# Patient Record
Sex: MALE | Race: WHITE | Employment: UNEMPLOYED | URBAN - METROPOLITAN AREA
[De-identification: names, ages, dates, MRNs, and addresses within clinical notes are randomized per-mention and may not be internally consistent; named-entity substitution may affect disease eponyms.]

---

## 2017-01-13 RX ORDER — DULOXETIN HYDROCHLORIDE 30 MG/1
CAPSULE, DELAYED RELEASE ORAL
Qty: 60 CAPSULE | Refills: 5 | Status: SHIPPED | OUTPATIENT
Start: 2017-01-13 | End: 2017-07-16 | Stop reason: SDUPTHER

## 2017-01-14 ENCOUNTER — TELEPHONE (OUTPATIENT)
Dept: FAMILY MEDICINE CLINIC | Age: 47
End: 2017-01-14

## 2017-01-15 ENCOUNTER — TELEPHONE (OUTPATIENT)
Dept: FAMILY MEDICINE CLINIC | Age: 47
End: 2017-01-15

## 2017-03-17 ENCOUNTER — HOSPITAL ENCOUNTER (OUTPATIENT)
Dept: CT IMAGING | Age: 47
Discharge: HOME | End: 2017-03-17
Attending: INTERNAL MEDICINE | Admitting: INTERNAL MEDICINE

## 2017-03-17 ENCOUNTER — OFFICE VISIT (OUTPATIENT)
Dept: FAMILY MEDICINE CLINIC | Age: 47
End: 2017-03-17

## 2017-03-17 VITALS
SYSTOLIC BLOOD PRESSURE: 110 MMHG | BODY MASS INDEX: 29.16 KG/M2 | HEART RATE: 131 BPM | OXYGEN SATURATION: 97 % | DIASTOLIC BLOOD PRESSURE: 80 MMHG | WEIGHT: 220 LBS | TEMPERATURE: 98.2 F

## 2017-03-17 DIAGNOSIS — R10.11 RUQ PAIN: Primary | ICD-10-CM

## 2017-03-17 DIAGNOSIS — F33.1 MODERATE EPISODE OF RECURRENT MAJOR DEPRESSIVE DISORDER (HCC): ICD-10-CM

## 2017-03-17 DIAGNOSIS — R10.11 RIGHT UPPER QUADRANT PAIN: ICD-10-CM

## 2017-03-17 DIAGNOSIS — R10.11 RUQ PAIN: ICD-10-CM

## 2017-03-17 LAB
A/G RATIO: 1.6 (ref 1.1–2.2)
ALBUMIN SERPL-MCNC: 4.5 G/DL (ref 3.4–5)
ALP BLD-CCNC: 114 U/L (ref 40–129)
ALT SERPL-CCNC: 58 U/L (ref 10–40)
ANION GAP SERPL CALCULATED.3IONS-SCNC: 15 MMOL/L (ref 3–16)
AST SERPL-CCNC: 30 U/L (ref 15–37)
BILIRUB SERPL-MCNC: 0.5 MG/DL (ref 0–1)
BUN BLDV-MCNC: 17 MG/DL (ref 7–20)
CALCIUM SERPL-MCNC: 9.4 MG/DL (ref 8.3–10.6)
CHLORIDE BLD-SCNC: 105 MMOL/L (ref 99–110)
CO2: 19 MMOL/L (ref 21–32)
CREAT SERPL-MCNC: 0.8 MG/DL (ref 0.9–1.3)
GFR AFRICAN AMERICAN: >60
GFR NON-AFRICAN AMERICAN: >60
GLOBULIN: 2.9 G/DL
GLUCOSE BLD-MCNC: 105 MG/DL (ref 70–99)
HCT VFR BLD CALC: 51.7 % (ref 40.5–52.5)
HEMOGLOBIN: 17.4 G/DL (ref 13.5–17.5)
MCH RBC QN AUTO: 31.8 PG (ref 26–34)
MCHC RBC AUTO-ENTMCNC: 33.6 G/DL (ref 31–36)
MCV RBC AUTO: 94.7 FL (ref 80–100)
PDW BLD-RTO: 13.7 % (ref 12.4–15.4)
PLATELET # BLD: 300 K/UL (ref 135–450)
PMV BLD AUTO: 8.7 FL (ref 5–10.5)
POTASSIUM SERPL-SCNC: 4.3 MMOL/L (ref 3.5–5.1)
RBC # BLD: 5.46 M/UL (ref 4.2–5.9)
SODIUM BLD-SCNC: 139 MMOL/L (ref 136–145)
TOTAL PROTEIN: 7.4 G/DL (ref 6.4–8.2)
WBC # BLD: 11.4 K/UL (ref 4–11)

## 2017-03-17 PROCEDURE — 99214 OFFICE O/P EST MOD 30 MIN: CPT | Performed by: INTERNAL MEDICINE

## 2017-03-17 PROCEDURE — 36415 COLL VENOUS BLD VENIPUNCTURE: CPT | Performed by: INTERNAL MEDICINE

## 2017-03-17 RX ORDER — DULOXETIN HYDROCHLORIDE 30 MG/1
CAPSULE, DELAYED RELEASE ORAL
Qty: 60 CAPSULE | Refills: 5 | Status: CANCELLED | OUTPATIENT
Start: 2017-03-17

## 2017-03-17 RX ORDER — DICYCLOMINE HYDROCHLORIDE 10 MG/1
10 CAPSULE ORAL 3 TIMES DAILY PRN
Qty: 60 CAPSULE | Refills: 5 | Status: SHIPPED | OUTPATIENT
Start: 2017-03-17 | End: 2017-11-02

## 2017-03-17 RX ORDER — CYCLOBENZAPRINE HCL 10 MG
TABLET ORAL
Qty: 90 TABLET | Refills: 5 | Status: SHIPPED | OUTPATIENT
Start: 2017-03-17 | End: 2017-11-15 | Stop reason: SDUPTHER

## 2017-03-17 RX ORDER — OMEPRAZOLE 40 MG/1
40 CAPSULE, DELAYED RELEASE ORAL DAILY
Qty: 30 CAPSULE | Refills: 5 | Status: SHIPPED | OUTPATIENT
Start: 2017-03-17 | End: 2017-03-20

## 2017-03-17 RX ORDER — TRAZODONE HYDROCHLORIDE 50 MG/1
100 TABLET ORAL NIGHTLY
Qty: 60 TABLET | Refills: 2 | Status: SHIPPED | OUTPATIENT
Start: 2017-03-17 | End: 2018-03-19 | Stop reason: SDUPTHER

## 2017-03-17 ASSESSMENT — ENCOUNTER SYMPTOMS
ABDOMINAL PAIN: 1
DIARRHEA: 0
CONSTIPATION: 0
NAUSEA: 0
BLOOD IN STOOL: 1
VOMITING: 0

## 2017-06-19 ENCOUNTER — OFFICE VISIT (OUTPATIENT)
Dept: FAMILY MEDICINE CLINIC | Age: 47
End: 2017-06-19

## 2017-06-19 VITALS
SYSTOLIC BLOOD PRESSURE: 110 MMHG | WEIGHT: 237 LBS | HEART RATE: 98 BPM | DIASTOLIC BLOOD PRESSURE: 76 MMHG | BODY MASS INDEX: 31.41 KG/M2 | OXYGEN SATURATION: 96 % | RESPIRATION RATE: 18 BRPM

## 2017-06-19 DIAGNOSIS — R60.9 SWELLING: Primary | ICD-10-CM

## 2017-06-19 DIAGNOSIS — R06.02 SHORTNESS OF BREATH: ICD-10-CM

## 2017-06-19 PROCEDURE — 99213 OFFICE O/P EST LOW 20 MIN: CPT | Performed by: NURSE PRACTITIONER

## 2017-06-19 ASSESSMENT — ENCOUNTER SYMPTOMS
DOUBLE VISION: 0
HEMOPTYSIS: 0
ORTHOPNEA: 0
SPUTUM PRODUCTION: 0
WHEEZING: 1
SHORTNESS OF BREATH: 1
BLURRED VISION: 1
COUGH: 1

## 2017-06-20 ENCOUNTER — OFFICE VISIT (OUTPATIENT)
Dept: FAMILY MEDICINE CLINIC | Age: 47
End: 2017-06-20

## 2017-06-20 VITALS
WEIGHT: 237 LBS | HEART RATE: 102 BPM | OXYGEN SATURATION: 96 % | HEIGHT: 73 IN | SYSTOLIC BLOOD PRESSURE: 120 MMHG | BODY MASS INDEX: 31.41 KG/M2 | DIASTOLIC BLOOD PRESSURE: 78 MMHG

## 2017-06-20 DIAGNOSIS — M79.89 LEG SWELLING: Primary | ICD-10-CM

## 2017-06-20 PROCEDURE — 99213 OFFICE O/P EST LOW 20 MIN: CPT | Performed by: FAMILY MEDICINE

## 2017-06-20 RX ORDER — FUROSEMIDE 20 MG/1
20 TABLET ORAL DAILY
Qty: 5 TABLET | Refills: 0 | Status: SHIPPED | OUTPATIENT
Start: 2017-06-20 | End: 2017-06-30 | Stop reason: SDUPTHER

## 2017-06-29 ENCOUNTER — TELEPHONE (OUTPATIENT)
Dept: FAMILY MEDICINE CLINIC | Age: 47
End: 2017-06-29

## 2017-06-29 DIAGNOSIS — M79.89 LEG SWELLING: ICD-10-CM

## 2017-06-30 RX ORDER — POTASSIUM CHLORIDE 750 MG/1
10 TABLET, EXTENDED RELEASE ORAL DAILY
Qty: 10 TABLET | Refills: 0 | Status: SHIPPED | OUTPATIENT
Start: 2017-06-30 | End: 2017-08-08 | Stop reason: ALTCHOICE

## 2017-06-30 RX ORDER — FUROSEMIDE 40 MG/1
40 TABLET ORAL DAILY
Qty: 5 TABLET | Refills: 0 | Status: SHIPPED | OUTPATIENT
Start: 2017-06-30 | End: 2017-07-05 | Stop reason: SDUPTHER

## 2017-07-05 ENCOUNTER — TELEPHONE (OUTPATIENT)
Dept: FAMILY MEDICINE CLINIC | Age: 47
End: 2017-07-05

## 2017-07-05 DIAGNOSIS — M79.89 LEG SWELLING: ICD-10-CM

## 2017-07-05 RX ORDER — FUROSEMIDE 40 MG/1
40 TABLET ORAL DAILY
Qty: 10 TABLET | Refills: 0 | Status: SHIPPED | OUTPATIENT
Start: 2017-07-05 | End: 2017-07-21 | Stop reason: SDUPTHER

## 2017-07-07 ENCOUNTER — HOSPITAL ENCOUNTER (OUTPATIENT)
Dept: GENERAL RADIOLOGY | Age: 47
Discharge: HOME | End: 2017-07-07

## 2017-07-07 ENCOUNTER — OFFICE VISIT (OUTPATIENT)
Dept: FAMILY MEDICINE CLINIC | Age: 47
End: 2017-07-07

## 2017-07-07 VITALS
OXYGEN SATURATION: 96 % | HEART RATE: 102 BPM | BODY MASS INDEX: 30.9 KG/M2 | DIASTOLIC BLOOD PRESSURE: 86 MMHG | WEIGHT: 234.2 LBS | SYSTOLIC BLOOD PRESSURE: 124 MMHG

## 2017-07-07 DIAGNOSIS — M25.571 ACUTE RIGHT ANKLE PAIN: ICD-10-CM

## 2017-07-07 DIAGNOSIS — M79.89 LEG SWELLING: ICD-10-CM

## 2017-07-07 DIAGNOSIS — M79.671 RIGHT FOOT PAIN: ICD-10-CM

## 2017-07-07 DIAGNOSIS — M25.571 ACUTE RIGHT ANKLE PAIN: Primary | ICD-10-CM

## 2017-07-07 DIAGNOSIS — M25.561 ACUTE PAIN OF RIGHT KNEE: ICD-10-CM

## 2017-07-07 DIAGNOSIS — M25.521 RIGHT ELBOW PAIN: ICD-10-CM

## 2017-07-07 LAB
ALBUMIN SERPL-MCNC: 4.3 G/DL (ref 3.4–5)
ANION GAP SERPL CALCULATED.3IONS-SCNC: 16 MMOL/L (ref 3–16)
BUN BLDV-MCNC: 24 MG/DL (ref 7–20)
CALCIUM SERPL-MCNC: 9.4 MG/DL (ref 8.3–10.6)
CHLORIDE BLD-SCNC: 101 MMOL/L (ref 99–110)
CO2: 26 MMOL/L (ref 21–32)
CREAT SERPL-MCNC: 0.8 MG/DL (ref 0.9–1.3)
GFR AFRICAN AMERICAN: >60
GFR NON-AFRICAN AMERICAN: >60
GLUCOSE BLD-MCNC: 115 MG/DL (ref 70–99)
PHOSPHORUS: 2.9 MG/DL (ref 2.5–4.9)
POTASSIUM SERPL-SCNC: 4.2 MMOL/L (ref 3.5–5.1)
SEDIMENTATION RATE, ERYTHROCYTE: 18 MM/HR (ref 0–15)
SODIUM BLD-SCNC: 143 MMOL/L (ref 136–145)
URIC ACID, SERUM: 8.2 MG/DL (ref 3.5–7.2)

## 2017-07-07 PROCEDURE — 99213 OFFICE O/P EST LOW 20 MIN: CPT | Performed by: INTERNAL MEDICINE

## 2017-07-07 RX ORDER — TRAMADOL HYDROCHLORIDE 50 MG/1
50 TABLET ORAL EVERY 8 HOURS PRN
Qty: 90 TABLET | Refills: 0 | Status: SHIPPED | OUTPATIENT
Start: 2017-07-07 | End: 2017-11-02

## 2017-07-07 RX ORDER — PROMETHAZINE HYDROCHLORIDE 25 MG/1
25 TABLET ORAL EVERY 8 HOURS PRN
Qty: 20 TABLET | Refills: 1 | Status: CANCELLED | OUTPATIENT
Start: 2017-07-07

## 2017-07-07 RX ORDER — NAPROXEN 500 MG/1
TABLET ORAL
Qty: 60 TABLET | Refills: 5 | Status: CANCELLED | OUTPATIENT
Start: 2017-07-07

## 2017-07-07 ASSESSMENT — PATIENT HEALTH QUESTIONNAIRE - PHQ9
2. FEELING DOWN, DEPRESSED OR HOPELESS: 1
SUM OF ALL RESPONSES TO PHQ QUESTIONS 1-9: 2
1. LITTLE INTEREST OR PLEASURE IN DOING THINGS: 1
SUM OF ALL RESPONSES TO PHQ9 QUESTIONS 1 & 2: 2

## 2017-07-10 ENCOUNTER — TELEPHONE (OUTPATIENT)
Dept: FAMILY MEDICINE CLINIC | Age: 47
End: 2017-07-10

## 2017-07-10 RX ORDER — PREDNISONE 20 MG/1
TABLET ORAL
Qty: 7 TABLET | Refills: 0 | Status: SHIPPED | OUTPATIENT
Start: 2017-07-10 | End: 2017-07-20

## 2017-07-20 ENCOUNTER — HOSPITAL ENCOUNTER (OUTPATIENT)
Dept: NON INVASIVE DIAGNOSTICS | Age: 47
Discharge: HOME | End: 2017-07-20
Attending: INTERNAL MEDICINE | Admitting: INTERNAL MEDICINE

## 2017-07-20 DIAGNOSIS — M79.89 OTHER SPECIFIED SOFT TISSUE DISORDERS: ICD-10-CM

## 2017-07-20 LAB
LV EF: 50 %
LVEF MODALITY: NORMAL

## 2017-07-20 RX ORDER — DULOXETIN HYDROCHLORIDE 60 MG/1
60 CAPSULE, DELAYED RELEASE ORAL DAILY
Qty: 30 CAPSULE | Refills: 5 | Status: SHIPPED | OUTPATIENT
Start: 2017-07-20 | End: 2017-07-21

## 2017-07-21 ENCOUNTER — OFFICE VISIT (OUTPATIENT)
Dept: FAMILY MEDICINE CLINIC | Age: 47
End: 2017-07-21

## 2017-07-21 VITALS
HEART RATE: 88 BPM | DIASTOLIC BLOOD PRESSURE: 80 MMHG | BODY MASS INDEX: 29.18 KG/M2 | OXYGEN SATURATION: 97 % | WEIGHT: 221.2 LBS | SYSTOLIC BLOOD PRESSURE: 124 MMHG

## 2017-07-21 DIAGNOSIS — M25.571 ACUTE RIGHT ANKLE PAIN: ICD-10-CM

## 2017-07-21 DIAGNOSIS — E78.00 PURE HYPERCHOLESTEROLEMIA: ICD-10-CM

## 2017-07-21 DIAGNOSIS — Z11.4 SCREENING FOR HIV (HUMAN IMMUNODEFICIENCY VIRUS): ICD-10-CM

## 2017-07-21 DIAGNOSIS — I51.89 CARDIAC DYSFUNCTION: Primary | ICD-10-CM

## 2017-07-21 DIAGNOSIS — M79.89 LEG SWELLING: ICD-10-CM

## 2017-07-21 LAB
CHOLESTEROL, TOTAL: 189 MG/DL (ref 0–199)
HDLC SERPL-MCNC: 31 MG/DL (ref 40–60)
LDL CHOLESTEROL CALCULATED: 120 MG/DL
TRIGL SERPL-MCNC: 192 MG/DL (ref 0–150)
VLDLC SERPL CALC-MCNC: 38 MG/DL

## 2017-07-21 PROCEDURE — 99214 OFFICE O/P EST MOD 30 MIN: CPT | Performed by: INTERNAL MEDICINE

## 2017-07-21 RX ORDER — DULOXETIN HYDROCHLORIDE 30 MG/1
30 CAPSULE, DELAYED RELEASE ORAL 2 TIMES DAILY
COMMUNITY
End: 2017-07-21 | Stop reason: ALTCHOICE

## 2017-07-21 RX ORDER — OMEPRAZOLE 40 MG/1
CAPSULE, DELAYED RELEASE ORAL
Qty: 30 CAPSULE | Refills: 5 | Status: SHIPPED | OUTPATIENT
Start: 2017-07-21 | End: 2018-03-19 | Stop reason: SDUPTHER

## 2017-07-21 RX ORDER — ONDANSETRON HYDROCHLORIDE 8 MG/1
8 TABLET, FILM COATED ORAL EVERY 8 HOURS PRN
Qty: 30 TABLET | Refills: 1 | Status: SHIPPED | OUTPATIENT
Start: 2017-07-21 | End: 2017-11-02

## 2017-07-21 RX ORDER — FUROSEMIDE 40 MG/1
40 TABLET ORAL DAILY PRN
Qty: 30 TABLET | Refills: 1 | Status: SHIPPED | OUTPATIENT
Start: 2017-07-21 | End: 2017-11-02

## 2017-07-21 RX ORDER — DULOXETIN HYDROCHLORIDE 30 MG/1
30 CAPSULE, DELAYED RELEASE ORAL 2 TIMES DAILY
Qty: 30 CAPSULE | Refills: 5 | Status: CANCELLED | OUTPATIENT
Start: 2017-07-21

## 2017-07-21 ASSESSMENT — ENCOUNTER SYMPTOMS
COUGH: 0
SHORTNESS OF BREATH: 0

## 2017-07-21 NOTE — TELEPHONE ENCOUNTER
Patient wants to know if he can have an early refill on tramadol. Last refill was 7/7/17, patient stated during OV that he was out of medication and was hoping to have pcp give him another prescription.  [address]

## 2017-07-24 LAB — HIV-1 AND HIV-2 ANTIBODIES: NORMAL

## 2017-07-25 RX ORDER — TRAMADOL HYDROCHLORIDE 50 MG/1
50 TABLET ORAL EVERY 8 HOURS PRN
Qty: 90 TABLET | Refills: 0 | OUTPATIENT
Start: 2017-07-25

## 2017-07-25 NOTE — TELEPHONE ENCOUNTER
90 tabs should have lasted one month, if this were taken as prescribed. No refill at this point. Schedule OV in 2 weeks to reassess and discuss pain medication.

## 2017-08-08 ENCOUNTER — OFFICE VISIT (OUTPATIENT)
Dept: CARDIOLOGY CLINIC | Age: 47
End: 2017-08-08

## 2017-08-08 VITALS
BODY MASS INDEX: 29.16 KG/M2 | HEART RATE: 89 BPM | SYSTOLIC BLOOD PRESSURE: 120 MMHG | HEIGHT: 73 IN | OXYGEN SATURATION: 96 % | WEIGHT: 220 LBS | DIASTOLIC BLOOD PRESSURE: 84 MMHG

## 2017-08-08 DIAGNOSIS — R06.02 SOB (SHORTNESS OF BREATH) ON EXERTION: ICD-10-CM

## 2017-08-08 DIAGNOSIS — Z76.89 ENCOUNTER TO ESTABLISH CARE: Primary | ICD-10-CM

## 2017-08-08 DIAGNOSIS — I10 ESSENTIAL HYPERTENSION: ICD-10-CM

## 2017-08-08 DIAGNOSIS — R07.2 PRECORDIAL PAIN: ICD-10-CM

## 2017-08-08 PROBLEM — R07.9 CHEST PAIN: Status: ACTIVE | Noted: 2017-08-08

## 2017-08-08 PROCEDURE — 93000 ELECTROCARDIOGRAM COMPLETE: CPT | Performed by: INTERNAL MEDICINE

## 2017-08-08 PROCEDURE — 99245 OFF/OP CONSLTJ NEW/EST HI 55: CPT | Performed by: INTERNAL MEDICINE

## 2017-08-08 RX ORDER — DULOXETIN HYDROCHLORIDE 60 MG/1
60 CAPSULE, DELAYED RELEASE ORAL DAILY
COMMUNITY
End: 2018-01-23 | Stop reason: SDUPTHER

## 2017-09-07 ENCOUNTER — OFFICE VISIT (OUTPATIENT)
Dept: CARDIOLOGY CLINIC | Age: 47
End: 2017-09-07

## 2017-09-07 VITALS
SYSTOLIC BLOOD PRESSURE: 120 MMHG | DIASTOLIC BLOOD PRESSURE: 98 MMHG | HEIGHT: 73 IN | BODY MASS INDEX: 30.62 KG/M2 | WEIGHT: 231 LBS | OXYGEN SATURATION: 95 % | HEART RATE: 106 BPM

## 2017-09-07 DIAGNOSIS — I10 HTN (HYPERTENSION), MALIGNANT: ICD-10-CM

## 2017-09-07 DIAGNOSIS — R07.2 PRECORDIAL PAIN: Primary | ICD-10-CM

## 2017-09-07 DIAGNOSIS — I51.9 LV DYSFUNCTION: ICD-10-CM

## 2017-09-07 PROCEDURE — 99214 OFFICE O/P EST MOD 30 MIN: CPT | Performed by: NURSE PRACTITIONER

## 2017-09-07 RX ORDER — VALSARTAN 160 MG/1
160 TABLET ORAL DAILY
Qty: 30 TABLET | Refills: 3 | Status: SHIPPED | OUTPATIENT
Start: 2017-09-07 | End: 2018-01-15 | Stop reason: SDUPTHER

## 2017-10-12 ENCOUNTER — TELEPHONE (OUTPATIENT)
Dept: CARDIOLOGY | Age: 47
End: 2017-10-12

## 2017-10-18 ENCOUNTER — OFFICE VISIT (OUTPATIENT)
Dept: CARDIOLOGY CLINIC | Age: 47
End: 2017-10-18

## 2017-10-18 VITALS
BODY MASS INDEX: 30.35 KG/M2 | HEIGHT: 73 IN | HEART RATE: 100 BPM | DIASTOLIC BLOOD PRESSURE: 84 MMHG | OXYGEN SATURATION: 95 % | WEIGHT: 229 LBS | SYSTOLIC BLOOD PRESSURE: 126 MMHG

## 2017-10-18 DIAGNOSIS — I51.9 LV DYSFUNCTION: ICD-10-CM

## 2017-10-18 DIAGNOSIS — I10 HTN (HYPERTENSION), MALIGNANT: Primary | ICD-10-CM

## 2017-10-18 DIAGNOSIS — I25.110 CORONARY ARTERY DISEASE INVOLVING NATIVE CORONARY ARTERY OF NATIVE HEART WITH UNSTABLE ANGINA PECTORIS (HCC): ICD-10-CM

## 2017-10-18 DIAGNOSIS — F41.9 ANXIETY: ICD-10-CM

## 2017-10-18 PROCEDURE — 99214 OFFICE O/P EST MOD 30 MIN: CPT | Performed by: NURSE PRACTITIONER

## 2017-10-18 NOTE — PROGRESS NOTES
[address]   Cardiac Evaluation    Primary Care Doctor:  Brennan Gaucher, MD    Chief Complaint   Patient presents with    1 Month Follow-Up    Hypertension    Discuss Labs     08/09/2017    Medication Adjustment    Shortness of Breath     can't breath w/ any activity    Fatigue    Altered Mental Status     confused    Headache     everyday        History of Present Illness:   I had the pleasure of seeing Karoline Jones in follow up for non-obstructive coronary artery disease, HTN and LV dysfunction. At last visit we stopped the metoprolol and started valsartan 160 mg daily for continue chest pain and LV dysfunction. He called with symptoms of shortness of breath and dizziness, was recommended evaluation in the ED but he declined. Today he is frustrated with continued symptoms. He has fatigue and dizziness with any activity or without activity. He complains of numbness in left hand from nerve damage and a severe headache (few days ago). He feels no better and maybe a bit worse since last visit and the valsartan. His main complaint is not able to do as much as before and feels a heavy burden to care for those around him. He admits to continued marijuana use and tobacco use. He took a dose of Xanax and had a really good day. Abimael Valentin describes symptoms including chest pain, dyspnea, fatigue but denies palpitations, orthopnea, PND, early saiety, edema, syncope. NYHA:   II  ACC/ AHA Stage:    C    Past Medical History:   has a past medical history of ADHD (attention deficit hyperactivity disorder); Chronic back pain; ED (erectile dysfunction); GERD (gastroesophageal reflux disease); Hypertension; and Pleurisy. Surgical History:   has a past surgical history that includes Lung surgery; Spine surgery (1995); Arm Surgery (Left, 12/10/2015); and Coronary angioplasty. Social History:   reports that he has been smoking Cigarettes. He has been smoking about 0.50 packs per day.  He has never used smokeless tobacco. He reports that he uses drugs, including Marijuana. He reports that he does not drink alcohol. Family History:   Family History   Problem Relation Age of Onset    Arthritis Mother     Asthma Father        Home Medications:  Prior to Admission medications    Medication Sig Start Date End Date Taking? Authorizing Provider   valsartan (DIOVAN) 160 MG tablet Take 1 tablet by mouth daily 9/7/17  Yes Damir Hernandes NP   aspirin 81 MG tablet Take 81 mg by mouth daily   Yes Historical Provider, MD   atorvastatin (LIPITOR) 40 MG tablet Take 1 tablet by mouth daily 8/9/17  Yes Skyla Escalona MD   DULoxetine (CYMBALTA) 60 MG extended release capsule Take 60 mg by mouth daily   Yes Historical Provider, MD   omeprazole (PRILOSEC) 40 MG delayed release capsule TAKE ONE CAPSULE BY MOUTH DAILY 7/21/17  Yes Destiny Estrada MD   ondansetron (ZOFRAN) 8 MG tablet Take 1 tablet by mouth every 8 hours as needed for Nausea or Vomiting 7/21/17  Yes Destiny Estrada MD   furosemide (LASIX) 40 MG tablet Take 1 tablet by mouth daily as needed (edema) 7/21/17  Yes Destiny Estrada MD   traMADol (ULTRAM) 50 MG tablet Take 1 tablet by mouth every 8 hours as needed for Pain 7/7/17  Yes Destiny Estrada MD   cyclobenzaprine (FLEXERIL) 10 MG tablet TAKE ONE TABLET BY MOUTH EVERY 8 HOURS AS NEEDED FOR MUSCLE SPASMS 3/17/17  Yes Destiny Estrada MD   dicyclomine (BENTYL) 10 MG capsule Take 1 capsule by mouth 3 times daily as needed (abd pain) 3/17/17  Yes Destiny Estrada MD   traZODone (DESYREL) 50 MG tablet Take 2 tablets by mouth nightly  Patient taking differently: Take 100 mg by mouth as needed  3/17/17  Yes Destiny Estrada MD        Allergies:  Lexapro [escitalopram oxalate]; Morphine; Prozac [fluoxetine hcl]; and Wellbutrin [bupropion]     Review of Systems:   · Constitutional: there has been no unanticipated weight loss. There's been no change in energy level, sleep pattern, or activity level. cardiomyopathy/myocarditis. Assessment:    1. HTN (hypertension), malignant    2. LV dysfunction    3. Coronary artery disease involving native coronary artery of native heart with unstable angina pectoris ([address]    4. Anxiety          Plan:   1. No change in heart medicines - same dose of valsartan 160 mg  2. Have blood work to check potassium and kidney's  3. Follow up with Dr. Boogie Nagy in 3 months  4. Look into the [place] [id number] for evaluation/ treatment of ADHD and anxiety disorder      I appreciate the opportunity of cooperating in the care of this individual.    Elisa Lane CNP, 10/18/2017, 1:33 PM    QUALITY MEASURES  1. Tobacco Cessation Counseling: Yes  2. Retake of BP if >140/90:   NA  3. Documentation to PCP/referring for new patient:  Sent to PCP at close of office visit  4. CAD patient on anti-platelet: Yes  5. CAD patient on STATIN therapy:  Yes  6.  Patient with CHF and aFib on anticoagulation:  NA

## 2017-10-18 NOTE — PATIENT INSTRUCTIONS
1. No change in heart medicines - same dose of valsartan 160 mg  2. Have blood work to check potassium and kidney's  3. Follow up with Dr. Al Rodriguez in 3 months  4.  [facility]/ treatment of ADHD and anxiety disorder

## 2017-11-03 ENCOUNTER — TELEPHONE (OUTPATIENT)
Dept: CASE MANAGEMENT | Age: 47
End: 2017-11-03

## 2017-11-03 DIAGNOSIS — R91.1 PULMONARY NODULE SEEN ON IMAGING STUDY: Primary | ICD-10-CM

## 2017-11-03 NOTE — TELEPHONE ENCOUNTER
Dr Vila Opitz, I received the imaging report from the ED MD due to incidental pulmonary nodule findings. I did not see a f/u visit scheduled with you yet and just wanted to see if you would like me to contact the pt regarding nodule finding and get him in with pulmonology based upon his risk factors. Thanks Montse Saldaña Lung Navigator    EXAMINATION:   CTA OF THE CHEST 11/2/2017 1:21 pm       TECHNIQUE:   CTA of the chest was performed after the administration of intravenous   contrast.  Multiplanar reformatted images are provided for review.  MIP   images are provided for review. Dose modulation, iterative reconstruction,   and/or weight based adjustment of the mA/kV was utilized to reduce the   radiation dose to as low as reasonably achievable.       COMPARISON:   Chest radiograph same date and CT abdomen and pelvis March 17, 2017.  No   prior dedicated chest CTs.       HISTORY:   ORDERING PHYSICIAN PROVIDED HISTORY: right rib pain tachy ho pneumo in past   TECHNOLOGIST PROVIDED HISTORY:   If patient is on cardiac monitor and/or pulse ox, they may be taken off   cardiac monitor and pulse ox, left on O2 if currently on. All monitors   reattached when patient returns to room. Technologist Provided Reason for Exam: RT CHEST CONTUSION FROM A FALL/ RIB   PAIN/ HX PNEUMOTHORAX   Acuity: Acute   Type of Encounter: Initial       FINDINGS:   Pulmonary Arteries: Evaluation for pulmonary embolism is nondiagnostic due to   poor bolus timing with the majority of the contrast being within the aorta. No obvious central pulmonary embolism identified.  Main pulmonary artery is   normal in caliber.       Mediastinum: There are few mildly enlarged mediastinal lymph nodes.  The   largest is in AP window lymph node measuring 1 cm in short axis dimension.    There is also mild right hilar lymphadenopathy.  The heart and pericardium   demonstrate no acute abnormality.  There is no acute abnormality of the   thoracic aorta.       Lungs/pleura: The central airways are patent.  Emphysematous changes are seen   within the lungs.  An area of scarring is seen within the right lung apex.  A   few areas of nodularity are seen along the superior left major fissure.    Largest area measures 7 mm on image number 106.  There are few other smaller   scattered indeterminate noncalcified nodules within the left lung.  There   mild linear dependent airspace opacities in both lungs with a linear band of   atelectasis versus scar in the left lower lobe.  No pneumothorax or pleural   effusion identified.       Upper Abdomen: Small hepatic cyst is again seen.  Visualized upper abdomen is   without acute process.       Soft Tissues/Bones: No acute bone or soft tissue abnormality.           Impression   Nondiagnostic evaluation for pulmonary embolism due to technical factors.  No   obvious central pulmonary embolism.       Mild bibasilar atelectasis.       A few small areas of nodularity are seen along the left major fissure as well   as a few small scattered indeterminate noncalcified lung nodules.  These all   measure 7 mm or less.  Follow-up recommended based on risk factors.       Fleischner Society guidelines for follow-up and management of incidentally   detected pulmonary nodules:

## 2017-11-03 NOTE — TELEPHONE ENCOUNTER
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  Multiplanar reformatted images are provided for review.  MIP   images are provided for review. Dose modulation, iterative reconstruction,   and/or weight based adjustment of the mA/kV was utilized to reduce the   radiation dose to as low as reasonably achievable.       COMPARISON:   Chest radiograph same date and CT abdomen and pelvis March 17, 2017.  No   prior dedicated chest CTs.       HISTORY:   ORDERING PHYSICIAN PROVIDED HISTORY: right rib pain tachy ho pneumo in past   TECHNOLOGIST PROVIDED HISTORY:   If patient is on cardiac monitor and/or pulse ox, they may be taken off   cardiac monitor and pulse ox, left on O2 if currently on. All monitors   reattached when patient returns to room. Technologist Provided Reason for Exam: RT CHEST CONTUSION FROM A FALL/ RIB   PAIN/ HX PNEUMOTHORAX   Acuity: Acute   Type of Encounter: Initial       FINDINGS:   Pulmonary Arteries: Evaluation for pulmonary embolism is nondiagnostic due to   poor bolus timing with the majority of the contrast being within the aorta. No obvious central pulmonary embolism identified.  Main pulmonary artery is   normal in caliber.       Mediastinum: There are few mildly enlarged mediastinal lymph nodes.  The   largest is in AP window lymph node measuring 1 cm in short axis dimension. There is also mild right hilar lymphadenopathy.  The heart and pericardium   demonstrate no acute abnormality.  There is no acute abnormality of the   thoracic aorta.       Lungs/pleura:  The central airways are patent.  Emphysematous changes are seen   within the lungs.  An area of scarring is seen within the right lung apex.  A   few areas of nodularity are seen along the superior left major fissure. Largest area measures 7 mm on image number 106.  There are few other smaller   scattered indeterminate noncalcified nodules within the left lung.  There   mild linear dependent airspace opacities in both lungs with a linear band of   atelectasis versus scar in the left lower lobe.  No pneumothorax or pleural   effusion identified.       Upper Abdomen: Small hepatic cyst is again seen.  Visualized upper abdomen is   without acute process.       Soft Tissues/Bones: No acute bone or soft tissue abnormality.           Impression   Nondiagnostic evaluation for pulmonary embolism due to technical factors.  No   obvious central pulmonary embolism.       Mild bibasilar atelectasis.       A few small areas of nodularity are seen along the left major fissure as well   as a few small scattered indeterminate noncalcified lung nodules.  These all   measure 7 mm or less.  Follow-up recommended based on risk factors.

## 2017-11-06 ENCOUNTER — OFFICE VISIT (OUTPATIENT)
Dept: PULMONOLOGY | Age: 47
End: 2017-11-06

## 2017-11-06 VITALS
RESPIRATION RATE: 16 BRPM | WEIGHT: 233.8 LBS | SYSTOLIC BLOOD PRESSURE: 122 MMHG | HEIGHT: 73 IN | DIASTOLIC BLOOD PRESSURE: 78 MMHG | TEMPERATURE: 98.2 F | OXYGEN SATURATION: 97 % | HEART RATE: 100 BPM | BODY MASS INDEX: 30.99 KG/M2

## 2017-11-06 DIAGNOSIS — R05.9 COUGH: ICD-10-CM

## 2017-11-06 DIAGNOSIS — J43.9 PULMONARY EMPHYSEMA, UNSPECIFIED EMPHYSEMA TYPE (HCC): ICD-10-CM

## 2017-11-06 DIAGNOSIS — R93.89 ABNORMAL CT OF THE CHEST: ICD-10-CM

## 2017-11-06 DIAGNOSIS — R91.8 PULMONARY NODULES: Primary | ICD-10-CM

## 2017-11-06 DIAGNOSIS — R06.02 SOB (SHORTNESS OF BREATH): ICD-10-CM

## 2017-11-06 DIAGNOSIS — F17.200 CURRENT SMOKER: ICD-10-CM

## 2017-11-06 PROCEDURE — G8484 FLU IMMUNIZE NO ADMIN: HCPCS | Performed by: INTERNAL MEDICINE

## 2017-11-06 PROCEDURE — 99244 OFF/OP CNSLTJ NEW/EST MOD 40: CPT | Performed by: INTERNAL MEDICINE

## 2017-11-06 PROCEDURE — G8417 CALC BMI ABV UP PARAM F/U: HCPCS | Performed by: INTERNAL MEDICINE

## 2017-11-06 PROCEDURE — G8598 ASA/ANTIPLAT THER USED: HCPCS | Performed by: INTERNAL MEDICINE

## 2017-11-06 PROCEDURE — G8427 DOCREV CUR MEDS BY ELIG CLIN: HCPCS | Performed by: INTERNAL MEDICINE

## 2017-11-06 PROCEDURE — 3023F SPIROM DOC REV: CPT | Performed by: INTERNAL MEDICINE

## 2017-11-06 PROCEDURE — G8926 SPIRO NO PERF OR DOC: HCPCS | Performed by: INTERNAL MEDICINE

## 2017-11-06 PROCEDURE — 4004F PT TOBACCO SCREEN RCVD TLK: CPT | Performed by: INTERNAL MEDICINE

## 2017-11-06 RX ORDER — ALBUTEROL SULFATE 90 UG/1
2 AEROSOL, METERED RESPIRATORY (INHALATION) EVERY 6 HOURS PRN
Qty: 1 INHALER | Refills: 6 | Status: SHIPPED | OUTPATIENT
Start: 2017-11-06 | End: 2019-07-30 | Stop reason: SDUPTHER

## 2017-11-06 RX ORDER — VARENICLINE TARTRATE 0.5 MG/1
TABLET, FILM COATED ORAL
Qty: 319 TABLET | Refills: 0 | Status: SHIPPED | OUTPATIENT
Start: 2017-11-06 | End: 2018-05-03

## 2017-11-06 NOTE — PROGRESS NOTES
[facility]                                                                    CHIEF COMPLAINT: Pulmonary nodules    Consulting provider: Alisha Coppola MD      HPI:   56years old with history of hypertension had a CT chest 11/2/2017 for chest pain evaluation. The chest reviewed by me and showed pulmonary atelectasis and nodules largest 7 mm. Patient smoker for 33 years- up to 2 ppd. Has cough with clear sputum. No hemoptysis. Occasional SOB, active and can walk as far as he wants. No weight loss. Uses Albuterol once a months- for SOB with help. Past Medical History:   Diagnosis Date    ADHD (attention deficit hyperactivity disorder)     Chronic back pain     ED (erectile dysfunction) 12/29/2011    GERD (gastroesophageal reflux disease)     Hypertension     Pleurisy        Past Surgical History:        Procedure Laterality Date    ARM SURGERY Left 12/10/2015    CORONARY ANGIOPLASTY      LUNG SURGERY      partial removal (right) agent orange    SPINE SURGERY  1995    lumbar laminectomy       Allergies:  is allergic to lexapro [escitalopram oxalate]; morphine; prozac [fluoxetine hcl]; and wellbutrin [bupropion]. Social History:    TOBACCO:   reports that he has been smoking Cigarettes. He has a 57.75 pack-year smoking history. He quit smokeless tobacco use about 2 years ago. ETOH:   reports that he does not drink alcohol.       Family History:       Problem Relation Age of Onset    Arthritis Mother     Other Mother      pneumothorax several times    Asthma Father        Current Medications:    Current Outpatient Prescriptions:     oxyCODONE-acetaminophen (PERCOCET) 5-325 MG per tablet, Take 1 tablet by mouth every 4 hours as needed for Pain ., Disp: 12 tablet, Rfl: 0    valsartan (DIOVAN) 160 MG tablet, Take 1 tablet by mouth daily, Disp: 30 tablet, Rfl: 3    aspirin 81 MG tablet, Take 81 mg by mouth daily, Disp: , Rfl:     atorvastatin (LIPITOR) 40 MG tablet, Take 1 tablet by mouth daily, Disp: 30 tablet, Rfl: 3    DULoxetine (CYMBALTA) 60 MG extended release capsule, Take 60 mg by mouth daily, Disp: , Rfl:     omeprazole (PRILOSEC) 40 MG delayed release capsule, TAKE ONE CAPSULE BY MOUTH DAILY, Disp: 30 capsule, Rfl: 5    cyclobenzaprine (FLEXERIL) 10 MG tablet, TAKE ONE TABLET BY MOUTH EVERY 8 HOURS AS NEEDED FOR MUSCLE SPASMS, Disp: 90 tablet, Rfl: 5    traZODone (DESYREL) 50 MG tablet, Take 2 tablets by mouth nightly (Patient taking differently: Take 100 mg by mouth as needed ), Disp: 60 tablet, Rfl: 2      REVIEW OF SYSTEMS:  Constitutional: Negative for fever  HENT: Negative for sore throat  Eyes: Negative for redness   Respiratory: +  cough  Cardiovascular: Negative for chest pain  Gastrointestinal: Negative for vomiting, diarrhea   Genitourinary: Negative for hematuria   Musculoskeletal: Negative for arthralgias   Skin: Negative for rash  Neurological: Negative for syncope  Hematological: Negative for adenopathy  Psychiatric/Behavorial: Negative for anxiety      Objective:   PHYSICAL EXAM:    Blood pressure 122/78, pulse 100, temperature 98.2 °F (36.8 °C), temperature source Oral, resp. rate 16, height 6' 1\" (1.854 m), weight 233 lb 12.8 oz (106.1 kg), SpO2 97 %.' on RA  Gen: No distress. Eyes: PERRL. No sclera icterus. No conjunctival injection. ENT: No discharge. Pharynx clear. Neck: Trachea midline. No obvious mass. Resp: No accessory muscle use. No crackles. No wheezes. No rhonchi. No dullness on percussion. Decreased air entry. CV: Regular rate. Regular rhythm. No murmur or rub. No edema. GI: Non-tender. Non-distended. No hernia. Skin: Warm and dry. No nodule on exposed extremities. Lymph: No cervical LAD. No supraclavicular LAD. M/S: No cyanosis. No joint deformity. No clubbing. Neuro: Awake. Alert. Moves all four extremities. Psych: Oriented x 3. No anxiety.            DATA reviewed by me:     CT chest 11/2/2017 images reviewed by me and showed:  Nondiagnostic evaluation for pulmonary embolism due to technical factors. No obvious central pulmonary embolism. Mild bibasilar atelectasis. A few small areas of nodularity are seen along the left major fissure as well as a few small scattered indeterminate noncalcified lung nodules. These all measure 7 mm or less. Follow-up recommended based on risk factors. Assessment:       · Pulmonary nodules on CT chest 11/2/2017, largest 7 mm. DDx granuloma versus intrapulmonary lymph node versus malignancy. We'll follow-up radiographically  · Abnormal CT chest with bibasilar atelectasis  · Pulmonary emphysema   · Recurrent PTX post VATS 1995   · Cough and SOB- favor emphysema related and deconditioning  · >33 pack year smoking       Plan:       Options of Bx, resection and watchful waiting were discussed with patient. I recommend radiographic follow up CT chest in 3 months  PFTs  Albuterol 2 puffs Q4-6 hrs PRN  Smoking cessation counseling. Patient was advised to visit smokefree. gov and [facility]. Will refer to [id number] smoking cessation program.   Willing to try Chantix. Risks, benefits and side effects were discussed with patient.

## 2017-12-20 RX ORDER — ATORVASTATIN CALCIUM 40 MG/1
TABLET, FILM COATED ORAL
Qty: 30 TABLET | Refills: 2 | Status: SHIPPED | OUTPATIENT
Start: 2017-12-20 | End: 2018-03-20 | Stop reason: SDUPTHER

## 2018-01-15 RX ORDER — VALSARTAN 160 MG/1
TABLET ORAL
Qty: 30 TABLET | Refills: 3 | Status: SHIPPED | OUTPATIENT
Start: 2018-01-15 | End: 2018-05-24 | Stop reason: SDUPTHER

## 2018-01-19 ENCOUNTER — TELEPHONE (OUTPATIENT)
Dept: FAMILY MEDICINE CLINIC | Age: 48
End: 2018-01-19

## 2018-01-23 ENCOUNTER — OFFICE VISIT (OUTPATIENT)
Dept: FAMILY MEDICINE CLINIC | Age: 48
End: 2018-01-23

## 2018-01-23 VITALS
WEIGHT: 231.6 LBS | SYSTOLIC BLOOD PRESSURE: 138 MMHG | DIASTOLIC BLOOD PRESSURE: 60 MMHG | BODY MASS INDEX: 30.56 KG/M2 | OXYGEN SATURATION: 97 % | HEART RATE: 125 BPM

## 2018-01-23 DIAGNOSIS — F33.1 MODERATE EPISODE OF RECURRENT MAJOR DEPRESSIVE DISORDER (HCC): Primary | ICD-10-CM

## 2018-01-23 DIAGNOSIS — L63.9 ALOPECIA AREATA: ICD-10-CM

## 2018-01-23 DIAGNOSIS — I10 ESSENTIAL HYPERTENSION: ICD-10-CM

## 2018-01-23 PROCEDURE — G8484 FLU IMMUNIZE NO ADMIN: HCPCS | Performed by: INTERNAL MEDICINE

## 2018-01-23 PROCEDURE — 90732 PPSV23 VACC 2 YRS+ SUBQ/IM: CPT | Performed by: INTERNAL MEDICINE

## 2018-01-23 PROCEDURE — 90472 IMMUNIZATION ADMIN EACH ADD: CPT | Performed by: INTERNAL MEDICINE

## 2018-01-23 PROCEDURE — G8427 DOCREV CUR MEDS BY ELIG CLIN: HCPCS | Performed by: INTERNAL MEDICINE

## 2018-01-23 PROCEDURE — 99214 OFFICE O/P EST MOD 30 MIN: CPT | Performed by: INTERNAL MEDICINE

## 2018-01-23 PROCEDURE — 4004F PT TOBACCO SCREEN RCVD TLK: CPT | Performed by: INTERNAL MEDICINE

## 2018-01-23 PROCEDURE — 90471 IMMUNIZATION ADMIN: CPT | Performed by: INTERNAL MEDICINE

## 2018-01-23 PROCEDURE — G8417 CALC BMI ABV UP PARAM F/U: HCPCS | Performed by: INTERNAL MEDICINE

## 2018-01-23 PROCEDURE — G8598 ASA/ANTIPLAT THER USED: HCPCS | Performed by: INTERNAL MEDICINE

## 2018-01-23 PROCEDURE — 90686 IIV4 VACC NO PRSV 0.5 ML IM: CPT | Performed by: INTERNAL MEDICINE

## 2018-01-23 RX ORDER — BUSPIRONE HYDROCHLORIDE 10 MG/1
10 TABLET ORAL 2 TIMES DAILY
Qty: 60 TABLET | Refills: 1 | Status: SHIPPED | OUTPATIENT
Start: 2018-01-23 | End: 2018-03-19 | Stop reason: SDUPTHER

## 2018-01-28 NOTE — PROGRESS NOTES
likely stress induced. Declines referral to derm for steroid injections. Plan:      As above and per orders.

## 2018-02-08 ENCOUNTER — OFFICE VISIT (OUTPATIENT)
Dept: PULMONOLOGY | Age: 48
End: 2018-02-08

## 2018-02-08 ENCOUNTER — HOSPITAL ENCOUNTER (OUTPATIENT)
Dept: CT IMAGING | Age: 48
Discharge: HOME | End: 2018-02-08
Attending: INTERNAL MEDICINE | Admitting: INTERNAL MEDICINE

## 2018-02-08 VITALS
RESPIRATION RATE: 18 BRPM | DIASTOLIC BLOOD PRESSURE: 76 MMHG | WEIGHT: 236 LBS | BODY MASS INDEX: 31.28 KG/M2 | HEIGHT: 73 IN | TEMPERATURE: 98.1 F | SYSTOLIC BLOOD PRESSURE: 120 MMHG | HEART RATE: 121 BPM | OXYGEN SATURATION: 97 %

## 2018-02-08 DIAGNOSIS — J43.9 PULMONARY EMPHYSEMA, UNSPECIFIED EMPHYSEMA TYPE (HCC): ICD-10-CM

## 2018-02-08 DIAGNOSIS — R91.1 PULMONARY NODULE: Primary | ICD-10-CM

## 2018-02-08 DIAGNOSIS — R05.9 COUGH: ICD-10-CM

## 2018-02-08 DIAGNOSIS — R91.1 PULMONARY NODULE: ICD-10-CM

## 2018-02-08 PROCEDURE — 99214 OFFICE O/P EST MOD 30 MIN: CPT | Performed by: INTERNAL MEDICINE

## 2018-02-08 PROCEDURE — G8926 SPIRO NO PERF OR DOC: HCPCS | Performed by: INTERNAL MEDICINE

## 2018-02-08 PROCEDURE — G8598 ASA/ANTIPLAT THER USED: HCPCS | Performed by: INTERNAL MEDICINE

## 2018-02-08 PROCEDURE — G8417 CALC BMI ABV UP PARAM F/U: HCPCS | Performed by: INTERNAL MEDICINE

## 2018-02-08 PROCEDURE — G8427 DOCREV CUR MEDS BY ELIG CLIN: HCPCS | Performed by: INTERNAL MEDICINE

## 2018-02-08 PROCEDURE — G8484 FLU IMMUNIZE NO ADMIN: HCPCS | Performed by: INTERNAL MEDICINE

## 2018-02-08 PROCEDURE — 4004F PT TOBACCO SCREEN RCVD TLK: CPT | Performed by: INTERNAL MEDICINE

## 2018-02-08 PROCEDURE — 3023F SPIROM DOC REV: CPT | Performed by: INTERNAL MEDICINE

## 2018-02-08 NOTE — PROGRESS NOTES
extremities. Psych: Oriented x 3. No anxiety. DATA reviewed by me:     CT chest 11/2/2017 images reviewed by me and showed:  Nondiagnostic evaluation for pulmonary embolism due to technical factors. No obvious central pulmonary embolism. Mild bibasilar atelectasis. A few small areas of nodularity are seen along the left major fissure as well as a few small scattered indeterminate noncalcified lung nodules. These all measure 7 mm or less. Follow-up recommended based on risk factors. CT chest 2/8/2018  images reviewed by me and showed:   1. No acute intrapulmonary findings. 2. Interval resolution of a few scattered pulmonary nodules as seen within the lungs the prior stud 11/02/2017, which were likely infectious inflammatory in etiology at that time. 3. There are 2 stable subcentimeter subpleural nodules within the left lung apex, the largest measuring 6 x 2 mm in size    Assessment:       · Pulmonary nodules on CT chest 11/2/2017, largest 6.6 mm. DDx granuloma versus intrapulmonary lymph node versus malignancy. Stable/imprtoving on repeat CT chest 2/8/2018   · Abnormal CT chest with bibasilar atelectasis  · Pulmonary emphysema   · Recurrent PTX post VATS 1995   · Cough and SOB- favor emphysema related and deconditioning  · >33 pack year smoking       Plan:       CT chest 12 months   PFTs  Albuterol 2 puffs Q4-6 hrs PRN  Smoking cessation counseling  Advised to start Chantix. Risks, benefits and side effects were discussed with patient.

## 2018-02-08 NOTE — PATIENT INSTRUCTIONS
Please keep all of your future appointments scheduled by [facility] [address], [facility] office. PFT PREP  Nothing my mouth 1 hour prior to test (water only is OK). No smoking or inhalers 4 hours prior to test unless directed differently by the physician. Please PRE-REGISTER at [phone] option 2, option 2,prior to your test date. Also, please remember to arrive 20 to 30 minutes prior to testing unless otherwise instructed. Tips to Help You Stop Smoking (taken from Up-To-Date)      Cigarette smoking is a preventable cause of death in the [place]. Quitting smoking now can decrease your risk of getting smoking-related illnesses like heart disease, stroke, cancer & COPD. S = Set a quit date. T = Tell family, friends, and the people around you that you plan to quit. A = Anticipate or plan ahead for the tough times you'll face while quitting. R = Remove cigarettes and other tobacco products from your home, car, and work. T = Talk to your doctor about getting help to quit. Your doctor may give you medicines to reduce your craving for cigarettes & the unpleasant symptoms that happen when you stop smoking (called withdrawal symptoms). What are the symptoms of withdrawal?  The symptoms include:   ?Trouble sleeping   ? Being irritable, anxious or restless   ? Getting frustrated or angry   ? Having trouble thinking clearly  Nicotine replacement therapy eases withdrawal and reduces your bodys craving for nicotine, the main drug found in cigarettes. Non-prescription forms of nicotine replacement include skin patches, lozenges, and gum. Two prescription medications are available that have been proven to help people stop smoking:  ? Bupropion is a prescription medicine that reduces your desire to smoke. This medicine is sold under the brand names Zyban and Wellbutrin & as a generic formulation. ? Varenicline (brand name: Chantix) is a prescription medicine that reduces

## 2018-02-14 NOTE — TELEPHONE ENCOUNTER
Last Seen: 1/23/2018  Next Appointment: Visit date not found    Requested Prescriptions     Pending Prescriptions Disp Refills    cyclobenzaprine (FLEXERIL) 10 MG tablet [Pharmacy Med Name: CYCLOBENZAPRINE 10 MG TABLET] 90 tablet 1     Sig: TAKE ONE TABLET BY MOUTH EVERY 8 HOURS AS NEEDED FOR MUSCLE SPASMS    DULoxetine (CYMBALTA) 60 MG extended release capsule [Pharmacy Med Name: DULoxetine HCL DR 60 MG CAPSULE] 30 capsule 0     Sig: TAKE ONE CAPSULE BY MOUTH DAILY

## 2018-02-16 RX ORDER — DULOXETIN HYDROCHLORIDE 60 MG/1
CAPSULE, DELAYED RELEASE ORAL
Qty: 30 CAPSULE | Refills: 5 | Status: SHIPPED | OUTPATIENT
Start: 2018-02-16 | End: 2018-05-02

## 2018-02-16 RX ORDER — CYCLOBENZAPRINE HCL 10 MG
TABLET ORAL
Qty: 90 TABLET | Refills: 1 | Status: SHIPPED | OUTPATIENT
Start: 2018-02-16 | End: 2018-04-16 | Stop reason: SDUPTHER

## 2018-03-20 RX ORDER — ATORVASTATIN CALCIUM 40 MG/1
TABLET, FILM COATED ORAL
Qty: 30 TABLET | Refills: 5 | Status: SHIPPED | OUTPATIENT
Start: 2018-03-20 | End: 2018-06-12 | Stop reason: SDUPTHER

## 2018-03-21 RX ORDER — OMEPRAZOLE 40 MG/1
CAPSULE, DELAYED RELEASE ORAL
Qty: 30 CAPSULE | Refills: 4 | Status: SHIPPED | OUTPATIENT
Start: 2018-03-21 | End: 2018-06-11

## 2018-04-13 ENCOUNTER — TELEPHONE (OUTPATIENT)
Dept: CASE MANAGEMENT | Age: 48
End: 2018-04-13

## 2018-04-17 RX ORDER — CYCLOBENZAPRINE HCL 10 MG
TABLET ORAL
Qty: 90 TABLET | Refills: 0 | Status: SHIPPED | OUTPATIENT
Start: 2018-04-17 | End: 2018-04-19 | Stop reason: SDUPTHER

## 2018-04-19 ENCOUNTER — OFFICE VISIT (OUTPATIENT)
Dept: FAMILY MEDICINE CLINIC | Age: 48
End: 2018-04-19

## 2018-04-19 VITALS
TEMPERATURE: 98.5 F | OXYGEN SATURATION: 98 % | WEIGHT: 226 LBS | BODY MASS INDEX: 29.82 KG/M2 | DIASTOLIC BLOOD PRESSURE: 84 MMHG | SYSTOLIC BLOOD PRESSURE: 138 MMHG | HEART RATE: 121 BPM

## 2018-04-19 DIAGNOSIS — N52.9 ERECTILE DYSFUNCTION, UNSPECIFIED ERECTILE DYSFUNCTION TYPE: ICD-10-CM

## 2018-04-19 DIAGNOSIS — K92.0 HEMATEMESIS, PRESENCE OF NAUSEA NOT SPECIFIED: ICD-10-CM

## 2018-04-19 DIAGNOSIS — I10 ESSENTIAL HYPERTENSION: ICD-10-CM

## 2018-04-19 DIAGNOSIS — R11.2 NAUSEA AND VOMITING, INTRACTABILITY OF VOMITING NOT SPECIFIED, UNSPECIFIED VOMITING TYPE: Primary | ICD-10-CM

## 2018-04-19 DIAGNOSIS — M54.50 CHRONIC LOW BACK PAIN WITHOUT SCIATICA, UNSPECIFIED BACK PAIN LATERALITY: ICD-10-CM

## 2018-04-19 DIAGNOSIS — G89.29 CHRONIC LOW BACK PAIN WITHOUT SCIATICA, UNSPECIFIED BACK PAIN LATERALITY: ICD-10-CM

## 2018-04-19 PROCEDURE — G8417 CALC BMI ABV UP PARAM F/U: HCPCS | Performed by: PHYSICIAN ASSISTANT

## 2018-04-19 PROCEDURE — G8427 DOCREV CUR MEDS BY ELIG CLIN: HCPCS | Performed by: PHYSICIAN ASSISTANT

## 2018-04-19 PROCEDURE — 99213 OFFICE O/P EST LOW 20 MIN: CPT | Performed by: PHYSICIAN ASSISTANT

## 2018-04-19 PROCEDURE — G8598 ASA/ANTIPLAT THER USED: HCPCS | Performed by: PHYSICIAN ASSISTANT

## 2018-04-19 PROCEDURE — 4004F PT TOBACCO SCREEN RCVD TLK: CPT | Performed by: PHYSICIAN ASSISTANT

## 2018-04-19 RX ORDER — ONDANSETRON 4 MG/1
4 TABLET, FILM COATED ORAL EVERY 8 HOURS PRN
Qty: 24 TABLET | Refills: 0 | Status: SHIPPED | OUTPATIENT
Start: 2018-04-19 | End: 2018-09-06 | Stop reason: ALTCHOICE

## 2018-04-19 RX ORDER — CYCLOBENZAPRINE HCL 10 MG
TABLET ORAL
Qty: 90 TABLET | Refills: 0 | Status: SHIPPED | OUTPATIENT
Start: 2018-04-19 | End: 2018-06-12 | Stop reason: SDUPTHER

## 2018-04-19 ASSESSMENT — ENCOUNTER SYMPTOMS
RHINORRHEA: 0
CONSTIPATION: 0
DIARRHEA: 0
SHORTNESS OF BREATH: 0
COUGH: 0
ABDOMINAL PAIN: 0
VOMITING: 0
SORE THROAT: 0
NAUSEA: 0

## 2018-04-20 ENCOUNTER — TELEPHONE (OUTPATIENT)
Dept: FAMILY MEDICINE CLINIC | Age: 48
End: 2018-04-20

## 2018-04-20 LAB
ANION GAP SERPL CALCULATED.3IONS-SCNC: 14 MMOL/L (ref 3–16)
BUN BLDV-MCNC: 16 MG/DL (ref 7–20)
CALCIUM SERPL-MCNC: 9.3 MG/DL (ref 8.3–10.6)
CHLORIDE BLD-SCNC: 104 MMOL/L (ref 99–110)
CO2: 24 MMOL/L (ref 21–32)
CREAT SERPL-MCNC: 0.7 MG/DL (ref 0.9–1.3)
GFR AFRICAN AMERICAN: >60
GFR NON-AFRICAN AMERICAN: >60
GLUCOSE BLD-MCNC: 93 MG/DL (ref 70–99)
HCT VFR BLD CALC: 47.3 % (ref 40.5–52.5)
HEMOGLOBIN: 15.6 G/DL (ref 13.5–17.5)
MCH RBC QN AUTO: 31 PG (ref 26–34)
MCHC RBC AUTO-ENTMCNC: 33.1 G/DL (ref 31–36)
MCV RBC AUTO: 93.8 FL (ref 80–100)
PDW BLD-RTO: 13.8 % (ref 12.4–15.4)
PLATELET # BLD: 288 K/UL (ref 135–450)
PMV BLD AUTO: 9.2 FL (ref 5–10.5)
POTASSIUM SERPL-SCNC: 3.7 MMOL/L (ref 3.5–5.1)
PROLACTIN: 13 NG/ML
RBC # BLD: 5.04 M/UL (ref 4.2–5.9)
SODIUM BLD-SCNC: 142 MMOL/L (ref 136–145)
WBC # BLD: 11.4 K/UL (ref 4–11)

## 2018-04-21 LAB
SEX HORMONE BINDING GLOBULIN: 73 NMOL/L (ref 11–80)
TESTOSTERONE FREE-NONMALE: 71 PG/ML (ref 47–244)
TESTOSTERONE TOTAL: 582 NG/DL (ref 220–1000)

## 2018-04-26 ENCOUNTER — INITIAL CONSULT (OUTPATIENT)
Dept: GASTROENTEROLOGY | Age: 48
End: 2018-04-26

## 2018-04-26 VITALS
DIASTOLIC BLOOD PRESSURE: 80 MMHG | HEIGHT: 73 IN | WEIGHT: 222 LBS | SYSTOLIC BLOOD PRESSURE: 134 MMHG | BODY MASS INDEX: 29.42 KG/M2

## 2018-04-26 DIAGNOSIS — G89.29 CHRONIC RLQ PAIN: ICD-10-CM

## 2018-04-26 DIAGNOSIS — K44.9 HIATAL HERNIA: ICD-10-CM

## 2018-04-26 DIAGNOSIS — R10.11 RUQ PAIN: ICD-10-CM

## 2018-04-26 DIAGNOSIS — R11.2 NAUSEA AND VOMITING, INTRACTABILITY OF VOMITING NOT SPECIFIED, UNSPECIFIED VOMITING TYPE: ICD-10-CM

## 2018-04-26 DIAGNOSIS — Q43.3 MALROTATION, CONGENITAL: ICD-10-CM

## 2018-04-26 DIAGNOSIS — R19.7 DIARRHEA, UNSPECIFIED TYPE: Primary | ICD-10-CM

## 2018-04-26 DIAGNOSIS — R10.31 CHRONIC RLQ PAIN: ICD-10-CM

## 2018-04-26 PROCEDURE — 99204 OFFICE O/P NEW MOD 45 MIN: CPT | Performed by: INTERNAL MEDICINE

## 2018-04-26 RX ORDER — LANSOPRAZOLE 30 MG/1
30 CAPSULE, DELAYED RELEASE ORAL 2 TIMES DAILY
Qty: 60 CAPSULE | Refills: 1 | Status: SHIPPED | OUTPATIENT
Start: 2018-04-26 | End: 2018-06-11

## 2018-04-26 RX ORDER — POLYETHYLENE GLYCOL 3350 17 G/17G
255 POWDER ORAL DAILY
Qty: 255 G | Refills: 0 | Status: SHIPPED | OUTPATIENT
Start: 2018-04-26 | End: 2018-06-11

## 2018-04-27 ENCOUNTER — TELEPHONE (OUTPATIENT)
Dept: GASTROENTEROLOGY | Age: 48
End: 2018-04-27

## 2018-04-27 RX ORDER — PANTOPRAZOLE SODIUM 40 MG/1
40 TABLET, DELAYED RELEASE ORAL 2 TIMES DAILY
Qty: 60 TABLET | Refills: 1 | Status: SHIPPED | OUTPATIENT
Start: 2018-04-27 | End: 2018-06-11 | Stop reason: SDUPTHER

## 2018-04-30 ENCOUNTER — TELEPHONE (OUTPATIENT)
Dept: GASTROENTEROLOGY | Age: 48
End: 2018-04-30

## 2018-05-02 ENCOUNTER — TELEPHONE (OUTPATIENT)
Dept: GASTROENTEROLOGY | Age: 48
End: 2018-05-02

## 2018-05-03 ENCOUNTER — HOSPITAL ENCOUNTER (OUTPATIENT)
Dept: SURGERY | Age: 48
Discharge: HOME | End: 2018-05-03
Attending: INTERNAL MEDICINE | Admitting: INTERNAL MEDICINE

## 2018-05-03 VITALS
DIASTOLIC BLOOD PRESSURE: 82 MMHG | OXYGEN SATURATION: 96 % | SYSTOLIC BLOOD PRESSURE: 117 MMHG | RESPIRATION RATE: 24 BRPM | TEMPERATURE: 98 F | HEART RATE: 89 BPM

## 2018-05-03 PROCEDURE — 45385 COLONOSCOPY W/LESION REMOVAL: CPT | Performed by: INTERNAL MEDICINE

## 2018-05-03 PROCEDURE — 43239 EGD BIOPSY SINGLE/MULTIPLE: CPT | Performed by: INTERNAL MEDICINE

## 2018-05-03 RX ORDER — CHLORDIAZEPOXIDE HYDROCHLORIDE AND CLIDINIUM BROMIDE 5; 2.5 MG/1; MG/1
1 CAPSULE ORAL
Qty: 90 CAPSULE | Refills: 1 | Status: SHIPPED | OUTPATIENT
Start: 2018-05-03 | End: 2018-05-11 | Stop reason: SDUPTHER

## 2018-05-03 RX ORDER — DIPHENHYDRAMINE HCL 25 MG
25 TABLET ORAL EVERY 6 HOURS PRN
COMMUNITY
End: 2021-05-27 | Stop reason: SDUPTHER

## 2018-05-03 RX ORDER — SODIUM CHLORIDE, SODIUM LACTATE, POTASSIUM CHLORIDE, CALCIUM CHLORIDE 600; 310; 30; 20 MG/100ML; MG/100ML; MG/100ML; MG/100ML
INJECTION, SOLUTION INTRAVENOUS ONCE
Status: COMPLETED | OUTPATIENT
Start: 2018-05-03 | End: 2018-05-03

## 2018-05-03 RX ADMIN — SODIUM CHLORIDE, SODIUM LACTATE, POTASSIUM CHLORIDE, CALCIUM CHLORIDE: 600; 310; 30; 20 INJECTION, SOLUTION INTRAVENOUS at 14:30

## 2018-05-03 ASSESSMENT — LIFESTYLE VARIABLES: SMOKING_STATUS: 1

## 2018-05-03 ASSESSMENT — ENCOUNTER SYMPTOMS: SHORTNESS OF BREATH: 1

## 2018-05-03 ASSESSMENT — PAIN - FUNCTIONAL ASSESSMENT: PAIN_FUNCTIONAL_ASSESSMENT: 0-10

## 2018-05-11 DIAGNOSIS — K58.0 IRRITABLE BOWEL SYNDROME WITH DIARRHEA: Primary | ICD-10-CM

## 2018-05-11 RX ORDER — CHLORDIAZEPOXIDE HYDROCHLORIDE AND CLIDINIUM BROMIDE 5; 2.5 MG/1; MG/1
1 CAPSULE ORAL
Qty: 90 CAPSULE | Refills: 1 | OUTPATIENT
Start: 2018-05-11 | End: 2018-06-11

## 2018-05-24 RX ORDER — VALSARTAN 160 MG/1
TABLET ORAL
Qty: 30 TABLET | Refills: 0 | Status: SHIPPED | OUTPATIENT
Start: 2018-05-24 | End: 2018-06-12 | Stop reason: SDUPTHER

## 2018-06-01 ENCOUNTER — TELEPHONE (OUTPATIENT)
Dept: GASTROENTEROLOGY | Age: 48
End: 2018-06-01

## 2018-06-11 ENCOUNTER — OFFICE VISIT (OUTPATIENT)
Dept: GASTROENTEROLOGY | Age: 48
End: 2018-06-11

## 2018-06-11 VITALS
WEIGHT: 220 LBS | HEIGHT: 73 IN | DIASTOLIC BLOOD PRESSURE: 86 MMHG | SYSTOLIC BLOOD PRESSURE: 138 MMHG | BODY MASS INDEX: 29.16 KG/M2

## 2018-06-11 DIAGNOSIS — K58.0 IRRITABLE BOWEL SYNDROME WITH DIARRHEA: ICD-10-CM

## 2018-06-11 DIAGNOSIS — K21.9 GASTROESOPHAGEAL REFLUX DISEASE WITHOUT ESOPHAGITIS: Primary | ICD-10-CM

## 2018-06-11 DIAGNOSIS — K76.0 FATTY LIVER: ICD-10-CM

## 2018-06-11 DIAGNOSIS — K22.70 BARRETT'S ESOPHAGUS DETERMINED BY ENDOSCOPY: ICD-10-CM

## 2018-06-11 PROCEDURE — 99213 OFFICE O/P EST LOW 20 MIN: CPT | Performed by: INTERNAL MEDICINE

## 2018-06-11 RX ORDER — PANTOPRAZOLE SODIUM 40 MG/1
40 TABLET, DELAYED RELEASE ORAL 2 TIMES DAILY
Qty: 60 TABLET | Refills: 11 | Status: SHIPPED | OUTPATIENT
Start: 2018-06-11 | End: 2018-06-12 | Stop reason: SDUPTHER

## 2018-06-12 ENCOUNTER — OFFICE VISIT (OUTPATIENT)
Dept: FAMILY MEDICINE CLINIC | Age: 48
End: 2018-06-12

## 2018-06-12 VITALS
HEART RATE: 117 BPM | OXYGEN SATURATION: 97 % | WEIGHT: 220 LBS | BODY MASS INDEX: 29.03 KG/M2 | DIASTOLIC BLOOD PRESSURE: 94 MMHG | SYSTOLIC BLOOD PRESSURE: 140 MMHG

## 2018-06-12 DIAGNOSIS — E78.00 PURE HYPERCHOLESTEROLEMIA: ICD-10-CM

## 2018-06-12 DIAGNOSIS — K21.9 GASTROESOPHAGEAL REFLUX DISEASE WITHOUT ESOPHAGITIS: ICD-10-CM

## 2018-06-12 DIAGNOSIS — F33.1 MODERATE EPISODE OF RECURRENT MAJOR DEPRESSIVE DISORDER (HCC): ICD-10-CM

## 2018-06-12 DIAGNOSIS — I10 ESSENTIAL HYPERTENSION: Primary | ICD-10-CM

## 2018-06-12 DIAGNOSIS — M54.50 CHRONIC LOW BACK PAIN WITHOUT SCIATICA, UNSPECIFIED BACK PAIN LATERALITY: ICD-10-CM

## 2018-06-12 DIAGNOSIS — I25.110 CORONARY ARTERY DISEASE INVOLVING NATIVE CORONARY ARTERY OF NATIVE HEART WITH UNSTABLE ANGINA PECTORIS (HCC): ICD-10-CM

## 2018-06-12 DIAGNOSIS — G89.29 CHRONIC LOW BACK PAIN WITHOUT SCIATICA, UNSPECIFIED BACK PAIN LATERALITY: ICD-10-CM

## 2018-06-12 DIAGNOSIS — K22.70 BARRETT'S ESOPHAGUS DETERMINED BY ENDOSCOPY: ICD-10-CM

## 2018-06-12 PROCEDURE — G8427 DOCREV CUR MEDS BY ELIG CLIN: HCPCS | Performed by: INTERNAL MEDICINE

## 2018-06-12 PROCEDURE — 99214 OFFICE O/P EST MOD 30 MIN: CPT | Performed by: INTERNAL MEDICINE

## 2018-06-12 PROCEDURE — G8598 ASA/ANTIPLAT THER USED: HCPCS | Performed by: INTERNAL MEDICINE

## 2018-06-12 PROCEDURE — G8417 CALC BMI ABV UP PARAM F/U: HCPCS | Performed by: INTERNAL MEDICINE

## 2018-06-12 PROCEDURE — 4004F PT TOBACCO SCREEN RCVD TLK: CPT | Performed by: INTERNAL MEDICINE

## 2018-06-12 RX ORDER — PANTOPRAZOLE SODIUM 40 MG/1
40 TABLET, DELAYED RELEASE ORAL 2 TIMES DAILY
Qty: 180 TABLET | Refills: 1 | Status: SHIPPED | OUTPATIENT
Start: 2018-06-12 | End: 2018-12-21 | Stop reason: SDUPTHER

## 2018-06-12 RX ORDER — TRAZODONE HYDROCHLORIDE 50 MG/1
TABLET ORAL
Qty: 180 TABLET | Refills: 1 | Status: SHIPPED | OUTPATIENT
Start: 2018-06-12 | End: 2018-12-21 | Stop reason: SDUPTHER

## 2018-06-12 RX ORDER — VALSARTAN 160 MG/1
TABLET ORAL
Qty: 90 TABLET | Refills: 1 | Status: SHIPPED | OUTPATIENT
Start: 2018-06-12 | End: 2018-07-30 | Stop reason: SINTOL

## 2018-06-12 RX ORDER — BUSPIRONE HYDROCHLORIDE 10 MG/1
TABLET ORAL
Qty: 180 TABLET | Refills: 1 | Status: SHIPPED | OUTPATIENT
Start: 2018-06-12 | End: 2019-06-26

## 2018-06-12 RX ORDER — CYCLOBENZAPRINE HCL 10 MG
TABLET ORAL
Qty: 90 TABLET | Refills: 5 | Status: SHIPPED | OUTPATIENT
Start: 2018-06-12 | End: 2018-12-21 | Stop reason: SDUPTHER

## 2018-06-12 RX ORDER — ATORVASTATIN CALCIUM 40 MG/1
TABLET, FILM COATED ORAL
Qty: 90 TABLET | Refills: 1 | Status: SHIPPED | OUTPATIENT
Start: 2018-06-12 | End: 2018-12-21 | Stop reason: SDUPTHER

## 2018-06-12 ASSESSMENT — ENCOUNTER SYMPTOMS
SHORTNESS OF BREATH: 0
COUGH: 0

## 2018-06-13 ENCOUNTER — TELEPHONE (OUTPATIENT)
Dept: FAMILY MEDICINE CLINIC | Age: 48
End: 2018-06-13

## 2018-06-13 LAB
CHOLESTEROL, TOTAL: 154 MG/DL (ref 0–199)
HDLC SERPL-MCNC: 47 MG/DL (ref 40–60)
LDL CHOLESTEROL CALCULATED: 87 MG/DL
TRIGL SERPL-MCNC: 99 MG/DL (ref 0–150)
VLDLC SERPL CALC-MCNC: 20 MG/DL

## 2018-06-13 RX ORDER — TADALAFIL 10 MG/1
10 TABLET ORAL PRN
Qty: 9 TABLET | Refills: 1 | Status: SHIPPED | OUTPATIENT
Start: 2018-06-13 | End: 2018-09-06 | Stop reason: ALTCHOICE

## 2018-07-30 RX ORDER — LOSARTAN POTASSIUM 50 MG/1
50 TABLET ORAL DAILY
Qty: 90 TABLET | Refills: 0 | Status: SHIPPED | OUTPATIENT
Start: 2018-07-30 | End: 2018-12-10 | Stop reason: SDUPTHER

## 2018-09-06 ENCOUNTER — TELEPHONE (OUTPATIENT)
Dept: FAMILY MEDICINE CLINIC | Age: 48
End: 2018-09-06

## 2018-09-06 ENCOUNTER — HOSPITAL ENCOUNTER (EMERGENCY)
Age: 48
Discharge: HOME | End: 2018-09-06
Attending: EMERGENCY MEDICINE
Payer: COMMERCIAL

## 2018-09-06 ENCOUNTER — APPOINTMENT (OUTPATIENT)
Dept: CT IMAGING | Age: 48
End: 2018-09-06
Payer: COMMERCIAL

## 2018-09-06 VITALS
SYSTOLIC BLOOD PRESSURE: 144 MMHG | HEART RATE: 88 BPM | RESPIRATION RATE: 16 BRPM | OXYGEN SATURATION: 95 % | HEIGHT: 73 IN | WEIGHT: 210 LBS | TEMPERATURE: 97.2 F | DIASTOLIC BLOOD PRESSURE: 100 MMHG | BODY MASS INDEX: 27.83 KG/M2

## 2018-09-06 DIAGNOSIS — R10.31 RIGHT LOWER QUADRANT ABDOMINAL PAIN: Primary | ICD-10-CM

## 2018-09-06 LAB
A/G RATIO: 1.4 (ref 1.1–2.2)
ALBUMIN SERPL-MCNC: 4.4 G/DL (ref 3.4–5)
ALP BLD-CCNC: 135 U/L (ref 40–129)
ALT SERPL-CCNC: 35 U/L (ref 10–40)
ANION GAP SERPL CALCULATED.3IONS-SCNC: 9 MMOL/L (ref 3–16)
AST SERPL-CCNC: 30 U/L (ref 15–37)
BASOPHILS ABSOLUTE: 0.1 K/UL (ref 0–0.2)
BASOPHILS RELATIVE PERCENT: 1 %
BILIRUB SERPL-MCNC: 0.4 MG/DL (ref 0–1)
BILIRUBIN URINE: NEGATIVE
BLOOD, URINE: NEGATIVE
BUN BLDV-MCNC: 16 MG/DL (ref 7–20)
CALCIUM SERPL-MCNC: 9.6 MG/DL (ref 8.3–10.6)
CHLORIDE BLD-SCNC: 108 MMOL/L (ref 99–110)
CLARITY: CLEAR
CO2: 26 MMOL/L (ref 21–32)
COLOR: YELLOW
CREAT SERPL-MCNC: 0.7 MG/DL (ref 0.9–1.3)
EOSINOPHILS ABSOLUTE: 0.2 K/UL (ref 0–0.6)
EOSINOPHILS RELATIVE PERCENT: 1.9 %
GFR AFRICAN AMERICAN: >60
GFR NON-AFRICAN AMERICAN: >60
GLOBULIN: 3.1 G/DL
GLUCOSE BLD-MCNC: 160 MG/DL (ref 70–99)
GLUCOSE URINE: NEGATIVE MG/DL
HCT VFR BLD CALC: 47.7 % (ref 40.5–52.5)
HEMOGLOBIN: 16.1 G/DL (ref 13.5–17.5)
KETONES, URINE: NEGATIVE MG/DL
LEUKOCYTE ESTERASE, URINE: NEGATIVE
LIPASE: 15 U/L (ref 13–60)
LYMPHOCYTES ABSOLUTE: 2.6 K/UL (ref 1–5.1)
LYMPHOCYTES RELATIVE PERCENT: 21.4 %
MCH RBC QN AUTO: 30.8 PG (ref 26–34)
MCHC RBC AUTO-ENTMCNC: 33.8 G/DL (ref 31–36)
MCV RBC AUTO: 91 FL (ref 80–100)
MICROSCOPIC EXAMINATION: NORMAL
MONOCYTES ABSOLUTE: 0.7 K/UL (ref 0–1.3)
MONOCYTES RELATIVE PERCENT: 6 %
NEUTROPHILS ABSOLUTE: 8.3 K/UL (ref 1.7–7.7)
NEUTROPHILS RELATIVE PERCENT: 69.7 %
NITRITE, URINE: NEGATIVE
PDW BLD-RTO: 14 % (ref 12.4–15.4)
PH UA: 6.5
PLATELET # BLD: 279 K/UL (ref 135–450)
PMV BLD AUTO: 7.9 FL (ref 5–10.5)
POTASSIUM SERPL-SCNC: 4.5 MMOL/L (ref 3.5–5.1)
PROTEIN UA: NEGATIVE MG/DL
RBC # BLD: 5.25 M/UL (ref 4.2–5.9)
SODIUM BLD-SCNC: 143 MMOL/L (ref 136–145)
SPECIFIC GRAVITY UA: 1.02
TOTAL PROTEIN: 7.5 G/DL (ref 6.4–8.2)
URINE TYPE: NORMAL
UROBILINOGEN, URINE: 0.2 E.U./DL
WBC # BLD: 11.9 K/UL (ref 4–11)

## 2018-09-06 PROCEDURE — 81003 URINALYSIS AUTO W/O SCOPE: CPT

## 2018-09-06 PROCEDURE — 96375 TX/PRO/DX INJ NEW DRUG ADDON: CPT

## 2018-09-06 PROCEDURE — 6360000004 HC RX CONTRAST MEDICATION: Performed by: NURSE PRACTITIONER

## 2018-09-06 PROCEDURE — 99284 EMERGENCY DEPT VISIT MOD MDM: CPT

## 2018-09-06 PROCEDURE — 96374 THER/PROPH/DIAG INJ IV PUSH: CPT

## 2018-09-06 PROCEDURE — 6360000002 HC RX W HCPCS: Performed by: NURSE PRACTITIONER

## 2018-09-06 PROCEDURE — 85025 COMPLETE CBC W/AUTO DIFF WBC: CPT

## 2018-09-06 PROCEDURE — 83690 ASSAY OF LIPASE: CPT

## 2018-09-06 PROCEDURE — 2580000003 HC RX 258: Performed by: NURSE PRACTITIONER

## 2018-09-06 PROCEDURE — 96361 HYDRATE IV INFUSION ADD-ON: CPT

## 2018-09-06 PROCEDURE — 80053 COMPREHEN METABOLIC PANEL: CPT

## 2018-09-06 PROCEDURE — 74177 CT ABD & PELVIS W/CONTRAST: CPT

## 2018-09-06 RX ORDER — 0.9 % SODIUM CHLORIDE 0.9 %
1000 INTRAVENOUS SOLUTION INTRAVENOUS ONCE
Status: COMPLETED | OUTPATIENT
Start: 2018-09-06 | End: 2018-09-06

## 2018-09-06 RX ORDER — KETOROLAC TROMETHAMINE 30 MG/ML
30 INJECTION, SOLUTION INTRAMUSCULAR; INTRAVENOUS ONCE
Status: COMPLETED | OUTPATIENT
Start: 2018-09-06 | End: 2018-09-06

## 2018-09-06 RX ORDER — DICYCLOMINE HYDROCHLORIDE 10 MG/1
10 CAPSULE ORAL EVERY 6 HOURS PRN
Qty: 20 CAPSULE | Refills: 0 | Status: SHIPPED | OUTPATIENT
Start: 2018-09-06 | End: 2018-11-26 | Stop reason: SDUPTHER

## 2018-09-06 RX ORDER — ONDANSETRON 2 MG/ML
4 INJECTION INTRAMUSCULAR; INTRAVENOUS ONCE
Status: COMPLETED | OUTPATIENT
Start: 2018-09-06 | End: 2018-09-06

## 2018-09-06 RX ADMIN — SODIUM CHLORIDE 1000 ML: 9 INJECTION, SOLUTION INTRAVENOUS at 10:00

## 2018-09-06 RX ADMIN — ONDANSETRON HYDROCHLORIDE 4 MG: 2 INJECTION, SOLUTION INTRAMUSCULAR; INTRAVENOUS at 10:00

## 2018-09-06 RX ADMIN — KETOROLAC TROMETHAMINE 30 MG: 30 INJECTION, SOLUTION INTRAMUSCULAR at 09:59

## 2018-09-06 RX ADMIN — IOPAMIDOL 75 ML: 755 INJECTION, SOLUTION INTRAVENOUS at 10:40

## 2018-09-06 ASSESSMENT — ENCOUNTER SYMPTOMS
SHORTNESS OF BREATH: 0
BLOOD IN STOOL: 0
ABDOMINAL PAIN: 1
BACK PAIN: 1
VOMITING: 0
DIARRHEA: 0
NAUSEA: 0
COUGH: 0

## 2018-09-06 ASSESSMENT — PAIN SCALES - GENERAL
PAINLEVEL_OUTOF10: 4
PAINLEVEL_OUTOF10: 2
PAINLEVEL_OUTOF10: 2

## 2018-09-06 ASSESSMENT — PAIN DESCRIPTION - LOCATION: LOCATION: ABDOMEN

## 2018-09-06 ASSESSMENT — PAIN DESCRIPTION - PAIN TYPE
TYPE: ACUTE PAIN
TYPE: ACUTE PAIN

## 2018-09-06 ASSESSMENT — PAIN SCALES - WONG BAKER: WONGBAKER_NUMERICALRESPONSE: 0

## 2018-09-06 NOTE — ED PROVIDER NOTES
Attending Supervisory Note/Shared Visit   I have personally performed a face to face diagnostic evaluation on this patient. I have reviewed the mid-levels findings and agree. History and Exam by me shows:    A shunt with right lower quadrant abdominal pain for 2 weeks. Pain waxes and wanes. No nausea vomiting diarrhea. No fever. Occasional pain in the right groin. No testicular pain. No back pain. ED Triage Vitals [09/06/18 0938]   BP Temp Temp Source Pulse Resp SpO2 Height Weight   (!) 148/106 97.2 °F (36.2 °C) Oral 96 16 97 % 6' 1\" (1.854 m) 210 lb (95.3 kg)     Constitutional: Well appearing and hydrated. Awake & alert. No acute distress. Cardiovascular: Regular rate. Pulmonary/Chest: Clear to auscultation bilaterally . No distress  GI: Soft and non-distended. There is no discomfort with palpation. No rebound, guarding, or rigidity. : No inguinal tenderness or swelling. No testicular tenderness or swelling  Neurological: Alert, awake. Moving all extremities  Skin: Skin is warm and dry. Psychiatric: Cooperative. MDM:     Abdominal Pain: This patient presents with abdominal pain of unclear etiology. A CT scan was performed to evaluate the abdomen. The history, clinical exam, labs, and imaging have not identified an emergent cause for the abdominal pain. Given the benign exam, the laboratory studies, and unremarkable CT, I have a very low suspicion for any life threatening disease. Patient's symptoms improved after ED medication. No signs of SBO, ileus, colitis, diverticulitis, appendicitis, significant systemic disease or sepsis. They will drink fluids and advance diet as tolerated. Will take oral pain medications.  I have discussed with the patient the level of uncertainty with undifferentiated abdominal pain and clearly explained the need to follow-up as noted on the discharge instructions, or return to the Emergency Department immediately if the pain worsens, develops fever, persistent and uncontrollable vomiting, or for any new symptoms or concerns. Clinical Impression:  1.  Right lower quadrant abdominal pain          Nuha Taveras MD  Attending Emergency Physician        Nuha Taveras MD  09/06/18 [id number]

## 2018-09-06 NOTE — ED TRIAGE NOTES
Chief Complaint   Patient presents with    Abdominal Pain     Pt presents with c/o RLQ abdominal pain x 2 weeks. Denies n/v/f. Denies dysuria. Also notes Right flank pain.

## 2018-09-06 NOTE — ED PROVIDER NOTES
[address] ED  eMERGENCY dEPARTMENT eNCOUnter        Pt Name: Angela Lee  MRN: [id number]  [place] 1970  Date of evaluation: 9/6/2018  Provider: RAH Manning - CNP  PCP: Esperanza Jarquin MD  ED Attending: No att. providers found    [address] [facility]       Chief Complaint   Patient presents with    Abdominal Pain     Pt presents with c/o RLQ abdominal pain x 2 weeks. Denies n/v/f. Denies dysuria. Also notes Right flank pain. HISTORY OF PRESENT ILLNESS   (Location/Symptom, Timing/Onset, Context/Setting, Quality, Duration, Modifying Factors, Severity)  Note limiting factors. Angela Lee is a 53 y.o. male  Who presents to the ED with complaints of RLQ abdominal pain. He reports that he has had abdominal pain consistently for the last 2 weeks. Pain has been wandering through his abdomen, but today does seem to be localized over his RLQ. He has not had any abdominal surgeries. He does reports some right flank pain as well. No dysuria or hematuria. No nausea, vomiting or diarrhea. He reports a fever a few days ago, but nothing since. He has been taking norco for pain without relief. Nursing Notes were all reviewed and agreed with or any disagreements were addressed  in the HPI. REVIEW OF SYSTEMS    (2-9 systems for level 4, 10 or more for level 5)     Review of Systems   Constitutional: Positive for fever. Negative for appetite change and chills. HENT: Negative. Respiratory: Negative for cough and shortness of breath. Cardiovascular: Negative for chest pain. Gastrointestinal: Positive for abdominal pain. Negative for blood in stool, diarrhea, nausea and vomiting. Genitourinary: Positive for flank pain. Negative for dysuria and hematuria. Musculoskeletal: Positive for back pain. Skin: Negative. Neurological: Negative. Psychiatric/Behavioral: Negative. Positives and Pertinent negatives as per HPI.   Except as noted above in the Packs/day: 1.00     Years: 33.00     Types: Cigarettes    Smokeless tobacco: Former User     Quit date: 11/1/2015      Comment: down to 1/2 PPD 11/6/17    Alcohol use Yes      Comment: socially     Drug use: Yes     Types: Marijuana      Comment: also taking vicodin and xanax that are not prescribed    Sexual activity: Yes     Other Topics Concern    None     Social History Narrative    None       SCREENINGS             PHYSICAL EXAM    (up to 7 for level 4, 8 or more for level 5)     ED Triage Vitals [09/06/18 0938]   BP Temp Temp Source Pulse Resp SpO2 Height Weight   (!) 148/106 97.2 °F (36.2 °C) Oral 96 16 97 % 6' 1\" (1.854 m) 210 lb (95.3 kg)       Physical Exam   Constitutional: He appears well-developed and well-nourished. HENT:   Head: Normocephalic and atraumatic. Right Ear: External ear normal.   Left Ear: External ear normal.   Nose: Nose normal.   Eyes: Conjunctivae are normal.   Neck: Normal range of motion. Cardiovascular: Normal rate, regular rhythm and normal heart sounds. Pulmonary/Chest: Effort normal and breath sounds normal.   Abdominal: Soft. Bowel sounds are normal. He exhibits no distension. There is tenderness. There is CVA tenderness. Musculoskeletal: Normal range of motion. Neurological: He is alert. Skin: Skin is warm and dry. Psychiatric: He has a normal mood and affect. His behavior is normal.   Nursing note and vitals reviewed.       DIAGNOSTIC RESULTS   LABS:    Labs Reviewed   CBC WITH AUTO DIFFERENTIAL - Abnormal; Notable for the following:        Result Value    WBC 11.9 (*)     Neutrophils # 8.3 (*)     All other components within normal limits    Narrative:     Performed at:  [facility] [place] [address],  [place], [facility]   Phone [phone]   COMPREHENSIVE METABOLIC PANEL - Abnormal; Notable for the following:     Glucose 160 (*)     CREATININE 0.7 (*)     Alkaline Phosphatase 135 (*)     All other components injection 75 mL (75 mLs Intravenous Given 9/6/18 1040)       Patient presented to the emergency department with complaints of right lower quadrant abdominal pain for the last couple weeks. Physical exam did reveal right lower quadrant abdominal tenderness as well as some right CVA tenderness. UA was negative for any infection. CBC with mild leukocytosis of 11.9. CMP and lipase were unremarkable. CT scan of abdomen and pelvis was obtained and revealed no acute abnormality. 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 Patient is medicated here with Toradol and Zofran. He did have improvement in his symptoms. He is to follow-up with primary care in the next 3 days. He was given a prescription for Bentyl. He is to return to the emergency department with any worsening symptoms. I discussed treatment plan with patient, patient is agreeable and denies questions at this time. The patient tolerated their visit well. They were seen and evaluated by the attending physician, No att. providers found who agreed with the assessment and plan. The patient and / or the family were informed of the results of any tests, a time was given to answer questions, a plan was proposed and they agreed with plan. FINAL IMPRESSION      1.  Right lower quadrant abdominal pain          DISPOSITION/PLAN   DISPOSITION        PATIENT REFERRED TO:  Esepranza Jarquin MD  [id number]    Schedule an appointment as soon as possible for a visit in 3 days  For follow up care    [facility] [place] [facility] [place] [facility]  [address] [facility]  [phone]  Go to   As needed, If symptoms worsen      DISCHARGE MEDICATIONS:  New Prescriptions    DICYCLOMINE (BENTYL) 10 MG CAPSULE    Take 1 capsule by mouth every 6 hours as needed (cramps)       DISCONTINUED MEDICATIONS:  Discontinued Medications    ONDANSETRON (ZOFRAN) 4 MG TABLET    Take 1 tablet by mouth every 8 hours as needed for

## 2018-09-10 ENCOUNTER — OFFICE VISIT (OUTPATIENT)
Dept: GASTROENTEROLOGY | Age: 48
End: 2018-09-10

## 2018-09-10 VITALS
HEIGHT: 73 IN | BODY MASS INDEX: 30.19 KG/M2 | SYSTOLIC BLOOD PRESSURE: 120 MMHG | DIASTOLIC BLOOD PRESSURE: 88 MMHG | WEIGHT: 227.8 LBS

## 2018-09-10 DIAGNOSIS — R10.31 CHRONIC RLQ PAIN: Primary | ICD-10-CM

## 2018-09-10 DIAGNOSIS — K58.9 IRRITABLE BOWEL SYNDROME, UNSPECIFIED TYPE: ICD-10-CM

## 2018-09-10 DIAGNOSIS — G89.29 CHRONIC RLQ PAIN: Primary | ICD-10-CM

## 2018-09-10 PROCEDURE — G8427 DOCREV CUR MEDS BY ELIG CLIN: HCPCS | Performed by: INTERNAL MEDICINE

## 2018-09-10 PROCEDURE — G8417 CALC BMI ABV UP PARAM F/U: HCPCS | Performed by: INTERNAL MEDICINE

## 2018-09-10 PROCEDURE — G8598 ASA/ANTIPLAT THER USED: HCPCS | Performed by: INTERNAL MEDICINE

## 2018-09-10 PROCEDURE — 99214 OFFICE O/P EST MOD 30 MIN: CPT | Performed by: INTERNAL MEDICINE

## 2018-09-10 PROCEDURE — 4004F PT TOBACCO SCREEN RCVD TLK: CPT | Performed by: INTERNAL MEDICINE

## 2018-09-10 RX ORDER — CHLORDIAZEPOXIDE HYDROCHLORIDE AND CLIDINIUM BROMIDE 5; 2.5 MG/1; MG/1
1 CAPSULE ORAL
Qty: 120 CAPSULE | Refills: 1 | Status: SHIPPED | OUTPATIENT
Start: 2018-09-10 | End: 2019-07-30 | Stop reason: SDUPTHER

## 2018-09-10 NOTE — PATIENT INSTRUCTIONS
Patient Education          chlordiazepoxide  Pronunciation:  KLOR dye AZ e POX bashir  Brand:  Librium  What is the most important information I should know about chlordiazepoxide? Chlordiazepoxide may be habit-forming. Misuse of habit-forming medicine can cause addiction, overdose, or death. Selling or giving away this medicine is against the law. What is chlordiazepoxide? Chlordiazepoxide is a benzodiazepine (efraín-stephanie-dye-AZE-eh-peen). Chlordiazepoxide affects chemicals in the brain that may be unbalanced in people with anxiety. Chlordiazepoxide is used to treat anxiety disorders. Chlordiazepoxide may be used short-term to treat anxiety you may have before a surgery. Chlordiazepoxide is also used to treat anxiety or withdrawal symptoms of alcoholism. Chlordiazepoxide may also be used for purposes not listed in this medication guide. What should I discuss with my healthcare provider before taking chlordiazepoxide? You should not use chlordiazepoxide if you are allergic to it. To make sure chlordiazepoxide is safe for you, tell your doctor if:  · you have porphyria (a genetic enzyme disorder that causes symptoms affecting the skin or nervous system);  · you take a blood thinner such as warfarin (Coumadin, Jantoven); or  · you take a narcotic (opioid) medication. The sedative effects of chlordiazepoxide may last longer in older adults. Accidental falls are common in elderly patients who take benzodiazepines. Use caution to avoid falling or accidental injury while you are taking chlordiazepoxide. Do not use chlordiazepoxide if you are pregnant. This medicine can cause birth defects. Your baby could also become dependent on the drug. This can cause life-threatening withdrawal symptoms in the baby after it is born. Babies born dependent on habit-forming medicine may need medical treatment for several weeks. Tell your doctor if you are pregnant or plan to become pregnant.  Use effective birth control to prevent pregnancy while you are taking chlordiazepoxide. It is not known whether chlordiazepoxide passes into breast milk or if it could harm a nursing baby. Tell your doctor if you are breast-feeding a baby. Chlordiazepoxide is not approved for use by anyone younger than 10years old. How should I take chlordiazepoxide? Follow all directions on your prescription label. Never use chlordiazepoxide in larger amounts, or for longer than prescribed. Tell your doctor if the medicine seems to stop working as well in treating your symptoms. Your doctor may occasionally change your dose to make sure you get the best results. Chlordiazepoxide may be habit-forming. Never share this medicine with another person, especially someone with a history of drug abuse or addiction. Keep the medication in a place where others cannot get to it. Misuse of habit-forming medicine can cause addiction, overdose, or death. Selling or giving away this medicine is against the law. Do not stop using chlordiazepoxide suddenly, or you could have unpleasant withdrawal symptoms. Ask your doctor how to safely stop using chlordiazepoxide. Store at room temperature away from moisture and heat. Keep track of the amount of medicine used from each new bottle. Chlordiazepoxide is a drug of abuse and you should be aware if anyone is using your medicine improperly or without a prescription. What happens if I miss a dose? Take the missed dose as soon as you remember. Skip the missed dose if it is almost time for your next scheduled dose. Do not take extra medicine to make up the missed dose. What happens if I overdose? Seek emergency medical attention or call the Poison Help line at 1-689.319.6733. An overdose of chlordiazepoxide can be fatal.  Overdose symptoms may include extreme drowsiness, confusion, muscle weakness, or loss of consciousness. What should I avoid while taking chlordiazepoxide? Avoid drinking alcohol.  Dangerous side effects could more information about chlordiazepoxide. Remember, keep this and all other medicines out of the reach of children, never share your medicines with others, and use this medication only for the indication prescribed. Every effort has been made to ensure that the information provided by [address] is accurate, up-to-date, and complete, but no guarantee is made to that effect. Drug information contained herein may be time sensitive. [facility] information has been compiled for use by healthcare practitioners and consumers in the [place] and therefore [facility] does not warrant that uses outside of the [place] are appropriate, unless specifically indicated otherwise. [facility]'s drug information does not endorse drugs, diagnose patients or recommend therapy. [facility]'s drug information is an informational resource designed to assist licensed healthcare practitioners in caring for their patients and/or to serve consumers viewing this service as a supplement to, and not a substitute for, the expertise, skill, knowledge and judgment of healthcare practitioners. The absence of a warning for a given drug or drug combination in no way should be construed to indicate that the drug or drug combination is safe, effective or appropriate for any given patient. [facility] does not assume any responsibility for any aspect of healthcare administered with the aid of information [facility] provides. The information contained herein is not intended to cover all possible uses, directions, precautions, warnings, drug interactions, allergic reactions, or adverse effects. If you have questions about the drugs you are taking, check with your doctor, nurse or pharmacist.  Copyright 8987-9326 1 [employer]. Version: 7.03. Revision date: 9/28/2016. Care instructions adapted under license by [facility] ([facility]).  If you have questions about a medical condition or this instruction, always ask your healthcare professional. [employer] disclaims any warranty or liability for your use of this information.

## 2018-09-10 NOTE — PROGRESS NOTES
[address] ,  [address], [facility]  Phone: [phone] [address]     Chief Complaint   Patient presents with    Abdominal Pain     X 2 weeks. Having a lot of pain in lower abdomen, worse prior to bowel movement       HPI     Thank you Aiyana Rubio MD for asking me to see Dewamarge [facility]va in consultation. He is a  [4] White [1] 53 y.o. . male seen independently who presents with the following GI complaints:  . Refugio Valentin  Has recurrent abdominal pain mostly on the right but it hurts all over. CT in the ER a few days ago with stable gut malrotation with cecum in the llq. Bowels seem to be moving normally independent of pain. Denies bloating, n/v.  He states he felt well after his colonoscopy. GERD controlled with medication. Again states librax helped in the past and he is willing to try this. No help from Bentyl. HPI elements: location, severity, timing, modifying factors, quality, duration, context and associated signs/symptoms. Last Encounter Reviewed: 6/11/2018  Chuck Valentin  Is here for follow up of ibs and Ha's. States his gerd has been doing well on protonix 40 bid although he admits to taking it occasional 3 times a day. He never got the librax and is not desiring it at this time.   He has known fatty liver with mild lft elevation.     Last Encounter Reviewed: 4/26/18  Addison Valentin  Complains of vomiting in the morning.  He has heartburn if he does not take prevacid. Rona Kebede is taking it at night and has not tried bid.  Nexium helped better. Jayda Tucker is no dysphagia.   He had a medium sized hiatal hernia on previous egd 2 years ago. Rona Kebede complains of 2 weeks of rlq pain along with muscle spasms in his ruq radiating to his right flank.  He describes it as stabbing when up but burning when he lies down. Rona Kebede is having a BM every 2-3 days without bloating or feelings of constipation.  He does have watery loose HISTORY     Past Surgical History:   Procedure Laterality Date    ARM SURGERY Left 12/10/2015    LUNG SURGERY      partial removal (right) agent orange    SPINE SURGERY  1995    lumbar laminectomy     CURRENT MEDICATIONS   (This list may include medications prescribed during this encounter as epic can not insert only the list prior to this encounter.)  Current Outpatient Rx   Medication Sig Dispense Refill    chlordiazePOXIDE-clidinium (LIBRAX) 5-2.5 MG per capsule Take 1 capsule by mouth 4 times daily (before meals and nightly). . 120 capsule 1    dicyclomine (BENTYL) 10 MG capsule Take 1 capsule by mouth every 6 hours as needed (cramps) 20 capsule 0    losartan (COZAAR) 50 MG tablet Take 1 tablet by mouth daily 90 tablet 0    cyclobenzaprine (FLEXERIL) 10 MG tablet TAKE ONE TABLET BY MOUTH EVERY 8 HOURS AS NEEDED FOR MUSCLE SPASMS 90 tablet 5    atorvastatin (LIPITOR) 40 MG tablet TAKE ONE TABLET BY MOUTH DAILY 90 tablet 1    busPIRone (BUSPAR) 10 MG tablet TAKE ONE TABLET BY MOUTH TWICE A DAY (Patient taking differently: Take 20 mg by mouth nightly TAKE ONE TABLET BY MOUTH TWICE A DAY) 180 tablet 1    traZODone (DESYREL) 50 MG tablet TAKE TWO TABLETS BY MOUTH ONCE NIGHTLY (Patient taking differently: 50 mg as needed TAKE TWO TABLETS BY MOUTH ONCE NIGHTLY) 180 tablet 1    diphenhydrAMINE (BENADRYL) 25 MG tablet Take 25 mg by mouth every 6 hours as needed for Itching      albuterol sulfate HFA (VENTOLIN HFA) 108 (90 Base) MCG/ACT inhaler Inhale 2 puffs into the lungs every 6 hours as needed for Wheezing or Shortness of Breath 1 Inhaler 6    aspirin 81 MG tablet Take 81 mg by mouth daily      pantoprazole (PROTONIX) 40 MG tablet Take 1 tablet by mouth 2 times daily 180 tablet 1     ALLERGIES     Allergies   Allergen Reactions    Lexapro [Escitalopram Oxalate] Other (See Comments)     Crazy dream hallucinations      Morphine      Makes him angry    Prozac [Fluoxetine Hcl]      Irritable        Wellbutrin [Bupropion] Other (See Comments)     Crazy dream halluciantions     IMMUNIZATIONS     Immunization History   Administered Date(s) Administered    Influenza Virus Vaccine 09/19/2014, 01/11/2016    Influenza, [place], 3 yrs and older, IM, PF (Fluzone 3 yrs and older or Afluria 5 yrs and older) 10/03/2016, 01/23/2018    Pneumococcal Polysaccharide ([id number]) 01/23/2018    Tdap (Boostrix, Adacel) 06/16/2011     REVIEW OF SYSTEMS   See HPI for further details and pertinent postiives. Negative for the following:  Constitutional: Negative for weight change. Negative for appetite change and fatigue. HENT: Negative for nosebleeds, sore throat, mouth sores, and voice change. Respiratory: Negative for cough, choking and chest tightness. Cardiovascular: Negative for chest pain   Gastrointestinal: See HPI  Musculoskeletal: Negative for arthralgias. Skin: Negative for pallor. Neurological: Negative for weakness and light-headedness. Hematological: Negative for adenopathy. Does not bruise/bleed easily. Psychiatric/Behavioral: Negative for suicidal ideas. PHYSICAL EXAM   VITAL SIGNS: /88   Ht 6' 0.99\" (1.854 m)   Wt 227 lb 12.8 oz (103.3 kg)   BMI 30.06 kg/m²   Wt Readings from Last 3 Encounters:   09/10/18 227 lb 12.8 oz (103.3 kg)   09/06/18 210 lb (95.3 kg)   06/12/18 220 lb (99.8 kg)     Constitutional: Well developed, Well nourished, No acute distress, Non-toxic appearance. HENT: Normocephalic, Atraumatic, Bilateral external ears normal, Oropharynx moist, No oral exudates, Nose normal.   Eyes: Conjunctiva normal, No discharge. Neck: Normal range of motion, No tenderness, Supple, No stridor. Lymphatic: No cervical, subclavian, or axillary lymphadenopathy. Cardiovascular: Normal heart rate, Normal rhythm, No murmurs, No rubs, No gallops. Thorax & Lungs: Normal breath sounds, No respiratory distress, No wheezing, No chest tenderness. No gynecomastia.   Abdomen: scars consistent with stated surgeries, no hernias, no HSM, soft NTND   Rectal:  Deferred. Skin: Warm, Dry, No erythema, No rash. No bruising. No spider hemangiomas. Back: No tenderness, No CVA tenderness. Lower Extremities: Intact distal pulses, No edema, No tenderness, No cyanosis, No clubbing. Neurologic: Alert & oriented x 3, Normal motor function, Normal sensory function, No focal deficits noted. No asterixis. RADIOLOGY/PROCEDURES       FINAL IMPRESSION     Orders Placed This Encounter   Procedures    FL UGI W SMALL BOWEL     Standing Status:   Future     Standing Expiration Date:   9/10/2019     Teresa Melgoza was seen today for abdominal pain. Diagnoses and all orders for this visit:    Chronic RLQ pain  -     chlordiazePOXIDE-clidinium (LIBRAX) 5-2.5 MG per capsule; Take 1 capsule by mouth 4 times daily (before meals and nightly). .  -     FL UGI W SMALL BOWEL; Future    Irritable bowel syndrome, unspecified type  -     chlordiazePOXIDE-clidinium (LIBRAX) 5-2.5 MG per capsule; Take 1 capsule by mouth 4 times daily (before meals and nightly). .      ORDERED FUTURE/PENDING TESTS     Lab Frequency Next Occurrence   CT Chest WO Contrast Once 02/08/2018   COLONOSCOPY W/ OR W/O BIOPSY Once 05/10/2018   [facility] nursing order (specify) Once 06/11/2018       FOLLOWUP   Return for after ordered tests.           [address] 9/10/18 2:46 PM    CC:  Cindy Garcia MD

## 2018-09-12 ENCOUNTER — TELEPHONE (OUTPATIENT)
Dept: GASTROENTEROLOGY | Age: 48
End: 2018-09-12

## 2018-10-02 ENCOUNTER — TELEPHONE (OUTPATIENT)
Dept: FAMILY MEDICINE CLINIC | Age: 48
End: 2018-10-02

## 2018-10-04 RX ORDER — SILDENAFIL CITRATE 20 MG/1
60 TABLET ORAL DAILY PRN
Qty: 30 TABLET | Refills: 5 | Status: SHIPPED | OUTPATIENT
Start: 2018-10-04 | End: 2021-05-27 | Stop reason: SDUPTHER

## 2018-11-27 RX ORDER — DICYCLOMINE HYDROCHLORIDE 10 MG/1
10 CAPSULE ORAL EVERY 6 HOURS PRN
Qty: 60 CAPSULE | Refills: 5 | Status: SHIPPED | OUTPATIENT
Start: 2018-11-27 | End: 2019-07-30 | Stop reason: SDUPTHER

## 2018-12-10 RX ORDER — LOSARTAN POTASSIUM 50 MG/1
TABLET ORAL
Qty: 30 TABLET | Refills: 0 | Status: SHIPPED | OUTPATIENT
Start: 2018-12-10 | End: 2018-12-21 | Stop reason: SDUPTHER

## 2018-12-21 ENCOUNTER — OFFICE VISIT (OUTPATIENT)
Dept: FAMILY MEDICINE CLINIC | Age: 48
End: 2018-12-21
Payer: COMMERCIAL

## 2018-12-21 VITALS
HEIGHT: 72 IN | SYSTOLIC BLOOD PRESSURE: 102 MMHG | WEIGHT: 227 LBS | BODY MASS INDEX: 30.75 KG/M2 | DIASTOLIC BLOOD PRESSURE: 72 MMHG | HEART RATE: 108 BPM | OXYGEN SATURATION: 98 %

## 2018-12-21 DIAGNOSIS — M96.1 POSTLAMINECTOMY SYNDROME, LUMBAR REGION: ICD-10-CM

## 2018-12-21 DIAGNOSIS — F33.1 MODERATE EPISODE OF RECURRENT MAJOR DEPRESSIVE DISORDER (HCC): ICD-10-CM

## 2018-12-21 DIAGNOSIS — K21.9 GASTROESOPHAGEAL REFLUX DISEASE WITHOUT ESOPHAGITIS: ICD-10-CM

## 2018-12-21 DIAGNOSIS — K58.9 IRRITABLE BOWEL SYNDROME, UNSPECIFIED TYPE: ICD-10-CM

## 2018-12-21 DIAGNOSIS — Z23 NEED FOR INFLUENZA VACCINATION: ICD-10-CM

## 2018-12-21 DIAGNOSIS — K22.70 BARRETT'S ESOPHAGUS DETERMINED BY ENDOSCOPY: ICD-10-CM

## 2018-12-21 DIAGNOSIS — F41.9 ANXIETY: ICD-10-CM

## 2018-12-21 DIAGNOSIS — I10 BENIGN ESSENTIAL HYPERTENSION: Primary | ICD-10-CM

## 2018-12-21 PROCEDURE — G8482 FLU IMMUNIZE ORDER/ADMIN: HCPCS | Performed by: INTERNAL MEDICINE

## 2018-12-21 PROCEDURE — G8598 ASA/ANTIPLAT THER USED: HCPCS | Performed by: INTERNAL MEDICINE

## 2018-12-21 PROCEDURE — 99214 OFFICE O/P EST MOD 30 MIN: CPT | Performed by: INTERNAL MEDICINE

## 2018-12-21 PROCEDURE — G8428 CUR MEDS NOT DOCUMENT: HCPCS | Performed by: INTERNAL MEDICINE

## 2018-12-21 PROCEDURE — 90686 IIV4 VACC NO PRSV 0.5 ML IM: CPT | Performed by: INTERNAL MEDICINE

## 2018-12-21 PROCEDURE — 90471 IMMUNIZATION ADMIN: CPT | Performed by: INTERNAL MEDICINE

## 2018-12-21 PROCEDURE — G8417 CALC BMI ABV UP PARAM F/U: HCPCS | Performed by: INTERNAL MEDICINE

## 2018-12-21 PROCEDURE — 4004F PT TOBACCO SCREEN RCVD TLK: CPT | Performed by: INTERNAL MEDICINE

## 2018-12-21 RX ORDER — ATORVASTATIN CALCIUM 40 MG/1
TABLET, FILM COATED ORAL
Qty: 90 TABLET | Refills: 1 | Status: SHIPPED | OUTPATIENT
Start: 2018-12-21 | End: 2019-07-30 | Stop reason: SDUPTHER

## 2018-12-21 RX ORDER — CYCLOBENZAPRINE HCL 10 MG
TABLET ORAL
Qty: 90 TABLET | Refills: 5 | Status: SHIPPED | OUTPATIENT
Start: 2018-12-21 | End: 2019-07-30 | Stop reason: SDUPTHER

## 2018-12-21 RX ORDER — BUSPIRONE HYDROCHLORIDE 10 MG/1
TABLET ORAL
Qty: 180 TABLET | Refills: 1 | Status: CANCELLED | OUTPATIENT
Start: 2018-12-21

## 2018-12-21 RX ORDER — CHLORDIAZEPOXIDE HYDROCHLORIDE AND CLIDINIUM BROMIDE 5; 2.5 MG/1; MG/1
1 CAPSULE ORAL
Qty: 120 CAPSULE | Refills: 1 | Status: CANCELLED | OUTPATIENT
Start: 2018-12-21

## 2018-12-21 RX ORDER — DICYCLOMINE HYDROCHLORIDE 10 MG/1
10 CAPSULE ORAL EVERY 6 HOURS PRN
Qty: 60 CAPSULE | Refills: 5 | Status: CANCELLED | OUTPATIENT
Start: 2018-12-21

## 2018-12-21 RX ORDER — BUSPIRONE HYDROCHLORIDE 10 MG/1
20 TABLET ORAL 2 TIMES DAILY
Qty: 360 TABLET | Refills: 1 | Status: SHIPPED | OUTPATIENT
Start: 2018-12-21 | End: 2019-06-26 | Stop reason: SDUPTHER

## 2018-12-21 RX ORDER — PANTOPRAZOLE SODIUM 40 MG/1
40 TABLET, DELAYED RELEASE ORAL 2 TIMES DAILY
Qty: 180 TABLET | Refills: 1 | Status: SHIPPED | OUTPATIENT
Start: 2018-12-21 | End: 2019-07-30 | Stop reason: SDUPTHER

## 2018-12-21 RX ORDER — LOSARTAN POTASSIUM 50 MG/1
TABLET ORAL
Qty: 90 TABLET | Refills: 1 | Status: SHIPPED | OUTPATIENT
Start: 2018-12-21 | End: 2019-04-03 | Stop reason: ALTCHOICE

## 2018-12-21 RX ORDER — TRAZODONE HYDROCHLORIDE 50 MG/1
100 TABLET ORAL NIGHTLY
Qty: 180 TABLET | Refills: 1 | Status: SHIPPED | OUTPATIENT
Start: 2018-12-21 | End: 2019-12-23

## 2018-12-21 ASSESSMENT — ENCOUNTER SYMPTOMS
SHORTNESS OF BREATH: 0
COUGH: 0

## 2018-12-21 NOTE — PROGRESS NOTES
Vaccine Information Sheet, \"Influenza - Inactivated\"  given to Olya kAhtar, or parent/legal guardian of  Olya Akhtar and verbalized understanding. Patient responses:    Have you ever had a reaction to a flu vaccine? No  Are you able to eat eggs without adverse effects? Yes  Do you have any current illness? No  Have you ever had [place] Syndrome? No    Flu vaccine given per order. Please see immunization tab.

## 2019-01-15 ENCOUNTER — TELEPHONE (OUTPATIENT)
Dept: GASTROENTEROLOGY | Age: 49
End: 2019-01-15

## 2019-02-04 ENCOUNTER — OFFICE VISIT (OUTPATIENT)
Dept: FAMILY MEDICINE CLINIC | Age: 49
End: 2019-02-04
Payer: COMMERCIAL

## 2019-02-04 VITALS
OXYGEN SATURATION: 97 % | WEIGHT: 240.2 LBS | BODY MASS INDEX: 31.83 KG/M2 | HEIGHT: 73 IN | DIASTOLIC BLOOD PRESSURE: 96 MMHG | HEART RATE: 118 BPM | SYSTOLIC BLOOD PRESSURE: 132 MMHG

## 2019-02-04 DIAGNOSIS — M79.89 SWELLING OF BOTH LOWER EXTREMITIES: Primary | ICD-10-CM

## 2019-02-04 PROCEDURE — G8598 ASA/ANTIPLAT THER USED: HCPCS | Performed by: PHYSICIAN ASSISTANT

## 2019-02-04 PROCEDURE — G8417 CALC BMI ABV UP PARAM F/U: HCPCS | Performed by: PHYSICIAN ASSISTANT

## 2019-02-04 PROCEDURE — G8427 DOCREV CUR MEDS BY ELIG CLIN: HCPCS | Performed by: PHYSICIAN ASSISTANT

## 2019-02-04 PROCEDURE — G8482 FLU IMMUNIZE ORDER/ADMIN: HCPCS | Performed by: PHYSICIAN ASSISTANT

## 2019-02-04 PROCEDURE — 4004F PT TOBACCO SCREEN RCVD TLK: CPT | Performed by: PHYSICIAN ASSISTANT

## 2019-02-04 PROCEDURE — 99213 OFFICE O/P EST LOW 20 MIN: CPT | Performed by: PHYSICIAN ASSISTANT

## 2019-02-04 RX ORDER — FUROSEMIDE 20 MG/1
20 TABLET ORAL EVERY OTHER DAY
Qty: 5 TABLET | Refills: 0 | Status: SHIPPED | OUTPATIENT
Start: 2019-02-04 | End: 2019-03-11 | Stop reason: SDUPTHER

## 2019-02-07 ASSESSMENT — ENCOUNTER SYMPTOMS
SORE THROAT: 0
VOMITING: 0
COUGH: 0
CONSTIPATION: 0
SHORTNESS OF BREATH: 0
DIARRHEA: 0
NAUSEA: 0
ABDOMINAL PAIN: 0
RHINORRHEA: 0

## 2019-02-11 ENCOUNTER — TELEPHONE (OUTPATIENT)
Dept: PULMONOLOGY | Age: 49
End: 2019-02-11

## 2019-03-11 ENCOUNTER — TELEPHONE (OUTPATIENT)
Dept: FAMILY MEDICINE CLINIC | Age: 49
End: 2019-03-11

## 2019-03-11 DIAGNOSIS — M79.89 SWELLING OF BOTH LOWER EXTREMITIES: ICD-10-CM

## 2019-03-11 RX ORDER — FUROSEMIDE 20 MG/1
20 TABLET ORAL EVERY OTHER DAY
Qty: 5 TABLET | Refills: 0 | Status: SHIPPED | OUTPATIENT
Start: 2019-03-11 | End: 2019-08-08

## 2019-04-03 ENCOUNTER — TELEPHONE (OUTPATIENT)
Dept: FAMILY MEDICINE CLINIC | Age: 49
End: 2019-04-03

## 2019-04-03 RX ORDER — VALSARTAN 80 MG/1
80 TABLET ORAL DAILY
Qty: 30 TABLET | Refills: 2 | Status: SHIPPED | OUTPATIENT
Start: 2019-04-03 | End: 2019-07-17 | Stop reason: SDUPTHER

## 2019-04-03 NOTE — TELEPHONE ENCOUNTER
Pt states that he has gotten letters and a call from the pharmacy that the losartan that he takes is recalled. Please advise.

## 2019-04-03 NOTE — TELEPHONE ENCOUNTER
Patient needs to call his local pharmacy to see if his lot number has been affected, they may have some that has not been affected by the recall.

## 2019-06-20 ENCOUNTER — APPOINTMENT (OUTPATIENT)
Dept: GENERAL RADIOLOGY | Age: 49
End: 2019-06-20
Payer: COMMERCIAL

## 2019-06-20 ENCOUNTER — HOSPITAL ENCOUNTER (OUTPATIENT)
Age: 49
Setting detail: OBSERVATION
Discharge: HOME | End: 2019-06-21
Attending: EMERGENCY MEDICINE | Admitting: EMERGENCY MEDICINE
Payer: COMMERCIAL

## 2019-06-20 ENCOUNTER — APPOINTMENT (OUTPATIENT)
Dept: CT IMAGING | Age: 49
End: 2019-06-20
Payer: COMMERCIAL

## 2019-06-20 ENCOUNTER — NURSE TRIAGE (OUTPATIENT)
Dept: OTHER | Facility: CLINIC | Age: 49
End: 2019-06-20

## 2019-06-20 DIAGNOSIS — R07.9 CHEST PAIN, UNSPECIFIED TYPE: Primary | ICD-10-CM

## 2019-06-20 DIAGNOSIS — R60.0 LEG EDEMA, RIGHT: ICD-10-CM

## 2019-06-20 PROBLEM — M79.89 PAIN AND SWELLING OF RIGHT LOWER LEG: Status: ACTIVE | Noted: 2019-06-20

## 2019-06-20 PROBLEM — R07.89 ATYPICAL CHEST PAIN: Status: ACTIVE | Noted: 2019-06-20

## 2019-06-20 PROBLEM — M79.661 PAIN AND SWELLING OF RIGHT LOWER LEG: Status: ACTIVE | Noted: 2019-06-20

## 2019-06-20 LAB
A/G RATIO: 1.2 (ref 1.1–2.2)
ALBUMIN SERPL-MCNC: 4.1 G/DL (ref 3.4–5)
ALP BLD-CCNC: 122 U/L (ref 40–129)
ALT SERPL-CCNC: 33 U/L (ref 10–40)
ANION GAP SERPL CALCULATED.3IONS-SCNC: 10 MMOL/L (ref 3–16)
AST SERPL-CCNC: 26 U/L (ref 15–37)
BASOPHILS ABSOLUTE: 0.1 K/UL (ref 0–0.2)
BASOPHILS RELATIVE PERCENT: 1 %
BILIRUB SERPL-MCNC: 0.3 MG/DL (ref 0–1)
BUN BLDV-MCNC: 21 MG/DL (ref 7–20)
CALCIUM SERPL-MCNC: 9.1 MG/DL (ref 8.3–10.6)
CHLORIDE BLD-SCNC: 102 MMOL/L (ref 99–110)
CO2: 27 MMOL/L (ref 21–32)
CREAT SERPL-MCNC: 0.8 MG/DL (ref 0.9–1.3)
EKG ATRIAL RATE: 126 BPM
EKG DIAGNOSIS: NORMAL
EKG P AXIS: 47 DEGREES
EKG P-R INTERVAL: 144 MS
EKG Q-T INTERVAL: 316 MS
EKG QRS DURATION: 88 MS
EKG QTC CALCULATION (BAZETT): 457 MS
EKG R AXIS: -3 DEGREES
EKG T AXIS: 63 DEGREES
EKG VENTRICULAR RATE: 126 BPM
EOSINOPHILS ABSOLUTE: 0.2 K/UL (ref 0–0.6)
EOSINOPHILS RELATIVE PERCENT: 2.9 %
GFR AFRICAN AMERICAN: >60
GFR NON-AFRICAN AMERICAN: >60
GLOBULIN: 3.3 G/DL
GLUCOSE BLD-MCNC: 129 MG/DL (ref 70–99)
HCT VFR BLD CALC: 43.6 % (ref 40.5–52.5)
HEMOGLOBIN: 14.6 G/DL (ref 13.5–17.5)
INR BLD: 1 (ref 0.86–1.14)
LYMPHOCYTES ABSOLUTE: 3.1 K/UL (ref 1–5.1)
LYMPHOCYTES RELATIVE PERCENT: 42.5 %
MAGNESIUM: 2.1 MG/DL (ref 1.8–2.4)
MCH RBC QN AUTO: 30.9 PG (ref 26–34)
MCHC RBC AUTO-ENTMCNC: 33.4 G/DL (ref 31–36)
MCV RBC AUTO: 92.3 FL (ref 80–100)
MONOCYTES ABSOLUTE: 0.8 K/UL (ref 0–1.3)
MONOCYTES RELATIVE PERCENT: 10.9 %
NEUTROPHILS ABSOLUTE: 3.1 K/UL (ref 1.7–7.7)
NEUTROPHILS RELATIVE PERCENT: 42.7 %
PDW BLD-RTO: 13.4 % (ref 12.4–15.4)
PLATELET # BLD: 252 K/UL (ref 135–450)
PMV BLD AUTO: 7.8 FL (ref 5–10.5)
POTASSIUM SERPL-SCNC: 3.7 MMOL/L (ref 3.5–5.1)
PRO-BNP: <5 PG/ML (ref 0–124)
PROTHROMBIN TIME: 11.4 SEC (ref 9.8–13)
RBC # BLD: 4.72 M/UL (ref 4.2–5.9)
SODIUM BLD-SCNC: 139 MMOL/L (ref 136–145)
TOTAL PROTEIN: 7.4 G/DL (ref 6.4–8.2)
TROPONIN: <0.01 NG/ML
WBC # BLD: 7.2 K/UL (ref 4–11)

## 2019-06-20 PROCEDURE — 6360000002 HC RX W HCPCS: Performed by: EMERGENCY MEDICINE

## 2019-06-20 PROCEDURE — 93005 ELECTROCARDIOGRAM TRACING: CPT | Performed by: EMERGENCY MEDICINE

## 2019-06-20 PROCEDURE — 99285 EMERGENCY DEPT VISIT HI MDM: CPT

## 2019-06-20 PROCEDURE — 96374 THER/PROPH/DIAG INJ IV PUSH: CPT

## 2019-06-20 PROCEDURE — 93010 ELECTROCARDIOGRAM REPORT: CPT | Performed by: INTERNAL MEDICINE

## 2019-06-20 PROCEDURE — 83880 ASSAY OF NATRIURETIC PEPTIDE: CPT

## 2019-06-20 PROCEDURE — 85025 COMPLETE CBC W/AUTO DIFF WBC: CPT

## 2019-06-20 PROCEDURE — 80053 COMPREHEN METABOLIC PANEL: CPT

## 2019-06-20 PROCEDURE — 71260 CT THORAX DX C+: CPT

## 2019-06-20 PROCEDURE — 6370000000 HC RX 637 (ALT 250 FOR IP): Performed by: EMERGENCY MEDICINE

## 2019-06-20 PROCEDURE — 71046 X-RAY EXAM CHEST 2 VIEWS: CPT

## 2019-06-20 PROCEDURE — 84484 ASSAY OF TROPONIN QUANT: CPT

## 2019-06-20 PROCEDURE — 6360000004 HC RX CONTRAST MEDICATION: Performed by: EMERGENCY MEDICINE

## 2019-06-20 PROCEDURE — 85610 PROTHROMBIN TIME: CPT

## 2019-06-20 PROCEDURE — 2580000003 HC RX 258: Performed by: EMERGENCY MEDICINE

## 2019-06-20 PROCEDURE — 6370000000 HC RX 637 (ALT 250 FOR IP): Performed by: INTERNAL MEDICINE

## 2019-06-20 PROCEDURE — G0378 HOSPITAL OBSERVATION PER HR: HCPCS

## 2019-06-20 PROCEDURE — 96372 THER/PROPH/DIAG INJ SC/IM: CPT

## 2019-06-20 PROCEDURE — 96376 TX/PRO/DX INJ SAME DRUG ADON: CPT

## 2019-06-20 PROCEDURE — 2580000003 HC RX 258: Performed by: INTERNAL MEDICINE

## 2019-06-20 PROCEDURE — 96361 HYDRATE IV INFUSION ADD-ON: CPT

## 2019-06-20 PROCEDURE — 83735 ASSAY OF MAGNESIUM: CPT

## 2019-06-20 PROCEDURE — 94150 VITAL CAPACITY TEST: CPT

## 2019-06-20 RX ORDER — DIPHENHYDRAMINE HCL 25 MG
50 TABLET ORAL ONCE
Status: COMPLETED | OUTPATIENT
Start: 2019-06-20 | End: 2019-06-20

## 2019-06-20 RX ORDER — VALSARTAN 80 MG/1
80 TABLET ORAL DAILY
Status: DISCONTINUED | OUTPATIENT
Start: 2019-06-21 | End: 2019-06-21 | Stop reason: HOSPADM

## 2019-06-20 RX ORDER — ATORVASTATIN CALCIUM 40 MG/1
40 TABLET, FILM COATED ORAL DAILY
Status: DISCONTINUED | OUTPATIENT
Start: 2019-06-21 | End: 2019-06-21 | Stop reason: HOSPADM

## 2019-06-20 RX ORDER — SODIUM CHLORIDE 0.9 % (FLUSH) 0.9 %
10 SYRINGE (ML) INJECTION PRN
Status: DISCONTINUED | OUTPATIENT
Start: 2019-06-20 | End: 2019-06-21 | Stop reason: HOSPADM

## 2019-06-20 RX ORDER — CYCLOBENZAPRINE HCL 10 MG
10 TABLET ORAL 3 TIMES DAILY PRN
Status: DISCONTINUED | OUTPATIENT
Start: 2019-06-20 | End: 2019-06-21 | Stop reason: HOSPADM

## 2019-06-20 RX ORDER — BUSPIRONE HYDROCHLORIDE 5 MG/1
20 TABLET ORAL 2 TIMES DAILY
Status: DISCONTINUED | OUTPATIENT
Start: 2019-06-20 | End: 2019-06-21 | Stop reason: HOSPADM

## 2019-06-20 RX ORDER — ALBUTEROL SULFATE 90 UG/1
2 AEROSOL, METERED RESPIRATORY (INHALATION) EVERY 6 HOURS PRN
Status: DISCONTINUED | OUTPATIENT
Start: 2019-06-20 | End: 2019-06-21 | Stop reason: HOSPADM

## 2019-06-20 RX ORDER — 0.9 % SODIUM CHLORIDE 0.9 %
1000 INTRAVENOUS SOLUTION INTRAVENOUS ONCE
Status: COMPLETED | OUTPATIENT
Start: 2019-06-20 | End: 2019-06-20

## 2019-06-20 RX ORDER — FUROSEMIDE 20 MG/1
20 TABLET ORAL EVERY OTHER DAY
Status: DISCONTINUED | OUTPATIENT
Start: 2019-06-21 | End: 2019-06-21 | Stop reason: HOSPADM

## 2019-06-20 RX ORDER — ALPRAZOLAM 0.25 MG/1
0.5 TABLET ORAL ONCE
Status: COMPLETED | OUTPATIENT
Start: 2019-06-20 | End: 2019-06-20

## 2019-06-20 RX ORDER — NITROGLYCERIN 0.4 MG/1
0.4 TABLET SUBLINGUAL EVERY 5 MIN PRN
Status: DISCONTINUED | OUTPATIENT
Start: 2019-06-20 | End: 2019-06-21 | Stop reason: HOSPADM

## 2019-06-20 RX ORDER — TRAZODONE HYDROCHLORIDE 50 MG/1
100 TABLET ORAL NIGHTLY
Status: DISCONTINUED | OUTPATIENT
Start: 2019-06-20 | End: 2019-06-21 | Stop reason: HOSPADM

## 2019-06-20 RX ORDER — SODIUM CHLORIDE 0.9 % (FLUSH) 0.9 %
10 SYRINGE (ML) INJECTION EVERY 12 HOURS SCHEDULED
Status: DISCONTINUED | OUTPATIENT
Start: 2019-06-20 | End: 2019-06-21 | Stop reason: HOSPADM

## 2019-06-20 RX ORDER — CHLORDIAZEPOXIDE HYDROCHLORIDE AND CLIDINIUM BROMIDE 5; 2.5 MG/1; MG/1
1 CAPSULE ORAL
Status: DISCONTINUED | OUTPATIENT
Start: 2019-06-20 | End: 2019-06-21 | Stop reason: HOSPADM

## 2019-06-20 RX ORDER — PANTOPRAZOLE SODIUM 40 MG/1
40 TABLET, DELAYED RELEASE ORAL 2 TIMES DAILY
Status: DISCONTINUED | OUTPATIENT
Start: 2019-06-20 | End: 2019-06-21 | Stop reason: HOSPADM

## 2019-06-20 RX ORDER — ONDANSETRON 2 MG/ML
4 INJECTION INTRAMUSCULAR; INTRAVENOUS EVERY 6 HOURS PRN
Status: DISCONTINUED | OUTPATIENT
Start: 2019-06-20 | End: 2019-06-21 | Stop reason: HOSPADM

## 2019-06-20 RX ORDER — ASPIRIN 81 MG/1
81 TABLET, CHEWABLE ORAL DAILY
Status: DISCONTINUED | OUTPATIENT
Start: 2019-06-21 | End: 2019-06-21 | Stop reason: HOSPADM

## 2019-06-20 RX ORDER — ASPIRIN 325 MG
325 TABLET ORAL ONCE
Status: COMPLETED | OUTPATIENT
Start: 2019-06-20 | End: 2019-06-20

## 2019-06-20 RX ADMIN — ENOXAPARIN SODIUM 105 MG: 120 INJECTION SUBCUTANEOUS at 23:35

## 2019-06-20 RX ADMIN — SODIUM CHLORIDE 1000 ML: 9 INJECTION, SOLUTION INTRAVENOUS at 19:18

## 2019-06-20 RX ADMIN — Medication 10 ML: at 23:36

## 2019-06-20 RX ADMIN — ASPIRIN 325 MG: 325 TABLET, COATED ORAL at 19:19

## 2019-06-20 RX ADMIN — CYCLOBENZAPRINE HYDROCHLORIDE 10 MG: 10 TABLET, FILM COATED ORAL at 23:15

## 2019-06-20 RX ADMIN — IOPAMIDOL 75 ML: 755 INJECTION, SOLUTION INTRAVENOUS at 20:10

## 2019-06-20 RX ADMIN — BUSPIRONE HYDROCHLORIDE 20 MG: 5 TABLET ORAL at 23:15

## 2019-06-20 RX ADMIN — HYDROMORPHONE HYDROCHLORIDE 0.5 MG: 1 INJECTION, SOLUTION INTRAMUSCULAR; INTRAVENOUS; SUBCUTANEOUS at 21:09

## 2019-06-20 RX ADMIN — PANTOPRAZOLE SODIUM 40 MG: 40 TABLET, DELAYED RELEASE ORAL at 23:34

## 2019-06-20 RX ADMIN — DIPHENHYDRAMINE HCL 50 MG: 25 TABLET ORAL at 23:34

## 2019-06-20 RX ADMIN — TRAZODONE HYDROCHLORIDE 100 MG: 50 TABLET ORAL at 23:15

## 2019-06-20 RX ADMIN — NITROGLYCERIN 1 INCH: 20 OINTMENT TOPICAL at 19:18

## 2019-06-20 RX ADMIN — HYDROMORPHONE HYDROCHLORIDE 0.5 MG: 1 INJECTION, SOLUTION INTRAMUSCULAR; INTRAVENOUS; SUBCUTANEOUS at 19:18

## 2019-06-20 RX ADMIN — ALPRAZOLAM 0.5 MG: 0.25 TABLET ORAL at 23:34

## 2019-06-20 ASSESSMENT — PAIN DESCRIPTION - ORIENTATION
ORIENTATION: RIGHT
ORIENTATION: LEFT;RIGHT
ORIENTATION: RIGHT

## 2019-06-20 ASSESSMENT — PAIN DESCRIPTION - PAIN TYPE
TYPE: ACUTE PAIN

## 2019-06-20 ASSESSMENT — PAIN DESCRIPTION - DESCRIPTORS: DESCRIPTORS: ACHING;THROBBING

## 2019-06-20 ASSESSMENT — PAIN DESCRIPTION - LOCATION
LOCATION: KNEE
LOCATION: CHEST;KNEE
LOCATION: CHEST

## 2019-06-20 ASSESSMENT — PAIN DESCRIPTION - ONSET: ONSET: ON-GOING

## 2019-06-20 ASSESSMENT — PAIN DESCRIPTION - FREQUENCY: FREQUENCY: CONTINUOUS

## 2019-06-20 ASSESSMENT — PAIN SCALES - GENERAL
PAINLEVEL_OUTOF10: 8
PAINLEVEL_OUTOF10: 5
PAINLEVEL_OUTOF10: 7
PAINLEVEL_OUTOF10: 7
PAINLEVEL_OUTOF10: 4

## 2019-06-20 ASSESSMENT — HEART SCORE: ECG: 1

## 2019-06-20 ASSESSMENT — PAIN DESCRIPTION - PROGRESSION: CLINICAL_PROGRESSION: GRADUALLY WORSENING

## 2019-06-20 NOTE — ED PROVIDER NOTES
[address] [facility] Emergency Department    CHIEF COMPLAINT  Chest Pain (states \" thinks it is anxiety\", states really here because his left ankle and iknee \" is swelled up\", states chest pain near right shoulder)      HISTORY OF PRESENT ILLNESS  Belem Nunez is a 51 y.o. male  who presents to the ED with chest pain and right leg swelling. Patient denies any trauma. Chest pain gets worse with exertion and he does have some mild shortness of breath. He denies any nausea. He has noticed his right leg swelling over the past few days. He does feel like he is under more stress recently taking care of his sick father. No other complaints, modifying factors or associated symptoms. I have reviewed the following from the nursing documentation.     Past Medical History:   Diagnosis Date    ADHD (attention deficit hyperactivity disorder)     Ha esophagus 6/11/2018    Chronic back pain     ED (erectile dysfunction) 12/29/2011    GERD (gastroesophageal reflux disease)     Hypertension     Pleurisy      Past Surgical History:   Procedure Laterality Date    ARM SURGERY Left 12/10/2015    LUNG SURGERY      partial removal (right) agent orange    SPINE SURGERY  1995    lumbar laminectomy     Family History   Problem Relation Age of Onset    Arthritis Mother     Other Mother         pneumothorax several times    Asthma Father      Social History     Socioeconomic History    Marital status:      Spouse name: Not on file    Number of children: Not on file    Years of education: Not on file    Highest education level: Not on file   Occupational History    Not on file   Social Needs    Financial resource strain: Not on file    Food insecurity:     Worry: Not on file     Inability: Not on file    Transportation needs:     Medical: Not on file     Non-medical: Not on file   Tobacco Use    Smoking status: Current Every Day Smoker     Packs/day: 1.00     Years: 33.00     Pack years: Natriuretic Peptide   Result Value Ref Range    Pro-BNP <5 0 - 124 pg/mL   Protime-INR   Result Value Ref Range    Protime 11.4 9.8 - 13.0 sec    INR 1.00 0.86 - 1.14   Magnesium   Result Value Ref Range    Magnesium 2.10 1.80 - 2.40 mg/dL   EKG 12 Lead   Result Value Ref Range    Ventricular Rate 126 BPM    Atrial Rate 126 BPM    P-R Interval 144 ms    QRS Duration 88 ms    Q-T Interval 316 ms    QTc Calculation (Bazett) 457 ms    P Axis 47 degrees    R Axis -3 degrees    T Axis 63 degrees    Diagnosis       Sinus tachycardiaOtherwise normal ECGWhen compared with ECG of 02-NOV-2017 11:41,No significant change was foundConfirmed by [facility] YORDY ONTIVEROS, Sapna Latif ([id number]) on 6/20/2019 10:14:26 PM       The Ekg interpreted by me shows  sinus tachycardia, bdga=112 with a rate of 126  Axis is   Normal  QTc is  normal  Intervals and Durations are unremarkable. ST Segments: nonspecific changes        RADIOLOGY    Xr Chest Standard (2 Vw)    Result Date: 6/20/2019  EXAMINATION: TWO XRAY VIEWS OF THE CHEST 6/20/2019 4:55 pm COMPARISON: 02/08/2018 HISTORY: ORDERING SYSTEM PROVIDED HISTORY: chest pain TECHNOLOGIST PROVIDED HISTORY: Reason for exam:->chest pain Ordering Physician Provided Reason for Exam: swelling Acuity: Acute Type of Exam: Initial FINDINGS: Frontal and lateral views of the chest are submitted for review. The cardiac silhouette is normal in size. Lung parenchyma is clear without focal airspace consolidation, sizeable pleural effusion, or pneumothorax. Trachea is midline. Osseous structures and soft tissues are grossly intact. No evidence for acute cardiopulmonary pathology. Ct Chest Pulmonary Embolism W Contrast    Result Date: 6/20/2019  EXAMINATION: CTA OF THE CHEST 6/20/2019 7:53 pm TECHNIQUE: CTA of the chest was performed after the administration of intravenous contrast.  Multiplanar reformatted images are provided for review. MIP images are provided for review.  Dose modulation, iterative leg to rule out DVT. Also, patient has significant cardiac risk factors and is admitted to the hospital.        [address] [address]  1. Chest pain, unspecified type    2. Leg edema, right        Blood pressure (!) 139/94, pulse 115, temperature 97.8 °F (36.6 °C), temperature source Oral, resp. rate 16, height 6' 1\" (1.854 m), weight 240 lb (108.9 kg), SpO2 95 %. DISPOSITION  Chang Valentin was admitted in fair condition.          Melba So DO  06/20/19 2300
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
## 2019-06-21 VITALS
RESPIRATION RATE: 17 BRPM | DIASTOLIC BLOOD PRESSURE: 65 MMHG | OXYGEN SATURATION: 94 % | HEART RATE: 113 BPM | WEIGHT: 237.7 LBS | TEMPERATURE: 97.8 F | SYSTOLIC BLOOD PRESSURE: 129 MMHG | HEIGHT: 73 IN | BODY MASS INDEX: 31.5 KG/M2

## 2019-06-21 LAB
TROPONIN: <0.01 NG/ML
TROPONIN: <0.01 NG/ML

## 2019-06-21 PROCEDURE — 36415 COLL VENOUS BLD VENIPUNCTURE: CPT

## 2019-06-21 PROCEDURE — 6370000000 HC RX 637 (ALT 250 FOR IP): Performed by: INTERNAL MEDICINE

## 2019-06-21 PROCEDURE — 2580000003 HC RX 258: Performed by: INTERNAL MEDICINE

## 2019-06-21 PROCEDURE — 6360000002 HC RX W HCPCS: Performed by: INTERNAL MEDICINE

## 2019-06-21 PROCEDURE — 84484 ASSAY OF TROPONIN QUANT: CPT

## 2019-06-21 PROCEDURE — 96372 THER/PROPH/DIAG INJ SC/IM: CPT

## 2019-06-21 PROCEDURE — G0378 HOSPITAL OBSERVATION PER HR: HCPCS

## 2019-06-21 RX ORDER — NAPROXEN 250 MG/1
250 TABLET ORAL 2 TIMES DAILY WITH MEALS
Status: DISCONTINUED | OUTPATIENT
Start: 2019-06-21 | End: 2019-06-21 | Stop reason: HOSPADM

## 2019-06-21 RX ADMIN — Medication 10 ML: at 09:38

## 2019-06-21 RX ADMIN — PANTOPRAZOLE SODIUM 40 MG: 40 TABLET, DELAYED RELEASE ORAL at 09:38

## 2019-06-21 RX ADMIN — ASPIRIN 81 MG 81 MG: 81 TABLET ORAL at 09:38

## 2019-06-21 RX ADMIN — VALSARTAN 80 MG: 80 TABLET, FILM COATED ORAL at 09:38

## 2019-06-21 RX ADMIN — FUROSEMIDE 20 MG: 20 TABLET ORAL at 09:38

## 2019-06-21 RX ADMIN — BUSPIRONE HYDROCHLORIDE 20 MG: 5 TABLET ORAL at 09:38

## 2019-06-21 RX ADMIN — ATORVASTATIN CALCIUM 40 MG: 40 TABLET, FILM COATED ORAL at 09:38

## 2019-06-21 RX ADMIN — ENOXAPARIN SODIUM 40 MG: 40 INJECTION SUBCUTANEOUS at 09:38

## 2019-06-21 RX ADMIN — CYCLOBENZAPRINE HYDROCHLORIDE 10 MG: 10 TABLET, FILM COATED ORAL at 09:38

## 2019-06-21 ASSESSMENT — PAIN DESCRIPTION - PROGRESSION
CLINICAL_PROGRESSION: GRADUALLY WORSENING
CLINICAL_PROGRESSION: GRADUALLY WORSENING

## 2019-06-21 ASSESSMENT — PAIN DESCRIPTION - DESCRIPTORS: DESCRIPTORS: DISCOMFORT;ACHING

## 2019-06-21 ASSESSMENT — PAIN DESCRIPTION - LOCATION: LOCATION: KNEE

## 2019-06-21 ASSESSMENT — PAIN SCALES - GENERAL
PAINLEVEL_OUTOF10: 0
PAINLEVEL_OUTOF10: 6
PAINLEVEL_OUTOF10: 4

## 2019-06-21 ASSESSMENT — PAIN DESCRIPTION - PAIN TYPE: TYPE: ACUTE PAIN

## 2019-06-21 ASSESSMENT — PAIN DESCRIPTION - ORIENTATION: ORIENTATION: LEFT;RIGHT
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
## 2019-06-21 NOTE — PROGRESS NOTES
Procedure Laterality Date    ARM SURGERY Left 12/10/2015    LUNG SURGERY      partial removal (right) agent orange    SPINE SURGERY  1995    lumbar laminectomy       Level of Consciousness: Alert, Oriented, and Cooperative = 0    Level of Activity: Walking with assistance = 1    Respiratory Pattern: Regular Pattern; RR 8-20 = 0    Breath Sounds: Diminshed bilaterally and/or crackles = 2    Sputum   ,  ,    Cough: Strong, spontaneous, non-productive = 0    Vital Signs   BP (!) 144/91   Pulse 111   Temp 97.4 °F (36.3 °C)   Resp 14   Ht 6' 1\" (1.854 m)   Wt 240 lb (108.9 kg)   SpO2 95%   BMI 31.66 kg/m²   SPO2 (COPD values may differ): Greater than or equal to 92% on room air = 0    Peak Flow (asthma only): not applicable = 0    RSI: 5-6 = Q4hr PRN (every four hours as needed) for dyspnea        Plan       Goals: volume expansion    Patient/caregiver was educated on the proper method of use for Respiratory Care Devices:  Yes      Level of patient/caregiver understanding able to:   ? Verbalize understanding   ? Demonstrate understanding       ? Teach back        ? Needs reinforcement       ? No available caregiver               ? Other:     Response to education:  Very Good     Is patient being placed on Home Treatment Regimen? Yes     Does the patient have everything they need prior to discharge? NA     Comments: Patient assessed    Plan of Care: Keep current therapy    Electronically signed by Elliot Lees RCP on 6/20/2019 at 11:44 PM    Respiratory Protocol Guidelines     1. Assessment and treatment by Respiratory Therapy will be initiated for medication and therapeutic interventions upon initiation of aerosolized medication. 2. Physician will be contacted for respiratory rate (RR) greater than 35 breaths per minute. Therapy will be held for heart rate (HR) greater than 140 beats per minute, pending direction from physician.   3. Bronchodilators will be administered via Metered Dose Inhaler (MDI) with spacer when the following criteria are met:  a. Alert and cooperative     b. HR < 140 bpm  c. RR < 30 bpm                d. Can demonstrate a 2-3 second inspiratory hold  4. Bronchodilators will be administered via Hand Held Nebulizer [facility]) to patients when ANY of the following criteria are met  a. Incognizant or uncooperative          b. Patients treated with HHN at Home        c. Unable to demonstrate proper use of MDI with spacer     d. RR > 30 bpm   5. Bronchodilators will be delivered via Metered Dose Inhaler (MDI), HHN, Aerogen to intubated patients on mechanical ventilation. 6. Inhalation medication orders will be delivered and/or substituted as outlined below. Aerosolized Medications Ordering and Administration Guidelines:    1. All Medications will be ordered by a physician, and their frequency and/or modality will be adjusted as defined by the patients Respiratory Severity Index (RSI) score. 2. If the patient does not have documented COPD, consider discontinuing anticholinergics when RSI is less than 9.  3. If the bronchospasm worsens (increased RSI), then the bronchodilator frequency can be increased to a maximum of every 4 hours. If greater than every 4 hours is required, the physician will be contacted. 4. If the bronchospasm improves, the frequency of the bronchodilator can be decreased, based on the patient's RSI, but not less than home treatment regimen frequency. 5. Bronchodilator(s) will be discontinued if patient has a RSI less than 9 and has received no scheduled or as needed treatment for 72  Hrs. Patients Ordered on a Mucolytic Agent:    1. Must always be administered with a bronchodilator. 2. Discontinue if patient experiences worsened bronchospasm, or secretions have lessened to the point that the patient is able to clear them with a cough. Anti-inflammatory and Combination Medications:    1.  If the patient lacks prior history of lung disease, is not using inhaled

## 2019-06-21 NOTE — PROGRESS NOTES
End of shift report given to CYN Hagan at bedside. Patient alert and oriented. Bed in lowest position with wheels locked. Call light within reach. No further needs at this time.

## 2019-06-22 ENCOUNTER — HOSPITAL ENCOUNTER (EMERGENCY)
Age: 49
Discharge: HOME | End: 2019-06-22
Attending: EMERGENCY MEDICINE
Payer: COMMERCIAL

## 2019-06-22 ENCOUNTER — APPOINTMENT (OUTPATIENT)
Dept: GENERAL RADIOLOGY | Age: 49
End: 2019-06-22
Payer: COMMERCIAL

## 2019-06-22 VITALS
HEART RATE: 123 BPM | BODY MASS INDEX: 31.14 KG/M2 | OXYGEN SATURATION: 92 % | WEIGHT: 235 LBS | TEMPERATURE: 98.6 F | RESPIRATION RATE: 22 BRPM | HEIGHT: 73 IN | DIASTOLIC BLOOD PRESSURE: 96 MMHG | SYSTOLIC BLOOD PRESSURE: 144 MMHG

## 2019-06-22 DIAGNOSIS — R50.9 FEVER, UNKNOWN ORIGIN: Primary | ICD-10-CM

## 2019-06-22 DIAGNOSIS — R00.0 SINUS TACHYCARDIA: ICD-10-CM

## 2019-06-22 LAB
A/G RATIO: 1.3 (ref 1.1–2.2)
ALBUMIN SERPL-MCNC: 3.9 G/DL (ref 3.4–5)
ALP BLD-CCNC: 103 U/L (ref 40–129)
ALT SERPL-CCNC: 31 U/L (ref 10–40)
ANION GAP SERPL CALCULATED.3IONS-SCNC: 10 MMOL/L (ref 3–16)
AST SERPL-CCNC: 29 U/L (ref 15–37)
BASOPHILS ABSOLUTE: 0.1 K/UL (ref 0–0.2)
BASOPHILS RELATIVE PERCENT: 0.8 %
BILIRUB SERPL-MCNC: 0.6 MG/DL (ref 0–1)
BILIRUBIN URINE: NEGATIVE
BLOOD, URINE: NEGATIVE
BUN BLDV-MCNC: 21 MG/DL (ref 7–20)
CALCIUM SERPL-MCNC: 8.5 MG/DL (ref 8.3–10.6)
CHLORIDE BLD-SCNC: 106 MMOL/L (ref 99–110)
CLARITY: CLEAR
CO2: 25 MMOL/L (ref 21–32)
COLOR: YELLOW
CREAT SERPL-MCNC: 0.8 MG/DL (ref 0.9–1.3)
EOSINOPHILS ABSOLUTE: 0.2 K/UL (ref 0–0.6)
EOSINOPHILS RELATIVE PERCENT: 1.4 %
ETHANOL: NORMAL MG/DL (ref 0–0.08)
GFR AFRICAN AMERICAN: >60
GFR NON-AFRICAN AMERICAN: >60
GLOBULIN: 2.9 G/DL
GLUCOSE BLD-MCNC: 105 MG/DL (ref 70–99)
GLUCOSE URINE: NEGATIVE MG/DL
HCT VFR BLD CALC: 40.2 % (ref 40.5–52.5)
HEMOGLOBIN: 13.3 G/DL (ref 13.5–17.5)
KETONES, URINE: NEGATIVE MG/DL
LACTIC ACID: 0.9 MMOL/L (ref 0.4–2)
LEUKOCYTE ESTERASE, URINE: NEGATIVE
LYMPHOCYTES ABSOLUTE: 1.7 K/UL (ref 1–5.1)
LYMPHOCYTES RELATIVE PERCENT: 14.6 %
MCH RBC QN AUTO: 30.7 PG (ref 26–34)
MCHC RBC AUTO-ENTMCNC: 33.2 G/DL (ref 31–36)
MCV RBC AUTO: 92.7 FL (ref 80–100)
MICROSCOPIC EXAMINATION: NORMAL
MONOCYTES ABSOLUTE: 0.8 K/UL (ref 0–1.3)
MONOCYTES RELATIVE PERCENT: 6.8 %
NEUTROPHILS ABSOLUTE: 9 K/UL (ref 1.7–7.7)
NEUTROPHILS RELATIVE PERCENT: 76.4 %
NITRITE, URINE: NEGATIVE
PDW BLD-RTO: 13.1 % (ref 12.4–15.4)
PH UA: 5.5 (ref 5–8)
PLATELET # BLD: 239 K/UL (ref 135–450)
PMV BLD AUTO: 8 FL (ref 5–10.5)
POTASSIUM REFLEX MAGNESIUM: 3.7 MMOL/L (ref 3.5–5.1)
PRO-BNP: 8 PG/ML (ref 0–124)
PROTEIN UA: NEGATIVE MG/DL
RBC # BLD: 4.34 M/UL (ref 4.2–5.9)
SODIUM BLD-SCNC: 141 MMOL/L (ref 136–145)
SPECIFIC GRAVITY UA: 1.02 (ref 1–1.03)
TOTAL PROTEIN: 6.8 G/DL (ref 6.4–8.2)
TROPONIN: <0.01 NG/ML
URINE REFLEX TO CULTURE: NORMAL
URINE TYPE: NORMAL
UROBILINOGEN, URINE: 0.2 E.U./DL
WBC # BLD: 11.7 K/UL (ref 4–11)

## 2019-06-22 PROCEDURE — 80053 COMPREHEN METABOLIC PANEL: CPT

## 2019-06-22 PROCEDURE — 99285 EMERGENCY DEPT VISIT HI MDM: CPT

## 2019-06-22 PROCEDURE — 85025 COMPLETE CBC W/AUTO DIFF WBC: CPT

## 2019-06-22 PROCEDURE — 2580000003 HC RX 258: Performed by: EMERGENCY MEDICINE

## 2019-06-22 PROCEDURE — 93005 ELECTROCARDIOGRAM TRACING: CPT

## 2019-06-22 PROCEDURE — 71045 X-RAY EXAM CHEST 1 VIEW: CPT

## 2019-06-22 PROCEDURE — 81003 URINALYSIS AUTO W/O SCOPE: CPT

## 2019-06-22 PROCEDURE — G0480 DRUG TEST DEF 1-7 CLASSES: HCPCS

## 2019-06-22 PROCEDURE — 84484 ASSAY OF TROPONIN QUANT: CPT

## 2019-06-22 PROCEDURE — 83880 ASSAY OF NATRIURETIC PEPTIDE: CPT

## 2019-06-22 PROCEDURE — 83605 ASSAY OF LACTIC ACID: CPT

## 2019-06-22 RX ORDER — 0.9 % SODIUM CHLORIDE 0.9 %
2000 INTRAVENOUS SOLUTION INTRAVENOUS ONCE
Status: COMPLETED | OUTPATIENT
Start: 2019-06-22 | End: 2019-06-22

## 2019-06-22 RX ADMIN — SODIUM CHLORIDE 2000 ML: 9 INJECTION, SOLUTION INTRAVENOUS at 22:38

## 2019-06-23 LAB
EKG ATRIAL RATE: 136 BPM
EKG DIAGNOSIS: NORMAL
EKG P AXIS: 39 DEGREES
EKG P-R INTERVAL: 140 MS
EKG Q-T INTERVAL: 292 MS
EKG QRS DURATION: 90 MS
EKG QTC CALCULATION (BAZETT): 439 MS
EKG R AXIS: -25 DEGREES
EKG T AXIS: 66 DEGREES
EKG VENTRICULAR RATE: 136 BPM

## 2019-06-23 NOTE — ED NOTES
Patient provided with ice pack per request. Pt. Requesting to go home. MD Pillo Stewart notified.       Tere Miles RN  06/22/19 2940

## 2019-06-23 NOTE — ED PROVIDER NOTES
Laterality Date    ARM SURGERY Left 12/10/2015    LUNG SURGERY      partial removal (right) agent orange    SPINE SURGERY  1995    lumbar laminectomy         CURRENT MEDICATIONS       Previous Medications    ALBUTEROL SULFATE HFA (VENTOLIN HFA) 108 (90 BASE) MCG/ACT INHALER    Inhale 2 puffs into the lungs every 6 hours as needed for Wheezing or Shortness of Breath    ASPIRIN 81 MG TABLET    Take 81 mg by mouth daily    ATORVASTATIN (LIPITOR) 40 MG TABLET    TAKE ONE TABLET BY MOUTH DAILY    BUSPIRONE (BUSPAR) 10 MG TABLET    TAKE ONE TABLET BY MOUTH TWICE A DAY    BUSPIRONE (BUSPAR) 10 MG TABLET    Take 2 tablets by mouth 2 times daily    CHLORDIAZEPOXIDE-CLIDINIUM (LIBRAX) 5-2.5 MG PER CAPSULE    Take 1 capsule by mouth 4 times daily (before meals and nightly). .    CYCLOBENZAPRINE (FLEXERIL) 10 MG TABLET    TAKE ONE TABLET BY MOUTH EVERY 8 HOURS AS NEEDED FOR MUSCLE SPASMS    DICYCLOMINE (BENTYL) 10 MG CAPSULE    Take 1 capsule by mouth every 6 hours as needed (cramps)    DIPHENHYDRAMINE (BENADRYL) 25 MG TABLET    Take 25 mg by mouth every 6 hours as needed for Itching    FUROSEMIDE (LASIX) 20 MG TABLET    Take 1 tablet by mouth every other day    PANTOPRAZOLE (PROTONIX) 40 MG TABLET    Take 1 tablet by mouth 2 times daily    SILDENAFIL (REVATIO) 20 MG TABLET    Take 3 tablets by mouth daily as needed (ED)    TRAZODONE (DESYREL) 50 MG TABLET    Take 2 tablets by mouth nightly TAKE TWO TABLETS BY MOUTH ONCE NIGHTLY    VALSARTAN (DIOVAN) 80 MG TABLET    Take 1 tablet by mouth daily       ALLERGIES     Lexapro [escitalopram oxalate];  Morphine; Prozac [fluoxetine hcl]; and Wellbutrin [bupropion]    FAMILY HISTORY       Family History   Problem Relation Age of Onset    Arthritis Mother     Other Mother         pneumothorax several times    Asthma Father           SOCIAL HISTORY       Social History     Socioeconomic History    Marital status:      Spouse name: None    Number of children: None    Years PROCEDURES:  Unless otherwise noted below, none     Procedures    FINAL IMPRESSION      1. Fever, unknown origin    2.  Sinus tachycardia          DISPOSITION/PLAN   DISPOSITION Decision To Discharge 06/22/2019 10:48:22 PM      PATIENT REFERRED TO:  MD Archie Mendoza [address] [facility] [id number]  [phone]    Schedule an appointment as soon as possible for a visit         DISCHARGE MEDICATIONS:  New Prescriptions    No medications on file          (Please note that portions of this note were completed with a voicerecognition program.  Efforts were made to edit the dictations but occasionally words are mis-transcribed.)    Min Aviles MD (electronically signed)  Attending Emergency Physician           Min Aviles MD  06/22/19 8218

## 2019-06-24 ENCOUNTER — TELEPHONE (OUTPATIENT)
Dept: FAMILY MEDICINE CLINIC | Age: 49
End: 2019-06-24

## 2019-06-24 NOTE — TELEPHONE ENCOUNTER
Pt has to be seen for HFU Pt  is scheduled 6-28 with phu but would like to know if pt can be seen sooner by any other provider.  Please call back  [phone]

## 2019-06-26 ENCOUNTER — OFFICE VISIT (OUTPATIENT)
Dept: FAMILY MEDICINE CLINIC | Age: 49
End: 2019-06-26
Payer: COMMERCIAL

## 2019-06-26 VITALS
DIASTOLIC BLOOD PRESSURE: 106 MMHG | WEIGHT: 237 LBS | OXYGEN SATURATION: 97 % | SYSTOLIC BLOOD PRESSURE: 138 MMHG | TEMPERATURE: 98.8 F | BODY MASS INDEX: 31.27 KG/M2 | HEART RATE: 121 BPM

## 2019-06-26 DIAGNOSIS — R00.0 TACHYCARDIA: ICD-10-CM

## 2019-06-26 DIAGNOSIS — I10 ESSENTIAL HYPERTENSION: Primary | ICD-10-CM

## 2019-06-26 DIAGNOSIS — Z13.1 SCREENING FOR DIABETES MELLITUS (DM): ICD-10-CM

## 2019-06-26 DIAGNOSIS — R53.83 FATIGUE, UNSPECIFIED TYPE: ICD-10-CM

## 2019-06-26 LAB — HBA1C MFR BLD: 5.6 %

## 2019-06-26 PROCEDURE — 99215 OFFICE O/P EST HI 40 MIN: CPT | Performed by: PHYSICIAN ASSISTANT

## 2019-06-26 PROCEDURE — 83036 HEMOGLOBIN GLYCOSYLATED A1C: CPT | Performed by: PHYSICIAN ASSISTANT

## 2019-06-26 PROCEDURE — 93000 ELECTROCARDIOGRAM COMPLETE: CPT | Performed by: PHYSICIAN ASSISTANT

## 2019-06-26 PROCEDURE — 1111F DSCHRG MED/CURRENT MED MERGE: CPT | Performed by: PHYSICIAN ASSISTANT

## 2019-06-26 RX ORDER — HYDROCHLOROTHIAZIDE 25 MG/1
25 TABLET ORAL EVERY MORNING
Qty: 30 TABLET | Refills: 3 | Status: SHIPPED | OUTPATIENT
Start: 2019-06-26 | End: 2019-07-30 | Stop reason: ALTCHOICE

## 2019-06-26 NOTE — PROGRESS NOTES
hydrochloro Post-Discharge Transitional Care Management Services or Hospital Follow Up      Hebert Lopez   YOB: 1970    Date of Office Visit:  6/26/2019  Date of Hospital Admission: 6/22/19  Date of Hospital Discharge: 6/22/19  Risk of hospital readmission (high >=14%.  Medium >=10%) :Readmission Risk Score: 0      Care management risk score Rising risk (score 2-5) and Complex Care (Scores >=6): 5     Non face to face  following discharge, date last encounter closed (first attempt may have been earlier): 6/25/2019  6:30 PM    Call initiated 2 business days of discharge: Yes    Patient Active Problem List   Diagnosis    Allergic rhinitis    Hypertension    ED (erectile dysfunction)    Backache    Insomnia    MDD (major depressive disorder)    GERD (gastroesophageal reflux disease)    Anxiety    Chronic abdominal pain    Malrotation, congenital    History of pancreatitis    Constipation    SOB (shortness of breath) on exertion    Coronary artery disease involving native coronary artery of native heart with unstable angina pectoris ([facility] [address]    Family history of Ha's esophagus    Esophagitis    Polyp of colon    Ha esophagus    Acute abdominal pain    Attention deficit hyperactivity disorder (ADHD)    Attention-deficit hyperactivity disorder, predominantly hyperactive type    Benign essential hypertension    Duodenitis    Low back pain with right-sided sciatica    Thoracic or lumbosacral neuritis or radiculitis, unspecified    Myalgia and myositis    Postlaminectomy syndrome, lumbar region    Tobacco use disorder    Pain and swelling of right lower leg    Atypical chest pain    Chest pain       Allergies   Allergen Reactions    Lexapro [Escitalopram Oxalate] Other (See Comments)     Crazy dream hallucinations      Morphine      Makes him angry    Prozac [Fluoxetine Hcl]      Irritable        Wellbutrin [Bupropion] Other (See Comments)     Crazy dream halluciantions       Medications listed as ordered at the time of discharge from hospital   [place], [facility] Medication Instructions GISSEL:    Printed on:06/26/19 8613   Medication Information                      albuterol sulfate HFA (VENTOLIN HFA) 108 (90 Base) MCG/ACT inhaler  Inhale 2 puffs into the lungs every 6 hours as needed for Wheezing or Shortness of Breath             aspirin 81 MG tablet  Take 81 mg by mouth daily             atorvastatin (LIPITOR) 40 MG tablet  TAKE ONE TABLET BY MOUTH DAILY             chlordiazePOXIDE-clidinium (LIBRAX) 5-2.5 MG per capsule  Take 1 capsule by mouth 4 times daily (before meals and nightly). .             cyclobenzaprine (FLEXERIL) 10 MG tablet  TAKE ONE TABLET BY MOUTH EVERY 8 HOURS AS NEEDED FOR MUSCLE SPASMS             dicyclomine (BENTYL) 10 MG capsule  Take 1 capsule by mouth every 6 hours as needed (cramps)             diphenhydrAMINE (BENADRYL) 25 MG tablet  Take 25 mg by mouth every 6 hours as needed for Itching             furosemide (LASIX) 20 MG tablet  Take 1 tablet by mouth every other day             hydrochlorothiazide (HYDRODIURIL) 25 MG tablet  Take 1 tablet by mouth every morning             pantoprazole (PROTONIX) 40 MG tablet  Take 1 tablet by mouth 2 times daily             sildenafil (REVATIO) 20 MG tablet  Take 3 tablets by mouth daily as needed (ED)             traZODone (DESYREL) 50 MG tablet  Take 2 tablets by mouth nightly TAKE TWO TABLETS BY MOUTH ONCE NIGHTLY             valsartan (DIOVAN) 80 MG tablet  Take 1 tablet by mouth daily                   Medications marked \"taking\" at this time  Outpatient Medications Marked as Taking for the 6/26/19 encounter (Office Visit) with AVE [facility]   Medication Sig Dispense Refill    hydrochlorothiazide (HYDRODIURIL) 25 MG tablet Take 1 tablet by mouth every morning 30 tablet 3    valsartan (DIOVAN) 80 MG tablet Take 1 tablet by mouth daily 30 tablet 2    furosemide (LASIX) 20 MG tablet Take 1 tablet by mouth every other day 5 tablet 0    cyclobenzaprine (FLEXERIL) 10 MG tablet TAKE ONE TABLET BY MOUTH EVERY 8 HOURS AS NEEDED FOR MUSCLE SPASMS 90 tablet 5    atorvastatin (LIPITOR) 40 MG tablet TAKE ONE TABLET BY MOUTH DAILY 90 tablet 1    traZODone (DESYREL) 50 MG tablet Take 2 tablets by mouth nightly TAKE TWO TABLETS BY MOUTH ONCE NIGHTLY 180 tablet 1    dicyclomine (BENTYL) 10 MG capsule Take 1 capsule by mouth every 6 hours as needed (cramps) 60 capsule 5    sildenafil (REVATIO) 20 MG tablet Take 3 tablets by mouth daily as needed (ED) 30 tablet 5    chlordiazePOXIDE-clidinium (LIBRAX) 5-2.5 MG per capsule Take 1 capsule by mouth 4 times daily (before meals and nightly). . 120 capsule 1    [DISCONTINUED] busPIRone (BUSPAR) 10 MG tablet TAKE ONE TABLET BY MOUTH TWICE A DAY (Patient taking differently: Take 20 mg by mouth nightly TAKE ONE TABLET BY MOUTH TWICE A DAY) 180 tablet 1    diphenhydrAMINE (BENADRYL) 25 MG tablet Take 25 mg by mouth every 6 hours as needed for Itching      albuterol sulfate HFA (VENTOLIN HFA) 108 (90 Base) MCG/ACT inhaler Inhale 2 puffs into the lungs every 6 hours as needed for Wheezing or Shortness of Breath 1 Inhaler 6    aspirin 81 MG tablet Take 81 mg by mouth daily          Medications patient taking as of now reconciled against medications ordered at time of hospital discharge: Yes    Chief Complaint   Patient presents with    Follow-Up from [facility] 6/20/19 - [address] [place] [address] 6/22/19 Right leg swelling        History of Present illness - Follow up of Hospital diagnosis(es): Anxiety, MDD, Essential Hypertension, tachycardia    Inpatient course: Discharge summary reviewed- see chart. Interval history/Current status: Pt reports that he feels about the same as he did during hospitalization. He has been laying in bed for the last several days due to extreme fatigue.   Current symptoms include fatigue, shortness of breath, Pain under the knee cap. Pt reports that he is not taking his Lasix because he was told several years ago that it would cause dehydration? Pt reports high anxiety and depressive symptoms due to taking care of his father that has dementia. I reviewed lab work from 06/22. BNP normal, troponin was normal, EKG showed tachycardia which is normal for this pt. CBC just slightly elevated. He had a CTA which was negative for PE on 06/22. Chest x-ray did not show any atelectasis, pneumothorax, or infection. A comprehensive review of systems was negative except for: Constitutional: positive for fatigue  Respiratory: positive for shortness of breath  Cardiovascular: positive for lower leg edema and feeling short of breath  Behavioral/Psych: positive for anxiety and depression    Vitals:    06/26/19 1443 06/26/19 1452   BP: (!) 138/110 (!) 138/106   Site: Left Upper Arm Left Upper Arm   Position: Sitting Sitting   Cuff Size: Large Adult Large Adult   Pulse: 121    Temp: 98.8 °F (37.1 °C)    TempSrc: Oral    SpO2: 97%    Weight: 237 lb (107.5 kg)      Body mass index is 31.27 kg/m².    Wt Readings from Last 3 Encounters:   06/26/19 237 lb (107.5 kg)   06/22/19 235 lb (106.6 kg)   06/21/19 237 lb 11.2 oz (107.8 kg)     BP Readings from Last 3 Encounters:   06/26/19 (!) 138/106   06/22/19 (!) 144/96   06/21/19 129/65        Physical Exam:  General Appearance: alert and oriented to person, place and time, well-developed and well-nourished, in no acute distress  Skin: no rash or erythema  Head: normocephalic and atraumatic  Eyes: pupils equal, round, and reactive to light, extraocular eye movements intact, conjunctivae normal  Pulmonary/Chest: clear to auscultation bilaterally- no wheezes, rales or rhonchi, normal air movement, no respiratory distress  Cardiovascular: normal S1 and S2, no gallops, intact distal pulses, no carotid bruits and tachycardia  Abdomen: soft, non-tender, non-distended, normal bowel sounds, no masses or organomegaly      Assessment/Plan:  1. Essential hypertension  -  Call with blood pressure readings in two weeks. Follow up with PCP in 4 weeks. - hydrochlorothiazide (HYDRODIURIL) 25 MG tablet; Take 1 tablet by mouth every morning  Dispense: 30 tablet; Refill: 3    2. Tachycardia  -  Pt's pulse rate typically elevated  - TSH with Reflex; Future  - EKG 12 lead: no changes from recent EKG, tachy    3. Fatigue, unspecified type  - CBC; Future  - MT DISCHARGE MEDS RECONCILED W/ CURRENT OUTPATIENT MED LIST    4. Screening for diabetes mellitus (DM)  - POCT glycosylated hemoglobin (Hb A1C): 5.6      5. Depression/Anxiety  -  Offered appointment with Dr. Quique Flynn but pt declined. He is not interested in any medication at this time for improvement of anxiety or mood.      Medical Decision Making: moderate complexity

## 2019-06-27 LAB
HCT VFR BLD CALC: 47.1 % (ref 40.5–52.5)
HEMOGLOBIN: 15.7 G/DL (ref 13.5–17.5)
MCH RBC QN AUTO: 31.1 PG (ref 26–34)
MCHC RBC AUTO-ENTMCNC: 33.3 G/DL (ref 31–36)
MCV RBC AUTO: 93.4 FL (ref 80–100)
PDW BLD-RTO: 13.7 % (ref 12.4–15.4)
PLATELET # BLD: 333 K/UL (ref 135–450)
PMV BLD AUTO: 8 FL (ref 5–10.5)
RBC # BLD: 5.04 M/UL (ref 4.2–5.9)
TSH REFLEX: 3.71 UIU/ML (ref 0.27–4.2)
WBC # BLD: 9.6 K/UL (ref 4–11)

## 2019-07-17 RX ORDER — VALSARTAN 80 MG/1
TABLET ORAL
Qty: 30 TABLET | Refills: 1 | Status: SHIPPED | OUTPATIENT
Start: 2019-07-17 | End: 2019-07-30 | Stop reason: SDUPTHER

## 2019-07-30 ENCOUNTER — HOSPITAL ENCOUNTER (OUTPATIENT)
Age: 49
Discharge: HOME | End: 2019-07-30
Payer: COMMERCIAL

## 2019-07-30 ENCOUNTER — OFFICE VISIT (OUTPATIENT)
Dept: FAMILY MEDICINE CLINIC | Age: 49
End: 2019-07-30
Payer: COMMERCIAL

## 2019-07-30 ENCOUNTER — HOSPITAL ENCOUNTER (OUTPATIENT)
Dept: GENERAL RADIOLOGY | Age: 49
Discharge: HOME | End: 2019-07-30
Payer: COMMERCIAL

## 2019-07-30 VITALS
WEIGHT: 237 LBS | DIASTOLIC BLOOD PRESSURE: 90 MMHG | BODY MASS INDEX: 31.27 KG/M2 | TEMPERATURE: 98.8 F | OXYGEN SATURATION: 98 % | SYSTOLIC BLOOD PRESSURE: 138 MMHG | HEART RATE: 101 BPM

## 2019-07-30 DIAGNOSIS — R07.89 ATYPICAL CHEST PAIN: ICD-10-CM

## 2019-07-30 DIAGNOSIS — R07.81 PLEURITIC CHEST PAIN: ICD-10-CM

## 2019-07-30 DIAGNOSIS — F33.1 MODERATE EPISODE OF RECURRENT MAJOR DEPRESSIVE DISORDER (HCC): ICD-10-CM

## 2019-07-30 DIAGNOSIS — R07.2 PRECORDIAL PAIN: ICD-10-CM

## 2019-07-30 DIAGNOSIS — R10.31 CHRONIC RLQ PAIN: ICD-10-CM

## 2019-07-30 DIAGNOSIS — K58.9 IRRITABLE BOWEL SYNDROME, UNSPECIFIED TYPE: ICD-10-CM

## 2019-07-30 DIAGNOSIS — G89.29 CHRONIC RLQ PAIN: ICD-10-CM

## 2019-07-30 DIAGNOSIS — K22.70 BARRETT'S ESOPHAGUS DETERMINED BY ENDOSCOPY: ICD-10-CM

## 2019-07-30 DIAGNOSIS — K21.9 GASTROESOPHAGEAL REFLUX DISEASE WITHOUT ESOPHAGITIS: ICD-10-CM

## 2019-07-30 DIAGNOSIS — I10 BENIGN ESSENTIAL HYPERTENSION: Primary | ICD-10-CM

## 2019-07-30 DIAGNOSIS — Q43.3 MALROTATION, CONGENITAL: ICD-10-CM

## 2019-07-30 LAB — D DIMER: <200 NG/ML DDU (ref 0–229)

## 2019-07-30 PROCEDURE — 4004F PT TOBACCO SCREEN RCVD TLK: CPT | Performed by: INTERNAL MEDICINE

## 2019-07-30 PROCEDURE — 93000 ELECTROCARDIOGRAM COMPLETE: CPT | Performed by: INTERNAL MEDICINE

## 2019-07-30 PROCEDURE — G8417 CALC BMI ABV UP PARAM F/U: HCPCS | Performed by: INTERNAL MEDICINE

## 2019-07-30 PROCEDURE — 71046 X-RAY EXAM CHEST 2 VIEWS: CPT

## 2019-07-30 PROCEDURE — 99214 OFFICE O/P EST MOD 30 MIN: CPT | Performed by: INTERNAL MEDICINE

## 2019-07-30 PROCEDURE — G8427 DOCREV CUR MEDS BY ELIG CLIN: HCPCS | Performed by: INTERNAL MEDICINE

## 2019-07-30 PROCEDURE — G8598 ASA/ANTIPLAT THER USED: HCPCS | Performed by: INTERNAL MEDICINE

## 2019-07-30 RX ORDER — DICYCLOMINE HYDROCHLORIDE 10 MG/1
10 CAPSULE ORAL EVERY 6 HOURS PRN
Qty: 180 CAPSULE | Refills: 1 | Status: SHIPPED | OUTPATIENT
Start: 2019-07-30 | End: 2020-10-17 | Stop reason: ALTCHOICE

## 2019-07-30 RX ORDER — PANTOPRAZOLE SODIUM 40 MG/1
40 TABLET, DELAYED RELEASE ORAL 2 TIMES DAILY
Qty: 180 TABLET | Refills: 1 | Status: SHIPPED | OUTPATIENT
Start: 2019-07-30 | End: 2020-03-11

## 2019-07-30 RX ORDER — CHLORDIAZEPOXIDE HYDROCHLORIDE AND CLIDINIUM BROMIDE 5; 2.5 MG/1; MG/1
1 CAPSULE ORAL 2 TIMES DAILY PRN
Qty: 60 CAPSULE | Refills: 2 | Status: SHIPPED | OUTPATIENT
Start: 2019-07-30 | End: 2020-08-01

## 2019-07-30 RX ORDER — CYCLOBENZAPRINE HCL 10 MG
TABLET ORAL
Qty: 90 TABLET | Refills: 5 | Status: SHIPPED | OUTPATIENT
Start: 2019-07-30 | End: 2020-03-11

## 2019-07-30 RX ORDER — METOPROLOL SUCCINATE 25 MG/1
25 TABLET, EXTENDED RELEASE ORAL DAILY
Qty: 90 TABLET | Refills: 1 | Status: SHIPPED | OUTPATIENT
Start: 2019-07-30 | End: 2020-01-20

## 2019-07-30 RX ORDER — TRAZODONE HYDROCHLORIDE 50 MG/1
100 TABLET ORAL NIGHTLY
Qty: 180 TABLET | Refills: 1 | Status: CANCELLED | OUTPATIENT
Start: 2019-07-30

## 2019-07-30 RX ORDER — ATORVASTATIN CALCIUM 40 MG/1
TABLET, FILM COATED ORAL
Qty: 90 TABLET | Refills: 1 | Status: SHIPPED | OUTPATIENT
Start: 2019-07-30 | End: 2020-01-20

## 2019-07-30 RX ORDER — VALSARTAN 80 MG/1
TABLET ORAL
Qty: 90 TABLET | Refills: 1 | Status: SHIPPED | OUTPATIENT
Start: 2019-07-30 | End: 2020-03-10

## 2019-07-30 RX ORDER — DULOXETIN HYDROCHLORIDE 30 MG/1
CAPSULE, DELAYED RELEASE ORAL
Qty: 30 CAPSULE | Refills: 0 | Status: SHIPPED | OUTPATIENT
Start: 2019-07-30 | End: 2019-08-08

## 2019-07-30 RX ORDER — ALBUTEROL SULFATE 90 UG/1
2 AEROSOL, METERED RESPIRATORY (INHALATION) EVERY 6 HOURS PRN
Qty: 1 INHALER | Refills: 6 | Status: SHIPPED | OUTPATIENT
Start: 2019-07-30 | End: 2021-05-27 | Stop reason: SDUPTHER

## 2019-07-30 RX ORDER — SILDENAFIL CITRATE 20 MG/1
60 TABLET ORAL DAILY PRN
Qty: 30 TABLET | Refills: 5 | Status: CANCELLED | OUTPATIENT
Start: 2019-07-30

## 2019-07-30 RX ORDER — DULOXETIN HYDROCHLORIDE 60 MG/1
60 CAPSULE, DELAYED RELEASE ORAL DAILY
Qty: 90 CAPSULE | Refills: 1 | Status: SHIPPED | OUTPATIENT
Start: 2019-07-30 | End: 2019-09-05 | Stop reason: SDUPTHER

## 2019-07-30 ASSESSMENT — ENCOUNTER SYMPTOMS
SHORTNESS OF BREATH: 0
COUGH: 0

## 2019-07-30 NOTE — PROGRESS NOTES
2019     Jessica Valentin (:  1970) is a 51 y.o. male, here for evaluation of the following medical concerns:    Hypertension   Pertinent negatives include no chest pain, headaches or shortness of breath. Routine follow up IBS, hypertension, chronic back pain and anxiety. Quit work to take care of dad. Bilateral feet swelling in February, keeps recurring and does respond to lasix. Thinks he had an event last , right sided and left sided chest pain after, got up to go to the restroom and couldn't pee, diarrhea, lost energy, checked BP was low, 90/50, o2 mid 70s, HR 20 on sat meter. Kept falling back asleep for 3 days, check pain for 2 days, not sure if it was constant. Day by day getting somewhat better. Patient's medications, allergies, past medical, surgical, social and family histories were reviewed and updated as appropriate. Review of Systems   Constitutional: Negative for fatigue. Respiratory: Negative for cough and shortness of breath. Cardiovascular: Negative for chest pain and leg swelling. Neurological: Negative for dizziness and headaches. Prior to Visit Medications    Medication Sig Taking?  Authorizing Provider   valsartan (DIOVAN) 80 MG tablet TAKE ONE TABLET BY MOUTH DAILY Yes Viviane Barroso MD   hydrochlorothiazide (HYDRODIURIL) 25 MG tablet Take 1 tablet by mouth every morning Yes [facility]   furosemide (LASIX) 20 MG tablet Take 1 tablet by mouth every other day Yes A[facility]   cyclobenzaprine (FLEXERIL) 10 MG tablet TAKE ONE TABLET BY MOUTH EVERY 8 HOURS AS NEEDED FOR MUSCLE SPASMS Yes Viviane Barroso MD   atorvastatin (LIPITOR) 40 MG tablet TAKE ONE TABLET BY MOUTH DAILY Yes Viviane Barroso MD   traZODone (DESYREL) 50 MG tablet Take 2 tablets by mouth nightly TAKE TWO TABLETS BY MOUTH ONCE NIGHTLY Yes Viviane Barroso MD   dicyclomine (BENTYL) 10 MG capsule Take 1 capsule by mouth every 6 hours as needed (cramps) Yes Brittany Edward, heard.  Pulmonary/Chest: Effort normal and breath sounds normal. He has no wheezes. He has no rales. Musculoskeletal: He exhibits no edema or tenderness. Neurological: He is alert and oriented to person, place, and time. Skin: No rash noted. Vitals reviewed. ASSESSMENT/PLAN:  Sarah Galeas was seen today for hypertension. Diagnoses and all orders for this visit:    Benign essential hypertension    Malrotation, congenital    Atypical chest pain  -     EKG 12 Lead  -     XR CHEST STANDARD (2 VW); Future    Moderate episode of recurrent major depressive disorder (HCC)    Gastroesophageal reflux disease without esophagitis  -     pantoprazole (PROTONIX) 40 MG tablet; Take 1 tablet by mouth 2 times daily    Ha's esophagus determined by endoscopy  -     pantoprazole (PROTONIX) 40 MG tablet; Take 1 tablet by mouth 2 times daily    Chronic RLQ pain  -     chlordiazePOXIDE-clidinium (LIBRAX) 5-2.5 MG per capsule; Take 1 capsule by mouth 2 times daily as needed (stomach cramps). Irritable bowel syndrome, unspecified type  -     chlordiazePOXIDE-clidinium (LIBRAX) 5-2.5 MG per capsule; Take 1 capsule by mouth 2 times daily as needed (stomach cramps). Pleuritic chest pain  -     D-DIMER, QUANTITATIVE; Future    Precordial pain  -     NM MYOCARDIAL SPECT REST EXERCISE OR RX; Future    Other orders  -     valsartan (DIOVAN) 80 MG tablet; TAKE ONE TABLET BY MOUTH DAILY  -     albuterol sulfate HFA (VENTOLIN HFA) 108 (90 Base) MCG/ACT inhaler; Inhale 2 puffs into the lungs every 6 hours as needed for Wheezing or Shortness of Breath  -     dicyclomine (BENTYL) 10 MG capsule; Take 1 capsule by mouth every 6 hours as needed (cramps)  -     atorvastatin (LIPITOR) 40 MG tablet; TAKE ONE TABLET BY MOUTH DAILY  -     cyclobenzaprine (FLEXERIL) 10 MG tablet; TAKE ONE TABLET BY MOUTH EVERY 8 HOURS AS NEEDED FOR MUSCLE SPASMS  -     DULoxetine (CYMBALTA) 30 MG extended release capsule;  Take 1 tab daily for one week then 2 tabs daily.  -     DULoxetine (CYMBALTA) 60 MG extended release capsule; Take 1 capsule by mouth daily  -     metoprolol succinate (TOPROL XL) 25 MG extended release tablet; Take 1 tablet by mouth daily    As above and per orders. Adding back cymbalta, eval for atypical chest pain, trial of metoprolol. Return in about 4 weeks (around 8/27/2019) for hypertension, depression. An electronic signature was used to authenticate this note.     --Shavonne Hou MD on 7/30/2019 at 1:11 PM

## 2019-07-31 ENCOUNTER — TELEPHONE (OUTPATIENT)
Dept: FAMILY MEDICINE CLINIC | Age: 49
End: 2019-07-31

## 2019-08-02 NOTE — TELEPHONE ENCOUNTER
Per Wilbert, chlordiazePOXIDE-Clidinium 5-2.5MG capsules do NOT require PA. NOTE: The pharmacy is using a non-covered NDC. Please use any other generic NDC. Letter attached. Please advise patient and pharmacy. Thank you.

## 2019-08-07 ENCOUNTER — HOSPITAL ENCOUNTER (INPATIENT)
Age: 49
LOS: 1 days | Discharge: HOME | DRG: 720 | End: 2019-08-09
Attending: EMERGENCY MEDICINE | Admitting: INTERNAL MEDICINE
Payer: COMMERCIAL

## 2019-08-07 ENCOUNTER — APPOINTMENT (OUTPATIENT)
Dept: CT IMAGING | Age: 49
DRG: 720 | End: 2019-08-07
Payer: COMMERCIAL

## 2019-08-07 ENCOUNTER — APPOINTMENT (OUTPATIENT)
Dept: GENERAL RADIOLOGY | Age: 49
DRG: 720 | End: 2019-08-07
Payer: COMMERCIAL

## 2019-08-07 DIAGNOSIS — R09.02 HYPOXEMIA REQUIRING SUPPLEMENTAL OXYGEN: ICD-10-CM

## 2019-08-07 DIAGNOSIS — A41.9 SEPSIS, DUE TO UNSPECIFIED ORGANISM: Primary | ICD-10-CM

## 2019-08-07 DIAGNOSIS — J18.9 PNEUMONIA DUE TO ORGANISM: ICD-10-CM

## 2019-08-07 DIAGNOSIS — Z99.81 HYPOXEMIA REQUIRING SUPPLEMENTAL OXYGEN: ICD-10-CM

## 2019-08-07 LAB
A/G RATIO: 1.2 (ref 1.1–2.2)
ALBUMIN SERPL-MCNC: 3.8 G/DL (ref 3.4–5)
ALP BLD-CCNC: 95 U/L (ref 40–129)
ALT SERPL-CCNC: 26 U/L (ref 10–40)
ANION GAP SERPL CALCULATED.3IONS-SCNC: 12 MMOL/L (ref 3–16)
AST SERPL-CCNC: 26 U/L (ref 15–37)
BASOPHILS ABSOLUTE: 0.1 K/UL (ref 0–0.2)
BASOPHILS RELATIVE PERCENT: 0.7 %
BILIRUB SERPL-MCNC: 0.7 MG/DL (ref 0–1)
BILIRUBIN URINE: NEGATIVE
BLOOD, URINE: NEGATIVE
BUN BLDV-MCNC: 16 MG/DL (ref 7–20)
CALCIUM SERPL-MCNC: 8.9 MG/DL (ref 8.3–10.6)
CHLORIDE BLD-SCNC: 99 MMOL/L (ref 99–110)
CLARITY: CLEAR
CO2: 27 MMOL/L (ref 21–32)
COLOR: YELLOW
CREAT SERPL-MCNC: 0.9 MG/DL (ref 0.9–1.3)
EOSINOPHILS ABSOLUTE: 0 K/UL (ref 0–0.6)
EOSINOPHILS RELATIVE PERCENT: 0 %
GFR AFRICAN AMERICAN: >60
GFR NON-AFRICAN AMERICAN: >60
GLOBULIN: 3.2 G/DL
GLUCOSE BLD-MCNC: 107 MG/DL (ref 70–99)
GLUCOSE URINE: NEGATIVE MG/DL
HCT VFR BLD CALC: 41.6 % (ref 40.5–52.5)
HEMOGLOBIN: 13.9 G/DL (ref 13.5–17.5)
KETONES, URINE: NEGATIVE MG/DL
LACTIC ACID, SEPSIS: 1.5 MMOL/L (ref 0.4–1.9)
LEUKOCYTE ESTERASE, URINE: NEGATIVE
LYMPHOCYTES ABSOLUTE: 2.2 K/UL (ref 1–5.1)
LYMPHOCYTES RELATIVE PERCENT: 13.7 %
MCH RBC QN AUTO: 30.5 PG (ref 26–34)
MCHC RBC AUTO-ENTMCNC: 33.4 G/DL (ref 31–36)
MCV RBC AUTO: 91.4 FL (ref 80–100)
MICROSCOPIC EXAMINATION: NORMAL
MONOCYTES ABSOLUTE: 1.2 K/UL (ref 0–1.3)
MONOCYTES RELATIVE PERCENT: 7.2 %
NEUTROPHILS ABSOLUTE: 12.9 K/UL (ref 1.7–7.7)
NEUTROPHILS RELATIVE PERCENT: 78.4 %
NITRITE, URINE: NEGATIVE
PDW BLD-RTO: 14 % (ref 12.4–15.4)
PH UA: 6.5 (ref 5–8)
PLATELET # BLD: 256 K/UL (ref 135–450)
PMV BLD AUTO: 7.9 FL (ref 5–10.5)
POTASSIUM SERPL-SCNC: 3.7 MMOL/L (ref 3.5–5.1)
PROCALCITONIN: 0.11 NG/ML (ref 0–0.15)
PROTEIN UA: NEGATIVE MG/DL
RBC # BLD: 4.55 M/UL (ref 4.2–5.9)
SODIUM BLD-SCNC: 138 MMOL/L (ref 136–145)
SPECIFIC GRAVITY UA: 1.01 (ref 1–1.03)
TOTAL PROTEIN: 7 G/DL (ref 6.4–8.2)
TROPONIN: <0.01 NG/ML
URINE TYPE: NORMAL
UROBILINOGEN, URINE: 0.2 E.U./DL
WBC # BLD: 16.4 K/UL (ref 4–11)

## 2019-08-07 PROCEDURE — 6360000002 HC RX W HCPCS: Performed by: EMERGENCY MEDICINE

## 2019-08-07 PROCEDURE — 87086 URINE CULTURE/COLONY COUNT: CPT

## 2019-08-07 PROCEDURE — 96361 HYDRATE IV INFUSION ADD-ON: CPT

## 2019-08-07 PROCEDURE — 85025 COMPLETE CBC W/AUTO DIFF WBC: CPT

## 2019-08-07 PROCEDURE — 6360000004 HC RX CONTRAST MEDICATION: Performed by: EMERGENCY MEDICINE

## 2019-08-07 PROCEDURE — 80053 COMPREHEN METABOLIC PANEL: CPT

## 2019-08-07 PROCEDURE — 2580000003 HC RX 258: Performed by: EMERGENCY MEDICINE

## 2019-08-07 PROCEDURE — 71045 X-RAY EXAM CHEST 1 VIEW: CPT

## 2019-08-07 PROCEDURE — 87040 BLOOD CULTURE FOR BACTERIA: CPT

## 2019-08-07 PROCEDURE — 96365 THER/PROPH/DIAG IV INF INIT: CPT

## 2019-08-07 PROCEDURE — 87449 NOS EACH ORGANISM AG IA: CPT

## 2019-08-07 PROCEDURE — 71260 CT THORAX DX C+: CPT

## 2019-08-07 PROCEDURE — 96367 TX/PROPH/DG ADDL SEQ IV INF: CPT

## 2019-08-07 PROCEDURE — 81003 URINALYSIS AUTO W/O SCOPE: CPT

## 2019-08-07 PROCEDURE — 83605 ASSAY OF LACTIC ACID: CPT

## 2019-08-07 PROCEDURE — 84145 PROCALCITONIN (PCT): CPT

## 2019-08-07 PROCEDURE — 93005 ELECTROCARDIOGRAM TRACING: CPT | Performed by: EMERGENCY MEDICINE

## 2019-08-07 PROCEDURE — 84484 ASSAY OF TROPONIN QUANT: CPT

## 2019-08-07 PROCEDURE — 99285 EMERGENCY DEPT VISIT HI MDM: CPT

## 2019-08-07 PROCEDURE — 6370000000 HC RX 637 (ALT 250 FOR IP): Performed by: EMERGENCY MEDICINE

## 2019-08-07 PROCEDURE — 74177 CT ABD & PELVIS W/CONTRAST: CPT

## 2019-08-07 RX ORDER — IPRATROPIUM BROMIDE AND ALBUTEROL SULFATE 2.5; .5 MG/3ML; MG/3ML
1 SOLUTION RESPIRATORY (INHALATION) ONCE
Status: COMPLETED | OUTPATIENT
Start: 2019-08-07 | End: 2019-08-07

## 2019-08-07 RX ORDER — SODIUM CHLORIDE 0.9 % (FLUSH) 0.9 %
10 SYRINGE (ML) INJECTION PRN
Status: DISCONTINUED | OUTPATIENT
Start: 2019-08-07 | End: 2019-08-08

## 2019-08-07 RX ORDER — LEVOFLOXACIN 5 MG/ML
750 INJECTION, SOLUTION INTRAVENOUS ONCE
Status: COMPLETED | OUTPATIENT
Start: 2019-08-07 | End: 2019-08-08

## 2019-08-07 RX ORDER — SODIUM CHLORIDE 0.9 % (FLUSH) 0.9 %
10 SYRINGE (ML) INJECTION EVERY 12 HOURS SCHEDULED
Status: DISCONTINUED | OUTPATIENT
Start: 2019-08-07 | End: 2019-08-08

## 2019-08-07 RX ORDER — 0.9 % SODIUM CHLORIDE 0.9 %
30 INTRAVENOUS SOLUTION INTRAVENOUS ONCE
Status: COMPLETED | OUTPATIENT
Start: 2019-08-07 | End: 2019-08-08

## 2019-08-07 RX ORDER — IBUPROFEN 800 MG/1
800 TABLET ORAL ONCE
Status: COMPLETED | OUTPATIENT
Start: 2019-08-07 | End: 2019-08-07

## 2019-08-07 RX ORDER — ACETAMINOPHEN 325 MG/1
650 TABLET ORAL ONCE
Status: COMPLETED | OUTPATIENT
Start: 2019-08-07 | End: 2019-08-07

## 2019-08-07 RX ADMIN — SODIUM CHLORIDE 3129 ML: 9 INJECTION, SOLUTION INTRAVENOUS at 21:47

## 2019-08-07 RX ADMIN — IOPAMIDOL 85 ML: 755 INJECTION, SOLUTION INTRAVENOUS at 23:28

## 2019-08-07 RX ADMIN — IBUPROFEN 800 MG: 800 TABLET, FILM COATED ORAL at 21:47

## 2019-08-07 RX ADMIN — LEVOFLOXACIN 750 MG: 5 INJECTION, SOLUTION INTRAVENOUS at 23:34

## 2019-08-07 RX ADMIN — ACETAMINOPHEN 650 MG: 325 TABLET ORAL at 21:47

## 2019-08-07 RX ADMIN — CEFTRIAXONE SODIUM 1 G: 1 INJECTION, POWDER, FOR SOLUTION INTRAMUSCULAR; INTRAVENOUS at 22:21

## 2019-08-07 RX ADMIN — IPRATROPIUM BROMIDE AND ALBUTEROL SULFATE 1 AMPULE: .5; 3 SOLUTION RESPIRATORY (INHALATION) at 21:47

## 2019-08-07 ASSESSMENT — PAIN SCALES - GENERAL
PAINLEVEL_OUTOF10: 4
PAINLEVEL_OUTOF10: 5

## 2019-08-07 ASSESSMENT — PAIN DESCRIPTION - LOCATION: LOCATION: BACK

## 2019-08-08 PROBLEM — R65.20 SEVERE SEPSIS (HCC): Status: ACTIVE | Noted: 2019-08-08

## 2019-08-08 PROBLEM — A41.9 SEVERE SEPSIS (HCC): Status: ACTIVE | Noted: 2019-08-08

## 2019-08-08 LAB
A/G RATIO: 1.1 (ref 1.1–2.2)
ALBUMIN SERPL-MCNC: 3 G/DL (ref 3.4–5)
ALP BLD-CCNC: 74 U/L (ref 40–129)
ALT SERPL-CCNC: 20 U/L (ref 10–40)
ANION GAP SERPL CALCULATED.3IONS-SCNC: 8 MMOL/L (ref 3–16)
AST SERPL-CCNC: 18 U/L (ref 15–37)
BASOPHILS ABSOLUTE: 0.1 K/UL (ref 0–0.2)
BASOPHILS RELATIVE PERCENT: 0.5 %
BILIRUB SERPL-MCNC: 0.6 MG/DL (ref 0–1)
BUN BLDV-MCNC: 12 MG/DL (ref 7–20)
CALCIUM SERPL-MCNC: 7.8 MG/DL (ref 8.3–10.6)
CHLORIDE BLD-SCNC: 104 MMOL/L (ref 99–110)
CO2: 26 MMOL/L (ref 21–32)
CREAT SERPL-MCNC: 0.7 MG/DL (ref 0.9–1.3)
EKG ATRIAL RATE: 137 BPM
EKG DIAGNOSIS: NORMAL
EKG P AXIS: 32 DEGREES
EKG P-R INTERVAL: 138 MS
EKG Q-T INTERVAL: 358 MS
EKG QRS DURATION: 90 MS
EKG QTC CALCULATION (BAZETT): 540 MS
EKG R AXIS: -19 DEGREES
EKG T AXIS: 56 DEGREES
EKG VENTRICULAR RATE: 137 BPM
EOSINOPHILS ABSOLUTE: 0.1 K/UL (ref 0–0.6)
EOSINOPHILS RELATIVE PERCENT: 0.8 %
GFR AFRICAN AMERICAN: >60
GFR NON-AFRICAN AMERICAN: >60
GLOBULIN: 2.8 G/DL
GLUCOSE BLD-MCNC: 105 MG/DL (ref 70–99)
HCT VFR BLD CALC: 36.5 % (ref 40.5–52.5)
HEMOGLOBIN: 12 G/DL (ref 13.5–17.5)
LYMPHOCYTES ABSOLUTE: 2.4 K/UL (ref 1–5.1)
LYMPHOCYTES RELATIVE PERCENT: 18.1 %
MCH RBC QN AUTO: 30.6 PG (ref 26–34)
MCHC RBC AUTO-ENTMCNC: 33 G/DL (ref 31–36)
MCV RBC AUTO: 92.7 FL (ref 80–100)
MONOCYTES ABSOLUTE: 1.1 K/UL (ref 0–1.3)
MONOCYTES RELATIVE PERCENT: 8.5 %
NEUTROPHILS ABSOLUTE: 9.5 K/UL (ref 1.7–7.7)
NEUTROPHILS RELATIVE PERCENT: 72.1 %
PDW BLD-RTO: 13.9 % (ref 12.4–15.4)
PLATELET # BLD: 195 K/UL (ref 135–450)
PMV BLD AUTO: 7.7 FL (ref 5–10.5)
POTASSIUM REFLEX MAGNESIUM: 3.6 MMOL/L (ref 3.5–5.1)
PROCALCITONIN: 0.1 NG/ML (ref 0–0.15)
RBC # BLD: 3.94 M/UL (ref 4.2–5.9)
REPORT: NORMAL
RESPIRATORY PANEL PCR: NORMAL
SODIUM BLD-SCNC: 138 MMOL/L (ref 136–145)
TOTAL PROTEIN: 5.8 G/DL (ref 6.4–8.2)
WBC # BLD: 13.2 K/UL (ref 4–11)

## 2019-08-08 PROCEDURE — 94640 AIRWAY INHALATION TREATMENT: CPT

## 2019-08-08 PROCEDURE — 87205 SMEAR GRAM STAIN: CPT

## 2019-08-08 PROCEDURE — 87633 RESP VIRUS 12-25 TARGETS: CPT

## 2019-08-08 PROCEDURE — 2700000000 HC OXYGEN THERAPY PER DAY

## 2019-08-08 PROCEDURE — 87486 CHLMYD PNEUM DNA AMP PROBE: CPT

## 2019-08-08 PROCEDURE — 99223 1ST HOSP IP/OBS HIGH 75: CPT | Performed by: INTERNAL MEDICINE

## 2019-08-08 PROCEDURE — 6370000000 HC RX 637 (ALT 250 FOR IP): Performed by: INTERNAL MEDICINE

## 2019-08-08 PROCEDURE — 87581 M.PNEUMON DNA AMP PROBE: CPT

## 2019-08-08 PROCEDURE — 85025 COMPLETE CBC W/AUTO DIFF WBC: CPT

## 2019-08-08 PROCEDURE — 2580000003 HC RX 258: Performed by: INTERNAL MEDICINE

## 2019-08-08 PROCEDURE — 80053 COMPREHEN METABOLIC PANEL: CPT

## 2019-08-08 PROCEDURE — 36415 COLL VENOUS BLD VENIPUNCTURE: CPT

## 2019-08-08 PROCEDURE — 84145 PROCALCITONIN (PCT): CPT

## 2019-08-08 PROCEDURE — 6360000002 HC RX W HCPCS: Performed by: INTERNAL MEDICINE

## 2019-08-08 PROCEDURE — 94761 N-INVAS EAR/PLS OXIMETRY MLT: CPT

## 2019-08-08 PROCEDURE — 94150 VITAL CAPACITY TEST: CPT

## 2019-08-08 PROCEDURE — 99254 IP/OBS CNSLTJ NEW/EST MOD 60: CPT | Performed by: INTERNAL MEDICINE

## 2019-08-08 PROCEDURE — 87070 CULTURE OTHR SPECIMN AEROBIC: CPT

## 2019-08-08 PROCEDURE — 87798 DETECT AGENT NOS DNA AMP: CPT

## 2019-08-08 PROCEDURE — 93010 ELECTROCARDIOGRAM REPORT: CPT | Performed by: INTERNAL MEDICINE

## 2019-08-08 PROCEDURE — 2580000003 HC RX 258: Performed by: PHYSICIAN ASSISTANT

## 2019-08-08 PROCEDURE — 2060000000 HC ICU INTERMEDIATE R&B

## 2019-08-08 RX ORDER — CYCLOBENZAPRINE HCL 10 MG
5 TABLET ORAL 3 TIMES DAILY PRN
Status: DISCONTINUED | OUTPATIENT
Start: 2019-08-08 | End: 2019-08-09 | Stop reason: HOSPADM

## 2019-08-08 RX ORDER — METOPROLOL SUCCINATE 25 MG/1
25 TABLET, EXTENDED RELEASE ORAL DAILY
Status: DISCONTINUED | OUTPATIENT
Start: 2019-08-08 | End: 2019-08-09 | Stop reason: HOSPADM

## 2019-08-08 RX ORDER — ATORVASTATIN CALCIUM 40 MG/1
40 TABLET, FILM COATED ORAL DAILY
Status: DISCONTINUED | OUTPATIENT
Start: 2019-08-08 | End: 2019-08-09 | Stop reason: HOSPADM

## 2019-08-08 RX ORDER — CHLORDIAZEPOXIDE HYDROCHLORIDE AND CLIDINIUM BROMIDE 5; 2.5 MG/1; MG/1
1 CAPSULE ORAL 2 TIMES DAILY PRN
Status: DISCONTINUED | OUTPATIENT
Start: 2019-08-08 | End: 2019-08-09 | Stop reason: HOSPADM

## 2019-08-08 RX ORDER — SODIUM CHLORIDE 0.9 % (FLUSH) 0.9 %
10 SYRINGE (ML) INJECTION EVERY 12 HOURS SCHEDULED
Status: DISCONTINUED | OUTPATIENT
Start: 2019-08-08 | End: 2019-08-09 | Stop reason: HOSPADM

## 2019-08-08 RX ORDER — IPRATROPIUM BROMIDE AND ALBUTEROL SULFATE 2.5; .5 MG/3ML; MG/3ML
1 SOLUTION RESPIRATORY (INHALATION)
Status: DISCONTINUED | OUTPATIENT
Start: 2019-08-08 | End: 2019-08-09 | Stop reason: HOSPADM

## 2019-08-08 RX ORDER — PANTOPRAZOLE SODIUM 40 MG/1
40 TABLET, DELAYED RELEASE ORAL
Status: DISCONTINUED | OUTPATIENT
Start: 2019-08-08 | End: 2019-08-09 | Stop reason: HOSPADM

## 2019-08-08 RX ORDER — ALBUTEROL SULFATE 2.5 MG/3ML
2.5 SOLUTION RESPIRATORY (INHALATION)
Status: DISCONTINUED | OUTPATIENT
Start: 2019-08-08 | End: 2019-08-09 | Stop reason: HOSPADM

## 2019-08-08 RX ORDER — PROCHLORPERAZINE EDISYLATE 5 MG/ML
10 INJECTION INTRAMUSCULAR; INTRAVENOUS EVERY 6 HOURS PRN
Status: DISCONTINUED | OUTPATIENT
Start: 2019-08-08 | End: 2019-08-09 | Stop reason: HOSPADM

## 2019-08-08 RX ORDER — SODIUM CHLORIDE 0.9 % (FLUSH) 0.9 %
10 SYRINGE (ML) INJECTION PRN
Status: DISCONTINUED | OUTPATIENT
Start: 2019-08-08 | End: 2019-08-09 | Stop reason: HOSPADM

## 2019-08-08 RX ORDER — NICOTINE 21 MG/24HR
1 PATCH, TRANSDERMAL 24 HOURS TRANSDERMAL DAILY
Status: DISCONTINUED | OUTPATIENT
Start: 2019-08-08 | End: 2019-08-09 | Stop reason: HOSPADM

## 2019-08-08 RX ORDER — VALSARTAN 80 MG/1
80 TABLET ORAL DAILY
Status: DISCONTINUED | OUTPATIENT
Start: 2019-08-08 | End: 2019-08-08

## 2019-08-08 RX ORDER — DIPHENHYDRAMINE HCL 25 MG
25 TABLET ORAL EVERY 6 HOURS PRN
Status: DISCONTINUED | OUTPATIENT
Start: 2019-08-08 | End: 2019-08-09 | Stop reason: HOSPADM

## 2019-08-08 RX ORDER — SODIUM CHLORIDE 9 MG/ML
INJECTION, SOLUTION INTRAVENOUS CONTINUOUS
Status: DISCONTINUED | OUTPATIENT
Start: 2019-08-08 | End: 2019-08-09 | Stop reason: HOSPADM

## 2019-08-08 RX ORDER — ASPIRIN 81 MG/1
81 TABLET, CHEWABLE ORAL DAILY
Status: DISCONTINUED | OUTPATIENT
Start: 2019-08-08 | End: 2019-08-09 | Stop reason: HOSPADM

## 2019-08-08 RX ORDER — DICYCLOMINE HYDROCHLORIDE 10 MG/1
10 CAPSULE ORAL EVERY 6 HOURS PRN
Status: DISCONTINUED | OUTPATIENT
Start: 2019-08-08 | End: 2019-08-09 | Stop reason: HOSPADM

## 2019-08-08 RX ORDER — IPRATROPIUM BROMIDE AND ALBUTEROL SULFATE 2.5; .5 MG/3ML; MG/3ML
1 SOLUTION RESPIRATORY (INHALATION) EVERY 4 HOURS PRN
Status: DISCONTINUED | OUTPATIENT
Start: 2019-08-08 | End: 2019-08-09 | Stop reason: HOSPADM

## 2019-08-08 RX ORDER — TRAZODONE HYDROCHLORIDE 100 MG/1
100 TABLET ORAL NIGHTLY
Status: DISCONTINUED | OUTPATIENT
Start: 2019-08-08 | End: 2019-08-09 | Stop reason: HOSPADM

## 2019-08-08 RX ORDER — DULOXETIN HYDROCHLORIDE 60 MG/1
60 CAPSULE, DELAYED RELEASE ORAL DAILY
Status: DISCONTINUED | OUTPATIENT
Start: 2019-08-08 | End: 2019-08-09 | Stop reason: HOSPADM

## 2019-08-08 RX ORDER — OXYCODONE HYDROCHLORIDE AND ACETAMINOPHEN 5; 325 MG/1; MG/1
1 TABLET ORAL EVERY 4 HOURS PRN
Status: DISCONTINUED | OUTPATIENT
Start: 2019-08-08 | End: 2019-08-09 | Stop reason: HOSPADM

## 2019-08-08 RX ORDER — ACETAMINOPHEN 325 MG/1
650 TABLET ORAL EVERY 4 HOURS PRN
Status: DISCONTINUED | OUTPATIENT
Start: 2019-08-08 | End: 2019-08-09 | Stop reason: HOSPADM

## 2019-08-08 RX ORDER — LEVOFLOXACIN 5 MG/ML
750 INJECTION, SOLUTION INTRAVENOUS EVERY 24 HOURS
Status: DISCONTINUED | OUTPATIENT
Start: 2019-08-08 | End: 2019-08-09 | Stop reason: HOSPADM

## 2019-08-08 RX ADMIN — CYCLOBENZAPRINE HYDROCHLORIDE 5 MG: 10 TABLET, FILM COATED ORAL at 21:49

## 2019-08-08 RX ADMIN — IPRATROPIUM BROMIDE AND ALBUTEROL SULFATE 1 AMPULE: .5; 3 SOLUTION RESPIRATORY (INHALATION) at 14:59

## 2019-08-08 RX ADMIN — Medication 10 ML: at 02:43

## 2019-08-08 RX ADMIN — Medication 10 ML: at 21:35

## 2019-08-08 RX ADMIN — ENOXAPARIN SODIUM 40 MG: 40 INJECTION SUBCUTANEOUS at 10:06

## 2019-08-08 RX ADMIN — IPRATROPIUM BROMIDE AND ALBUTEROL SULFATE 1 AMPULE: .5; 3 SOLUTION RESPIRATORY (INHALATION) at 10:48

## 2019-08-08 RX ADMIN — ASPIRIN 81 MG 81 MG: 81 TABLET ORAL at 09:58

## 2019-08-08 RX ADMIN — IPRATROPIUM BROMIDE AND ALBUTEROL SULFATE 1 AMPULE: .5; 3 SOLUTION RESPIRATORY (INHALATION) at 19:48

## 2019-08-08 RX ADMIN — SODIUM CHLORIDE: 9 INJECTION, SOLUTION INTRAVENOUS at 14:57

## 2019-08-08 RX ADMIN — Medication 1.5 G: at 02:33

## 2019-08-08 RX ADMIN — IPRATROPIUM BROMIDE AND ALBUTEROL SULFATE 1 AMPULE: .5; 3 SOLUTION RESPIRATORY (INHALATION) at 07:13

## 2019-08-08 RX ADMIN — TRAZODONE HYDROCHLORIDE 100 MG: 100 TABLET ORAL at 21:35

## 2019-08-08 RX ADMIN — DULOXETINE HYDROCHLORIDE 60 MG: 60 CAPSULE, DELAYED RELEASE ORAL at 09:58

## 2019-08-08 RX ADMIN — ATORVASTATIN CALCIUM 40 MG: 40 TABLET, FILM COATED ORAL at 09:58

## 2019-08-08 RX ADMIN — IPRATROPIUM BROMIDE AND ALBUTEROL SULFATE 1 AMPULE: .5; 3 SOLUTION RESPIRATORY (INHALATION) at 23:15

## 2019-08-08 RX ADMIN — METOPROLOL SUCCINATE 25 MG: 25 TABLET, FILM COATED, EXTENDED RELEASE ORAL at 09:58

## 2019-08-08 RX ADMIN — SODIUM CHLORIDE: 9 INJECTION, SOLUTION INTRAVENOUS at 02:33

## 2019-08-08 RX ADMIN — CEFEPIME 2 G: 2 INJECTION, POWDER, FOR SOLUTION INTRAVENOUS at 10:06

## 2019-08-08 RX ADMIN — TRAZODONE HYDROCHLORIDE 100 MG: 100 TABLET ORAL at 02:33

## 2019-08-08 RX ADMIN — VALSARTAN 80 MG: 80 TABLET, FILM COATED ORAL at 09:58

## 2019-08-08 RX ADMIN — DIPHENHYDRAMINE HCL 25 MG: 25 TABLET ORAL at 21:49

## 2019-08-08 RX ADMIN — Medication 10 ML: at 10:07

## 2019-08-08 RX ADMIN — IPRATROPIUM BROMIDE AND ALBUTEROL SULFATE 1 AMPULE: .5; 3 SOLUTION RESPIRATORY (INHALATION) at 01:44

## 2019-08-08 RX ADMIN — LEVOFLOXACIN 750 MG: 5 INJECTION, SOLUTION INTRAVENOUS at 21:35

## 2019-08-08 RX ADMIN — PANTOPRAZOLE SODIUM 40 MG: 40 TABLET, DELAYED RELEASE ORAL at 15:34

## 2019-08-08 RX ADMIN — PANTOPRAZOLE SODIUM 40 MG: 40 TABLET, DELAYED RELEASE ORAL at 06:20

## 2019-08-08 ASSESSMENT — PAIN SCALES - GENERAL
PAINLEVEL_OUTOF10: 3
PAINLEVEL_OUTOF10: 0

## 2019-08-08 ASSESSMENT — PAIN DESCRIPTION - FREQUENCY: FREQUENCY: CONTINUOUS

## 2019-08-08 ASSESSMENT — PAIN DESCRIPTION - PAIN TYPE: TYPE: CHRONIC PAIN

## 2019-08-08 ASSESSMENT — PAIN DESCRIPTION - DIRECTION: RADIATING_TOWARDS: BILAT LEGS

## 2019-08-08 ASSESSMENT — PAIN DESCRIPTION - ONSET: ONSET: ON-GOING

## 2019-08-08 ASSESSMENT — PAIN DESCRIPTION - DESCRIPTORS: DESCRIPTORS: CONSTANT;DULL;NAGGING

## 2019-08-08 ASSESSMENT — PAIN - FUNCTIONAL ASSESSMENT: PAIN_FUNCTIONAL_ASSESSMENT: PREVENTS OR INTERFERES SOME ACTIVE ACTIVITIES AND ADLS

## 2019-08-08 ASSESSMENT — PAIN DESCRIPTION - PROGRESSION: CLINICAL_PROGRESSION: NOT CHANGED

## 2019-08-08 ASSESSMENT — PAIN DESCRIPTION - LOCATION: LOCATION: BACK

## 2019-08-08 ASSESSMENT — PAIN DESCRIPTION - ORIENTATION: ORIENTATION: LOWER;MID

## 2019-08-08 NOTE — FLOWSHEET NOTE
08/08/19 0914   Vital Signs   Temp 98.4 °F (36.9 °C)   Temp Source Oral   Pulse 132   Heart Rate Source Monitor   Resp 16   /74   BP Location Left Arm   BP Upper/Lower Upper   MAP (mmHg) 88   Oxygen Therapy   SpO2 95 %   O2 Device Nasal cannula   O2 Flow Rate (L/min) 2 L/min   Patient resting quietly in bed. No s/s of distress noted. Shift assessment complete, see flow sheet. Denies needs at this time. Call light within reach. Will continue to monitor.

## 2019-08-08 NOTE — ED PROVIDER NOTES
Base) MCG/ACT inhaler Inhale 2 puffs into the lungs every 6 hours as needed for Wheezing or Shortness of Breath 7/30/19  Yes Nazia Katz MD   chlordiazePOXIDE-clidinium (LIBRAX) 5-2.5 MG per capsule Take 1 capsule by mouth 2 times daily as needed (stomach cramps). 7/30/19  Yes Nazia Katz MD   dicyclomine (BENTYL) 10 MG capsule Take 1 capsule by mouth every 6 hours as needed (cramps) 7/30/19  Yes Nazia Katz MD   atorvastatin (LIPITOR) 40 MG tablet TAKE ONE TABLET BY MOUTH DAILY 7/30/19  Yes Nazia Katz MD   cyclobenzaprine (FLEXERIL) 10 MG tablet TAKE ONE TABLET BY MOUTH EVERY 8 HOURS AS NEEDED FOR MUSCLE SPASMS 7/30/19  Yes Nazia Katz MD   metoprolol succinate (TOPROL XL) 25 MG extended release tablet Take 1 tablet by mouth daily 7/30/19  Yes Nazia Katz MD   traZODone (DESYREL) 50 MG tablet Take 2 tablets by mouth nightly TAKE TWO TABLETS BY MOUTH ONCE NIGHTLY 12/21/18  Yes Nazia Katz MD   diphenhydrAMINE (BENADRYL) 25 MG tablet Take 25 mg by mouth every 6 hours as needed for Itching   Yes Historical Provider, MD   aspirin 81 MG tablet Take 81 mg by mouth daily   Yes Historical Provider, MD   DULoxetine (CYMBALTA) 30 MG extended release capsule Take 1 tab daily for one week then 2 tabs daily.  7/30/19   Nazia Katz MD   DULoxetine (CYMBALTA) 60 MG extended release capsule Take 1 capsule by mouth daily 7/30/19   Nazia Katz MD   furosemide (LASIX) 20 MG tablet Take 1 tablet by mouth every other day 3/11/19   [facility]   sildenafil (REVATIO) 20 MG tablet Take 3 tablets by mouth daily as needed (ED) 10/4/18   Nazia Katz MD       Allergies as of 08/07/2019 - Review Complete 08/07/2019   Allergen Reaction Noted    Lexapro [escitalopram oxalate] Other (See Comments) 11/09/2016    Morphine  09/07/2010    Prozac [fluoxetine hcl]  09/07/2010    Wellbutrin [bupropion] Other (See Comments) 11/09/2016       Past Medical History:   Diagnosis Date    ADHD Culture    Culture blood #1    Culture blood #2    XR CHEST PORTABLE    CBC auto differential    Comprehensive metabolic panel    Troponin    Procalcitonin    Urinalysis    Lactate, Sepsis    Initiate Oxygen Therapy Protocol    EKG 12 Lead       Medications ordered for this visit  Orders Placed This Encounter   Medications    0.9 % sodium chloride IV bolus 3,129 mL    sodium chloride flush 0.9 % injection 10 mL    sodium chloride flush 0.9 % injection 10 mL    acetaminophen (TYLENOL) tablet 650 mg    ibuprofen (ADVIL;MOTRIN) tablet 800 mg    ipratropium-albuterol (DUONEB) nebulizer solution 1 ampule     LABS  Results for orders placed or performed during the hospital encounter of 08/07/19   CBC auto differential   Result Value Ref Range    WBC 16.4 (H) 4.0 - 11.0 K/uL    RBC 4.55 4.20 - 5.90 M/uL    Hemoglobin 13.9 13.5 - 17.5 g/dL    Hematocrit 41.6 40.5 - 52.5 %    MCV 91.4 80.0 - 100.0 fL    MCH 30.5 26.0 - 34.0 pg    MCHC 33.4 31.0 - 36.0 g/dL    RDW 14.0 12.4 - 15.4 %    Platelets 753 530 - 782 K/uL    MPV 7.9 5.0 - 10.5 fL    Neutrophils % 78.4 %    Lymphocytes % 13.7 %    Monocytes % 7.2 %    Eosinophils % 0.0 %    Basophils % 0.7 %    Neutrophils # 12.9 (H) 1.7 - 7.7 K/uL    Lymphocytes # 2.2 1.0 - 5.1 K/uL    Monocytes # 1.2 0.0 - 1.3 K/uL    Eosinophils # 0.0 0.0 - 0.6 K/uL    Basophils # 0.1 0.0 - 0.2 K/uL   Comprehensive metabolic panel   Result Value Ref Range    Sodium 138 136 - 145 mmol/L    Potassium 3.7 3.5 - 5.1 mmol/L    Chloride 99 99 - 110 mmol/L    CO2 27 21 - 32 mmol/L    Anion Gap 12 3 - 16    Glucose 107 (H) 70 - 99 mg/dL    BUN 16 7 - 20 mg/dL    CREATININE 0.9 0.9 - 1.3 mg/dL    GFR Non-African American >60 >60    GFR African American >60 >60    Calcium 8.9 8.3 - 10.6 mg/dL    Total Protein 7.0 6.4 - 8.2 g/dL    Alb 3.8 3.4 - 5.0 g/dL    Albumin/Globulin Ratio 1.2 1.1 - 2.2    Total Bilirubin 0.7 0.0 - 1.0 mg/dL    Alkaline Phosphatase 95 40 - 129 U/L    ALT 26 10 - 40 U/L

## 2019-08-08 NOTE — H&P
Provider   valsartan (DIOVAN) 80 MG tablet TAKE ONE TABLET BY MOUTH DAILY 7/30/19  Yes Tori Huizar MD   pantoprazole (PROTONIX) 40 MG tablet Take 1 tablet by mouth 2 times daily 7/30/19 1/29/20 Yes Tori Huizar MD   albuterol sulfate HFA (VENTOLIN HFA) 108 (90 Base) MCG/ACT inhaler Inhale 2 puffs into the lungs every 6 hours as needed for Wheezing or Shortness of Breath 7/30/19  Yes Tori Huizar MD   chlordiazePOXIDE-clidinium (LIBRAX) 5-2.5 MG per capsule Take 1 capsule by mouth 2 times daily as needed (stomach cramps). 7/30/19  Yes Tori Huizar MD   dicyclomine (BENTYL) 10 MG capsule Take 1 capsule by mouth every 6 hours as needed (cramps) 7/30/19  Yes Tori Huizar MD   atorvastatin (LIPITOR) 40 MG tablet TAKE ONE TABLET BY MOUTH DAILY 7/30/19  Yes Tori Huizar MD   cyclobenzaprine (FLEXERIL) 10 MG tablet TAKE ONE TABLET BY MOUTH EVERY 8 HOURS AS NEEDED FOR MUSCLE SPASMS 7/30/19  Yes Tori Huizar MD   metoprolol succinate (TOPROL XL) 25 MG extended release tablet Take 1 tablet by mouth daily 7/30/19  Yes Tori Huizar MD   traZODone (DESYREL) 50 MG tablet Take 2 tablets by mouth nightly TAKE TWO TABLETS BY MOUTH ONCE NIGHTLY 12/21/18  Yes Tori Huizar MD   sildenafil (REVATIO) 20 MG tablet Take 3 tablets by mouth daily as needed (ED) 10/4/18  Yes Tori Huizar MD   diphenhydrAMINE (BENADRYL) 25 MG tablet Take 25 mg by mouth every 6 hours as needed for Itching   Yes Historical Provider, MD   aspirin 81 MG tablet Take 81 mg by mouth daily   Yes Historical Provider, MD   DULoxetine (CYMBALTA) 60 MG extended release capsule Take 1 capsule by mouth daily 7/30/19   Tori Huizar MD       Allergies:  Lexapro [escitalopram oxalate]; Morphine; Prozac [fluoxetine hcl]; and Wellbutrin [bupropion]    Social History:  The patient currently lives at home. He cares for his father who has dementia and requires 24/7 care     TOBACCO:   reports that he has been smoking cigarettes.  He has a after discussion with my PA formulated the plan. Agree with note with the following edits. HPI:     I reviewed the patient's Past Medical History, Past Surgical History, Medications, and Allergies. 51 y.o. male with tobacco abuse, HTN, chronic pain, depression and anxiety who presented to the ED with complaint of fatigue, fever, feeling unwell. Reports in his usual state of health until 2 nights ago. He reports falling asleep and being woken up in the morning by the aide whom he hired to help take care of his father with dementia. Patient states he never sleeps through the night as he is his father's primary caretaker, but when he woke up yesterday he felt extremely fatigued and generally weak. He felt like he could not stand up. He admits to a chronic cough        General:  Middle aged male Awake, alert and oriented. Appears to be not in any distress  Mucous Membranes:  Pink , anicteric  Neck: No JVD, no carotid bruit, no thyromegaly  Chest: diminished in left base with crackles  Cardiovascular:  RRR S1S2 heard, no murmurs or gallops  Abdomen:  Soft, undistended, non tender, no organomegaly, BS present  Extremities: No edema or cyanosis. Distal pulses well felt  Neurological : grossly normal              CBC:   Recent Labs     08/07/19 2131 08/08/19  0505   WBC 16.4* 13.2*   HGB 13.9 12.0*   HCT 41.6 36.5*   MCV 91.4 92.7    195     BMP:   Recent Labs     08/07/19 2131 08/08/19  0505    138   K 3.7 3.6   CL 99 104   CO2 27 26   BUN 16 12   CREATININE 0.9 0.7*     LIVER PROFILE:   Recent Labs     08/07/19 2131 08/08/19  0505   AST 26 18   ALT 26 20   BILITOT 0.7 0.6   ALKPHOS 95 74     PT/INR: No results for input(s): PROTIME, INR in the last 72 hours. APTT: No results for input(s): APTT in the last 72 hours.   UA:  Recent Labs     08/07/19  2315   COLORU Yellow   PHUR 6.5   CLARITYU Clear   SPECGRAV 1.010   LEUKOCYTESUR Negative   UROBILINOGEN 0.2   BILIRUBINUR Negative   BLOODU

## 2019-08-09 ENCOUNTER — TELEPHONE (OUTPATIENT)
Dept: PULMONOLOGY | Age: 49
End: 2019-08-09

## 2019-08-09 VITALS
HEIGHT: 73 IN | RESPIRATION RATE: 22 BRPM | TEMPERATURE: 98.7 F | DIASTOLIC BLOOD PRESSURE: 68 MMHG | OXYGEN SATURATION: 95 % | WEIGHT: 255.5 LBS | HEART RATE: 112 BPM | BODY MASS INDEX: 33.86 KG/M2 | SYSTOLIC BLOOD PRESSURE: 118 MMHG

## 2019-08-09 DIAGNOSIS — R09.02 HYPOXEMIA: ICD-10-CM

## 2019-08-09 DIAGNOSIS — J18.9 COMMUNITY ACQUIRED PNEUMONIA, UNSPECIFIED LATERALITY: Primary | ICD-10-CM

## 2019-08-09 PROBLEM — A41.9 SEPSIS (HCC): Status: RESOLVED | Noted: 2019-08-08 | Resolved: 2019-08-09

## 2019-08-09 LAB
ANION GAP SERPL CALCULATED.3IONS-SCNC: 9 MMOL/L (ref 3–16)
BASOPHILS ABSOLUTE: 0 K/UL (ref 0–0.2)
BASOPHILS RELATIVE PERCENT: 0.4 %
BUN BLDV-MCNC: 7 MG/DL (ref 7–20)
CALCIUM SERPL-MCNC: 8.3 MG/DL (ref 8.3–10.6)
CHLORIDE BLD-SCNC: 107 MMOL/L (ref 99–110)
CO2: 23 MMOL/L (ref 21–32)
CREAT SERPL-MCNC: 0.7 MG/DL (ref 0.9–1.3)
EOSINOPHILS ABSOLUTE: 0.2 K/UL (ref 0–0.6)
EOSINOPHILS RELATIVE PERCENT: 1.8 %
GFR AFRICAN AMERICAN: >60
GFR NON-AFRICAN AMERICAN: >60
GLUCOSE BLD-MCNC: 108 MG/DL (ref 70–99)
HCT VFR BLD CALC: 34.8 % (ref 40.5–52.5)
HEMOGLOBIN: 11.6 G/DL (ref 13.5–17.5)
L. PNEUMOPHILA SEROGP 1 UR AG: NORMAL
LYMPHOCYTES ABSOLUTE: 1.9 K/UL (ref 1–5.1)
LYMPHOCYTES RELATIVE PERCENT: 19.1 %
MAGNESIUM: 2 MG/DL (ref 1.8–2.4)
MCH RBC QN AUTO: 30.5 PG (ref 26–34)
MCHC RBC AUTO-ENTMCNC: 33.3 G/DL (ref 31–36)
MCV RBC AUTO: 91.6 FL (ref 80–100)
MONOCYTES ABSOLUTE: 1 K/UL (ref 0–1.3)
MONOCYTES RELATIVE PERCENT: 9.6 %
NEUTROPHILS ABSOLUTE: 7 K/UL (ref 1.7–7.7)
NEUTROPHILS RELATIVE PERCENT: 69.1 %
PDW BLD-RTO: 13.6 % (ref 12.4–15.4)
PLATELET # BLD: 194 K/UL (ref 135–450)
PMV BLD AUTO: 7.7 FL (ref 5–10.5)
POTASSIUM REFLEX MAGNESIUM: 3.5 MMOL/L (ref 3.5–5.1)
PROCALCITONIN: 0.03 NG/ML (ref 0–0.15)
RBC # BLD: 3.8 M/UL (ref 4.2–5.9)
SODIUM BLD-SCNC: 139 MMOL/L (ref 136–145)
STREP PNEUMONIAE ANTIGEN, URINE: NORMAL
URINE CULTURE, ROUTINE: NORMAL
WBC # BLD: 10.1 K/UL (ref 4–11)

## 2019-08-09 PROCEDURE — 2580000003 HC RX 258: Performed by: INTERNAL MEDICINE

## 2019-08-09 PROCEDURE — 99238 HOSP IP/OBS DSCHRG MGMT 30/<: CPT | Performed by: INTERNAL MEDICINE

## 2019-08-09 PROCEDURE — 85025 COMPLETE CBC W/AUTO DIFF WBC: CPT

## 2019-08-09 PROCEDURE — 6360000002 HC RX W HCPCS: Performed by: INTERNAL MEDICINE

## 2019-08-09 PROCEDURE — 84145 PROCALCITONIN (PCT): CPT

## 2019-08-09 PROCEDURE — 94640 AIRWAY INHALATION TREATMENT: CPT

## 2019-08-09 PROCEDURE — 94761 N-INVAS EAR/PLS OXIMETRY MLT: CPT

## 2019-08-09 PROCEDURE — 2580000003 HC RX 258: Performed by: PHYSICIAN ASSISTANT

## 2019-08-09 PROCEDURE — 83735 ASSAY OF MAGNESIUM: CPT

## 2019-08-09 PROCEDURE — 80048 BASIC METABOLIC PNL TOTAL CA: CPT

## 2019-08-09 PROCEDURE — 6370000000 HC RX 637 (ALT 250 FOR IP): Performed by: INTERNAL MEDICINE

## 2019-08-09 PROCEDURE — 99232 SBSQ HOSP IP/OBS MODERATE 35: CPT | Performed by: INTERNAL MEDICINE

## 2019-08-09 PROCEDURE — 36415 COLL VENOUS BLD VENIPUNCTURE: CPT

## 2019-08-09 PROCEDURE — 2700000000 HC OXYGEN THERAPY PER DAY

## 2019-08-09 RX ORDER — LEVOFLOXACIN 750 MG/1
750 TABLET ORAL DAILY
Qty: 6 TABLET | Refills: 0 | Status: SHIPPED | OUTPATIENT
Start: 2019-08-09 | End: 2019-08-15

## 2019-08-09 RX ADMIN — ATORVASTATIN CALCIUM 40 MG: 40 TABLET, FILM COATED ORAL at 08:35

## 2019-08-09 RX ADMIN — ACETAMINOPHEN 650 MG: 325 TABLET ORAL at 02:56

## 2019-08-09 RX ADMIN — Medication 10 ML: at 08:36

## 2019-08-09 RX ADMIN — METOPROLOL SUCCINATE 25 MG: 25 TABLET, FILM COATED, EXTENDED RELEASE ORAL at 08:36

## 2019-08-09 RX ADMIN — IPRATROPIUM BROMIDE AND ALBUTEROL SULFATE 1 AMPULE: .5; 3 SOLUTION RESPIRATORY (INHALATION) at 11:23

## 2019-08-09 RX ADMIN — DULOXETINE HYDROCHLORIDE 60 MG: 60 CAPSULE, DELAYED RELEASE ORAL at 08:35

## 2019-08-09 RX ADMIN — OXYCODONE HYDROCHLORIDE AND ACETAMINOPHEN 1 TABLET: 5; 325 TABLET ORAL at 06:17

## 2019-08-09 RX ADMIN — IPRATROPIUM BROMIDE AND ALBUTEROL SULFATE 1 AMPULE: .5; 3 SOLUTION RESPIRATORY (INHALATION) at 07:38

## 2019-08-09 RX ADMIN — PANTOPRAZOLE SODIUM 40 MG: 40 TABLET, DELAYED RELEASE ORAL at 06:13

## 2019-08-09 RX ADMIN — CYCLOBENZAPRINE HYDROCHLORIDE 5 MG: 10 TABLET, FILM COATED ORAL at 08:36

## 2019-08-09 RX ADMIN — SODIUM CHLORIDE: 9 INJECTION, SOLUTION INTRAVENOUS at 06:13

## 2019-08-09 RX ADMIN — ASPIRIN 81 MG 81 MG: 81 TABLET ORAL at 08:36

## 2019-08-09 RX ADMIN — OXYCODONE HYDROCHLORIDE AND ACETAMINOPHEN 1 TABLET: 5; 325 TABLET ORAL at 10:48

## 2019-08-09 RX ADMIN — ENOXAPARIN SODIUM 40 MG: 40 INJECTION SUBCUTANEOUS at 08:35

## 2019-08-09 ASSESSMENT — PAIN DESCRIPTION - FREQUENCY
FREQUENCY: CONTINUOUS

## 2019-08-09 ASSESSMENT — PAIN - FUNCTIONAL ASSESSMENT
PAIN_FUNCTIONAL_ASSESSMENT: PREVENTS OR INTERFERES SOME ACTIVE ACTIVITIES AND ADLS
PAIN_FUNCTIONAL_ASSESSMENT: PREVENTS OR INTERFERES SOME ACTIVE ACTIVITIES AND ADLS

## 2019-08-09 ASSESSMENT — PAIN DESCRIPTION - ORIENTATION
ORIENTATION: LOWER
ORIENTATION: LOWER;RIGHT
ORIENTATION: LOWER

## 2019-08-09 ASSESSMENT — PAIN DESCRIPTION - DESCRIPTORS
DESCRIPTORS: CONSTANT;SHARP
DESCRIPTORS: CONSTANT;DULL
DESCRIPTORS: DULL;DISCOMFORT

## 2019-08-09 ASSESSMENT — PAIN DESCRIPTION - PAIN TYPE
TYPE: CHRONIC PAIN

## 2019-08-09 ASSESSMENT — PAIN SCALES - GENERAL
PAINLEVEL_OUTOF10: 5
PAINLEVEL_OUTOF10: 6
PAINLEVEL_OUTOF10: 6
PAINLEVEL_OUTOF10: 8
PAINLEVEL_OUTOF10: 2

## 2019-08-09 ASSESSMENT — PAIN DESCRIPTION - ONSET
ONSET: ON-GOING

## 2019-08-09 ASSESSMENT — PAIN DESCRIPTION - PROGRESSION
CLINICAL_PROGRESSION: NOT CHANGED
CLINICAL_PROGRESSION: GRADUALLY WORSENING

## 2019-08-09 ASSESSMENT — PAIN DESCRIPTION - LOCATION
LOCATION: BACK
LOCATION: BACK
LOCATION: BACK;ARM

## 2019-08-09 NOTE — PROGRESS NOTES
Dishcharge instructions given to the pt. He verbalized understanding. Spoke with the pt's PCP and set up a follow up appointment on 8/15/19 at 1120. Pt verbalized understanding. Pt able to identify adverse effects of antibiotic use. New medication Levaquin sent to the pt's pharmacy [facility] in [place]. He verbalized he is able to afford the medication. IV discontinued in the pt's right arm. IV is intact. Pressure to site held and a bandage is applied. Telemetry discontinued. Pt dressed in his own cloths and verbalized he has all of his possessions. Transport notified for wheelchair to take the pt to his rides car.  India Pierce
held for heart rate (HR) greater than 140 beats per minute, pending direction from physician. 3. Bronchodilators will be administered via Metered Dose Inhaler (MDI) with spacer when the following criteria are met:  a. Alert and cooperative     b. HR < 140 bpm  c. RR < 30 bpm                d. Can demonstrate a 2-3 second inspiratory hold  4. Bronchodilators will be administered via Hand Held Nebulizer JO[facility]) to patients when ANY of the following criteria are met  a. Incognizant or uncooperative          b. Patients treated with HHN at Home        c. Unable to demonstrate proper use of MDI with spacer     d. RR > 30 bpm   5. Bronchodilators will be delivered via Metered Dose Inhaler (MDI), HHN, Aerogen to intubated patients on mechanical ventilation. 6. Inhalation medication orders will be delivered and/or substituted as outlined below. Aerosolized Medications Ordering and Administration Guidelines:    1. All Medications will be ordered by a physician, and their frequency and/or modality will be adjusted as defined by the patients Respiratory Severity Index (RSI) score. 2. If the patient does not have documented COPD, consider discontinuing anticholinergics when RSI is less than 9.  3. If the bronchospasm worsens (increased RSI), then the bronchodilator frequency can be increased to a maximum of every 4 hours. If greater than every 4 hours is required, the physician will be contacted. 4. If the bronchospasm improves, the frequency of the bronchodilator can be decreased, based on the patient's RSI, but not less than home treatment regimen frequency. 5. Bronchodilator(s) will be discontinued if patient has a RSI less than 9 and has received no scheduled or as needed treatment for 72  Hrs. Patients Ordered on a Mucolytic Agent:    1. Must always be administered with a bronchodilator.     2. Discontinue if patient experiences worsened bronchospasm, or secretions have lessened to the point that the patient is

## 2019-08-10 LAB
CULTURE, RESPIRATORY: NORMAL
GRAM STAIN RESULT: NORMAL

## 2019-08-12 ENCOUNTER — APPOINTMENT (OUTPATIENT)
Dept: GENERAL RADIOLOGY | Age: 49
End: 2019-08-12
Payer: COMMERCIAL

## 2019-08-12 ENCOUNTER — TELEPHONE (OUTPATIENT)
Dept: OTHER | Facility: CLINIC | Age: 49
End: 2019-08-12

## 2019-08-12 ENCOUNTER — HOSPITAL ENCOUNTER (EMERGENCY)
Age: 49
Discharge: HOME | End: 2019-08-12
Attending: EMERGENCY MEDICINE
Payer: COMMERCIAL

## 2019-08-12 VITALS
TEMPERATURE: 97.1 F | OXYGEN SATURATION: 93 % | RESPIRATION RATE: 12 BRPM | DIASTOLIC BLOOD PRESSURE: 99 MMHG | WEIGHT: 250 LBS | HEART RATE: 93 BPM | HEIGHT: 73 IN | SYSTOLIC BLOOD PRESSURE: 128 MMHG | BODY MASS INDEX: 33.13 KG/M2

## 2019-08-12 DIAGNOSIS — J18.9 PNEUMONIA DUE TO ORGANISM: Primary | ICD-10-CM

## 2019-08-12 DIAGNOSIS — J44.9 CHRONIC OBSTRUCTIVE PULMONARY DISEASE, UNSPECIFIED COPD TYPE (HCC): ICD-10-CM

## 2019-08-12 DIAGNOSIS — F17.200 SMOKER: ICD-10-CM

## 2019-08-12 LAB
A/G RATIO: 1.1 (ref 1.1–2.2)
ALBUMIN SERPL-MCNC: 3.7 G/DL (ref 3.4–5)
ALP BLD-CCNC: 107 U/L (ref 40–129)
ALT SERPL-CCNC: 29 U/L (ref 10–40)
ANION GAP SERPL CALCULATED.3IONS-SCNC: 10 MMOL/L (ref 3–16)
APTT: 35.1 SEC (ref 26–36)
AST SERPL-CCNC: 25 U/L (ref 15–37)
BASE EXCESS ARTERIAL: 1.6 MMOL/L (ref -3–3)
BASOPHILS ABSOLUTE: 0.1 K/UL (ref 0–0.2)
BASOPHILS RELATIVE PERCENT: 1.1 %
BILIRUB SERPL-MCNC: <0.2 MG/DL (ref 0–1)
BLOOD CULTURE, ROUTINE: NORMAL
BUN BLDV-MCNC: 11 MG/DL (ref 7–20)
CALCIUM SERPL-MCNC: 9.2 MG/DL (ref 8.3–10.6)
CARBOXYHEMOGLOBIN ARTERIAL: 2.1 % (ref 0–1.5)
CHLORIDE BLD-SCNC: 102 MMOL/L (ref 99–110)
CO2: 27 MMOL/L (ref 21–32)
CREAT SERPL-MCNC: 0.8 MG/DL (ref 0.9–1.3)
CULTURE, BLOOD 2: NORMAL
D DIMER: <200 NG/ML DDU (ref 0–229)
EKG ATRIAL RATE: 105 BPM
EKG DIAGNOSIS: NORMAL
EKG P AXIS: 46 DEGREES
EKG P-R INTERVAL: 152 MS
EKG Q-T INTERVAL: 350 MS
EKG QRS DURATION: 100 MS
EKG QTC CALCULATION (BAZETT): 462 MS
EKG R AXIS: 3 DEGREES
EKG T AXIS: 67 DEGREES
EKG VENTRICULAR RATE: 105 BPM
EOSINOPHILS ABSOLUTE: 0.3 K/UL (ref 0–0.6)
EOSINOPHILS RELATIVE PERCENT: 3.2 %
GFR AFRICAN AMERICAN: >60
GFR NON-AFRICAN AMERICAN: >60
GLOBULIN: 3.5 G/DL
GLUCOSE BLD-MCNC: 130 MG/DL (ref 70–99)
HCO3 ARTERIAL: 26.8 MMOL/L (ref 21–29)
HCT VFR BLD CALC: 41.6 % (ref 40.5–52.5)
HEMOGLOBIN, ART, EXTENDED: 14.4 G/DL (ref 13.5–17.5)
HEMOGLOBIN: 14.1 G/DL (ref 13.5–17.5)
INR BLD: 1.06 (ref 0.86–1.14)
LACTIC ACID: 1.1 MMOL/L (ref 0.4–2)
LYMPHOCYTES ABSOLUTE: 2.4 K/UL (ref 1–5.1)
LYMPHOCYTES RELATIVE PERCENT: 27.1 %
MAGNESIUM: 2 MG/DL (ref 1.8–2.4)
MCH RBC QN AUTO: 30.7 PG (ref 26–34)
MCHC RBC AUTO-ENTMCNC: 33.9 G/DL (ref 31–36)
MCV RBC AUTO: 90.8 FL (ref 80–100)
METHEMOGLOBIN ARTERIAL: 0.2 %
MONOCYTES ABSOLUTE: 0.7 K/UL (ref 0–1.3)
MONOCYTES RELATIVE PERCENT: 7.6 %
NEUTROPHILS ABSOLUTE: 5.4 K/UL (ref 1.7–7.7)
NEUTROPHILS RELATIVE PERCENT: 61 %
O2 CONTENT ARTERIAL: 19 ML/DL
O2 SAT, ARTERIAL: 95 %
O2 THERAPY: ABNORMAL
PCO2 ARTERIAL: 44.5 MMHG (ref 35–45)
PDW BLD-RTO: 13.5 % (ref 12.4–15.4)
PH ARTERIAL: 7.4 (ref 7.35–7.45)
PLATELET # BLD: 305 K/UL (ref 135–450)
PMV BLD AUTO: 7.5 FL (ref 5–10.5)
PO2 ARTERIAL: 74.8 MMHG (ref 75–108)
POTASSIUM SERPL-SCNC: 4.2 MMOL/L (ref 3.5–5.1)
PRO-BNP: 14 PG/ML (ref 0–124)
PROTHROMBIN TIME: 12.1 SEC (ref 9.8–13)
RBC # BLD: 4.58 M/UL (ref 4.2–5.9)
SODIUM BLD-SCNC: 139 MMOL/L (ref 136–145)
TCO2 ARTERIAL: 28.2 MMOL/L
TOTAL PROTEIN: 7.2 G/DL (ref 6.4–8.2)
TROPONIN: <0.01 NG/ML
WBC # BLD: 8.8 K/UL (ref 4–11)

## 2019-08-12 PROCEDURE — 96360 HYDRATION IV INFUSION INIT: CPT

## 2019-08-12 PROCEDURE — 6370000000 HC RX 637 (ALT 250 FOR IP): Performed by: PHYSICIAN ASSISTANT

## 2019-08-12 PROCEDURE — 2580000003 HC RX 258: Performed by: PHYSICIAN ASSISTANT

## 2019-08-12 PROCEDURE — 99285 EMERGENCY DEPT VISIT HI MDM: CPT

## 2019-08-12 PROCEDURE — 87040 BLOOD CULTURE FOR BACTERIA: CPT

## 2019-08-12 PROCEDURE — 85379 FIBRIN DEGRADATION QUANT: CPT

## 2019-08-12 PROCEDURE — 80053 COMPREHEN METABOLIC PANEL: CPT

## 2019-08-12 PROCEDURE — 71046 X-RAY EXAM CHEST 2 VIEWS: CPT

## 2019-08-12 PROCEDURE — 83735 ASSAY OF MAGNESIUM: CPT

## 2019-08-12 PROCEDURE — 84484 ASSAY OF TROPONIN QUANT: CPT

## 2019-08-12 PROCEDURE — 82803 BLOOD GASES ANY COMBINATION: CPT

## 2019-08-12 PROCEDURE — 85025 COMPLETE CBC W/AUTO DIFF WBC: CPT

## 2019-08-12 PROCEDURE — 85610 PROTHROMBIN TIME: CPT

## 2019-08-12 PROCEDURE — 85730 THROMBOPLASTIN TIME PARTIAL: CPT

## 2019-08-12 PROCEDURE — 83605 ASSAY OF LACTIC ACID: CPT

## 2019-08-12 PROCEDURE — 83880 ASSAY OF NATRIURETIC PEPTIDE: CPT

## 2019-08-12 PROCEDURE — 93010 ELECTROCARDIOGRAM REPORT: CPT | Performed by: INTERNAL MEDICINE

## 2019-08-12 PROCEDURE — 93005 ELECTROCARDIOGRAM TRACING: CPT | Performed by: PHYSICIAN ASSISTANT

## 2019-08-12 PROCEDURE — 87801 DETECT AGNT MULT DNA AMPLI: CPT

## 2019-08-12 RX ORDER — PREDNISONE 10 MG/1
TABLET ORAL
Qty: 18 TABLET | Refills: 0 | Status: SHIPPED | OUTPATIENT
Start: 2019-08-12 | End: 2019-08-22

## 2019-08-12 RX ORDER — DOXYCYCLINE HYCLATE 100 MG
100 TABLET ORAL 2 TIMES DAILY
Qty: 20 TABLET | Refills: 0 | Status: SHIPPED | OUTPATIENT
Start: 2019-08-12 | End: 2019-08-22

## 2019-08-12 RX ORDER — IPRATROPIUM BROMIDE AND ALBUTEROL SULFATE 2.5; .5 MG/3ML; MG/3ML
2 SOLUTION RESPIRATORY (INHALATION) ONCE
Status: COMPLETED | OUTPATIENT
Start: 2019-08-12 | End: 2019-08-12

## 2019-08-12 RX ORDER — 0.9 % SODIUM CHLORIDE 0.9 %
1000 INTRAVENOUS SOLUTION INTRAVENOUS ONCE
Status: COMPLETED | OUTPATIENT
Start: 2019-08-12 | End: 2019-08-12

## 2019-08-12 RX ORDER — IPRATROPIUM BROMIDE AND ALBUTEROL SULFATE 2.5; .5 MG/3ML; MG/3ML
1 SOLUTION RESPIRATORY (INHALATION) EVERY 6 HOURS PRN
Qty: 75 ML | Refills: 0 | Status: SHIPPED | OUTPATIENT
Start: 2019-08-12 | End: 2019-09-24 | Stop reason: ALTCHOICE

## 2019-08-12 RX ADMIN — SODIUM CHLORIDE 1000 ML: 9 INJECTION, SOLUTION INTRAVENOUS at 17:45

## 2019-08-12 RX ADMIN — IPRATROPIUM BROMIDE AND ALBUTEROL SULFATE 2 AMPULE: .5; 3 SOLUTION RESPIRATORY (INHALATION) at 19:08

## 2019-08-12 NOTE — TELEPHONE ENCOUNTER
Writer contacted  ED provider,  to inform of 30 day readmission risk. Probable change in antibiotics and discharge.

## 2019-08-12 NOTE — ED PROVIDER NOTES
dysfunction) 12/29/2011    GERD (gastroesophageal reflux disease)     Hypertension     Pleurisy          SURGICAL HISTORY     Past Surgical History:   Procedure Laterality Date    ARM SURGERY Left 12/10/2015    BACK SURGERY      COLONOSCOPY      LUNG SURGERY      partial removal (right) agent orange    SPINE SURGERY  1995    lumbar laminectomy         CURRENTMEDICATIONS       Discharge Medication List as of 8/12/2019  8:04 PM      CONTINUE these medications which have NOT CHANGED    Details   levofloxacin (LEVAQUIN) 750 MG tablet Take 1 tablet by mouth daily for 6 days, Disp-6 tablet, R-0Normal      valsartan (DIOVAN) 80 MG tablet TAKE ONE TABLET BY MOUTH DAILY, Disp-90 tablet, R-1Normal      pantoprazole (PROTONIX) 40 MG tablet Take 1 tablet by mouth 2 times daily, Disp-180 tablet, R-1Normal      albuterol sulfate HFA (VENTOLIN HFA) 108 (90 Base) MCG/ACT inhaler Inhale 2 puffs into the lungs every 6 hours as needed for Wheezing or Shortness of Breath, Disp-1 Inhaler, R-6Normal      chlordiazePOXIDE-clidinium (LIBRAX) 5-2.5 MG per capsule Take 1 capsule by mouth 2 times daily as needed (stomach cramps). , Disp-60 capsule, R-2Normal      dicyclomine (BENTYL) 10 MG capsule Take 1 capsule by mouth every 6 hours as needed (cramps), Disp-180 capsule, R-1Normal      atorvastatin (LIPITOR) 40 MG tablet TAKE ONE TABLET BY MOUTH DAILY, Disp-90 tablet, R-1Normal      cyclobenzaprine (FLEXERIL) 10 MG tablet TAKE ONE TABLET BY MOUTH EVERY 8 HOURS AS NEEDED FOR MUSCLE SPASMS, Disp-90 tablet, R-5Normal      DULoxetine (CYMBALTA) 60 MG extended release capsule Take 1 capsule by mouth daily, Disp-90 capsule, R-1Normal      metoprolol succinate (TOPROL XL) 25 MG extended release tablet Take 1 tablet by mouth daily, Disp-90 tablet, R-1Normal      traZODone (DESYREL) 50 MG tablet Take 2 tablets by mouth nightly TAKE TWO TABLETS BY MOUTH ONCE NIGHTLY, Disp-180 tablet, R-1Normal      sildenafil (REVATIO) 20 MG tablet Take 3 CULTURE BLOOD #2   CBC WITH AUTO DIFFERENTIAL    Narrative:     Performed at:  [facility] [address],  [place]   Phone [phone]   TROPONIN    Narrative:     Performed at:  [facility] [address],  [place] [facility]   Phone [phone]   APTT    Narrative:     Performed at:  [facility] [address],  [place] [facility]   Phone [phone]   PROTIME-INR    Narrative:     Performed at:  [facility] [address],  [place] [facility]   Phone [phone]   D-DIMER, QUANTITATIVE    Narrative:     Performed at:  [facility] [address],  [place] [facility]   Phone [phone]   BRAIN NATRIURETIC PEPTIDE    Narrative:     Performed at:  [facility] [address],  [place], [facility]   Phone [phone]   LACTIC ACID, PLASMA    Narrative:     Performed at:  [facility] [address],  [place] [facility]   Phone [phone]   MAGNESIUM    Narrative:     Performed at:  [facility]) [facility] [address],  [place]   Phone [phone]       All other labs were within normal range or not returned as of this dictation. EKG: All EKG's are interpreted by the Emergency Department Physician in the absence of a cardiologist.  Please see their note for interpretation of EKG.       RADIOLOGY:   Non-plain film images such as CT, Ultrasound and MRI are read by the radiologist. Plain radiographic images are visualized andpreliminarily interpreted by the  ED Provider with the below findings:        Interpretation perthe Radiologist below, if available at the time of this note:    XR CHEST STANDARD (2 VW)   Final Result   Interstitial opacity which is increased when compared to the previous exam.   Correlate with clinical evidence of interstitial edema. Since the heart size   is normal, consider both cardiogenic and noncardiogenic etiologies of   interstitial edema. Xr Chest Standard (2 Vw)    Result Date: 8/12/2019  EXAMINATION: TWO XRAY VIEWS OF THE CHEST 8/12/2019 3:10 pm COMPARISON: 08/07/2019 HISTORY: ORDERING SYSTEM PROVIDED HISTORY: Pneumonia, shortness of breath, progressive TECHNOLOGIST PROVIDED HISTORY: Reason for exam:->Pneumonia, shortness of breath, progressive Reason for Exam: Respiratory Distress (Pt. states he was short of breath when he would lay down. Pt. was at [facility] on Tuesday of last week and was diagnosed with pneumonia.) FINDINGS: There is interstitial opacity, increased when compared to the previous exam, with a small amount of fluid adjacent to the minor fissure. The cardial pericardial silhouette is normal in size, however. No focal infiltrate is identified. No pneumothorax is seen. No free air. No acute bony abnormalities are detected. Interstitial opacity which is increased when compared to the previous exam. Correlate with clinical evidence of interstitial edema. Since the heart size is normal, consider both cardiogenic and noncardiogenic etiologies of interstitial edema. Ct Abdomen Pelvis W Iv Contrast Additional Contrast? None    Result Date: 8/8/2019  EXAMINATION: CTA OF THE CHEST; CT OF THE ABDOMEN AND PELVIS WITH CONTRAST 8/7/2019 11:21 pm TECHNIQUE: CTA of the chest was performed after the administration of intravenous contrast.  Multiplanar reformatted images are provided for review. MIP images are provided for review. Dose modulation, iterative reconstruction, and/or weight based adjustment of the mA/kV was utilized to reduce the radiation dose to as low as reasonably achievable.; CT of the abdomen and pelvis was performed with the administration of intravenous contrast. Multiplanar reformatted images are provided for review. FINDINGS: Pulmonary Arteries: Less than optimal timing contrast bolus. No convincing evidence of central or lobar pulmonary artery emboli. .  Main pulmonary artery is normal in caliber. Mediastinum: No evidence of mediastinal lymphadenopathy. The heart and pericardium demonstrate no acute abnormality. There is no acute abnormality of the thoracic aorta. Lungs/pleura: Patchy ground-glass nodules identified involving the left lower lobe. Large is ground-glass nodule measures up to 9 mm in size. This is associated with pleuroparenchymal bands left lower lobe. Findings suggestive of infectious or inflammatory infiltrate/pneumonia. Soft Tissues/Bones: No acute bone or soft tissue abnormality. Mild-to-moderate degenerative changes notably involving lower thoracic spine. CT abdomen pelvis: Lung bases are clear. Organs: Diffuse hypoattenuation liver suggesting fatty infiltration. Scattered hepatic hypodensities identified up to 1.5 cm consistent with cysts. .  These are stable. Spleen, adrenal glands, kidneys, and pancreas unremarkable. GI/bowel: Mild retained stool throughout the colon. Cecum noted within the left mid abdomen. Appendix unremarkable. No pericecal inflammatory changes. Peritoneum/retroperitoneum: No free air or free fluid. Aorta remarkable caliber. Pelvis: Bladder unremarkable. Prostate unremarkable. Bones/soft tissues: Severe degenerate changes L5-S1. Moderate severe facet arthropathy mid and lower lumbar spine. Vertebral heights are maintained. No evidence of pulmonary embolism Ground-glass opacity involving the left lower lobe with multifocal nodules likely due to infectious or inflammatory pneumonia/infiltrate. Follow-up is recommended to assure resolution following medical treatment course. No acute inflammatory process or evidence of bowel obstruction involving the abdomen pelvis. Fatty infiltration liver.            PROCEDURES   Unless otherwise noted below, none

## 2019-08-13 ENCOUNTER — TELEPHONE (OUTPATIENT)
Dept: FAMILY MEDICINE CLINIC | Age: 49
End: 2019-08-13

## 2019-08-13 NOTE — ED PROVIDER NOTES
Emergency Department Provider Note     Location: [place] [address] ED  8/12/2019    I independently performed a history and physical on Nataliia Smith. All diagnostic, treatment, and disposition decisions were made by myself in conjunction with the mid-level provider. Briefly, this is a 51 y.o. male here for shortness of breath, concern that his pneumonia is worsening    ED Triage Vitals [08/12/19 1707]   BP Temp Temp Source Pulse Resp SpO2 Height Weight   (!) 144/99 97.1 °F (36.2 °C) Oral 105 18 90 % 6' 1\" (1.854 m) 250 lb (113.4 kg)      Exam:  no acute distress, A&Ox4, heart RRR, no M/G/R, lungs with coarse breath sounds, few scattered end expiratory wheezes, a few atelectatic crackles in the bases, no significant rhonchi on my exam, resp. Nonlabored, no abd tenderness, no peritoneal signs/no rebound/no guarding      Patient seen and examined. Xr Chest Standard (2 Vw)    Result Date: 8/12/2019  EXAMINATION: TWO XRAY VIEWS OF THE CHEST 8/12/2019 3:10 pm COMPARISON: 08/07/2019 HISTORY: ORDERING SYSTEM PROVIDED HISTORY: Pneumonia, shortness of breath, progressive TECHNOLOGIST PROVIDED HISTORY: Reason for exam:->Pneumonia, shortness of breath, progressive Reason for Exam: Respiratory Distress (Pt. states he was short of breath when he would lay down. Pt. was at [facility] on Tuesday of last week and was diagnosed with pneumonia.) FINDINGS: There is interstitial opacity, increased when compared to the previous exam, with a small amount of fluid adjacent to the minor fissure. The cardial pericardial silhouette is normal in size, however. No focal infiltrate is identified. No pneumothorax is seen. No free air. No acute bony abnormalities are detected. Interstitial opacity which is increased when compared to the previous exam. Correlate with clinical evidence of interstitial edema. Since the heart size is normal, consider both cardiogenic and noncardiogenic etiologies of interstitial edema.

## 2019-08-14 ENCOUNTER — TELEPHONE (OUTPATIENT)
Dept: FAMILY MEDICINE CLINIC | Age: 49
End: 2019-08-14

## 2019-08-14 DIAGNOSIS — J18.9 PNEUMONIA DUE TO INFECTIOUS ORGANISM, UNSPECIFIED LATERALITY, UNSPECIFIED PART OF LUNG: Primary | ICD-10-CM

## 2019-08-15 ENCOUNTER — OFFICE VISIT (OUTPATIENT)
Dept: FAMILY MEDICINE CLINIC | Age: 49
End: 2019-08-15
Payer: COMMERCIAL

## 2019-08-15 VITALS
HEART RATE: 97 BPM | BODY MASS INDEX: 32.19 KG/M2 | DIASTOLIC BLOOD PRESSURE: 86 MMHG | OXYGEN SATURATION: 97 % | SYSTOLIC BLOOD PRESSURE: 110 MMHG | WEIGHT: 244 LBS

## 2019-08-15 DIAGNOSIS — F17.200 TOBACCO DEPENDENCE: ICD-10-CM

## 2019-08-15 DIAGNOSIS — A41.9 SEPSIS, DUE TO UNSPECIFIED ORGANISM: ICD-10-CM

## 2019-08-15 DIAGNOSIS — J81.0 ACUTE PULMONARY EDEMA (HCC): ICD-10-CM

## 2019-08-15 DIAGNOSIS — J18.9 PNEUMONIA OF LEFT LOWER LOBE DUE TO INFECTIOUS ORGANISM: Primary | ICD-10-CM

## 2019-08-15 LAB — REPORT: NORMAL

## 2019-08-15 PROCEDURE — 99214 OFFICE O/P EST MOD 30 MIN: CPT | Performed by: PHYSICIAN ASSISTANT

## 2019-08-15 PROCEDURE — 4004F PT TOBACCO SCREEN RCVD TLK: CPT | Performed by: PHYSICIAN ASSISTANT

## 2019-08-15 PROCEDURE — G8427 DOCREV CUR MEDS BY ELIG CLIN: HCPCS | Performed by: PHYSICIAN ASSISTANT

## 2019-08-15 PROCEDURE — 1111F DSCHRG MED/CURRENT MED MERGE: CPT | Performed by: PHYSICIAN ASSISTANT

## 2019-08-15 PROCEDURE — G8598 ASA/ANTIPLAT THER USED: HCPCS | Performed by: PHYSICIAN ASSISTANT

## 2019-08-15 PROCEDURE — G8417 CALC BMI ABV UP PARAM F/U: HCPCS | Performed by: PHYSICIAN ASSISTANT

## 2019-08-15 RX ORDER — VARENICLINE TARTRATE 25 MG
KIT ORAL
Qty: 1 BOX | Refills: 0 | Status: SHIPPED | OUTPATIENT
Start: 2019-08-15 | End: 2019-09-30

## 2019-08-15 RX ORDER — HYDROCHLOROTHIAZIDE 25 MG/1
25 TABLET ORAL DAILY
COMMUNITY
End: 2019-12-18 | Stop reason: SDUPTHER

## 2019-08-15 ASSESSMENT — ENCOUNTER SYMPTOMS
VOMITING: 0
CONSTIPATION: 0
ABDOMINAL PAIN: 0
NAUSEA: 0
SORE THROAT: 0
RHINORRHEA: 0
DIARRHEA: 0

## 2019-08-16 ASSESSMENT — ENCOUNTER SYMPTOMS
SHORTNESS OF BREATH: 1
COUGH: 1

## 2019-08-18 LAB
BLOOD CULTURE, ROUTINE: ABNORMAL
BLOOD CULTURE, ROUTINE: ABNORMAL
ORGANISM: ABNORMAL

## 2019-08-27 ENCOUNTER — HOSPITAL ENCOUNTER (OUTPATIENT)
Age: 49
Discharge: HOME | End: 2019-08-27
Payer: COMMERCIAL

## 2019-08-27 ENCOUNTER — OFFICE VISIT (OUTPATIENT)
Dept: PULMONOLOGY | Age: 49
End: 2019-08-27
Payer: COMMERCIAL

## 2019-08-27 VITALS
BODY MASS INDEX: 31.41 KG/M2 | RESPIRATION RATE: 16 BRPM | WEIGHT: 237 LBS | HEIGHT: 73 IN | DIASTOLIC BLOOD PRESSURE: 78 MMHG | SYSTOLIC BLOOD PRESSURE: 114 MMHG | HEART RATE: 120 BPM | TEMPERATURE: 97.4 F | OXYGEN SATURATION: 98 %

## 2019-08-27 DIAGNOSIS — J40 TRACHEOBRONCHITIS: ICD-10-CM

## 2019-08-27 DIAGNOSIS — R91.8 PULMONARY NODULES: ICD-10-CM

## 2019-08-27 DIAGNOSIS — J43.9 PULMONARY EMPHYSEMA, UNSPECIFIED EMPHYSEMA TYPE (HCC): ICD-10-CM

## 2019-08-27 DIAGNOSIS — J40 TRACHEOBRONCHITIS: Primary | ICD-10-CM

## 2019-08-27 DIAGNOSIS — Z87.891 PERSONAL HISTORY OF TOBACCO USE: ICD-10-CM

## 2019-08-27 DIAGNOSIS — R93.89 ABNORMAL CT OF THE CHEST: ICD-10-CM

## 2019-08-27 LAB
REASON FOR REJECTION: NORMAL
REJECTED TEST: NORMAL

## 2019-08-27 PROCEDURE — G8427 DOCREV CUR MEDS BY ELIG CLIN: HCPCS | Performed by: INTERNAL MEDICINE

## 2019-08-27 PROCEDURE — 4004F PT TOBACCO SCREEN RCVD TLK: CPT | Performed by: INTERNAL MEDICINE

## 2019-08-27 PROCEDURE — G8598 ASA/ANTIPLAT THER USED: HCPCS | Performed by: INTERNAL MEDICINE

## 2019-08-27 PROCEDURE — G8926 SPIRO NO PERF OR DOC: HCPCS | Performed by: INTERNAL MEDICINE

## 2019-08-27 PROCEDURE — G8417 CALC BMI ABV UP PARAM F/U: HCPCS | Performed by: INTERNAL MEDICINE

## 2019-08-27 PROCEDURE — 99214 OFFICE O/P EST MOD 30 MIN: CPT | Performed by: INTERNAL MEDICINE

## 2019-08-27 PROCEDURE — 1111F DSCHRG MED/CURRENT MED MERGE: CPT | Performed by: INTERNAL MEDICINE

## 2019-08-27 PROCEDURE — 3023F SPIROM DOC REV: CPT | Performed by: INTERNAL MEDICINE

## 2019-08-27 PROCEDURE — 87205 SMEAR GRAM STAIN: CPT

## 2019-08-27 RX ORDER — LEVOFLOXACIN 500 MG/1
500 TABLET, FILM COATED ORAL DAILY
Qty: 7 TABLET | Refills: 0 | Status: SHIPPED | OUTPATIENT
Start: 2019-08-27 | End: 2019-09-03

## 2019-08-30 ENCOUNTER — OFFICE VISIT (OUTPATIENT)
Dept: FAMILY MEDICINE CLINIC | Age: 49
End: 2019-08-30
Payer: COMMERCIAL

## 2019-08-30 VITALS
BODY MASS INDEX: 31 KG/M2 | DIASTOLIC BLOOD PRESSURE: 78 MMHG | SYSTOLIC BLOOD PRESSURE: 116 MMHG | WEIGHT: 235 LBS | HEART RATE: 82 BPM | OXYGEN SATURATION: 98 %

## 2019-08-30 DIAGNOSIS — R07.9 CHEST PAIN, UNSPECIFIED TYPE: ICD-10-CM

## 2019-08-30 DIAGNOSIS — R19.7 DIARRHEA OF PRESUMED INFECTIOUS ORIGIN: ICD-10-CM

## 2019-08-30 DIAGNOSIS — J18.9 PNEUMONIA DUE TO ORGANISM: Primary | ICD-10-CM

## 2019-08-30 DIAGNOSIS — F33.1 MODERATE EPISODE OF RECURRENT MAJOR DEPRESSIVE DISORDER (HCC): ICD-10-CM

## 2019-08-30 PROCEDURE — 99214 OFFICE O/P EST MOD 30 MIN: CPT | Performed by: INTERNAL MEDICINE

## 2019-08-30 PROCEDURE — G8427 DOCREV CUR MEDS BY ELIG CLIN: HCPCS | Performed by: INTERNAL MEDICINE

## 2019-08-30 PROCEDURE — 4004F PT TOBACCO SCREEN RCVD TLK: CPT | Performed by: INTERNAL MEDICINE

## 2019-08-30 PROCEDURE — G8598 ASA/ANTIPLAT THER USED: HCPCS | Performed by: INTERNAL MEDICINE

## 2019-08-30 PROCEDURE — G8417 CALC BMI ABV UP PARAM F/U: HCPCS | Performed by: INTERNAL MEDICINE

## 2019-08-30 PROCEDURE — 1111F DSCHRG MED/CURRENT MED MERGE: CPT | Performed by: INTERNAL MEDICINE

## 2019-08-30 RX ORDER — PROMETHAZINE HYDROCHLORIDE 25 MG/1
25 TABLET ORAL EVERY 8 HOURS PRN
Qty: 20 TABLET | Refills: 0 | Status: SHIPPED | OUTPATIENT
Start: 2019-08-30 | End: 2019-09-06

## 2019-09-03 ENCOUNTER — HOSPITAL ENCOUNTER (OUTPATIENT)
Age: 49
Discharge: HOME | End: 2019-09-03
Payer: COMMERCIAL

## 2019-09-03 ENCOUNTER — HOSPITAL ENCOUNTER (OUTPATIENT)
Dept: PULMONOLOGY | Age: 49
Discharge: HOME | End: 2019-09-03
Payer: COMMERCIAL

## 2019-09-03 DIAGNOSIS — J43.9 PULMONARY EMPHYSEMA, UNSPECIFIED EMPHYSEMA TYPE (HCC): ICD-10-CM

## 2019-09-03 DIAGNOSIS — R19.7 DIARRHEA OF PRESUMED INFECTIOUS ORIGIN: ICD-10-CM

## 2019-09-03 LAB — C DIFF TOXIN/ANTIGEN: NORMAL

## 2019-09-03 PROCEDURE — 87324 CLOSTRIDIUM AG IA: CPT

## 2019-09-03 PROCEDURE — 94618 PULMONARY STRESS TESTING: CPT

## 2019-09-03 PROCEDURE — 94726 PLETHYSMOGRAPHY LUNG VOLUMES: CPT

## 2019-09-03 PROCEDURE — 6360000002 HC RX W HCPCS: Performed by: INTERNAL MEDICINE

## 2019-09-03 PROCEDURE — 94060 EVALUATION OF WHEEZING: CPT

## 2019-09-03 PROCEDURE — 87505 NFCT AGENT DETECTION GI: CPT

## 2019-09-03 PROCEDURE — 94729 DIFFUSING CAPACITY: CPT

## 2019-09-03 PROCEDURE — 87449 NOS EACH ORGANISM AG IA: CPT

## 2019-09-03 PROCEDURE — 87046 STOOL CULTR AEROBIC BACT EA: CPT

## 2019-09-03 PROCEDURE — 94640 AIRWAY INHALATION TREATMENT: CPT

## 2019-09-03 RX ORDER — ALBUTEROL SULFATE 2.5 MG/3ML
2.5 SOLUTION RESPIRATORY (INHALATION) ONCE
Status: COMPLETED | OUTPATIENT
Start: 2019-09-03 | End: 2019-09-03

## 2019-09-03 RX ADMIN — ALBUTEROL SULFATE 2.5 MG: 2.5 SOLUTION RESPIRATORY (INHALATION) at 14:14

## 2019-09-04 LAB — GI BACTERIAL PATHOGENS BY PCR: NORMAL

## 2019-09-05 ENCOUNTER — TELEPHONE (OUTPATIENT)
Dept: FAMILY MEDICINE CLINIC | Age: 49
End: 2019-09-05

## 2019-09-05 RX ORDER — DULOXETIN HYDROCHLORIDE 60 MG/1
60 CAPSULE, DELAYED RELEASE ORAL DAILY
Qty: 90 CAPSULE | Refills: 1 | Status: SHIPPED | OUTPATIENT
Start: 2019-09-05 | End: 2019-11-10

## 2019-09-09 RX ORDER — DULOXETIN HYDROCHLORIDE 30 MG/1
CAPSULE, DELAYED RELEASE ORAL
Qty: 30 CAPSULE | Refills: 0 | OUTPATIENT
Start: 2019-09-09

## 2019-09-09 NOTE — TELEPHONE ENCOUNTER
Patient states he is taking this medication per his and dr Jhon Heath treatment plan, he doesn't know

## 2019-09-17 ENCOUNTER — TELEPHONE (OUTPATIENT)
Dept: PULMONOLOGY | Age: 49
End: 2019-09-17

## 2019-09-17 NOTE — TELEPHONE ENCOUNTER
Pt called stating that he took the abx given at last OV, and he is still having symptoms. Pt does not know what to do. Pt has had these symptoms for close to 1 month, without improvement. Please advise. Do you have the following symptoms? Shortness of Breath  yes  Wheezing  yes  Cough  yes                  Cough Characteristics:                           Productive    Yes, somtimes                           Sputum Color    brown                           Hemoptysis   no                           Consistency of sputum   thick     Fever:    no  Temp:n/a  Chills/Sweats: sweats  What other symptoms are you having?:  Fatigue, very sleepy, cannot eat - no desire to eat    How long have you had these symptoms? approx 1 month     [facility]: [facility]          Review medications and allergies: Allergies? Lexapro, Morphine, Prozac, Wellbutrin                   Currently on Antibiotics? (Drug/Dose/Frequency and how long on?) none                   Systemic Steroids? (Drug/Dose/Frequency and how long on?) none      Last sick call taken on n/a. Meds prescribed were n/a    OV 8/27/19:    Assessment:       · Tracheobronchitis/pneumonia  · Abnormal CT chest/chest x-ray- admitted and treated for PNA. Completed 2 course of antibiotics  · Pulmonary nodules on CT chest 11/2/2017, largest 6.6 mm. DDx granuloma versus intrapulmonary lymph node versus malignancy.   Stable/imprtoving on repeat CT chest 2/8/2018   · Abnormal CT chest with bibasilar atelectasis  · Pulmonary emphysema   · Recurrent PTX post VATS 1995   · >33 pack year smoking       Plan:       · Levaquin 500 mg po daily for 7 days   · Sputum Cx   · ER if no improvement  · PFTs   · Follow-up in 4 weeks with CXR    · Will need CT chest and 3 months   · Albuterol 2 puffs Q4-6 hrs PRN  · Smoking cessation counseling

## 2019-09-24 ENCOUNTER — APPOINTMENT (OUTPATIENT)
Dept: GENERAL RADIOLOGY | Age: 49
End: 2019-09-24
Payer: COMMERCIAL

## 2019-09-24 ENCOUNTER — OFFICE VISIT (OUTPATIENT)
Dept: PULMONOLOGY | Age: 49
End: 2019-09-24
Payer: COMMERCIAL

## 2019-09-24 ENCOUNTER — APPOINTMENT (OUTPATIENT)
Dept: CT IMAGING | Age: 49
End: 2019-09-24
Payer: COMMERCIAL

## 2019-09-24 ENCOUNTER — TELEPHONE (OUTPATIENT)
Dept: PULMONOLOGY | Age: 49
End: 2019-09-24

## 2019-09-24 ENCOUNTER — HOSPITAL ENCOUNTER (EMERGENCY)
Age: 49
Discharge: HOME | End: 2019-09-24
Attending: EMERGENCY MEDICINE
Payer: COMMERCIAL

## 2019-09-24 VITALS
HEART RATE: 86 BPM | BODY MASS INDEX: 29.16 KG/M2 | DIASTOLIC BLOOD PRESSURE: 89 MMHG | HEIGHT: 73 IN | OXYGEN SATURATION: 93 % | TEMPERATURE: 99.1 F | WEIGHT: 220 LBS | SYSTOLIC BLOOD PRESSURE: 113 MMHG | RESPIRATION RATE: 5 BRPM

## 2019-09-24 VITALS
WEIGHT: 242.6 LBS | HEIGHT: 70 IN | DIASTOLIC BLOOD PRESSURE: 64 MMHG | RESPIRATION RATE: 18 BRPM | TEMPERATURE: 97.6 F | OXYGEN SATURATION: 96 % | SYSTOLIC BLOOD PRESSURE: 128 MMHG | BODY MASS INDEX: 34.73 KG/M2 | HEART RATE: 88 BPM

## 2019-09-24 DIAGNOSIS — R93.89 ABNORMAL CT OF THE CHEST: ICD-10-CM

## 2019-09-24 DIAGNOSIS — R06.89 DYSPNEA AND RESPIRATORY ABNORMALITIES: Primary | ICD-10-CM

## 2019-09-24 DIAGNOSIS — R06.00 DYSPNEA AND RESPIRATORY ABNORMALITIES: Primary | ICD-10-CM

## 2019-09-24 DIAGNOSIS — J43.9 ACUTE EXACERBATION OF EMPHYSEMA (HCC): Primary | ICD-10-CM

## 2019-09-24 DIAGNOSIS — Z72.0 TOBACCO ABUSE: ICD-10-CM

## 2019-09-24 LAB
A/G RATIO: 1.2 (ref 1.1–2.2)
ALBUMIN SERPL-MCNC: 4.1 G/DL (ref 3.4–5)
ALP BLD-CCNC: 143 U/L (ref 40–129)
ALT SERPL-CCNC: 39 U/L (ref 10–40)
AMORPHOUS: ABNORMAL /HPF
AMPHETAMINE SCREEN, URINE: ABNORMAL
ANION GAP SERPL CALCULATED.3IONS-SCNC: 10 MMOL/L (ref 3–16)
AST SERPL-CCNC: 26 U/L (ref 15–37)
BACTERIA: ABNORMAL /HPF
BARBITURATE SCREEN URINE: ABNORMAL
BASOPHILS ABSOLUTE: 0.2 K/UL (ref 0–0.2)
BASOPHILS RELATIVE PERCENT: 2.6 %
BENZODIAZEPINE SCREEN, URINE: POSITIVE
BILIRUB SERPL-MCNC: <0.2 MG/DL (ref 0–1)
BILIRUBIN URINE: NEGATIVE
BLOOD, URINE: NEGATIVE
BUN BLDV-MCNC: 15 MG/DL (ref 7–20)
CALCIUM SERPL-MCNC: 9.3 MG/DL (ref 8.3–10.6)
CANNABINOID SCREEN URINE: POSITIVE
CHLORIDE BLD-SCNC: 103 MMOL/L (ref 99–110)
CLARITY: CLEAR
CO2: 26 MMOL/L (ref 21–32)
COCAINE METABOLITE SCREEN URINE: ABNORMAL
COLOR: YELLOW
CREAT SERPL-MCNC: 0.8 MG/DL (ref 0.9–1.3)
CRYSTALS, UA: ABNORMAL /HPF
EOSINOPHILS ABSOLUTE: 0.2 K/UL (ref 0–0.6)
EOSINOPHILS RELATIVE PERCENT: 2.5 %
EPITHELIAL CELLS, UA: ABNORMAL /HPF
GFR AFRICAN AMERICAN: >60
GFR NON-AFRICAN AMERICAN: >60
GLOBULIN: 3.4 G/DL
GLUCOSE BLD-MCNC: 118 MG/DL (ref 70–99)
GLUCOSE URINE: NEGATIVE MG/DL
HCT VFR BLD CALC: 43.9 % (ref 40.5–52.5)
HEMOGLOBIN: 15 G/DL (ref 13.5–17.5)
KETONES, URINE: ABNORMAL MG/DL
LACTIC ACID: 1.1 MMOL/L (ref 0.4–2)
LEUKOCYTE ESTERASE, URINE: NEGATIVE
LYMPHOCYTES ABSOLUTE: 2.2 K/UL (ref 1–5.1)
LYMPHOCYTES RELATIVE PERCENT: 24.9 %
Lab: ABNORMAL
MCH RBC QN AUTO: 30.5 PG (ref 26–34)
MCHC RBC AUTO-ENTMCNC: 34.1 G/DL (ref 31–36)
MCV RBC AUTO: 89.4 FL (ref 80–100)
METHADONE SCREEN, URINE: ABNORMAL
MICROSCOPIC EXAMINATION: YES
MONOCYTES ABSOLUTE: 0.7 K/UL (ref 0–1.3)
MONOCYTES RELATIVE PERCENT: 8.5 %
MUCUS: ABNORMAL /LPF
NEUTROPHILS ABSOLUTE: 5.4 K/UL (ref 1.7–7.7)
NEUTROPHILS RELATIVE PERCENT: 61.5 %
NITRITE, URINE: NEGATIVE
OPIATE SCREEN URINE: ABNORMAL
OXYCODONE URINE: ABNORMAL
PDW BLD-RTO: 13.6 % (ref 12.4–15.4)
PH UA: 5.5
PH UA: 5.5 (ref 5–8)
PHENCYCLIDINE SCREEN URINE: ABNORMAL
PLATELET # BLD: 277 K/UL (ref 135–450)
PMV BLD AUTO: 7.9 FL (ref 5–10.5)
POTASSIUM REFLEX MAGNESIUM: 4.3 MMOL/L (ref 3.5–5.1)
PRO-BNP: 6 PG/ML (ref 0–124)
PROPOXYPHENE SCREEN: ABNORMAL
PROTEIN UA: ABNORMAL MG/DL
RBC # BLD: 4.91 M/UL (ref 4.2–5.9)
RBC UA: ABNORMAL /HPF (ref 0–2)
SODIUM BLD-SCNC: 139 MMOL/L (ref 136–145)
SPECIFIC GRAVITY UA: >=1.03 (ref 1–1.03)
TOTAL PROTEIN: 7.5 G/DL (ref 6.4–8.2)
TROPONIN: <0.01 NG/ML
URINE REFLEX TO CULTURE: YES
URINE TYPE: ABNORMAL
UROBILINOGEN, URINE: 0.2 E.U./DL
WBC # BLD: 8.8 K/UL (ref 4–11)
WBC UA: ABNORMAL /HPF (ref 0–5)

## 2019-09-24 PROCEDURE — 6360000004 HC RX CONTRAST MEDICATION: Performed by: EMERGENCY MEDICINE

## 2019-09-24 PROCEDURE — G8417 CALC BMI ABV UP PARAM F/U: HCPCS | Performed by: INTERNAL MEDICINE

## 2019-09-24 PROCEDURE — 99284 EMERGENCY DEPT VISIT MOD MDM: CPT

## 2019-09-24 PROCEDURE — 81001 URINALYSIS AUTO W/SCOPE: CPT

## 2019-09-24 PROCEDURE — 83880 ASSAY OF NATRIURETIC PEPTIDE: CPT

## 2019-09-24 PROCEDURE — 87086 URINE CULTURE/COLONY COUNT: CPT

## 2019-09-24 PROCEDURE — 2580000003 HC RX 258: Performed by: EMERGENCY MEDICINE

## 2019-09-24 PROCEDURE — 80307 DRUG TEST PRSMV CHEM ANLYZR: CPT

## 2019-09-24 PROCEDURE — 80053 COMPREHEN METABOLIC PANEL: CPT

## 2019-09-24 PROCEDURE — 85025 COMPLETE CBC W/AUTO DIFF WBC: CPT

## 2019-09-24 PROCEDURE — 71046 X-RAY EXAM CHEST 2 VIEWS: CPT

## 2019-09-24 PROCEDURE — 84484 ASSAY OF TROPONIN QUANT: CPT

## 2019-09-24 PROCEDURE — 96361 HYDRATE IV INFUSION ADD-ON: CPT

## 2019-09-24 PROCEDURE — 93005 ELECTROCARDIOGRAM TRACING: CPT

## 2019-09-24 PROCEDURE — 96360 HYDRATION IV INFUSION INIT: CPT

## 2019-09-24 PROCEDURE — 83605 ASSAY OF LACTIC ACID: CPT

## 2019-09-24 PROCEDURE — G8427 DOCREV CUR MEDS BY ELIG CLIN: HCPCS | Performed by: INTERNAL MEDICINE

## 2019-09-24 PROCEDURE — 3023F SPIROM DOC REV: CPT | Performed by: INTERNAL MEDICINE

## 2019-09-24 PROCEDURE — G8926 SPIRO NO PERF OR DOC: HCPCS | Performed by: INTERNAL MEDICINE

## 2019-09-24 PROCEDURE — 71260 CT THORAX DX C+: CPT

## 2019-09-24 PROCEDURE — G8598 ASA/ANTIPLAT THER USED: HCPCS | Performed by: INTERNAL MEDICINE

## 2019-09-24 PROCEDURE — 4004F PT TOBACCO SCREEN RCVD TLK: CPT | Performed by: INTERNAL MEDICINE

## 2019-09-24 PROCEDURE — 99214 OFFICE O/P EST MOD 30 MIN: CPT | Performed by: INTERNAL MEDICINE

## 2019-09-24 RX ORDER — AZITHROMYCIN 250 MG/1
250 TABLET, FILM COATED ORAL SEE ADMIN INSTRUCTIONS
Qty: 6 TABLET | Refills: 0 | Status: SHIPPED | OUTPATIENT
Start: 2019-09-24 | End: 2019-09-29

## 2019-09-24 RX ORDER — PREDNISONE 10 MG/1
TABLET ORAL
Qty: 30 TABLET | Refills: 0 | Status: SHIPPED | OUTPATIENT
Start: 2019-09-24 | End: 2019-10-04

## 2019-09-24 RX ORDER — SODIUM CHLORIDE 9 MG/ML
1000 INJECTION, SOLUTION INTRAVENOUS CONTINUOUS
Status: DISCONTINUED | OUTPATIENT
Start: 2019-09-24 | End: 2019-09-24 | Stop reason: HOSPADM

## 2019-09-24 RX ADMIN — SODIUM CHLORIDE 1000 ML: 9 INJECTION, SOLUTION INTRAVENOUS at 12:52

## 2019-09-24 RX ADMIN — IOPAMIDOL 85 ML: 755 INJECTION, SOLUTION INTRAVENOUS at 13:40

## 2019-09-24 NOTE — ED PROVIDER NOTES
[address] ED  eMERGENCY dEPARTMENT eNCOUnter        Pt Name: Tanisha Lee  MRN: [id number]  [place] 1970  Date of evaluation: 9/24/2019  Provider: Sri Trejo DO  PCP: Dilia Harden MD      CHIEF COMPLAINT       Chief Complaint   Patient presents with    Dizziness     dizzy hard to breathe x a couple weeks now being treated for pna. Pt to have f/u apt. with Dr. Franco Homans at 1500 today       HISTORY OFPRESENT ILLNESS   (Location/Symptom, Timing/Onset, Context/Setting, Quality, Duration, Modifying Factors,Severity)  Note limiting factors. Tanisha Lee is a 51 y.o. male history of CAD, recurrent pneumonia presents to ED with episode of dyspnea. Patient states that he was supposed to see his pulmonary doctor today for an outpatient appointment however decided that since he felt \"so sick\" that he should come to the ED. Patient states that he has not been able to breathe. Patient finished 2 rounds of antibiotics at home. Denies fevers chills chest pain nausea vomiting leg swelling other pain complaints    Nursing Notes were all reviewed and agreed with or any disagreements were addressed  in the HPI. REVIEW OF SYSTEMS    (2-9 systems for level 4, 10 or more for level 5)     Review of Systems    Positives and Pertinent negatives as per HPI. Except as noted above in the ROS, all other systems were reviewed andnegative.        PASTMEDICAL HISTORY     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    Chronic back pain     ED (erectile dysfunction) 12/29/2011    GERD (gastroesophageal reflux disease)     Hypertension     Pleurisy          SURGICAL HISTORY       Past Surgical History:   Procedure Laterality Date    ARM SURGERY Left 12/10/2015    BACK SURGERY      COLONOSCOPY      LUNG SURGERY      partial removal (right) agent orange    SPINE SURGERY  1995    lumbar laminectomy

## 2019-09-25 ENCOUNTER — TELEPHONE (OUTPATIENT)
Dept: FAMILY MEDICINE CLINIC | Age: 49
End: 2019-09-25

## 2019-09-25 LAB
EKG ATRIAL RATE: 117 BPM
EKG DIAGNOSIS: NORMAL
EKG P AXIS: 30 DEGREES
EKG P-R INTERVAL: 156 MS
EKG Q-T INTERVAL: 322 MS
EKG QRS DURATION: 102 MS
EKG QTC CALCULATION (BAZETT): 449 MS
EKG R AXIS: -20 DEGREES
EKG T AXIS: 58 DEGREES
EKG VENTRICULAR RATE: 117 BPM
URINE CULTURE, ROUTINE: NORMAL

## 2019-09-25 RX ORDER — DULOXETIN HYDROCHLORIDE 60 MG/1
60 CAPSULE, DELAYED RELEASE ORAL DAILY
Qty: 90 CAPSULE | Refills: 1 | Status: SHIPPED | OUTPATIENT
Start: 2019-09-25 | End: 2019-09-30 | Stop reason: SDUPTHER

## 2019-09-30 ENCOUNTER — TELEPHONE (OUTPATIENT)
Dept: FAMILY MEDICINE CLINIC | Age: 49
End: 2019-09-30

## 2019-09-30 RX ORDER — DULOXETIN HYDROCHLORIDE 60 MG/1
60 CAPSULE, DELAYED RELEASE ORAL 2 TIMES DAILY
Qty: 180 CAPSULE | Refills: 1 | Status: SHIPPED | OUTPATIENT
Start: 2019-09-30 | End: 2020-04-09

## 2019-09-30 RX ORDER — VARENICLINE TARTRATE 1 MG/1
TABLET, FILM COATED ORAL
Qty: 56 TABLET | Refills: 2 | Status: SHIPPED | OUTPATIENT
Start: 2019-09-30 | End: 2020-02-14

## 2019-09-30 RX ORDER — VARENICLINE TARTRATE 1 MG/1
1 TABLET, FILM COATED ORAL 2 TIMES DAILY
Qty: 60 TABLET | Refills: 0 | Status: SHIPPED | OUTPATIENT
Start: 2019-09-30 | End: 2019-09-30

## 2019-09-30 ASSESSMENT — ENCOUNTER SYMPTOMS
SHORTNESS OF BREATH: 0
COUGH: 1

## 2019-10-28 RX ORDER — BUSPIRONE HYDROCHLORIDE 10 MG/1
TABLET ORAL
Qty: 120 TABLET | Refills: 5 | Status: SHIPPED | OUTPATIENT
Start: 2019-10-28 | End: 2020-05-11

## 2019-10-30 ENCOUNTER — OFFICE VISIT (OUTPATIENT)
Dept: PSYCHOLOGY | Age: 49
End: 2019-10-30
Payer: COMMERCIAL

## 2019-10-30 DIAGNOSIS — F41.9 ANXIETY: ICD-10-CM

## 2019-10-30 DIAGNOSIS — F32.A DEPRESSION, UNSPECIFIED DEPRESSION TYPE: Primary | ICD-10-CM

## 2019-10-30 PROCEDURE — 90791 PSYCH DIAGNOSTIC EVALUATION: CPT | Performed by: PSYCHOLOGIST

## 2019-10-30 ASSESSMENT — PATIENT HEALTH QUESTIONNAIRE - PHQ9
7. TROUBLE CONCENTRATING ON THINGS, SUCH AS READING THE NEWSPAPER OR WATCHING TELEVISION: 3
4. FEELING TIRED OR HAVING LITTLE ENERGY: 3
SUM OF ALL RESPONSES TO PHQ QUESTIONS 1-9: 21
1. LITTLE INTEREST OR PLEASURE IN DOING THINGS: 2
SUM OF ALL RESPONSES TO PHQ9 QUESTIONS 1 & 2: 5
6. FEELING BAD ABOUT YOURSELF - OR THAT YOU ARE A FAILURE OR HAVE LET YOURSELF OR YOUR FAMILY DOWN: 2
9. THOUGHTS THAT YOU WOULD BE BETTER OFF DEAD, OR OF HURTING YOURSELF: 1
SUM OF ALL RESPONSES TO PHQ QUESTIONS 1-9: 21
2. FEELING DOWN, DEPRESSED OR HOPELESS: 3
8. MOVING OR SPEAKING SO SLOWLY THAT OTHER PEOPLE COULD HAVE NOTICED. OR THE OPPOSITE, BEING SO FIGETY OR RESTLESS THAT YOU HAVE BEEN MOVING AROUND A LOT MORE THAN USUAL: 1
3. TROUBLE FALLING OR STAYING ASLEEP: 3
5. POOR APPETITE OR OVEREATING: 3

## 2019-10-30 ASSESSMENT — ANXIETY QUESTIONNAIRES
1. FEELING NERVOUS, ANXIOUS, OR ON EDGE: 3-NEARLY EVERY DAY
6. BECOMING EASILY ANNOYED OR IRRITABLE: 3-NEARLY EVERY DAY
5. BEING SO RESTLESS THAT IT IS HARD TO SIT STILL: 3-NEARLY EVERY DAY
7. FEELING AFRAID AS IF SOMETHING AWFUL MIGHT HAPPEN: 3-NEARLY EVERY DAY
2. NOT BEING ABLE TO STOP OR CONTROL WORRYING: 3-NEARLY EVERY DAY
4. TROUBLE RELAXING: 3-NEARLY EVERY DAY
GAD7 TOTAL SCORE: 21
3. WORRYING TOO MUCH ABOUT DIFFERENT THINGS: 3-NEARLY EVERY DAY

## 2019-10-30 ASSESSMENT — COLUMBIA-SUICIDE SEVERITY RATING SCALE - C-SSRS
6. HAVE YOU EVER DONE ANYTHING, STARTED TO DO ANYTHING, OR PREPARED TO DO ANYTHING TO END YOUR LIFE?: NO
2. HAVE YOU ACTUALLY HAD ANY THOUGHTS OF KILLING YOURSELF?: NO
1. WITHIN THE PAST MONTH, HAVE YOU WISHED YOU WERE DEAD OR WISHED YOU COULD GO TO SLEEP AND NOT WAKE UP?: NO

## 2019-11-10 ENCOUNTER — HOSPITAL ENCOUNTER (EMERGENCY)
Age: 49
Discharge: HOME | End: 2019-11-10
Attending: EMERGENCY MEDICINE
Payer: COMMERCIAL

## 2019-11-10 ENCOUNTER — APPOINTMENT (OUTPATIENT)
Dept: GENERAL RADIOLOGY | Age: 49
End: 2019-11-10
Payer: COMMERCIAL

## 2019-11-10 VITALS
OXYGEN SATURATION: 96 % | SYSTOLIC BLOOD PRESSURE: 150 MMHG | HEART RATE: 108 BPM | DIASTOLIC BLOOD PRESSURE: 107 MMHG | HEIGHT: 70 IN | RESPIRATION RATE: 15 BRPM | TEMPERATURE: 98.6 F | WEIGHT: 220 LBS | BODY MASS INDEX: 31.5 KG/M2

## 2019-11-10 DIAGNOSIS — J18.9 ATYPICAL PNEUMONIA: Primary | ICD-10-CM

## 2019-11-10 DIAGNOSIS — F32.A DEPRESSION, UNSPECIFIED DEPRESSION TYPE: ICD-10-CM

## 2019-11-10 DIAGNOSIS — D72.829 LEUKOCYTOSIS, UNSPECIFIED TYPE: ICD-10-CM

## 2019-11-10 DIAGNOSIS — R06.02 SHORTNESS OF BREATH: ICD-10-CM

## 2019-11-10 DIAGNOSIS — R68.83 CHILLS: ICD-10-CM

## 2019-11-10 LAB
A/G RATIO: 1.2 (ref 1.1–2.2)
ALBUMIN SERPL-MCNC: 4.2 G/DL (ref 3.4–5)
ALP BLD-CCNC: 115 U/L (ref 40–129)
ALT SERPL-CCNC: 37 U/L (ref 10–40)
AMPHETAMINE SCREEN, URINE: ABNORMAL
ANION GAP SERPL CALCULATED.3IONS-SCNC: 12 MMOL/L (ref 3–16)
AST SERPL-CCNC: 27 U/L (ref 15–37)
BARBITURATE SCREEN URINE: ABNORMAL
BASOPHILS ABSOLUTE: 0.1 K/UL (ref 0–0.2)
BASOPHILS RELATIVE PERCENT: 0.6 %
BENZODIAZEPINE SCREEN, URINE: ABNORMAL
BILIRUB SERPL-MCNC: <0.2 MG/DL (ref 0–1)
BILIRUBIN URINE: NEGATIVE
BLOOD, URINE: NEGATIVE
BUN BLDV-MCNC: 18 MG/DL (ref 7–20)
CALCIUM SERPL-MCNC: 9.5 MG/DL (ref 8.3–10.6)
CANNABINOID SCREEN URINE: POSITIVE
CHLORIDE BLD-SCNC: 100 MMOL/L (ref 99–110)
CLARITY: CLEAR
CO2: 25 MMOL/L (ref 21–32)
COCAINE METABOLITE SCREEN URINE: ABNORMAL
COLOR: YELLOW
CREAT SERPL-MCNC: 0.9 MG/DL (ref 0.9–1.3)
D DIMER: <200 NG/ML DDU (ref 0–229)
EOSINOPHILS ABSOLUTE: 0.1 K/UL (ref 0–0.6)
EOSINOPHILS RELATIVE PERCENT: 0.8 %
ETHANOL: NORMAL MG/DL (ref 0–0.08)
GFR AFRICAN AMERICAN: >60
GFR NON-AFRICAN AMERICAN: >60
GLOBULIN: 3.5 G/DL
GLUCOSE BLD-MCNC: 127 MG/DL (ref 70–99)
GLUCOSE URINE: NEGATIVE MG/DL
HCT VFR BLD CALC: 42.1 % (ref 40.5–52.5)
HEMOGLOBIN: 14.4 G/DL (ref 13.5–17.5)
KETONES, URINE: NEGATIVE MG/DL
LACTIC ACID: 1.8 MMOL/L (ref 0.4–2)
LEUKOCYTE ESTERASE, URINE: NEGATIVE
LIPASE: 7 U/L (ref 13–60)
LYMPHOCYTES ABSOLUTE: 1.8 K/UL (ref 1–5.1)
LYMPHOCYTES RELATIVE PERCENT: 15.4 %
Lab: ABNORMAL
MAGNESIUM: 2.1 MG/DL (ref 1.8–2.4)
MCH RBC QN AUTO: 30.7 PG (ref 26–34)
MCHC RBC AUTO-ENTMCNC: 34.3 G/DL (ref 31–36)
MCV RBC AUTO: 89.5 FL (ref 80–100)
METHADONE SCREEN, URINE: ABNORMAL
MICROSCOPIC EXAMINATION: NORMAL
MONOCYTES ABSOLUTE: 0.7 K/UL (ref 0–1.3)
MONOCYTES RELATIVE PERCENT: 6.1 %
NEUTROPHILS ABSOLUTE: 9.3 K/UL (ref 1.7–7.7)
NEUTROPHILS RELATIVE PERCENT: 77.1 %
NITRITE, URINE: NEGATIVE
OPIATE SCREEN URINE: ABNORMAL
OXYCODONE URINE: ABNORMAL
PDW BLD-RTO: 14.7 % (ref 12.4–15.4)
PH UA: 6
PH UA: 6 (ref 5–8)
PHENCYCLIDINE SCREEN URINE: ABNORMAL
PLATELET # BLD: 276 K/UL (ref 135–450)
PMV BLD AUTO: 7.6 FL (ref 5–10.5)
POTASSIUM REFLEX MAGNESIUM: 4.3 MMOL/L (ref 3.5–5.1)
PROPOXYPHENE SCREEN: ABNORMAL
PROTEIN UA: NEGATIVE MG/DL
RBC # BLD: 4.71 M/UL (ref 4.2–5.9)
SODIUM BLD-SCNC: 137 MMOL/L (ref 136–145)
SPECIFIC GRAVITY UA: 1.01 (ref 1–1.03)
TOTAL PROTEIN: 7.7 G/DL (ref 6.4–8.2)
TROPONIN: <0.01 NG/ML
URINE TYPE: NORMAL
UROBILINOGEN, URINE: 0.2 E.U./DL
WBC # BLD: 12 K/UL (ref 4–11)

## 2019-11-10 PROCEDURE — 6360000002 HC RX W HCPCS: Performed by: EMERGENCY MEDICINE

## 2019-11-10 PROCEDURE — 80053 COMPREHEN METABOLIC PANEL: CPT

## 2019-11-10 PROCEDURE — 84484 ASSAY OF TROPONIN QUANT: CPT

## 2019-11-10 PROCEDURE — 96375 TX/PRO/DX INJ NEW DRUG ADDON: CPT

## 2019-11-10 PROCEDURE — 81003 URINALYSIS AUTO W/O SCOPE: CPT

## 2019-11-10 PROCEDURE — 85025 COMPLETE CBC W/AUTO DIFF WBC: CPT

## 2019-11-10 PROCEDURE — 71046 X-RAY EXAM CHEST 2 VIEWS: CPT

## 2019-11-10 PROCEDURE — 99285 EMERGENCY DEPT VISIT HI MDM: CPT

## 2019-11-10 PROCEDURE — 6370000000 HC RX 637 (ALT 250 FOR IP): Performed by: EMERGENCY MEDICINE

## 2019-11-10 PROCEDURE — 83690 ASSAY OF LIPASE: CPT

## 2019-11-10 PROCEDURE — G0480 DRUG TEST DEF 1-7 CLASSES: HCPCS

## 2019-11-10 PROCEDURE — 80307 DRUG TEST PRSMV CHEM ANLYZR: CPT

## 2019-11-10 PROCEDURE — 83605 ASSAY OF LACTIC ACID: CPT

## 2019-11-10 PROCEDURE — 93005 ELECTROCARDIOGRAM TRACING: CPT | Performed by: EMERGENCY MEDICINE

## 2019-11-10 PROCEDURE — 83735 ASSAY OF MAGNESIUM: CPT

## 2019-11-10 PROCEDURE — 2580000003 HC RX 258: Performed by: EMERGENCY MEDICINE

## 2019-11-10 PROCEDURE — 96365 THER/PROPH/DIAG IV INF INIT: CPT

## 2019-11-10 PROCEDURE — 96366 THER/PROPH/DIAG IV INF ADDON: CPT

## 2019-11-10 PROCEDURE — 85379 FIBRIN DEGRADATION QUANT: CPT

## 2019-11-10 RX ORDER — SODIUM CHLORIDE, SODIUM LACTATE, POTASSIUM CHLORIDE, AND CALCIUM CHLORIDE .6; .31; .03; .02 G/100ML; G/100ML; G/100ML; G/100ML
1000 INJECTION, SOLUTION INTRAVENOUS ONCE
Status: COMPLETED | OUTPATIENT
Start: 2019-11-10 | End: 2019-11-10

## 2019-11-10 RX ORDER — LEVOFLOXACIN 750 MG/1
750 TABLET ORAL ONCE
Status: COMPLETED | OUTPATIENT
Start: 2019-11-10 | End: 2019-11-10

## 2019-11-10 RX ORDER — ONDANSETRON 2 MG/ML
4 INJECTION INTRAMUSCULAR; INTRAVENOUS ONCE
Status: COMPLETED | OUTPATIENT
Start: 2019-11-10 | End: 2019-11-10

## 2019-11-10 RX ORDER — IPRATROPIUM BROMIDE AND ALBUTEROL SULFATE 2.5; .5 MG/3ML; MG/3ML
3 SOLUTION RESPIRATORY (INHALATION) ONCE
Status: COMPLETED | OUTPATIENT
Start: 2019-11-10 | End: 2019-11-10

## 2019-11-10 RX ORDER — DEXAMETHASONE SODIUM PHOSPHATE 10 MG/ML
8 INJECTION INTRAMUSCULAR; INTRAVENOUS ONCE
Status: COMPLETED | OUTPATIENT
Start: 2019-11-10 | End: 2019-11-10

## 2019-11-10 RX ORDER — LEVOFLOXACIN 750 MG/1
750 TABLET ORAL DAILY
Qty: 5 TABLET | Refills: 0 | Status: SHIPPED | OUTPATIENT
Start: 2019-11-10 | End: 2019-11-15

## 2019-11-10 RX ADMIN — LEVOFLOXACIN 750 MG: 750 TABLET, FILM COATED ORAL at 14:34

## 2019-11-10 RX ADMIN — SODIUM CHLORIDE, POTASSIUM CHLORIDE, SODIUM LACTATE AND CALCIUM CHLORIDE 1000 ML: 600; 310; 30; 20 INJECTION, SOLUTION INTRAVENOUS at 11:51

## 2019-11-10 RX ADMIN — DEXAMETHASONE SODIUM PHOSPHATE 8 MG: 10 INJECTION, SOLUTION INTRAMUSCULAR; INTRAVENOUS at 11:51

## 2019-11-10 RX ADMIN — IPRATROPIUM BROMIDE AND ALBUTEROL SULFATE 3 AMPULE: .5; 3 SOLUTION RESPIRATORY (INHALATION) at 11:51

## 2019-11-10 RX ADMIN — ONDANSETRON HYDROCHLORIDE 4 MG: 2 INJECTION, SOLUTION INTRAMUSCULAR; INTRAVENOUS at 11:51

## 2019-11-10 ASSESSMENT — ENCOUNTER SYMPTOMS
ABDOMINAL DISTENTION: 1
ABDOMINAL PAIN: 1
COLOR CHANGE: 0
VOMITING: 1
BACK PAIN: 1
NAUSEA: 1
SHORTNESS OF BREATH: 1
DIARRHEA: 0

## 2019-11-10 ASSESSMENT — PAIN DESCRIPTION - LOCATION: LOCATION: BACK

## 2019-11-10 ASSESSMENT — PAIN DESCRIPTION - DESCRIPTORS: DESCRIPTORS: ACHING

## 2019-11-10 ASSESSMENT — PAIN SCALES - GENERAL: PAINLEVEL_OUTOF10: 3

## 2019-11-10 ASSESSMENT — PAIN DESCRIPTION - PAIN TYPE: TYPE: CHRONIC PAIN

## 2019-11-10 ASSESSMENT — PAIN DESCRIPTION - FREQUENCY: FREQUENCY: CONTINUOUS

## 2019-11-11 ENCOUNTER — TELEPHONE (OUTPATIENT)
Dept: PULMONOLOGY | Age: 49
End: 2019-11-11

## 2019-11-11 ENCOUNTER — OFFICE VISIT (OUTPATIENT)
Dept: FAMILY MEDICINE CLINIC | Age: 49
End: 2019-11-11
Payer: COMMERCIAL

## 2019-11-11 VITALS
TEMPERATURE: 98.3 F | SYSTOLIC BLOOD PRESSURE: 118 MMHG | OXYGEN SATURATION: 96 % | DIASTOLIC BLOOD PRESSURE: 88 MMHG | HEART RATE: 120 BPM | BODY MASS INDEX: 35.58 KG/M2 | WEIGHT: 248 LBS

## 2019-11-11 DIAGNOSIS — F33.1 MODERATE EPISODE OF RECURRENT MAJOR DEPRESSIVE DISORDER (HCC): ICD-10-CM

## 2019-11-11 DIAGNOSIS — R11.0 NAUSEA: ICD-10-CM

## 2019-11-11 DIAGNOSIS — R73.9 HYPERGLYCEMIA: ICD-10-CM

## 2019-11-11 DIAGNOSIS — K58.2 IRRITABLE BOWEL SYNDROME WITH BOTH CONSTIPATION AND DIARRHEA: Primary | ICD-10-CM

## 2019-11-11 DIAGNOSIS — I10 ESSENTIAL HYPERTENSION: ICD-10-CM

## 2019-11-11 LAB
EKG ATRIAL RATE: 114 BPM
EKG DIAGNOSIS: NORMAL
EKG P AXIS: 37 DEGREES
EKG P-R INTERVAL: 152 MS
EKG Q-T INTERVAL: 334 MS
EKG QRS DURATION: 100 MS
EKG QTC CALCULATION (BAZETT): 460 MS
EKG R AXIS: -25 DEGREES
EKG T AXIS: 58 DEGREES
EKG VENTRICULAR RATE: 114 BPM
HBA1C MFR BLD: 5.7 %

## 2019-11-11 PROCEDURE — G8428 CUR MEDS NOT DOCUMENT: HCPCS | Performed by: INTERNAL MEDICINE

## 2019-11-11 PROCEDURE — 93010 ELECTROCARDIOGRAM REPORT: CPT | Performed by: INTERNAL MEDICINE

## 2019-11-11 PROCEDURE — 4004F PT TOBACCO SCREEN RCVD TLK: CPT | Performed by: INTERNAL MEDICINE

## 2019-11-11 PROCEDURE — 99213 OFFICE O/P EST LOW 20 MIN: CPT | Performed by: INTERNAL MEDICINE

## 2019-11-11 PROCEDURE — G8417 CALC BMI ABV UP PARAM F/U: HCPCS | Performed by: INTERNAL MEDICINE

## 2019-11-11 PROCEDURE — G8598 ASA/ANTIPLAT THER USED: HCPCS | Performed by: INTERNAL MEDICINE

## 2019-11-11 PROCEDURE — 83036 HEMOGLOBIN GLYCOSYLATED A1C: CPT | Performed by: INTERNAL MEDICINE

## 2019-11-11 PROCEDURE — G8482 FLU IMMUNIZE ORDER/ADMIN: HCPCS | Performed by: INTERNAL MEDICINE

## 2019-11-11 RX ORDER — ONDANSETRON 4 MG/1
4 TABLET, FILM COATED ORAL EVERY 8 HOURS PRN
Qty: 40 TABLET | Refills: 2 | Status: SHIPPED | OUTPATIENT
Start: 2019-11-11 | End: 2020-09-21 | Stop reason: SDUPTHER

## 2019-11-26 ENCOUNTER — TELEPHONE (OUTPATIENT)
Dept: FAMILY MEDICINE CLINIC | Age: 49
End: 2019-11-26

## 2019-11-26 NOTE — TELEPHONE ENCOUNTER
Pt was on Vi's schedule for tomorrow in a Same Day Visit, but,  it was scheduled as a DM / BP check. Nehemiah Murillo, asked me to contact the patient to see if he was truly coming in for a check up or if he was ill. Per pt he stated he would rather not come in at all since he just saw PCP 11/11/19. Pt stated he wanted to let Dr. Matthew Gutierrez know that in the last few nights at rest his O2 levels have been dropping to 89. Wanted to know what Dr. Vance Jump?

## 2019-11-27 ENCOUNTER — TELEPHONE (OUTPATIENT)
Dept: PULMONOLOGY | Age: 49
End: 2019-11-27

## 2019-11-27 DIAGNOSIS — J43.9 PULMONARY EMPHYSEMA, UNSPECIFIED EMPHYSEMA TYPE (HCC): Primary | ICD-10-CM

## 2019-11-27 RX ORDER — PREDNISONE 20 MG/1
20 TABLET ORAL DAILY
Qty: 5 TABLET | Refills: 0 | Status: SHIPPED | OUTPATIENT
Start: 2019-11-27 | End: 2019-12-02

## 2019-11-27 ASSESSMENT — ENCOUNTER SYMPTOMS
BLOOD IN STOOL: 0
DIARRHEA: 1
WHEEZING: 1
NAUSEA: 1
SHORTNESS OF BREATH: 1
RECTAL PAIN: 0
CONSTIPATION: 0
ABDOMINAL PAIN: 1
ABDOMINAL DISTENTION: 0

## 2019-11-27 NOTE — TELEPHONE ENCOUNTER
I spoke to Vasiliy, discussed using his albuterol Q 4-6 hours during the day and before bed, 2 puffs each use. Discussed proper usage. Will begin steroid pack. I gave her Dr. David Hamilton and Dr. Sanjay Chin contact information as he forgot this #. He is most concerned about his O2 dropping low HS- asking about a CPAP. Advised Dr. Yarelis Duncan for this consult. He will get in to see them ASAP and call us Monday with an update. Pt continuing Chantix- strongly advised him to stop smoking.      UMESH Erwin:   [place] [phone]

## 2019-11-27 NOTE — TELEPHONE ENCOUNTER
If he hasn't stopped smoking, he needs to now. Use albuterol inhaler several times daily and at bedtime. I'll send in another steroid pack for him. If he is going to need oxygen, we have to see and document that in the office, or he can follow up with his pulmonary specialist to do this. Needs to see pulmonary specialist next week if he isn't doing any better.

## 2020-01-09 NOTE — DISCHARGE INSTR - DIET
Daily Note /Discharge    Today's date: 2020  Patient name: Terry Randall  : 1971  MRN: [id number]  Referring provider: Terrace Dakins, MD  Dx:   Encounter Diagnosis     ICD-10-CM    1  Radial nerve compression, right G56 31                   Subjective: "It feels really good "      Objective: See treatment diary below      Assessment: Tolerated treatment well  Patient and therapist  Patient tolerated additional UE strengthening exercises to improve functional use and UE strength  Patient has Consistent attended OP OT services since start of care  Patient has attended 5 visits since start of care  Patient last missed visit/visit was on 2020  Patient and therapist discussed discontinued and have agreed on Plan of Care  Patient achieved all goals in plan of care  Patient to be discharged to an independent Home Exercise Program  Thank you for the referral  Please refer to patient's last assessment for most recent objective measurements  Patient given additional silicone gel patch to be worn to decrease scar adhesions         Plan: D/C OT services        Precautions Radial Nerve Release Protocol       Manual  2019   Scar Massage  10 Min                                          Exercise Diary  2019   Radial Nerve Glide  X 10 X 30 X 30 X 30   Wrist Flexor Stretch  30 sec x 3 5 sec x 30 30 sec x 3 30 sec x 3   Wrist Extensor Stretch  30 sec x 3 5 sec x 30 30 sec x 3 30 sec x 3   Wrist Iso  5 sec x 5 5 sec x 15     Supination Iso  5 sec x 5 5 sec x 15     Wrist Flex/Ext   2 x 20 2# 2 x 20 each 3# 2 x 20 each   Sup/pronation   2 x 20 2# 2 x 20 2# 2 x 20   Elbow Flex/Ext Iso   5 sec x 5 5 sec x 10 2# 2 x 20 2# 2 x 20   Theraputty Grasps  Yellow x 20 Yellow x 20 Red x 20 Red x 20   Theraputty Pinches  Yellow x 20 Yellow x 20 Red x 30 Red x 30   Theraputty Finger Abduction  Yellow x 20 Yellow x 20 Red x 10 Red x 10  Good nutrition is important when healing from an illness, injury, or surgery. Follow any nutrition recommendations given to you during your hospital stay.  If you were given an oral nutrition supplement while in the hospital, continue to take this supplement at home. You can take it with meals, in-between meals, and/or before bedtime. These supplements can be purchased at most local grocery stores, pharmacies, and chain super-stores.  If you have any questions about your diet or nutrition, call the hospital and ask for the dietitian. Digi-Flex  Blue 2 x 10 Blue 2 x 20 Black 2 x 20 Black 2 x 20   X-Trainer    Green 2 x 10 Blue 2 x 10   FlexBar pro/sup/flex/ext    Green x 20 each way Green x 20 each way   Gripper Pegs    All in/out hardest gripper level All in/out hardest gripper level   UBE Machine    5 min 5 Min   Wrist Maze    X 2    Tension Pins    Bl,G,B on/off Bl, G, B on/off   Triceps Extension     Green 2 x 10   Power Web Twists     Green 2 x 10       Modalities 12/17/2019 12/26/2019 12/31/19 1/6/20 01/09/2020   Ultrasound  Performed Performed Performed Performed   IFC Radial Tunnel w/ MH  Performed Performed Performed Performed   CP Performed

## 2020-01-20 RX ORDER — ATORVASTATIN CALCIUM 40 MG/1
TABLET, FILM COATED ORAL
Qty: 90 TABLET | Refills: 1 | Status: SHIPPED | OUTPATIENT
Start: 2020-01-20 | End: 2020-08-27 | Stop reason: SDUPTHER

## 2020-01-20 RX ORDER — METOPROLOL SUCCINATE 25 MG/1
TABLET, EXTENDED RELEASE ORAL
Qty: 90 TABLET | Refills: 1 | Status: ON HOLD | OUTPATIENT
Start: 2020-01-20 | End: 2020-08-02 | Stop reason: SDUPTHER

## 2020-01-20 NOTE — TELEPHONE ENCOUNTER
Refill Request     Last Seen: 11/11/2019    Last Written: 7/30/19 with 1 refill    Next Appointment:   Future Appointments   Date Time Provider Alejandra Grubbs   4/8/2020 11:30 AM MD ANTHONY Ramirez     Requested Prescriptions     Pending Prescriptions Disp Refills    metoprolol succinate (TOPROL XL) 25 MG extended release tablet [Pharmacy Med Name: METOPROLOL SUCC ER 25 MG TAB] 30 tablet 0     Sig: TAKE ONE TABLET BY MOUTH DAILY    atorvastatin (LIPITOR) 40 MG tablet [Pharmacy Med Name: ATORVASTATIN 40 MG TABLET] 30 tablet 0     Sig: TAKE ONE TABLET BY MOUTH DAILY

## 2020-02-14 RX ORDER — VARENICLINE TARTRATE 1 MG/1
TABLET, FILM COATED ORAL
Qty: 60 TABLET | Refills: 2 | Status: SHIPPED | OUTPATIENT
Start: 2020-02-14 | End: 2020-08-10 | Stop reason: SDUPTHER

## 2020-03-11 RX ORDER — VALSARTAN 80 MG/1
TABLET ORAL
Qty: 30 TABLET | Refills: 5 | Status: SHIPPED | OUTPATIENT
Start: 2020-03-11 | End: 2020-09-28

## 2020-03-25 ENCOUNTER — TELEPHONE (OUTPATIENT)
Dept: PULMONOLOGY | Age: 50
End: 2020-03-25

## 2020-05-04 ENCOUNTER — OFFICE VISIT (OUTPATIENT)
Dept: FAMILY MEDICINE CLINIC | Age: 50
End: 2020-05-04
Payer: COMMERCIAL

## 2020-05-04 VITALS
TEMPERATURE: 97.2 F | HEART RATE: 138 BPM | RESPIRATION RATE: 16 BRPM | WEIGHT: 260 LBS | SYSTOLIC BLOOD PRESSURE: 132 MMHG | OXYGEN SATURATION: 97 % | DIASTOLIC BLOOD PRESSURE: 88 MMHG | BODY MASS INDEX: 37.22 KG/M2 | HEIGHT: 70 IN

## 2020-05-04 PROCEDURE — 99213 OFFICE O/P EST LOW 20 MIN: CPT | Performed by: PHYSICIAN ASSISTANT

## 2020-05-04 PROCEDURE — 4004F PT TOBACCO SCREEN RCVD TLK: CPT | Performed by: PHYSICIAN ASSISTANT

## 2020-05-04 PROCEDURE — G8417 CALC BMI ABV UP PARAM F/U: HCPCS | Performed by: PHYSICIAN ASSISTANT

## 2020-05-04 PROCEDURE — G8427 DOCREV CUR MEDS BY ELIG CLIN: HCPCS | Performed by: PHYSICIAN ASSISTANT

## 2020-05-04 NOTE — PROGRESS NOTES
Michael Johnson 53 y.o. male    Chief Complaint   Patient presents with    Hernia     onset x 2-3 weeks ago        HPI     The patient is here for evaluation of possible hernia. Symptoms started 2-3 weeks ago. He was lifting logs at a [employer] when he started feeling sharp pain in the right groin. The pain will sometimes radiate up towards his belly button and down into his right anterior thigh. There is no radiation of pain into his right testicle. There is no bulge at the area of pain. He denies having any testicular swelling, change in bowel movements, restriction in gait, or discoloration of the overlying skin. He reports that otc medications are not helping the pain at all. It seems unchanged, possibly worse since it started which is why he decided to come to the office.         Current Outpatient Medications:     DULoxetine (CYMBALTA) 60 MG extended release capsule, TAKE ONE CAPSULE BY MOUTH TWICE A DAY, Disp: 180 capsule, Rfl: 0    valsartan (DIOVAN) 80 MG tablet, TAKE ONE TABLET BY MOUTH DAILY, Disp: 30 tablet, Rfl: 5    pantoprazole (PROTONIX) 40 MG tablet, TAKE ONE TABLET BY MOUTH TWICE A DAY, Disp: 180 tablet, Rfl: 1    cyclobenzaprine (FLEXERIL) 10 MG tablet, TAKE ONE TABLET BY MOUTH EVERY 8 HOURS AS NEEDED FOR MUSCLE SPASMS, Disp: 90 tablet, Rfl: 1    metoprolol succinate (TOPROL XL) 25 MG extended release tablet, TAKE ONE TABLET BY MOUTH DAILY, Disp: 90 tablet, Rfl: 1    atorvastatin (LIPITOR) 40 MG tablet, TAKE ONE TABLET BY MOUTH DAILY, Disp: 90 tablet, Rfl: 1    traZODone (DESYREL) 50 MG tablet, TAKE TWO TABLETS BY MOUTH ONCE NIGHTLY, Disp: 180 tablet, Rfl: 1    hydrochlorothiazide (HYDRODIURIL) 25 MG tablet, Take 1 tablet by mouth daily, Disp: 90 tablet, Rfl: 1    busPIRone (BUSPAR) 10 MG tablet, TAKE TWO TABLETS BY MOUTH TWICE A DAY, Disp: 120 tablet, Rfl: 5    tiotropium (SPIRIVA RESPIMAT) 2.5 MCG/ACT AERS inhaler, Inhale 2 puffs into the lungs daily, Disp: 1 Inhaler, Rfl: 6    albuterol sounds are normal. There is no distension. Palpations: Abdomen is soft. Tenderness: There is no guarding or rebound. Hernia: There is no hernia in the umbilical area, right inguinal area or right femoral area. Genitourinary:     Scrotum/Testes: Normal. Cremasteric reflex is present. Right: Swelling not present. Musculoskeletal:      Right hip: He exhibits tenderness. He exhibits normal range of motion, normal strength and no bony tenderness. Lymphadenopathy:      Lower Body: No right inguinal adenopathy. Neurological:      Mental Status: He is alert. Assessment    1. Pelvic pain        Plan    Barb Willis was seen today for hernia. Diagnoses and all orders for this visit:    Pelvic pain  -     CT PELVIS W CONTRAST; Future  -     No hernia felt on exam. We discussed that it could be a pulled groin muscle but due to worsening symptoms we will obtain imaging to verify there is no abdominal wall hernia. -     Pt to alternate tylenol and ibuprofen for pain.

## 2020-05-05 ASSESSMENT — ENCOUNTER SYMPTOMS
ABDOMINAL DISTENTION: 0
BACK PAIN: 0
ABDOMINAL PAIN: 1
BLOOD IN STOOL: 0
RECTAL PAIN: 0
COLOR CHANGE: 0
DIARRHEA: 0
CONSTIPATION: 0

## 2020-05-11 RX ORDER — CYCLOBENZAPRINE HCL 10 MG
TABLET ORAL
Qty: 90 TABLET | Refills: 0 | Status: SHIPPED | OUTPATIENT
Start: 2020-05-11 | End: 2020-06-08

## 2020-05-11 RX ORDER — BUSPIRONE HYDROCHLORIDE 10 MG/1
TABLET ORAL
Qty: 120 TABLET | Refills: 2 | Status: SHIPPED | OUTPATIENT
Start: 2020-05-11 | End: 2020-08-21

## 2020-05-11 RX ORDER — TIOTROPIUM BROMIDE INHALATION SPRAY 3.12 UG/1
SPRAY, METERED RESPIRATORY (INHALATION)
Qty: 1 INHALER | Refills: 5 | Status: SHIPPED | OUTPATIENT
Start: 2020-05-11 | End: 2020-12-07

## 2020-05-14 ENCOUNTER — TELEPHONE (OUTPATIENT)
Dept: FAMILY MEDICINE CLINIC | Age: 50
End: 2020-05-14

## 2020-05-21 ENCOUNTER — HOSPITAL ENCOUNTER (OUTPATIENT)
Dept: CT IMAGING | Age: 50
Discharge: HOME | End: 2020-05-21
Payer: COMMERCIAL

## 2020-05-21 LAB
GFR AFRICAN AMERICAN: >60
GFR NON-AFRICAN AMERICAN: >60
PERFORMED ON: NORMAL
POC CREATININE: 1 MG/DL (ref 0.9–1.3)
POC SAMPLE TYPE: NORMAL

## 2020-05-21 PROCEDURE — 72193 CT PELVIS W/DYE: CPT

## 2020-05-21 PROCEDURE — 82565 ASSAY OF CREATININE: CPT

## 2020-05-21 PROCEDURE — 6360000004 HC RX CONTRAST MEDICATION: Performed by: PHYSICIAN ASSISTANT

## 2020-05-21 RX ADMIN — IOPAMIDOL 75 ML: 755 INJECTION, SOLUTION INTRAVENOUS at 08:46

## 2020-05-21 RX ADMIN — IOHEXOL 50 ML: 240 INJECTION, SOLUTION INTRATHECAL; INTRAVASCULAR; INTRAVENOUS; ORAL at 08:45

## 2020-06-01 ENCOUNTER — INITIAL CONSULT (OUTPATIENT)
Dept: SURGERY | Age: 50
End: 2020-06-01
Payer: COMMERCIAL

## 2020-06-01 VITALS
WEIGHT: 263.2 LBS | HEIGHT: 70 IN | DIASTOLIC BLOOD PRESSURE: 78 MMHG | BODY MASS INDEX: 37.68 KG/M2 | SYSTOLIC BLOOD PRESSURE: 130 MMHG | TEMPERATURE: 97.8 F

## 2020-06-01 PROCEDURE — G8427 DOCREV CUR MEDS BY ELIG CLIN: HCPCS | Performed by: SURGERY

## 2020-06-01 PROCEDURE — 99242 OFF/OP CONSLTJ NEW/EST SF 20: CPT | Performed by: SURGERY

## 2020-06-01 PROCEDURE — G8417 CALC BMI ABV UP PARAM F/U: HCPCS | Performed by: SURGERY

## 2020-06-08 RX ORDER — CYCLOBENZAPRINE HCL 10 MG
TABLET ORAL
Qty: 90 TABLET | Refills: 0 | Status: SHIPPED | OUTPATIENT
Start: 2020-06-08 | End: 2020-07-22 | Stop reason: SDUPTHER

## 2020-06-15 ENCOUNTER — VIRTUAL VISIT (OUTPATIENT)
Dept: FAMILY MEDICINE CLINIC | Age: 50
End: 2020-06-15
Payer: COMMERCIAL

## 2020-06-15 PROCEDURE — 99213 OFFICE O/P EST LOW 20 MIN: CPT | Performed by: INTERNAL MEDICINE

## 2020-06-15 PROCEDURE — G8428 CUR MEDS NOT DOCUMENT: HCPCS | Performed by: INTERNAL MEDICINE

## 2020-06-15 ASSESSMENT — ENCOUNTER SYMPTOMS
CONSTIPATION: 0
DIARRHEA: 0
BACK PAIN: 1
BLOOD IN STOOL: 0

## 2020-06-15 NOTE — PROGRESS NOTES
pain with right-sided sciatica  -     Elizabeth Tang MD, Spine Surgery, [facility]  Chronic, worsening, refer        No follow-ups on file. Tena [place] Fernando Bernheim is a 53 y.o. male being evaluated by a Virtual Visit (video visit) encounter to address concerns as mentioned above. A caregiver was present when appropriate. Due to this being a TeleHealth encounter (During YRQZX-53 public health emergency), evaluation of the following organ systems was limited: Vitals/Constitutional/EENT/Resp/CV/GI//MS/Neuro/Skin/Heme-Lymph-Imm. Pursuant to the emergency declaration under the [address] [place] [address] authority and the [employer] Act, this Virtual Visit was conducted with patient's (and/or legal guardian's) consent, to reduce the patient's risk of exposure to COVID-19 and provide necessary medical care. The patient (and/or legal guardian) has also been advised to contact this office for worsening conditions or problems, and seek emergency medical treatment and/or call 911 if deemed necessary. Patient identification was verified at the start of the visit: Yes    Total time spent for this encounter: Not billed by time    Services were provided through a video synchronous discussion virtually to substitute for in-person clinic visit. Patient and provider were located at their individual homes. --Key Apple MD on 6/15/2020 at 2:41 PM    An electronic signature was used to authenticate this note. An electronic signature was used to authenticate this note.     --Key Apple MD on 6/15/2020 at 2:29 PM

## 2020-06-16 ENCOUNTER — TELEPHONE (OUTPATIENT)
Dept: ORTHOPEDIC SURGERY | Age: 50
End: 2020-06-16

## 2020-06-16 NOTE — TELEPHONE ENCOUNTER
Patient was referred to Dr. Estrella Restrepo for  Back   MRI no LSP/ YES CT PELVIS  PT no  MEDICAL HX positive medication hx  Patient is DENIED to be seen at this time. Message sent to schedulers   Please notify referring physician OF denied.

## 2020-06-17 ENCOUNTER — TELEPHONE (OUTPATIENT)
Dept: FAMILY MEDICINE CLINIC | Age: 50
End: 2020-06-17

## 2020-06-17 NOTE — TELEPHONE ENCOUNTER
Marciano Nunez called today to let you know that [facility] review new pt files before scheduling the apts and Dr. An Rao doesn't think that she would be a good fit / couldn't help pt, and if pt still needs a referral for his back pain to referral him to someone else.

## 2020-06-30 NOTE — PROGRESS NOTES
hours as needed for Nausea or Vomiting 40 tablet 2    albuterol sulfate HFA (VENTOLIN HFA) 108 (90 Base) MCG/ACT inhaler Inhale 2 puffs into the lungs every 6 hours as needed for Wheezing or Shortness of Breath 1 Inhaler 6    chlordiazePOXIDE-clidinium (LIBRAX) 5-2.5 MG per capsule Take 1 capsule by mouth 2 times daily as needed (stomach cramps). 60 capsule 2    dicyclomine (BENTYL) 10 MG capsule Take 1 capsule by mouth every 6 hours as needed (cramps) 180 capsule 1    sildenafil (REVATIO) 20 MG tablet Take 3 tablets by mouth daily as needed (ED) 30 tablet 5    diphenhydrAMINE (BENADRYL) 25 MG tablet Take 25 mg by mouth every 6 hours as needed for Itching      aspirin 81 MG tablet Take 81 mg by mouth daily      CHANTIX 1 MG tablet TAKE ONE TABLET BY MOUTH TWICE A DAY (Patient not taking: Reported on 6/1/2020) 60 tablet 2     No current facility-administered medications on file prior to visit.         Allergies   Allergen Reactions    Lexapro [Escitalopram Oxalate] Other (See Comments)     Crazy dream hallucinations      Morphine      Makes him angry    Prozac [Fluoxetine Hcl]      Irritable        Wellbutrin [Bupropion] Other (See Comments)     Crazy dream halluciantions       Social History     Socioeconomic History    Marital status:      Spouse name: Not on file    Number of children: Not on file    Years of education: Not on file    Highest education level: Not on file   Occupational History    Not on file   Social Needs    Financial resource strain: Not on file    Food insecurity     Worry: Not on file     Inability: Not on file    Transportation needs     Medical: Not on file     Non-medical: Not on file   Tobacco Use    Smoking status: Current Every Day Smoker     Packs/day: 0.50     Years: 33.00     Pack years: 16.50     Types: Cigarettes    Smokeless tobacco: Former User     Quit date: 11/1/2015    Tobacco comment: less than a ppd- 8/27/19   Substance and Sexual Activity   

## 2020-07-20 ENCOUNTER — TELEPHONE (OUTPATIENT)
Dept: PULMONOLOGY | Age: 50
End: 2020-07-20

## 2020-07-20 NOTE — TELEPHONE ENCOUNTER
Patient did not show for 2 month follow-up appointment  with  on 7/20/20    l[place]m to change [place] [facility] [place]  lm to change to vv 7/17/20       Patient was also no show on: 2/11/19    LOV   Assessment: 9/24/19      · Pulmonary emphysema with acute exacerbation  · Abnormal CT chest 9/24/2019- favor dependent atelectasis  · Pulmonary nodules on CT chest 11/2/2017, largest 6.6 mm. DDx granuloma versus intrapulmonary lymph node versus malignancy.   Stable/imprtoving on repeat CT chest  · Recurrent PTX post VATS 1995   · >33 pack year smoking   · UDS positive for Benzodiazepine and Marijuana- buy xanax off the street he said    · On Suboxon       Plan:       · Prednisone taper   · Z-Balta   · Trial of Spiriva Respimat: Two inhalations (5 mcg) once daily (maximum: 2 inhalations per 24 hours)  · Albuterol 2 puffs Q4-6 hrs PRN  · Smoking cessation counseling

## 2020-07-22 RX ORDER — CYCLOBENZAPRINE HCL 10 MG
TABLET ORAL
Qty: 90 TABLET | Refills: 2 | Status: SHIPPED | OUTPATIENT
Start: 2020-07-22 | End: 2020-09-21

## 2020-07-22 NOTE — TELEPHONE ENCOUNTER
Last office visit 5/4/2020     Last written     Next office visit scheduled 9/15/2020    Requested Prescriptions     Pending Prescriptions Disp Refills    cyclobenzaprine (FLEXERIL) 10 MG tablet 90 tablet 0     Sig: Take One Tablet Every 8 Hours As Needed For Muscle Spasms

## 2020-07-31 ENCOUNTER — APPOINTMENT (OUTPATIENT)
Dept: GENERAL RADIOLOGY | Age: 50
End: 2020-07-31
Payer: COMMERCIAL

## 2020-07-31 ENCOUNTER — HOSPITAL ENCOUNTER (EMERGENCY)
Age: 50
Discharge: HOME | End: 2020-07-31
Attending: EMERGENCY MEDICINE
Payer: COMMERCIAL

## 2020-07-31 VITALS
WEIGHT: 260 LBS | OXYGEN SATURATION: 93 % | RESPIRATION RATE: 18 BRPM | BODY MASS INDEX: 37.22 KG/M2 | TEMPERATURE: 98.5 F | SYSTOLIC BLOOD PRESSURE: 132 MMHG | DIASTOLIC BLOOD PRESSURE: 83 MMHG | HEIGHT: 70 IN | HEART RATE: 114 BPM

## 2020-07-31 LAB
A/G RATIO: 1.3 (ref 1.1–2.2)
ACETAMINOPHEN LEVEL: <5 UG/ML (ref 10–30)
ALBUMIN SERPL-MCNC: 4.1 G/DL (ref 3.4–5)
ALP BLD-CCNC: 96 U/L (ref 40–129)
ALT SERPL-CCNC: 50 U/L (ref 10–40)
AMPHETAMINE SCREEN, URINE: ABNORMAL
ANION GAP SERPL CALCULATED.3IONS-SCNC: 9 MMOL/L (ref 3–16)
AST SERPL-CCNC: 61 U/L (ref 15–37)
BARBITURATE SCREEN URINE: ABNORMAL
BASOPHILS ABSOLUTE: 0 K/UL (ref 0–0.2)
BASOPHILS RELATIVE PERCENT: 0.5 %
BENZODIAZEPINE SCREEN, URINE: POSITIVE
BILIRUB SERPL-MCNC: 0.4 MG/DL (ref 0–1)
BILIRUBIN URINE: NEGATIVE
BLOOD, URINE: NEGATIVE
BUN BLDV-MCNC: 24 MG/DL (ref 7–20)
CALCIUM SERPL-MCNC: 8.7 MG/DL (ref 8.3–10.6)
CANNABINOID SCREEN URINE: POSITIVE
CHLORIDE BLD-SCNC: 102 MMOL/L (ref 99–110)
CLARITY: CLEAR
CO2: 26 MMOL/L (ref 21–32)
COCAINE METABOLITE SCREEN URINE: ABNORMAL
COLOR: YELLOW
CREAT SERPL-MCNC: 1 MG/DL (ref 0.9–1.3)
D DIMER: <200 NG/ML DDU (ref 0–229)
EKG ATRIAL RATE: 103 BPM
EKG DIAGNOSIS: NORMAL
EKG P AXIS: 27 DEGREES
EKG P-R INTERVAL: 146 MS
EKG Q-T INTERVAL: 370 MS
EKG QRS DURATION: 100 MS
EKG QTC CALCULATION (BAZETT): 484 MS
EKG R AXIS: -3 DEGREES
EKG T AXIS: 59 DEGREES
EKG VENTRICULAR RATE: 103 BPM
EOSINOPHILS ABSOLUTE: 0.2 K/UL (ref 0–0.6)
EOSINOPHILS RELATIVE PERCENT: 1.8 %
ETHANOL: NORMAL MG/DL (ref 0–0.08)
GFR AFRICAN AMERICAN: >60
GFR NON-AFRICAN AMERICAN: >60
GLOBULIN: 3.1 G/DL
GLUCOSE BLD-MCNC: 89 MG/DL (ref 70–99)
GLUCOSE BLD-MCNC: 90 MG/DL (ref 70–99)
GLUCOSE URINE: NEGATIVE MG/DL
HCT VFR BLD CALC: 39.5 % (ref 40.5–52.5)
HEMOGLOBIN: 13.3 G/DL (ref 13.5–17.5)
KETONES, URINE: ABNORMAL MG/DL
LACTIC ACID: 1 MMOL/L (ref 0.4–2)
LEUKOCYTE ESTERASE, URINE: NEGATIVE
LYMPHOCYTES ABSOLUTE: 2.1 K/UL (ref 1–5.1)
LYMPHOCYTES RELATIVE PERCENT: 22 %
Lab: ABNORMAL
MCH RBC QN AUTO: 31.8 PG (ref 26–34)
MCHC RBC AUTO-ENTMCNC: 33.6 G/DL (ref 31–36)
MCV RBC AUTO: 94.7 FL (ref 80–100)
METHADONE SCREEN, URINE: POSITIVE
MICROSCOPIC EXAMINATION: YES
MONOCYTES ABSOLUTE: 1 K/UL (ref 0–1.3)
MONOCYTES RELATIVE PERCENT: 10.8 %
NEUTROPHILS ABSOLUTE: 6.2 K/UL (ref 1.7–7.7)
NEUTROPHILS RELATIVE PERCENT: 64.9 %
NITRITE, URINE: NEGATIVE
OPIATE SCREEN URINE: ABNORMAL
OXYCODONE URINE: ABNORMAL
PDW BLD-RTO: 13.4 % (ref 12.4–15.4)
PERFORMED ON: NORMAL
PH UA: 6
PH UA: 6 (ref 5–8)
PHENCYCLIDINE SCREEN URINE: ABNORMAL
PLATELET # BLD: 244 K/UL (ref 135–450)
PMV BLD AUTO: 8.6 FL (ref 5–10.5)
POTASSIUM REFLEX MAGNESIUM: 3.8 MMOL/L (ref 3.5–5.1)
PROPOXYPHENE SCREEN: ABNORMAL
PROTEIN UA: ABNORMAL MG/DL
RBC # BLD: 4.18 M/UL (ref 4.2–5.9)
RBC UA: NORMAL /HPF (ref 0–4)
SALICYLATE, SERUM: 6.3 MG/DL (ref 15–30)
SARS-COV-2, NAAT: NOT DETECTED
SODIUM BLD-SCNC: 137 MMOL/L (ref 136–145)
SPECIFIC GRAVITY UA: >=1.03 (ref 1–1.03)
TOTAL PROTEIN: 7.2 G/DL (ref 6.4–8.2)
TROPONIN: <0.01 NG/ML
URINE REFLEX TO CULTURE: ABNORMAL
URINE TYPE: ABNORMAL
UROBILINOGEN, URINE: 0.2 E.U./DL
WBC # BLD: 9.5 K/UL (ref 4–11)
WBC UA: NORMAL /HPF (ref 0–5)

## 2020-07-31 PROCEDURE — 93010 ELECTROCARDIOGRAM REPORT: CPT | Performed by: INTERNAL MEDICINE

## 2020-07-31 PROCEDURE — 81001 URINALYSIS AUTO W/SCOPE: CPT

## 2020-07-31 PROCEDURE — 99285 EMERGENCY DEPT VISIT HI MDM: CPT

## 2020-07-31 PROCEDURE — 83605 ASSAY OF LACTIC ACID: CPT

## 2020-07-31 PROCEDURE — G0480 DRUG TEST DEF 1-7 CLASSES: HCPCS

## 2020-07-31 PROCEDURE — 80053 COMPREHEN METABOLIC PANEL: CPT

## 2020-07-31 PROCEDURE — 2580000003 HC RX 258: Performed by: PHYSICIAN ASSISTANT

## 2020-07-31 PROCEDURE — 80307 DRUG TEST PRSMV CHEM ANLYZR: CPT

## 2020-07-31 PROCEDURE — 84484 ASSAY OF TROPONIN QUANT: CPT

## 2020-07-31 PROCEDURE — 85025 COMPLETE CBC W/AUTO DIFF WBC: CPT

## 2020-07-31 PROCEDURE — 93005 ELECTROCARDIOGRAM TRACING: CPT | Performed by: PHYSICIAN ASSISTANT

## 2020-07-31 PROCEDURE — 85379 FIBRIN DEGRADATION QUANT: CPT

## 2020-07-31 PROCEDURE — 71046 X-RAY EXAM CHEST 2 VIEWS: CPT

## 2020-07-31 PROCEDURE — U0002 COVID-19 LAB TEST NON-CDC: HCPCS

## 2020-07-31 RX ORDER — 0.9 % SODIUM CHLORIDE 0.9 %
1000 INTRAVENOUS SOLUTION INTRAVENOUS ONCE
Status: COMPLETED | OUTPATIENT
Start: 2020-07-31 | End: 2020-07-31

## 2020-07-31 RX ADMIN — SODIUM CHLORIDE 1000 ML: 9 INJECTION, SOLUTION INTRAVENOUS at 15:11

## 2020-07-31 ASSESSMENT — PAIN DESCRIPTION - PAIN TYPE: TYPE: CHRONIC PAIN

## 2020-07-31 ASSESSMENT — PAIN DESCRIPTION - LOCATION: LOCATION: BACK

## 2020-07-31 ASSESSMENT — PAIN SCALES - GENERAL: PAINLEVEL_OUTOF10: 4

## 2020-07-31 NOTE — ED PROVIDER NOTES
I independently performed a history and physical on [address]. All diagnostic, treatment, and disposition decisions were made by myself in conjunction with the advanced practice provider. For further details of [facility] emergency department encounter, please see Ashlie Thompson PA-C's documentation. Patient states he is not confused at home but is here because his family was concerned. He is alert and oriented x3 with me in the room. He states he had a cough and sore throat for several weeks and is concerned he might have COVID. No known exposure. On exam he is breathing easily and lungs clear to auscultation. In my opinion patient shows no evidence of any delirium or mental status change. We will send a COVID test.  Advised patient to return for new or worsening symptoms. EKG  The Ekg interpreted by me shows  sinus tachycardia, [id number]   Axis is   Normal  QTc is  normal  Intervals and Durations are unremarkable. ST Segments: no acute change  No significant change from prior EKG dated 7 aug 2019    Xr Chest (2 Vw)  No radiographic evidence of acute pulmonary disease.      Results for orders placed or performed during the hospital encounter of 07/31/20   CBC Auto Differential   Result Value Ref Range    WBC 9.5 4.0 - 11.0 K/uL    RBC 4.18 (L) 4.20 - 5.90 M/uL    Hemoglobin 13.3 (L) 13.5 - 17.5 g/dL    Hematocrit 39.5 (L) 40.5 - 52.5 %    MCV 94.7 80.0 - 100.0 fL    MCH 31.8 26.0 - 34.0 pg    MCHC 33.6 31.0 - 36.0 g/dL    RDW 13.4 12.4 - 15.4 %    Platelets 496 908 - 095 K/uL    MPV 8.6 5.0 - 10.5 fL    Neutrophils % 64.9 %    Lymphocytes % 22.0 %    Monocytes % 10.8 %    Eosinophils % 1.8 %    Basophils % 0.5 %    Neutrophils Absolute 6.2 1.7 - 7.7 K/uL    Lymphocytes Absolute 2.1 1.0 - 5.1 K/uL    Monocytes Absolute 1.0 0.0 - 1.3 K/uL    Eosinophils Absolute 0.2 0.0 - 0.6 K/uL    Basophils Absolute 0.0 0.0 - 0.2 K/uL   Comprehensive Metabolic Panel w/ Reflex to MG   Result Value Ref Range    Sodium 137 136 - 145 mmol/L    Potassium reflex Magnesium 3.8 3.5 - 5.1 mmol/L    Chloride 102 99 - 110 mmol/L    CO2 26 21 - 32 mmol/L    Anion Gap 9 3 - 16    Glucose 90 70 - 99 mg/dL    BUN 24 (H) 7 - 20 mg/dL    CREATININE 1.0 0.9 - 1.3 mg/dL    GFR Non-African American >60 >60    GFR African American >60 >60    Calcium 8.7 8.3 - 10.6 mg/dL    Total Protein 7.2 6.4 - 8.2 g/dL    Alb 4.1 3.4 - 5.0 g/dL    Albumin/Globulin Ratio 1.3 1.1 - 2.2    Total Bilirubin 0.4 0.0 - 1.0 mg/dL    Alkaline Phosphatase 96 40 - 129 U/L    ALT 50 (H) 10 - 40 U/L    AST 61 (H) 15 - 37 U/L    Globulin 3.1 g/dL   Troponin   Result Value Ref Range    Troponin <0.01 <0.01 ng/mL   D-Dimer, Quantitative   Result Value Ref Range    D-Dimer, Quant <200 0 - 229 ng/mL DDU   Urinalysis Reflex to Culture    Specimen: Urine, clean catch   Result Value Ref Range    Color, UA Yellow Straw/Yellow    Clarity, UA Clear Clear    Glucose, Ur Negative Negative mg/dL    Bilirubin Urine Negative Negative    Ketones, Urine TRACE (A) Negative mg/dL    Specific Gravity, UA >=1.030 1.005 - 1.030    Blood, Urine Negative Negative    pH, UA 6.0 5.0 - 8.0    Protein, UA TRACE (A) Negative mg/dL    Urobilinogen, Urine 0.2 <2.0 E.U./dL    Nitrite, Urine Negative Negative    Leukocyte Esterase, Urine Negative Negative    Microscopic Examination YES     Urine Type NotGiven     Urine Reflex to Culture Not Indicated    Lactic Acid, Plasma   Result Value Ref Range    Lactic Acid 1.0 0.4 - 2.0 mmol/L   Drug screen multi urine   Result Value Ref Range    pH, UA 6.0     Drug Screen Comment: see below    Ethanol   Result Value Ref Range    Ethanol Lvl None Detected mg/dL   Acetaminophen level   Result Value Ref Range    Acetaminophen Level <5 (L) 10 - 30 ug/mL   Salicylate   Result Value Ref Range    Salicylate, Serum 6.3 (L) 15.0 - 30.0 mg/dL   Microscopic Urinalysis   Result Value Ref Range    WBC, UA None seen 0 - 5 /HPF    RBC, UA None seen 0 - 4 /HPF   POCT Glucose   Result Value Ref Range    POC Glucose 89 70 - 99 mg/dl    Performed on Meche Ortega MD  07/31/20 9124

## 2020-07-31 NOTE — ED PROVIDER NOTES
[address] ED  EMERGENCY DEPARTMENT ENCOUNTER        Pt Name: Mikey Schuster  MRN: [id number]  [place] 1970  Date of evaluation: 7/31/2020  Provider: AVE Gurrola  PCP: Placido Gray MD     I have seen and evaluated this patient with my supervising physician Dr. Ty Cameron. CHIEF COMPLAINT       Chief Complaint   Patient presents with    Altered Mental Status     3-4 days confusion       HISTORY OF PRESENT ILLNESS   (Location, Timing/Onset, Context/Setting, Quality, Duration, Modifying Factors, Severity, Associated Signs and Symptoms)  Note limiting factors. Mikey Schuster is a 53 y.o. male who presents the emergency department via EMS with concern for confusion. Per nursing, EMS reported that they received a call from patient's mother stating that he appeared to be confused at home. 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 He states he takes Xanax to cope with this, took this medication this morning. He is not prescribed this medication and obtained on the streets. States had episode of vomiting 3 weeks ago, has had none since. Initially declined that he had a cough and stated that he had measured temperatures of 99 degrees at home, prior to discharge he states that he has had one measured fever of 101 °F and has been coughing at home. Denies chest pain, shortness of breath, abdominal pain, headache, neck pain or stiffness, numbness, weakness, capsular pain, hemoptysis. Nursing Notes were all reviewed and agreed with or any disagreements were addressed in the HPI. REVIEW OF SYSTEMS    (2-9 systems for level 4, 10 or more for level 5)     Review of Systems    Positives and Pertinent negatives as per HPI. Except as noted above in the ROS, all other systems were reviewed and negative.        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    Chronic back pain     ED (erectile dysfunction) 12/29/2011    GERD (gastroesophageal reflux disease)     Hypertension     Pleurisy          SURGICAL HISTORY     Past Surgical History:   Procedure Laterality Date    ARM SURGERY Left 12/10/2015    BACK SURGERY      COLONOSCOPY      LUNG SURGERY      partial removal (right) agent orange    SPINE SURGERY  1995    lumbar laminectomy         CURRENTMEDICATIONS       Discharge Medication List as of 7/31/2020  4:40 PM      CONTINUE these medications which have NOT CHANGED    Details   hydroCHLOROthiazide (HYDRODIURIL) 25 MG tablet TAKE ONE TABLET BY MOUTH DAILY, Disp-30 tablet,R-2Normal      DULoxetine (CYMBALTA) 60 MG extended release capsule TAKE ONE CAPSULE BY MOUTH TWICE A DAY, Disp-60 capsule,R-5Normal      traZODone (DESYREL) 50 MG tablet TAKE TWO TABLETS BY MOUTH ONCE NIGHTLY, Disp-60 tablet,R-2Normal      cyclobenzaprine (FLEXERIL) 10 MG tablet Take One Tablet Every 8 Hours As Needed For Muscle Spasms, Disp-90 tablet,R-2Normal      busPIRone (BUSPAR) 10 MG tablet TAKE TWO TABLETS BY MOUTH TWICE A DAY, Disp-120 tablet, R-2Normal      SPIRIVA RESPIMAT 2.5 MCG/ACT AERS inhaler INHALE TWO PUFFS BY MOUTH DAILY, Disp-1 Inhaler, R-5Normal      valsartan (DIOVAN) 80 MG tablet TAKE ONE TABLET BY MOUTH DAILY, Disp-30 tablet, R-5Normal      pantoprazole (PROTONIX) 40 MG tablet TAKE ONE TABLET BY MOUTH TWICE A DAY, Disp-180 tablet, R-1Normal      CHANTIX 1 MG tablet TAKE ONE TABLET BY MOUTH TWICE A DAY, Disp-60 tablet, R-2Normal      metoprolol succinate (TOPROL XL) 25 MG extended release tablet TAKE ONE TABLET BY MOUTH DAILY, Disp-90 tablet, R-1Normal      atorvastatin (LIPITOR) 40 MG tablet TAKE ONE TABLET BY MOUTH DAILY, Disp-90 tablet, R-1Normal      ondansetron (ZOFRAN) 4 MG tablet Take 1 tablet by mouth every 8 hours as needed for Nausea or Vomiting, Disp-40 tablet, R-2Normal      albuterol sulfate HFA (VENTOLIN HFA) 108 (90 Base) MCG/ACT inhaler Inhale 2 puffs into the lungs every 6 hours as needed for Wheezing or Shortness of Breath, Disp-1 Inhaler, R-6Normal      chlordiazePOXIDE-clidinium (LIBRAX) 5-2.5 MG per capsule Take 1 capsule by mouth 2 times daily as needed (stomach cramps). , Disp-60 capsule, R-2Normal      dicyclomine (BENTYL) 10 MG capsule Take 1 capsule by mouth every 6 hours as needed (cramps), Disp-180 capsule, R-1Normal      sildenafil (REVATIO) 20 MG tablet Take 3 tablets by mouth daily as needed (ED), Disp-30 tablet, R-5Patient is aware this will be cash pay. No PA is needed. Normal      diphenhydrAMINE (BENADRYL) 25 MG tablet Take 25 mg by mouth every 6 hours as needed for ItchingHistorical Med      aspirin 81 MG tablet Take 81 mg by mouth dailyHistorical Med               ALLERGIES     Lexapro [escitalopram oxalate];  Morphine; Prozac [fluoxetine hcl]; and Wellbutrin [bupropion]    FAMILYHISTORY       Family History   Problem Relation Age of Onset    Arthritis Mother     Other Mother         pneumothorax several times    Asthma Father           SOCIAL HISTORY       Social History     Tobacco Use    Smoking status: Current Every Day Smoker     Packs/day: 1.00     Years: 33.00     Pack years: 33.00     Types: Cigarettes    Smokeless tobacco: Former User     Quit date: 11/1/2015   Substance Use Topics    Alcohol use: Yes     Comment: socially     Drug use: Yes     Types: Marijuana, Other-see comments, Opiates      Comment: no opiates does take xanx       SCREENINGS             PHYSICAL EXAM    (up to 7 for level 4, 8 or more for level 5)     ED Triage Vitals   BP Temp Temp Source Pulse Resp SpO2 Height Weight   07/31/20 1415 07/31/20 1408 07/31/20 1408 07/31/20 1408 07/31/20 1408 07/31/20 1408 07/31/20 1408 07/31/20 1408   (!) 128/104 98.5 °F (36.9 °C) Oral 105 20 94 % 5' 10\" (1.778 m) 260 lb (117.9 kg) Physical Exam  Nursing note reviewed. Constitutional:       Appearance: He is not diaphoretic. HENT:      Head: Normocephalic and atraumatic. Nose: No congestion or rhinorrhea. Mouth/Throat:      Mouth: Mucous membranes are moist.      Pharynx: Oropharynx is clear. No oropharyngeal exudate or posterior oropharyngeal erythema. Eyes:      General: No scleral icterus. Extraocular Movements: Extraocular movements intact. Conjunctiva/sclera: Conjunctivae normal.      Pupils: Pupils are equal, round, and reactive to light. Neck:      Musculoskeletal: Normal range of motion and neck supple. No neck rigidity or muscular tenderness. Cardiovascular:      Rate and Rhythm: Regular rhythm. Tachycardia present. Pulses: Normal pulses. Heart sounds: Normal heart sounds. No murmur. No friction rub. No gallop. Pulmonary:      Effort: Pulmonary effort is normal. No respiratory distress. Breath sounds: Normal breath sounds. No stridor. No wheezing, rhonchi or rales. Abdominal:      General: There is no distension. Palpations: Abdomen is soft. Tenderness: There is no abdominal tenderness. There is no right CVA tenderness, left CVA tenderness, guarding or rebound. Hernia: No hernia is present. Musculoskeletal: Normal range of motion. Comments: No calf swelling or tenderness to palpation. Skin:     General: Skin is warm and dry. Capillary Refill: Capillary refill takes less than 2 seconds. Neurological:      General: No focal deficit present. Mental Status: He is alert and oriented to person, place, and time. Cranial Nerves: No cranial nerve deficit. Sensory: No sensory deficit. Motor: No weakness. Coordination: Coordination normal.      Gait: Gait normal.   Psychiatric:      Comments: Patient has a flat mood and affect. He is very evasive with questioning, often changing his history.   Poor eye contact throughout exam.  Does not normal limits    Narrative:     Performed at:  [facility],  [place], [facility]   Phone [phone]   TROPONIN    Narrative:     Performed at:  [facility],  [place], [facility]   Phone [phone]   D-DIMER, QUANTITATIVE    Narrative:     Performed at:  [facility],  [place], [facility]   Phone [phone]   LACTIC ACID, PLASMA    Narrative:     Performed at:  [facility] [address],  [place], [facility]   Phone [phone]   ETHANOL    Narrative:     Performed at:  [place] [address],  [place], [facility]   Phone [phone]   MICROSCOPIC URINALYSIS    Narrative:     Performed at:  [facility] [address],  [place], [facility]   Phone [phone]    Narrative:     Performed at:  [place] [address],  [place], [facility]   Phone [phone]   POCT GLUCOSE    Narrative:     Performed at:  [facility],  [place], [facility]   Phone [phone]       All other labs were within normal range or not returned as of this dictation. EKG: All EKG's are interpreted by the Emergency Department Physician in the absence of a cardiologist.  Please see their note for interpretation of EKG. RADIOLOGY:   Non-plain film images such as CT, Ultrasound and MRI are read by the radiologist. Plain radiographic images are visualized and preliminarily interpreted by the ED Provider with the below findings:        Interpretation per the Radiologist below, if available at the time of this note:    XR CHEST (2 VW)   Final Result   No radiographic evidence of acute pulmonary disease.            Xr Chest (2 Vw)    Result Date: 7/31/2020  EXAMINATION: TWO XRAY VIEWS OF THE CHEST 7/31/2020 3:57 pm COMPARISON: Chest x-ray dated 11/10/2019. HISTORY: ORDERING SYSTEM PROVIDED HISTORY: Tachycardia TECHNOLOGIST PROVIDED HISTORY: Reason for exam:->Tachycardia Reason for Exam: Tachycardia Acuity: Acute Type of Exam: Initial FINDINGS: HEART/MEDIASTINUM: The cardiomediastinal silhouette is within normal limits. PLEURA/LUNGS: There are no focal consolidations or pleural effusions. There is no appreciable pneumothorax. BONES/SOFT TISSUE: No acute abnormality. No radiographic evidence of acute pulmonary disease. PROCEDURES   Unless otherwise noted below, none     Procedures    CRITICAL CARE TIME   N/A    CONSULTS:  None      EMERGENCY DEPARTMENT COURSE and DIFFERENTIAL DIAGNOSIS/MDM:   Vitals:    Vitals:    07/31/20 1535 07/31/20 1601 07/31/20 1647 07/31/20 1649   BP:    132/83   Pulse: 99 109 114    Resp: 19 24 18    Temp:       TempSrc:       SpO2: 92% 95% 93%    Weight:       Height:           Patient was given the following medications:  Medications   0.9 % sodium chloride bolus (0 mLs Intravenous Stopped 7/31/20 1631)           69-year-old male presents emergency department via EMS after being called with concern for confusion. Patient denies having any confusion. Vital signs notable for mild tachycardia. Extensive septic, cardiac, PE work-up initiated. Nonischemic EKG with negative troponin, low concern for ACS. He does not have a leukocytosis and has a normal lactate, low concern for severe hypoperfusion from sepsis. Does not show signs of urinary tract infection. He does not have fever in the emergency room, meningismus or neurological deficit concerning for meningitis. Not peritoneal abdominal exam.  Clear chest x-ray. No identifiable infectious source. Negative d-dimer, no further PE work-up indicated. Patient does endorse taking Xanax, I believe this can adequately explain why his family believed he was confused.   Low suspicion for emergent etiology at this time. Patient had declined cough and fever to me, though endorses to my supervising physician. COVID-19 swab ordered. He has a clear chest x-ray and no hypoxia, does not warrant admission for COVID-19 rule out. Patient is ambulating freely throughout department drinking a soda. Prefer discharge home with outpatient follow-up. Instructed to follow-up with primary care physician on Monday. Instructed to return the emergency department for new or worsening symptoms including but not limited to fever, chest pain, shortness of breath, abdominal pain, severe nausea or vomiting, numbness, weakness, headache, neck pain or stiffness, any other symptoms he is concerned about. Verbal and written discharge instructions and return precautions given. FINAL IMPRESSION      1.  Polysubstance abuse [facility])          DISPOSITION/PLAN   DISPOSITION Decision To Discharge 07/31/2020 04:34:32 PM      PATIENT REFERREDTO:  Berny Huston MD  [address] [place] [address],[address] [place] [address]  [phone]    On 8/3/2020  Follow-up with your primary care provider on Monday      DISCHARGE MEDICATIONS:  Discharge Medication List as of 7/31/2020  4:40 PM          DISCONTINUED MEDICATIONS:  Discharge Medication List as of 7/31/2020  4:40 PM                 (Please note that portions of this note were completed with a voice recognition program.  Efforts were made to edit the dictations but occasionally words are mis-transcribed.)    AVE Krause (electronically signed)         AVE Krause  07/31/20 2355

## 2020-07-31 NOTE — ED PROVIDER NOTES
EKG: Sinus tachycardia rate of 103 bpm.  No ST elevation.   When compared to prior from 8/7/2019 no change     Taylor Gonsalves MD  07/31/20 1909

## 2020-08-01 ENCOUNTER — HOSPITAL ENCOUNTER (OUTPATIENT)
Age: 50
Setting detail: OBSERVATION
Discharge: HOME | End: 2020-08-02
Attending: EMERGENCY MEDICINE | Admitting: INTERNAL MEDICINE
Payer: COMMERCIAL

## 2020-08-01 ENCOUNTER — APPOINTMENT (OUTPATIENT)
Dept: CT IMAGING | Age: 50
End: 2020-08-01
Payer: COMMERCIAL

## 2020-08-01 ENCOUNTER — APPOINTMENT (OUTPATIENT)
Dept: GENERAL RADIOLOGY | Age: 50
End: 2020-08-01
Payer: COMMERCIAL

## 2020-08-01 PROBLEM — R09.02 HYPOXIA: Status: ACTIVE | Noted: 2020-08-01

## 2020-08-01 LAB
A/G RATIO: 1.3 (ref 1.1–2.2)
ALBUMIN SERPL-MCNC: 3.9 G/DL (ref 3.4–5)
ALP BLD-CCNC: 102 U/L (ref 40–129)
ALT SERPL-CCNC: 54 U/L (ref 10–40)
ANION GAP SERPL CALCULATED.3IONS-SCNC: 11 MMOL/L (ref 3–16)
AST SERPL-CCNC: 78 U/L (ref 15–37)
BASOPHILS ABSOLUTE: 0 K/UL (ref 0–0.2)
BASOPHILS RELATIVE PERCENT: 0.4 %
BILIRUB SERPL-MCNC: 0.6 MG/DL (ref 0–1)
BUN BLDV-MCNC: 18 MG/DL (ref 7–20)
CALCIUM SERPL-MCNC: 8.7 MG/DL (ref 8.3–10.6)
CHLORIDE BLD-SCNC: 100 MMOL/L (ref 99–110)
CO2: 26 MMOL/L (ref 21–32)
CREAT SERPL-MCNC: 0.9 MG/DL (ref 0.9–1.3)
EOSINOPHILS ABSOLUTE: 0.2 K/UL (ref 0–0.6)
EOSINOPHILS RELATIVE PERCENT: 2 %
GFR AFRICAN AMERICAN: >60
GFR NON-AFRICAN AMERICAN: >60
GLOBULIN: 3 G/DL
GLUCOSE BLD-MCNC: 106 MG/DL (ref 70–99)
HCT VFR BLD CALC: 38.7 % (ref 40.5–52.5)
HEMOGLOBIN: 13.2 G/DL (ref 13.5–17.5)
LACTIC ACID: 1.3 MMOL/L (ref 0.4–2)
LIPASE: 9 U/L (ref 13–60)
LYMPHOCYTES ABSOLUTE: 2.2 K/UL (ref 1–5.1)
LYMPHOCYTES RELATIVE PERCENT: 23.6 %
MCH RBC QN AUTO: 32.1 PG (ref 26–34)
MCHC RBC AUTO-ENTMCNC: 34.1 G/DL (ref 31–36)
MCV RBC AUTO: 94.1 FL (ref 80–100)
MONOCYTES ABSOLUTE: 1.1 K/UL (ref 0–1.3)
MONOCYTES RELATIVE PERCENT: 11.1 %
NEUTROPHILS ABSOLUTE: 5.9 K/UL (ref 1.7–7.7)
NEUTROPHILS RELATIVE PERCENT: 62.9 %
PDW BLD-RTO: 13.3 % (ref 12.4–15.4)
PLATELET # BLD: 247 K/UL (ref 135–450)
PMV BLD AUTO: 8.4 FL (ref 5–10.5)
POTASSIUM REFLEX MAGNESIUM: 3.6 MMOL/L (ref 3.5–5.1)
PRO-BNP: 116 PG/ML (ref 0–124)
RBC # BLD: 4.11 M/UL (ref 4.2–5.9)
SODIUM BLD-SCNC: 137 MMOL/L (ref 136–145)
TOTAL PROTEIN: 6.9 G/DL (ref 6.4–8.2)
TROPONIN: <0.01 NG/ML
WBC # BLD: 9.4 K/UL (ref 4–11)

## 2020-08-01 PROCEDURE — 99285 EMERGENCY DEPT VISIT HI MDM: CPT

## 2020-08-01 PROCEDURE — 96375 TX/PRO/DX INJ NEW DRUG ADDON: CPT

## 2020-08-01 PROCEDURE — 83880 ASSAY OF NATRIURETIC PEPTIDE: CPT

## 2020-08-01 PROCEDURE — 6360000002 HC RX W HCPCS: Performed by: INTERNAL MEDICINE

## 2020-08-01 PROCEDURE — 6370000000 HC RX 637 (ALT 250 FOR IP): Performed by: INTERNAL MEDICINE

## 2020-08-01 PROCEDURE — 6370000000 HC RX 637 (ALT 250 FOR IP): Performed by: NURSE PRACTITIONER

## 2020-08-01 PROCEDURE — 6360000002 HC RX W HCPCS: Performed by: NURSE PRACTITIONER

## 2020-08-01 PROCEDURE — 83690 ASSAY OF LIPASE: CPT

## 2020-08-01 PROCEDURE — G0378 HOSPITAL OBSERVATION PER HR: HCPCS

## 2020-08-01 PROCEDURE — 84484 ASSAY OF TROPONIN QUANT: CPT

## 2020-08-01 PROCEDURE — 85025 COMPLETE CBC W/AUTO DIFF WBC: CPT

## 2020-08-01 PROCEDURE — 83605 ASSAY OF LACTIC ACID: CPT

## 2020-08-01 PROCEDURE — 80053 COMPREHEN METABOLIC PANEL: CPT

## 2020-08-01 PROCEDURE — 6360000004 HC RX CONTRAST MEDICATION: Performed by: NURSE PRACTITIONER

## 2020-08-01 PROCEDURE — 96374 THER/PROPH/DIAG INJ IV PUSH: CPT

## 2020-08-01 PROCEDURE — 73502 X-RAY EXAM HIP UNI 2-3 VIEWS: CPT

## 2020-08-01 PROCEDURE — 74177 CT ABD & PELVIS W/CONTRAST: CPT

## 2020-08-01 PROCEDURE — 71046 X-RAY EXAM CHEST 2 VIEWS: CPT

## 2020-08-01 PROCEDURE — 2580000003 HC RX 258: Performed by: NURSE PRACTITIONER

## 2020-08-01 PROCEDURE — 96372 THER/PROPH/DIAG INJ SC/IM: CPT

## 2020-08-01 RX ORDER — PROMETHAZINE HYDROCHLORIDE 25 MG/1
12.5 TABLET ORAL EVERY 6 HOURS PRN
Status: DISCONTINUED | OUTPATIENT
Start: 2020-08-01 | End: 2020-08-02 | Stop reason: HOSPADM

## 2020-08-01 RX ORDER — POLYETHYLENE GLYCOL 3350 17 G/17G
17 POWDER, FOR SOLUTION ORAL DAILY PRN
Status: DISCONTINUED | OUTPATIENT
Start: 2020-08-01 | End: 2020-08-02 | Stop reason: HOSPADM

## 2020-08-01 RX ORDER — BUSPIRONE HYDROCHLORIDE 10 MG/1
10 TABLET ORAL 2 TIMES DAILY
Status: DISCONTINUED | OUTPATIENT
Start: 2020-08-01 | End: 2020-08-02 | Stop reason: HOSPADM

## 2020-08-01 RX ORDER — SODIUM CHLORIDE 0.9 % (FLUSH) 0.9 %
10 SYRINGE (ML) INJECTION PRN
Status: DISCONTINUED | OUTPATIENT
Start: 2020-08-01 | End: 2020-08-02 | Stop reason: HOSPADM

## 2020-08-01 RX ORDER — HYDROCHLOROTHIAZIDE 25 MG/1
25 TABLET ORAL DAILY
Status: DISCONTINUED | OUTPATIENT
Start: 2020-08-01 | End: 2020-08-02

## 2020-08-01 RX ORDER — ACETAMINOPHEN 650 MG/1
650 SUPPOSITORY RECTAL EVERY 6 HOURS PRN
Status: DISCONTINUED | OUTPATIENT
Start: 2020-08-01 | End: 2020-08-02 | Stop reason: HOSPADM

## 2020-08-01 RX ORDER — PANTOPRAZOLE SODIUM 40 MG/1
40 TABLET, DELAYED RELEASE ORAL
Status: DISCONTINUED | OUTPATIENT
Start: 2020-08-01 | End: 2020-08-02 | Stop reason: HOSPADM

## 2020-08-01 RX ORDER — PREDNISONE 20 MG/1
60 TABLET ORAL ONCE
Status: COMPLETED | OUTPATIENT
Start: 2020-08-01 | End: 2020-08-01

## 2020-08-01 RX ORDER — FUROSEMIDE 10 MG/ML
40 INJECTION INTRAMUSCULAR; INTRAVENOUS ONCE
Status: COMPLETED | OUTPATIENT
Start: 2020-08-01 | End: 2020-08-01

## 2020-08-01 RX ORDER — ONDANSETRON 2 MG/ML
4 INJECTION INTRAMUSCULAR; INTRAVENOUS EVERY 6 HOURS PRN
Status: DISCONTINUED | OUTPATIENT
Start: 2020-08-01 | End: 2020-08-02 | Stop reason: HOSPADM

## 2020-08-01 RX ORDER — TRAZODONE HYDROCHLORIDE 50 MG/1
50 TABLET ORAL NIGHTLY
Status: DISCONTINUED | OUTPATIENT
Start: 2020-08-01 | End: 2020-08-02 | Stop reason: HOSPADM

## 2020-08-01 RX ORDER — ONDANSETRON 2 MG/ML
4 INJECTION INTRAMUSCULAR; INTRAVENOUS ONCE
Status: COMPLETED | OUTPATIENT
Start: 2020-08-01 | End: 2020-08-01

## 2020-08-01 RX ORDER — IPRATROPIUM BROMIDE AND ALBUTEROL SULFATE 2.5; .5 MG/3ML; MG/3ML
1 SOLUTION RESPIRATORY (INHALATION) ONCE
Status: COMPLETED | OUTPATIENT
Start: 2020-08-01 | End: 2020-08-01

## 2020-08-01 RX ORDER — ACETAMINOPHEN 325 MG/1
650 TABLET ORAL EVERY 6 HOURS PRN
Status: DISCONTINUED | OUTPATIENT
Start: 2020-08-01 | End: 2020-08-02 | Stop reason: HOSPADM

## 2020-08-01 RX ORDER — NICOTINE 21 MG/24HR
1 PATCH, TRANSDERMAL 24 HOURS TRANSDERMAL DAILY
Status: DISCONTINUED | OUTPATIENT
Start: 2020-08-01 | End: 2020-08-02 | Stop reason: HOSPADM

## 2020-08-01 RX ORDER — ALBUTEROL SULFATE 90 UG/1
2 AEROSOL, METERED RESPIRATORY (INHALATION) EVERY 6 HOURS PRN
Status: DISCONTINUED | OUTPATIENT
Start: 2020-08-01 | End: 2020-08-02 | Stop reason: HOSPADM

## 2020-08-01 RX ORDER — 0.9 % SODIUM CHLORIDE 0.9 %
1000 INTRAVENOUS SOLUTION INTRAVENOUS ONCE
Status: DISCONTINUED | OUTPATIENT
Start: 2020-08-01 | End: 2020-08-01

## 2020-08-01 RX ORDER — DICYCLOMINE HYDROCHLORIDE 10 MG/1
10 CAPSULE ORAL EVERY 6 HOURS PRN
Status: DISCONTINUED | OUTPATIENT
Start: 2020-08-01 | End: 2020-08-02 | Stop reason: HOSPADM

## 2020-08-01 RX ORDER — METOPROLOL SUCCINATE 25 MG/1
25 TABLET, EXTENDED RELEASE ORAL DAILY
Status: DISCONTINUED | OUTPATIENT
Start: 2020-08-01 | End: 2020-08-02

## 2020-08-01 RX ORDER — ATORVASTATIN CALCIUM 40 MG/1
40 TABLET, FILM COATED ORAL DAILY
Status: DISCONTINUED | OUTPATIENT
Start: 2020-08-01 | End: 2020-08-02 | Stop reason: HOSPADM

## 2020-08-01 RX ORDER — SODIUM CHLORIDE 0.9 % (FLUSH) 0.9 %
10 SYRINGE (ML) INJECTION EVERY 12 HOURS SCHEDULED
Status: DISCONTINUED | OUTPATIENT
Start: 2020-08-01 | End: 2020-08-02 | Stop reason: HOSPADM

## 2020-08-01 RX ORDER — VALSARTAN 80 MG/1
80 TABLET ORAL DAILY
Status: DISCONTINUED | OUTPATIENT
Start: 2020-08-01 | End: 2020-08-02 | Stop reason: HOSPADM

## 2020-08-01 RX ORDER — ALBUTEROL SULFATE 2.5 MG/3ML
5 SOLUTION RESPIRATORY (INHALATION) ONCE
Status: COMPLETED | OUTPATIENT
Start: 2020-08-01 | End: 2020-08-01

## 2020-08-01 RX ORDER — ASPIRIN 81 MG/1
81 TABLET, CHEWABLE ORAL DAILY
Status: DISCONTINUED | OUTPATIENT
Start: 2020-08-01 | End: 2020-08-02 | Stop reason: HOSPADM

## 2020-08-01 RX ORDER — DULOXETIN HYDROCHLORIDE 60 MG/1
60 CAPSULE, DELAYED RELEASE ORAL DAILY
Status: DISCONTINUED | OUTPATIENT
Start: 2020-08-01 | End: 2020-08-02 | Stop reason: HOSPADM

## 2020-08-01 RX ADMIN — ALBUTEROL SULFATE 5 MG: 2.5 SOLUTION RESPIRATORY (INHALATION) at 14:14

## 2020-08-01 RX ADMIN — HYDROCHLOROTHIAZIDE 25 MG: 25 TABLET ORAL at 18:30

## 2020-08-01 RX ADMIN — IPRATROPIUM BROMIDE AND ALBUTEROL SULFATE 1 AMPULE: .5; 3 SOLUTION RESPIRATORY (INHALATION) at 14:14

## 2020-08-01 RX ADMIN — ENOXAPARIN SODIUM 40 MG: 40 INJECTION SUBCUTANEOUS at 18:30

## 2020-08-01 RX ADMIN — DULOXETINE HYDROCHLORIDE 60 MG: 60 CAPSULE, DELAYED RELEASE ORAL at 21:44

## 2020-08-01 RX ADMIN — BUSPIRONE HYDROCHLORIDE 10 MG: 10 TABLET ORAL at 21:44

## 2020-08-01 RX ADMIN — FUROSEMIDE 40 MG: 10 INJECTION, SOLUTION INTRAMUSCULAR; INTRAVENOUS at 14:14

## 2020-08-01 RX ADMIN — PANTOPRAZOLE SODIUM 40 MG: 40 TABLET, DELAYED RELEASE ORAL at 18:30

## 2020-08-01 RX ADMIN — TRAZODONE HYDROCHLORIDE 50 MG: 50 TABLET ORAL at 21:44

## 2020-08-01 RX ADMIN — SODIUM CHLORIDE 1000 ML: 9 INJECTION, SOLUTION INTRAVENOUS at 12:23

## 2020-08-01 RX ADMIN — IOPAMIDOL 75 ML: 755 INJECTION, SOLUTION INTRAVENOUS at 12:51

## 2020-08-01 RX ADMIN — ACETAMINOPHEN 650 MG: 325 TABLET ORAL at 18:30

## 2020-08-01 RX ADMIN — PREDNISONE 60 MG: 20 TABLET ORAL at 14:13

## 2020-08-01 RX ADMIN — ONDANSETRON HYDROCHLORIDE 4 MG: 2 INJECTION, SOLUTION INTRAMUSCULAR; INTRAVENOUS at 12:23

## 2020-08-01 ASSESSMENT — PAIN DESCRIPTION - PAIN TYPE: TYPE: CHRONIC PAIN

## 2020-08-01 ASSESSMENT — ENCOUNTER SYMPTOMS
ABDOMINAL PAIN: 1
RHINORRHEA: 0
COLOR CHANGE: 0
SHORTNESS OF BREATH: 1
SORE THROAT: 0

## 2020-08-01 ASSESSMENT — PAIN SCALES - GENERAL
PAINLEVEL_OUTOF10: 5
PAINLEVEL_OUTOF10: 3

## 2020-08-01 ASSESSMENT — PAIN DESCRIPTION - LOCATION: LOCATION: HIP

## 2020-08-01 ASSESSMENT — PAIN DESCRIPTION - ORIENTATION: ORIENTATION: RIGHT

## 2020-08-01 NOTE — PLAN OF CARE
Hypoxia - neg d dimer and covid yesterday    abd pain - ct neg   Workup neg in Er  Given steroids, lasix .  Will await improvement

## 2020-08-01 NOTE — ED NOTES
Pt requested something to eat,  Boxed lunch brought in to pt. Monitor showed that pt's o2 sat 88%, I looked at pt and he had taken off the nasal canula that was placed on him. I informed the pt that he needs to keep his canula on because his oxygen level was in the 80's. Pt is alert and oriented and he said he would.      Ledy Stockton RN  08/01/20 8652

## 2020-08-01 NOTE — ED NOTES
Ambulated patient and O2 saturation dropped to 86% and had a heart rate of 121.  NADER Avalos was notified and acknowledged at [employer]  08/01/20 7990

## 2020-08-01 NOTE — ED NOTES
Nursing report given to 2W RN, pt to floor per wheel chair.      Miguelito Carvajal RN  08/01/20 4235

## 2020-08-01 NOTE — ED NOTES
Perfect serve message to Dr. Mccain Sit @ [address]  08/01/20 1811 George Gauthier returned the call to [facility] @ [address]  08/01/20 0526

## 2020-08-01 NOTE — ED NOTES
After triaging pt, I decided to place pt on heart monitor. When I lifted pt's shirt to place lead pads on pt played with one of his nipples and made a goofy sound. At that I stared at the pt and did not say anything, pt then related a story of when he was in a club and did that just as someone took a picture. I informed the pt that his story was not funny. Pt apologized, I did not say anything, he repeated the he was \"sincerely\" sorry. As I walked out of the room I told the pt apology accepted. I informed provider of what pt did and said to me when I was in the room.      Shannon Tello RN  08/01/20 5943

## 2020-08-01 NOTE — PROGRESS NOTES
pt. requesting nicotine patch smokes 1 pack day , and  his PRN flexeril , and sates will needs his PRN Benadryl, and stated takes Cymbalta BID. perfect serve sent to DR Rima Bunn.

## 2020-08-01 NOTE — PROGRESS NOTES
Admission: Patient received to room 217 from ED. Patient admitted with Dx of PNA. Patient A&Ox 4  upon arrival. Admission assessment as charted. VSS. Patient oriented to room, staff, and call system. Educated on fall protocol and hourly rounding. Patient informed to utilize call light with any needs. Pt verbalized understanding. Will continue to monitor.

## 2020-08-01 NOTE — ED PROVIDER NOTES
Cardiovascular: Positive for leg swelling. Negative for chest pain. Gastrointestinal: Positive for abdominal pain. Genitourinary: Negative for decreased urine volume and difficulty urinating. Musculoskeletal: Negative for arthralgias and myalgias. Right hip pain   Skin: Negative for color change and rash. Neurological: Negative for dizziness and light-headedness. Psychiatric/Behavioral: Positive for confusion. Negative for agitation. All other systems reviewed and are negative. Positivesand Pertinent negatives as per HPI. Except as noted above in the ROS, all other systems were reviewed and negative.        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    Chronic back pain     ED (erectile dysfunction) 12/29/2011    GERD (gastroesophageal reflux disease)     Hypertension     Pleurisy          SURGICAL HISTORY       Past Surgical History:   Procedure Laterality Date    ARM SURGERY Left 12/10/2015    BACK SURGERY      COLONOSCOPY      LUNG SURGERY      partial removal (right) agent orange    SPINE SURGERY  1995    lumbar laminectomy         CURRENT MEDICATIONS       Previous Medications    ALBUTEROL SULFATE HFA (VENTOLIN HFA) 108 (90 BASE) MCG/ACT INHALER    Inhale 2 puffs into the lungs every 6 hours as needed for Wheezing or Shortness of Breath    ASPIRIN 81 MG TABLET    Take 81 mg by mouth daily    ATORVASTATIN (LIPITOR) 40 MG TABLET    TAKE ONE TABLET BY MOUTH DAILY    BUSPIRONE (BUSPAR) 10 MG TABLET    TAKE TWO TABLETS BY MOUTH TWICE A DAY    CHANTIX 1 MG TABLET    TAKE ONE TABLET BY MOUTH TWICE A DAY    CYCLOBENZAPRINE (FLEXERIL) 10 MG TABLET    Take One Tablet Every 8 Hours As Needed For Muscle Spasms    DICYCLOMINE (BENTYL) 10 MG CAPSULE    Take 1 capsule by mouth every 6 hours as needed (cramps)    DIPHENHYDRAMINE (BENADRYL) 25 MG TABLET    Take 25 mg by mouth every 6 hours as needed for Itching    DULOXETINE (CYMBALTA) 60 MG EXTENDED RELEASE CAPSULE    TAKE ONE CAPSULE BY MOUTH TWICE A DAY    HYDROCHLOROTHIAZIDE (HYDRODIURIL) 25 MG TABLET    TAKE ONE TABLET BY MOUTH DAILY    METOPROLOL SUCCINATE (TOPROL XL) 25 MG EXTENDED RELEASE TABLET    TAKE ONE TABLET BY MOUTH DAILY    ONDANSETRON (ZOFRAN) 4 MG TABLET    Take 1 tablet by mouth every 8 hours as needed for Nausea or Vomiting    PANTOPRAZOLE (PROTONIX) 40 MG TABLET    TAKE ONE TABLET BY MOUTH TWICE A DAY    SILDENAFIL (REVATIO) 20 MG TABLET    Take 3 tablets by mouth daily as needed (ED)    SPIRIVA RESPIMAT 2.5 MCG/ACT AERS INHALER    INHALE TWO PUFFS BY MOUTH DAILY    TRAZODONE (DESYREL) 50 MG TABLET    TAKE TWO TABLETS BY MOUTH ONCE NIGHTLY    VALSARTAN (DIOVAN) 80 MG TABLET    TAKE ONE TABLET BY MOUTH DAILY         ALLERGIES     Lexapro [escitalopram oxalate];  Morphine; Prozac [fluoxetine hcl]; and Wellbutrin [bupropion]    FAMILY HISTORY       Family History   Problem Relation Age of Onset    Arthritis Mother     Other Mother         pneumothorax several times    Asthma Father          SOCIAL HISTORY       Social History     Socioeconomic History    Marital status:      Spouse name: None    Number of children: None    Years of education: None    Highest education level: None   Occupational History    None   Social Needs    Financial resource strain: None    Food insecurity     Worry: None     Inability: None    Transportation needs     Medical: None     Non-medical: None   Tobacco Use    Smoking status: Current Every Day Smoker     Packs/day: 1.00     Years: 33.00     Pack years: 33.00     Types: Cigarettes    Smokeless tobacco: Former User     Quit date: 11/1/2015   Substance and Sexual Activity    Alcohol use: Yes     Comment: socially     Drug use: Yes     Types: Marijuana, Other-see comments, Opiates      Comment: no opiates does take xanx    Sexual activity: Yes   Lifestyle    Inappropriate behavior. Patient states that he is here for \"brain damage \". Patient chuckles frequently about his comments. DIAGNOSTIC RESULTS   LABS:    Labs Reviewed   CBC WITH AUTO DIFFERENTIAL - Abnormal; Notable for the following components:       Result Value    RBC 4.11 (*)     Hemoglobin 13.2 (*)     Hematocrit 38.7 (*)     All other components within normal limits    Narrative:     Performed at:  [facility] [address],  [place], [facility]   Phone [phone]   COMPREHENSIVE METABOLIC PANEL W/ REFLEX TO MG FOR LOW K - Abnormal; Notable for the following components:    Glucose 106 (*)     ALT 54 (*)     AST 78 (*)     All other components within normal limits    Narrative:     Performed at:  [place] [address],  [place], [place] [facility]   Phone [phone]   LIPASE - Abnormal; Notable for the following components:    Lipase 9.0 (*)     All other components within normal limits    Narrative:     Performed at:  [place] [address],  [place], [facility]   Phone [phone]   TROPONIN    Narrative:     Performed at:  [place] [address],  [place], [facility]   Phone [phone]   BRAIN NATRIURETIC PEPTIDE    Narrative:     Performed at:  [facility] [address],  [place], [place] [facility]   Phone [phone]   LACTIC ACID, PLASMA    Narrative:     Performed at:  [facility] [address],  [place], [place] [employer] [employer]   Phone [phone]       All other labs were within normal range or notreturned as of this dictation. EKG: All EKG's are interpreted by the Emergency Department Physician who either signs or Co-signs this chart in the absence of a cardiologist.  Please see their note for interpretation of EKG.       RADIOLOGY:   Abdominal CT with IV contrast interpreted by radiologist for no acute process. Hepatic steatosis. Right hip x-ray interpreted by radiologist for negative. Chest x-ray interpreted by radiologist for no acute cardiopulmonary disease. Interpretation per the Radiologist below, if available at the time of this note:    CT ABDOMEN PELVIS W IV CONTRAST Additional Contrast? None   Final Result   1. No acute process. 2. Hepatic steatosis         XR HIP RIGHT (2-3 VIEWS)   Final Result   Negative         XR CHEST (2 VW)   Final Result   No active cardiopulmonary disease           No results found. PROCEDURES   Unless otherwise noted below, none     Procedures    CRITICAL CARE TIME   N/A    CONSULTS:  IP CONSULT TO HOSPITALIST      EMERGENCY DEPARTMENT COURSE and DIFFERENTIAL DIAGNOSIS/MDM:   Vitals:    Vitals:    08/01/20 1403 08/01/20 1433 08/01/20 1503 08/01/20 1506   BP: 132/68 130/65 134/89    Pulse: 113 118 132 117   Resp: 16 12 18 13   Temp:       TempSrc:       SpO2: 93% 93% (!) 88% (!) 87%   Weight:       Height:           Patient was given the following medications:  Medications   ondansetron (ZOFRAN) injection 4 mg (4 mg Intravenous Given 8/1/20 1223)   iopamidol (ISOVUE-370) 76 % injection 75 mL (75 mLs Intravenous Given 8/1/20 1251)   furosemide (LASIX) injection 40 mg (40 mg Intravenous Given 8/1/20 1414)   ipratropium-albuterol (DUONEB) nebulizer solution 1 ampule (1 ampule Inhalation Given 8/1/20 1414)   predniSONE (DELTASONE) tablet 60 mg (60 mg Oral Given 8/1/20 1413)   albuterol (PROVENTIL) nebulizer solution 5 mg (5 mg Nebulization Given 8/1/20 1414)       Patient was seen and evaluated by Dr. Michele Rooney and myself. Patient here for concerns for edema. Initially when I evaluated the patient asked him what he was here for he states that he is here for \"brain damage \".   Patient does laugh inappropriately and does have some inappropriate comments but states that he is here for right hip pain lower abdominal pain and swelling to his legs. Patient states that he was seen yesterday for the same symptoms however after reviewing his chart it appears that he was sent in for confusion. Patient did have a urine drug screen that was positive yesterday. Patient is a very poor historian and is constantly changing his story. Lab values have been reviewed and interpreted. He did have elevated liver enzymes today. With an AST of 78 and ALT of 54. Abdominal CT was negative for any acute findings but was concerning for hepatic steatosis. Chest x-ray and right hip were negative. I was able to review his visit from yesterday and found that his urine drug screen was positive for methadone marijuana and benzodiazepines. The rest of his lab work was reviewed. He did have a negative d-dimer yesterday. Due to yesterday's negative d-dimer I did not repeat it. Patient on exam is found to be tachycardic and hypoxic. He did ambulate in the ED and his oxygen saturation dropped to 86%  Due to his edema he was not given fluids however his BNP was 116. Patient was provided with oxygen in the ED. A consult was placed to the hospitalist for admission for tachycardia and hypoxia however the hospitalist initially refused requesting treatments and steroids be provided due to his history of COPD. Patient did not have wheezing however he was provided with steroids and breathing treatments in the ED. The patient remains hypoxic and tachycardic. Patient was reevaluated after his treatments and remains tachycardic and hypoxic. A consult was placed to the hospital again for admission. The hospitalist is accepted this time. Patient's care will be transitioned to the inpatient unit. The patient tolerated their visit well. They were seen and evaluated by the attendingphysician, No att. providers found who agreed with the assessment and plan.   The patient and / or the family were informed of the results of any tests, a time was given to answer

## 2020-08-02 VITALS
DIASTOLIC BLOOD PRESSURE: 80 MMHG | HEART RATE: 110 BPM | SYSTOLIC BLOOD PRESSURE: 128 MMHG | RESPIRATION RATE: 20 BRPM | BODY MASS INDEX: 34.46 KG/M2 | OXYGEN SATURATION: 90 % | WEIGHT: 260 LBS | HEIGHT: 73 IN | TEMPERATURE: 96.9 F

## 2020-08-02 LAB
ANION GAP SERPL CALCULATED.3IONS-SCNC: 12 MMOL/L (ref 3–16)
BUN BLDV-MCNC: 16 MG/DL (ref 7–20)
CALCIUM SERPL-MCNC: 8.7 MG/DL (ref 8.3–10.6)
CHLORIDE BLD-SCNC: 99 MMOL/L (ref 99–110)
CO2: 26 MMOL/L (ref 21–32)
CREAT SERPL-MCNC: 0.8 MG/DL (ref 0.9–1.3)
GFR AFRICAN AMERICAN: >60
GFR NON-AFRICAN AMERICAN: >60
GLUCOSE BLD-MCNC: 113 MG/DL (ref 70–99)
MAGNESIUM: 2.2 MG/DL (ref 1.8–2.4)
POTASSIUM REFLEX MAGNESIUM: 3.4 MMOL/L (ref 3.5–5.1)
PROCALCITONIN: 0.07 NG/ML (ref 0–0.15)
SODIUM BLD-SCNC: 137 MMOL/L (ref 136–145)

## 2020-08-02 PROCEDURE — 2580000003 HC RX 258: Performed by: INTERNAL MEDICINE

## 2020-08-02 PROCEDURE — 96376 TX/PRO/DX INJ SAME DRUG ADON: CPT

## 2020-08-02 PROCEDURE — 36415 COLL VENOUS BLD VENIPUNCTURE: CPT

## 2020-08-02 PROCEDURE — G0378 HOSPITAL OBSERVATION PER HR: HCPCS

## 2020-08-02 PROCEDURE — 6370000000 HC RX 637 (ALT 250 FOR IP): Performed by: INTERNAL MEDICINE

## 2020-08-02 PROCEDURE — 6360000002 HC RX W HCPCS: Performed by: INTERNAL MEDICINE

## 2020-08-02 PROCEDURE — 6370000000 HC RX 637 (ALT 250 FOR IP): Performed by: HOSPITALIST

## 2020-08-02 PROCEDURE — 83735 ASSAY OF MAGNESIUM: CPT

## 2020-08-02 PROCEDURE — 84145 PROCALCITONIN (PCT): CPT

## 2020-08-02 PROCEDURE — 99217 PR OBSERVATION CARE DISCHARGE MANAGEMENT: CPT | Performed by: INTERNAL MEDICINE

## 2020-08-02 PROCEDURE — 96375 TX/PRO/DX INJ NEW DRUG ADDON: CPT

## 2020-08-02 PROCEDURE — 80048 BASIC METABOLIC PNL TOTAL CA: CPT

## 2020-08-02 RX ORDER — DOXYCYCLINE HYCLATE 100 MG
100 TABLET ORAL EVERY 12 HOURS SCHEDULED
Status: DISCONTINUED | OUTPATIENT
Start: 2020-08-02 | End: 2020-08-02 | Stop reason: HOSPADM

## 2020-08-02 RX ORDER — METOPROLOL SUCCINATE 50 MG/1
TABLET, EXTENDED RELEASE ORAL
Qty: 30 TABLET | Refills: 0 | Status: SHIPPED | OUTPATIENT
Start: 2020-08-02 | End: 2020-09-21 | Stop reason: SDUPTHER

## 2020-08-02 RX ORDER — POTASSIUM CHLORIDE 20 MEQ/1
20 TABLET, EXTENDED RELEASE ORAL DAILY
Qty: 30 TABLET | Refills: 0 | Status: SHIPPED | OUTPATIENT
Start: 2020-08-02 | End: 2021-05-27 | Stop reason: SDUPTHER

## 2020-08-02 RX ORDER — FUROSEMIDE 20 MG/1
20 TABLET ORAL DAILY
Qty: 30 TABLET | Refills: 0 | Status: SHIPPED | OUTPATIENT
Start: 2020-08-02 | End: 2020-09-25 | Stop reason: SDUPTHER

## 2020-08-02 RX ORDER — FUROSEMIDE 10 MG/ML
40 INJECTION INTRAMUSCULAR; INTRAVENOUS ONCE
Status: COMPLETED | OUTPATIENT
Start: 2020-08-02 | End: 2020-08-02

## 2020-08-02 RX ORDER — METOPROLOL SUCCINATE 50 MG/1
50 TABLET, EXTENDED RELEASE ORAL DAILY
Status: DISCONTINUED | OUTPATIENT
Start: 2020-08-02 | End: 2020-08-02 | Stop reason: HOSPADM

## 2020-08-02 RX ORDER — KETOROLAC TROMETHAMINE 30 MG/ML
15 INJECTION, SOLUTION INTRAMUSCULAR; INTRAVENOUS ONCE
Status: COMPLETED | OUTPATIENT
Start: 2020-08-02 | End: 2020-08-02

## 2020-08-02 RX ORDER — POTASSIUM CHLORIDE 20 MEQ/1
40 TABLET, EXTENDED RELEASE ORAL ONCE
Status: COMPLETED | OUTPATIENT
Start: 2020-08-02 | End: 2020-08-02

## 2020-08-02 RX ADMIN — BUSPIRONE HYDROCHLORIDE 10 MG: 10 TABLET ORAL at 09:11

## 2020-08-02 RX ADMIN — PANTOPRAZOLE SODIUM 40 MG: 40 TABLET, DELAYED RELEASE ORAL at 09:10

## 2020-08-02 RX ADMIN — FUROSEMIDE 40 MG: 10 INJECTION, SOLUTION INTRAMUSCULAR; INTRAVENOUS at 11:37

## 2020-08-02 RX ADMIN — KETOROLAC TROMETHAMINE 15 MG: 30 INJECTION, SOLUTION INTRAMUSCULAR at 11:37

## 2020-08-02 RX ADMIN — METOPROLOL SUCCINATE 50 MG: 50 TABLET, EXTENDED RELEASE ORAL at 09:11

## 2020-08-02 RX ADMIN — HYDROCHLOROTHIAZIDE 25 MG: 25 TABLET ORAL at 09:10

## 2020-08-02 RX ADMIN — ACETAMINOPHEN 650 MG: 325 TABLET ORAL at 01:33

## 2020-08-02 RX ADMIN — VALSARTAN 80 MG: 80 TABLET, FILM COATED ORAL at 09:11

## 2020-08-02 RX ADMIN — ASPIRIN 81 MG: 81 TABLET, CHEWABLE ORAL at 09:11

## 2020-08-02 RX ADMIN — ACETAMINOPHEN 650 MG: 325 TABLET ORAL at 09:15

## 2020-08-02 RX ADMIN — DOXYCYCLINE HYCLATE 100 MG: 100 TABLET, COATED ORAL at 09:11

## 2020-08-02 RX ADMIN — ATORVASTATIN CALCIUM 40 MG: 40 TABLET, FILM COATED ORAL at 09:10

## 2020-08-02 RX ADMIN — POTASSIUM CHLORIDE 40 MEQ: 1500 TABLET, EXTENDED RELEASE ORAL at 11:37

## 2020-08-02 RX ADMIN — Medication 10 ML: at 09:21

## 2020-08-02 ASSESSMENT — PAIN DESCRIPTION - PROGRESSION: CLINICAL_PROGRESSION: NOT CHANGED

## 2020-08-02 ASSESSMENT — PAIN DESCRIPTION - ONSET: ONSET: ON-GOING

## 2020-08-02 ASSESSMENT — PAIN DESCRIPTION - LOCATION
LOCATION: BACK
LOCATION: FOOT

## 2020-08-02 ASSESSMENT — PAIN DESCRIPTION - ORIENTATION
ORIENTATION: LOWER
ORIENTATION: RIGHT;LEFT

## 2020-08-02 ASSESSMENT — PAIN DESCRIPTION - DESCRIPTORS: DESCRIPTORS: ACHING

## 2020-08-02 ASSESSMENT — PAIN DESCRIPTION - PAIN TYPE
TYPE: CHRONIC PAIN
TYPE: CHRONIC PAIN

## 2020-08-02 ASSESSMENT — PAIN SCALES - GENERAL
PAINLEVEL_OUTOF10: 3
PAINLEVEL_OUTOF10: 0
PAINLEVEL_OUTOF10: 10

## 2020-08-02 ASSESSMENT — PAIN DESCRIPTION - FREQUENCY: FREQUENCY: INTERMITTENT

## 2020-08-02 ASSESSMENT — PAIN - FUNCTIONAL ASSESSMENT: PAIN_FUNCTIONAL_ASSESSMENT: ACTIVITIES ARE NOT PREVENTED

## 2020-08-02 NOTE — DISCHARGE SUMMARY
Name:  Milla Sidhu  Room:  0217/0217-02  MRN:    [id number]    IM Discharge Summary    Discharging Physician:  Freddie French MD    Admit: 8/1/2020    Discharge:      PCP      Jill Paz MD    Diagnoses and hospital course  this Admission         1) hypoxia   - likely with underlying HUSSAIN , copd and obesity hypoventilation  - improved with supportive measures, steroids, HHN and ambulation   - imaging neg. D dimer neg. Low suspicion for chf and PE  - pt seen today ambulating in hallways with no desaturations  - covid 19 neg  - no wheezing on exam. Stop steroids  - pt should consider weight loss and HUSSAIN study as outpt  - smoking cessation adv      2) pedal edema - not related to hypoxia. Not taking hctz with cramps. BP high as well  - increase BB and add lasix with KCL . Stop hctz  - should f/w PCP      3) abd pain - workup neg. Pt did not mention this today       HPI   53 y.o. male presents with a 3 day history of increased feer, chills with temp up to 102, sob with cough productive of clear to white sputum. He denies chest pain, notes some bipedal edema x 3 d, denies calf pain. (+) episode of nausea and vomiting with emesis consisting of previously ingested food less than 30 minutes prior. Denies abdominal pain. (+) chronic tobacco use but no history of home O2 dependency. Physical Exam at Discharge:        General: middle aged male, walking in hallways   Awake, alert and oriented. Appears to be not in any distress  Mucous Membranes:  Pink , anicteric  Neck: No JVD, no carotid bruit, no thyromegaly  Chest:  Clear to auscultation bilaterally, no added sounds  Cardiovascular:  RRR S1S2 heard, no murmurs or gallops  Abdomen:  Soft, undistended, non tender, no organomegaly, BS present  Extremities: 1+ edema michelle   No edema or cyanosis.  Distal pulses well felt  Neurological : grossly normal          Radiology (Please Review Full Report for Details)       CT ABDOMEN PELVIS W IV CONTRAST Additional Contrast? None Final Result   1. No acute process. 2. Hepatic steatosis           XR HIP RIGHT (2-3 VIEWS)   Final Result   Negative           XR CHEST (2 VW)   Final Result   No active cardiopulmonary disease                     Recent Labs     07/31/20  1410 08/01/20  1210   WBC 9.5 9.4   HGB 13.3* 13.2*   HCT 39.5* 38.7*   MCV 94.7 94.1    247       Recent Labs     07/31/20  1410 08/01/20  1210 08/02/20  0551    137 137   K 3.8 3.6 3.4*    100 99   CO2 26 26 26   BUN 24* 18 16   CREATININE 1.0 0.9 0.8*       CBC:  Lab Results   Component Value Date    WBC 9.4 08/01/2020    HGB 13.2 08/01/2020    HCT 38.7 08/01/2020    MCV 94.1 08/01/2020     08/01/2020    NEUTOPHILPCT 62.9 08/01/2020    LYMPHOPCT 23.6 08/01/2020    MONOPCT 11.1 08/01/2020    EOSPCT 0.3 02/02/2011    BASOPCT 0.4 08/01/2020    NEUTROABS 5.9 08/01/2020    LYMPHSABS 2.2 08/01/2020    MONOSABS 1.1 08/01/2020    EOSABS 0.2 08/01/2020    BASOSABS 0.0 08/01/2020     BNP:   Lab Results   Component Value Date     08/02/2020    K 3.4 08/02/2020    CO2 26 08/02/2020    BUN 16 08/02/2020    CREATININE 0.8 08/02/2020    CALCIUM 8.7 08/02/2020                Medication List      START taking these medications    furosemide 20 MG tablet  Commonly known as:  Lasix  Take 1 tablet by mouth daily     potassium chloride 20 MEQ extended release tablet  Commonly known as:  KLOR-CON M  Take 1 tablet by mouth daily        CHANGE how you take these medications    busPIRone 10 MG tablet  Commonly known as:  BUSPAR  TAKE TWO TABLETS BY MOUTH TWICE A DAY  What changed:  See the new instructions.      metoprolol succinate 50 MG extended release tablet  Commonly known as:  TOPROL XL  TAKE ONE TABLET BY MOUTH DAILY  What changed:  medication strength        CONTINUE taking these medications    albuterol sulfate  (90 Base) MCG/ACT inhaler  Commonly known as:  Ventolin HFA  Inhale 2 puffs into the lungs every 6 hours as needed for Wheezing or Shortness review of medications and discharge plan.       Sheree Thompson MD 8/2/2020 12:24 PM

## 2020-08-02 NOTE — PROGRESS NOTES
Pt c/o chronic back pain requested tylenol. Pt alert able to make needs know SpO2 90% on RA call light in reach. Will continue to monitor.

## 2020-08-02 NOTE — FLOWSHEET NOTE
08/02/20 0533   Vital Signs   Temp 96.7 °F (35.9 °C)   Temp Source Oral   Pulse 115   Heart Rate Source Monitor   Resp 18   /73   BP Location Left Arm   BP Upper/Lower Upper   Patient Position Semi fowlers   Level of Consciousness 0   MEWS Score 3   Oxygen Therapy   SpO2 96 %   O2 Device None (Room air)   Pt resting in bed, no acute distress.  [facility]

## 2020-08-02 NOTE — CARE COORDINATION
Review of chart for any potential discharge needs. No needs identified for discharge intervention at this time. MD and bedside RN  if needs arise please consult case management for discharge intervention. CM not following at this time.            Pt is on RA at 96%

## 2020-08-02 NOTE — FLOWSHEET NOTE
08/01/20 2130   Vital Signs   Temp 99.1 °F (37.3 °C)   Temp Source Oral   Pulse 135   Resp 18   BP (!) 144/86   BP Location Left upper arm   Level of Consciousness 0   MEWS Score 4   Oxygen Therapy   SpO2 (!) 88 %   O2 Device None (Room air)   02 on at 2lnc. Saturation increased to 97%. Evening meds given. Pt denies any meds or illegal drugs in his bag.  Kunal Check

## 2020-08-02 NOTE — PROGRESS NOTES
Pt 94% on room air, ambulating in hallway close to the stairwell. Pt ambulated back to his room. Said his feet hurt bilat r/t swelling. Pt given tylenol 2 po. Pt stated to writer \"what would happen if I sneaked a cigarette in the stairwell? \" Kasey Samples told pt to not smoke in the hospital due to no smoking in or outside of the hospital. Pt verbalized understanding and said he would not smoke.  Liza Mckee

## 2020-08-02 NOTE — H&P
Hospital Medicine History & Physical      PCP: Ivis Velazquez MD    Date of Admission: 8/1/2020    Date of Service: Pt seen/examined on 8/1/2020 and Admitted to Inpatient with expected LOS greater than two midnights due to medical therapy. Chief Complaint:  SOB      History Of Present Illness:       53 y.o. male presents with a 3 day history of increased feer, chills with temp up to 102, sob with cough productive of clear to white sputum. He denies chest pain, notes some bipedal edema x 3 d, denies calf pain. (+) episode of nausea and vomiting with emesis consisting of previously ingested food less than 30 minutes prior. Denies abdominal pain. (+) chronic tobacco use but no history of home O2 dependency. 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  Authorizing Provider   hydroCHLOROthiazide (HYDRODIURIL) 25 MG tablet TAKE ONE TABLET BY MOUTH DAILY 7/22/20  Yes Ivis Velazquez MD   DULoxetine (CYMBALTA) 60 MG extended release capsule TAKE ONE CAPSULE BY MOUTH TWICE A DAY 7/22/20  Yes Ivis Velazquez MD   traZODone (DESYREL) 50 MG tablet TAKE TWO TABLETS BY MOUTH ONCE NIGHTLY 7/22/20  Yes Ivis Velazquez MD   cyclobenzaprine (FLEXERIL) 10 MG tablet Take One Tablet Every 8 Hours As Needed For Muscle Spasms 7/22/20  Yes Ivis Velazquez MD   busPIRone (BUSPAR) 10 MG tablet TAKE TWO TABLETS BY MOUTH TWICE A DAY  Patient taking differently: 10 mg daily as needed  5/11/20  Yes [address]   SPIRIVA RESPIMAT 2.5 MCG/ACT AERS inhaler INHALE TWO PUFFS BY MOUTH DAILY 5/11/20  Yes Deng Amado MD   valsartan (DIOVAN) 80 MG tablet TAKE ONE TABLET BY MOUTH DAILY 3/11/20  Yes [address]   pantoprazole (PROTONIX) 40 MG tablet TAKE ONE TABLET BY MOUTH TWICE A DAY 3/11/20  Yes [address]   metoprolol succinate (TOPROL XL) 25 MG extended release tablet TAKE ONE TABLET BY MOUTH DAILY 1/20/20  Yes [address]   atorvastatin (LIPITOR) 40 MG tablet TAKE ONE TABLET BY MOUTH DAILY 1/20/20  Yes [address]   ondansetron (ZOFRAN) 4 MG tablet Take 1 tablet by mouth every 8 hours as needed for Nausea or Vomiting 11/11/19  Yes Jill Paz MD   albuterol sulfate HFA (VENTOLIN HFA) 108 (90 Base) MCG/ACT inhaler Inhale 2 puffs into the lungs every 6 hours as needed for Wheezing or Shortness of Breath 7/30/19  Yes Jill Paz MD   dicyclomine (BENTYL) 10 MG capsule Take 1 capsule by mouth every 6 hours as needed (cramps) 7/30/19  Yes Jill Paz MD   diphenhydrAMINE (BENADRYL) 25 MG tablet Take 25 mg by mouth every 6 hours as needed for Itching Pt report takes for upset stomach in AM as needed   Yes Historical Provider, MD   aspirin 81 MG tablet Take 81 mg by mouth daily   Yes Historical Provider, MD   CHANTIX 1 MG tablet TAKE ONE TABLET BY MOUTH TWICE A DAY 2/14/20   [facility]   sildenafil (REVATIO) 20 MG tablet Take 3 tablets by mouth daily as needed (ED) 10/4/18   Jill Paz MD       Allergies:  Lexapro [escitalopram oxalate]; Morphine; Prozac [fluoxetine hcl]; and Wellbutrin [bupropion]    Social History:           TOBACCO:   reports that he has been smoking cigarettes. He has a 33.00 pack-year smoking history. He quit smokeless tobacco use about 4 years ago. ETOH:   reports current alcohol use.       Family History:               Problem Relation Age of Onset    Arthritis Mother     Other Mother         pneumothorax several times    Asthma Father        REVIEW OF SYSTEMS:   Pertinent positives as noted in the HPI. All other systems reviewed and negative. PHYSICAL EXAM PERFORMED:    BP (!) 144/86   Pulse 135   Temp 99.1 °F (37.3 °C) (Oral)   Resp 18   Ht 6' 1\" (1.854 m)   Wt 260 lb (117.9 kg)   SpO2 97%   BMI 34.30 kg/m²     General appearance:  No apparent distress, appears stated age and cooperative. HEENT:  Normal cephalic, atraumatic without obvious deformity. Pupils equal, round, and reactive to light. Extra ocular muscles intact. Conjunctivae/corneas clear. Neck: Supple, with full range of motion. No jugular venous distention. Trachea midline. Respiratory:  Normal respiratory effort. Clear to auscultation, bilaterally without Rales/Wheezes/Rhonchi. Cardiovascular:  Regular rate and rhythm with normal S1/S2 without murmurs, rubs or gallops. Abdomen: Soft, non-tender, non-distended with normal bowel sounds. Musculoskeletal:  No clubbing, cyanosis or (+) trace bipedal edema,Full range of motion without deformity. Skin: Skin color, texture, turgor normal.  No rashes or lesions. Neurologic:   grossly non-focal.  Psychiatric:  Alert and oriented, thought content appropriate, normal insight  Capillary Refill: Brisk,< 3 seconds   Peripheral Pulses: +2 palpable, equal bilaterally       Labs:     Recent Labs     07/31/20 1410 08/01/20  1210   WBC 9.5 9.4   HGB 13.3* 13.2*   HCT 39.5* 38.7*    247     Recent Labs     07/31/20 1410 08/01/20  1210    137   K 3.8 3.6    100   CO2 26 26   BUN 24* 18   CREATININE 1.0 0.9   CALCIUM 8.7 8.7     Recent Labs     07/31/20 1410 08/01/20  1210   AST 61* 78*   ALT 50* 54*   BILITOT 0.4 0.6   ALKPHOS 96 102     No results for input(s): INR in the last 72 hours.   Recent Labs     07/31/20 1410 08/01/20  1210   TROPONINI <0.01 <0.01       Urinalysis:      Lab Results   Component Value Date    NITRU Negative 07/31/2020    WBCUA None seen 07/31/2020    BACTERIA 1+ 09/24/2019    RBCUA None seen 07/31/2020    BLOODU Negative 07/31/2020    SPECGRAV >=1.030 07/31/2020    GLUCOSEU Negative 07/31/2020       Radiology:          CT ABDOMEN PELVIS W IV CONTRAST Additional Contrast? None   Final Result   1. No acute process. 2. Hepatic steatosis         XR HIP RIGHT (2-3 VIEWS)   Final Result   Negative         XR CHEST (2 VW)   Final Result   No active cardiopulmonary disease             ASSESSMENT:    Active Hospital Problems    Diagnosis Date Noted    Hypoxia [R09.02] 08/01/2020         PLAN:      1) Acute hypoxemic resp failure  - covid 19 neg, suspect acute bronchitis, copd  - continue prednisone, alb HHN  - patient states he's been prescribed HHN, but has no machine to administer it at home    2) Acute bronchitis  - doxy    3) HTN  - continue home med    DVT Prophylaxis: lovenox  Diet: DIET GENERAL;  Code Status: Full Code       Behzad Zuleta MD    Thank you Bentley Bamberger, MD for the opportunity to be involved in this patient's care. If you have any questions or concerns please feel free to contact me at [phone].

## 2020-08-02 NOTE — PROGRESS NOTES
Report given to [facility].  Marilyn Garces

## 2020-08-02 NOTE — FLOWSHEET NOTE
08/02/20 0835   Encounter Summary   Services provided to: Patient not available   Referral/Consult From: Nurse   Support System Family members   Place of [place]   ([employer])   Continue Visiting Yes  (A/D forms requested)   Complexity of Encounter Low   Length of Encounter 15 minutes   Spiritual/Religion   Intervention Prayer

## 2020-08-02 NOTE — PROGRESS NOTES
RESPIRATORY THERAPY ASSESSMENT    Name:  [id number] Hern[address] Shields Record Number:  [id number]  Age: 53 y.o. Gender: male  : 1970  Today's Date:  2020  Room:  0217/0217-02    Assessment     Is the patient being admitted for a COPD or Asthma exacerbation? No   (If yes the patient will be seen every 4 hours for the first 24 hours and then reassessed)    Patient Admission Diagnosis      Allergies  Allergies   Allergen Reactions    Lexapro [Escitalopram Oxalate] Other (See Comments)     Crazy dream hallucinations      Morphine      Makes him angry    Prozac [Fluoxetine Hcl]      Irritable        Wellbutrin [Bupropion] Other (See Comments)     Crazy dream halluciantions       Minimum Predicted Vital Capacity:               Actual Vital Capacity:                    Pulmonary History:smoker  Home Oxygen Therapy:  room air  Home Respiratory Therapy:albuterol/spiriva   Current Respiratory Therapy:  Albuterol prn          Respiratory Severity Index(RSI)   Patients with orders for inhalation medications, oxygen, or any therapeutic treatment modality will be placed on Respiratory Protocol. They will be assessed with the first treatment and at least every 72 hours thereafter. The following severity scale will be used to determine frequency of treatment intervention.     Smoking History: Pulmonary Disease or Smoking History, Greater than 15 pack year = 2    Social History  Social History     Tobacco Use    Smoking status: Current Every Day Smoker     Packs/day: 1.00     Years: 33.00     Pack years: 33.00     Types: Cigarettes    Smokeless tobacco: Former User     Quit date: 2015   Substance Use Topics    Alcohol use: Yes     Comment: socially     Drug use: Yes     Types: Marijuana, Other-see comments, Opiates      Comment: no opiates does take xanx       Recent Surgical History: None = 0  Past Surgical History  Past Surgical History:   Procedure Laterality Date    ARM SURGERY Left 12/10/2015    BACK SURGERY      COLONOSCOPY      LUNG SURGERY      partial removal (right) agent orange    SPINE SURGERY  1995    lumbar laminectomy       Level of Consciousness: Alert, Oriented, and Cooperative = 0    Level of Activity: Walking unassisted = 0    Respiratory Pattern: Regular Pattern; RR 8-20 = 0    Breath Sounds: Diminshed bilaterally and/or crackles = 2    Sputum   ,  ,    Cough: Strong, spontaneous, non-productive = 0    Vital Signs   BP (!) 144/86   Pulse 135   Temp 99.1 °F (37.3 °C) (Oral)   Resp 18   Ht 6' 1\" (1.854 m)   Wt 260 lb (117.9 kg)   SpO2 97%   BMI 34.30 kg/m²   SPO2 (COPD values may differ): Greater than or equal to 92% on room air = 0    Peak Flow (asthma only): not applicable = 0    RSI: 0-4 = See once and convert to home regimen or discontinue        Plan       Goals: medication delivery    Patient/caregiver was educated on the proper method of use for Respiratory Care Devices:  Yes      Level of patient/caregiver understanding able to:   ? Verbalize understanding   ? Demonstrate understanding       ? Teach back        ? Needs reinforcement       ? No available caregiver               ? Other:     Response to education:  Good     Is patient being placed on Home Treatment Regimen? Yes     Does the patient have everything they need prior to discharge? NA     Comments: chart reviewed and patient assessed    Plan of Care: home regimen    Electronically signed by Angel Monsalve RCP on 8/2/2020 at 1:12 AM    Respiratory Protocol Guidelines     1. Assessment and treatment by Respiratory Therapy will be initiated for medication and therapeutic interventions upon initiation of aerosolized medication. 2. Physician will be contacted for respiratory rate (RR) greater than 35 breaths per minute. Therapy will be held for heart rate (HR) greater than 140 beats per minute, pending direction from physician.   3. Bronchodilators will be administered via Metered Dose Inhaler (MDI) with spacer when the following criteria are met:  a. Alert and cooperative     b. HR < 140 bpm  c. RR < 30 bpm                d. Can demonstrate a 2-3 second inspiratory hold  4. Bronchodilators will be administered via Hand Held Nebulizer [facility]) to patients when ANY of the following criteria are met  a. Incognizant or uncooperative          b. Patients treated with HHN at Home        c. Unable to demonstrate proper use of MDI with spacer     d. RR > 30 bpm   5. Bronchodilators will be delivered via Metered Dose Inhaler (MDI), HHN, Aerogen to intubated patients on mechanical ventilation. 6. Inhalation medication orders will be delivered and/or substituted as outlined below. Aerosolized Medications Ordering and Administration Guidelines:    1. All Medications will be ordered by a physician, and their frequency and/or modality will be adjusted as defined by the patients Respiratory Severity Index (RSI) score. 2. If the patient does not have documented COPD, consider discontinuing anticholinergics when RSI is less than 9.  3. If the bronchospasm worsens (increased RSI), then the bronchodilator frequency can be increased to a maximum of every 4 hours. If greater than every 4 hours is required, the physician will be contacted. 4. If the bronchospasm improves, the frequency of the bronchodilator can be decreased, based on the patient's RSI, but not less than home treatment regimen frequency. 5. Bronchodilator(s) will be discontinued if patient has a RSI less than 9 and has received no scheduled or as needed treatment for 72  Hrs. Patients Ordered on a Mucolytic Agent:    1. Must always be administered with a bronchodilator. 2. Discontinue if patient experiences worsened bronchospasm, or secretions have lessened to the point that the patient is able to clear them with a cough. Anti-inflammatory and Combination Medications:    1.  If the patient lacks prior history of lung disease, is not using inhaled anti-inflammatory medication at home, and lacks wheezing by examination or by history for at least 24 hours, contact physician for possible discontinuation.

## 2020-08-03 ENCOUNTER — TELEPHONE (OUTPATIENT)
Dept: FAMILY MEDICINE CLINIC | Age: 50
End: 2020-08-03

## 2020-08-10 ENCOUNTER — OFFICE VISIT (OUTPATIENT)
Dept: FAMILY MEDICINE CLINIC | Age: 50
End: 2020-08-10
Payer: COMMERCIAL

## 2020-08-10 VITALS
HEART RATE: 102 BPM | SYSTOLIC BLOOD PRESSURE: 124 MMHG | OXYGEN SATURATION: 95 % | RESPIRATION RATE: 19 BRPM | DIASTOLIC BLOOD PRESSURE: 84 MMHG | TEMPERATURE: 97.7 F | WEIGHT: 257 LBS | BODY MASS INDEX: 33.91 KG/M2

## 2020-08-10 PROBLEM — J44.1 ACUTE EXACERBATION OF EMPHYSEMA: Status: ACTIVE | Noted: 2020-08-10

## 2020-08-10 PROBLEM — F33.1 MODERATE EPISODE OF RECURRENT MAJOR DEPRESSIVE DISORDER (HCC): Status: ACTIVE | Noted: 2020-08-10

## 2020-08-10 PROBLEM — J43.9 ACUTE EXACERBATION OF EMPHYSEMA (HCC): Status: ACTIVE | Noted: 2020-08-10

## 2020-08-10 PROCEDURE — 1111F DSCHRG MED/CURRENT MED MERGE: CPT | Performed by: PHYSICIAN ASSISTANT

## 2020-08-10 PROCEDURE — 99214 OFFICE O/P EST MOD 30 MIN: CPT | Performed by: PHYSICIAN ASSISTANT

## 2020-08-10 RX ORDER — VARENICLINE TARTRATE 1 MG/1
TABLET, FILM COATED ORAL
Qty: 60 TABLET | Refills: 5 | Status: SHIPPED | OUTPATIENT
Start: 2020-08-10 | End: 2021-03-30

## 2020-08-10 NOTE — PROGRESS NOTES
Post-Discharge Transitional Care Management Services or Hospital Follow Up      Roxanne Cabrales II   YOB: 1970    Date of Office Visit:  8/10/2020  Date of Hospital Admission: 8/1/20  Date of Hospital Discharge: 8/2/20  Risk of hospital readmission (high >=14%.  Medium >=10%) :No data recorded    Care management risk score Rising risk (score 2-5) and Complex Care (Scores >=6): 9     Non face to face  following discharge, date last encounter closed (first attempt may have been earlier): 8/3/2020  9:27 AM    Call initiated 2 business days of discharge: Yes    Patient Active Problem List   Diagnosis    Allergic rhinitis    Hypertension    ED (erectile dysfunction)    Backache    Insomnia    MDD (major depressive disorder)    GERD (gastroesophageal reflux disease)    Anxiety    Chronic abdominal pain    Malrotation, congenital    History of pancreatitis    Constipation    SOB (shortness of breath) on exertion    Coronary artery disease involving native coronary artery of native heart with unstable angina pectoris ([address]    Family history of Ha's esophagus    Esophagitis    Polyp of colon    Ha esophagus    Acute abdominal pain    Attention deficit hyperactivity disorder (ADHD)    Attention-deficit hyperactivity disorder, predominantly hyperactive type    Benign essential hypertension    Duodenitis    Low back pain with right-sided sciatica    Thoracic or lumbosacral neuritis or radiculitis, unspecified    Myalgia and myositis    Postlaminectomy syndrome, lumbar region    Tobacco use disorder    Pain and swelling of right lower leg    Atypical chest pain    Chest pain    Pneumonia due to organism    Hypoxia    HUSSAIN (obstructive sleep apnea)    Acute exacerbation of emphysema (HCC)    Moderate episode of recurrent major depressive disorder ([address]       Allergies   Allergen Reactions    Lexapro [Escitalopram Oxalate] Other (See Comments)     Crazy dream hallucinations      Morphine      Makes him angry    Prozac [Fluoxetine Hcl]      Irritable        Wellbutrin [Bupropion] Other (See Comments)     Crazy dream halluciantions       Medications listed as ordered at the time of discharge from hospital   [address] Medication Instructions GISSEL:    Printed on:08/10/20 9686   Medication Information                      albuterol sulfate HFA (VENTOLIN HFA) 108 (90 Base) MCG/ACT inhaler  Inhale 2 puffs into the lungs every 6 hours as needed for Wheezing or Shortness of Breath             aspirin 81 MG tablet  Take 81 mg by mouth daily             atorvastatin (LIPITOR) 40 MG tablet  TAKE ONE TABLET BY MOUTH DAILY             busPIRone (BUSPAR) 10 MG tablet  TAKE TWO TABLETS BY MOUTH TWICE A DAY             cyclobenzaprine (FLEXERIL) 10 MG tablet  Take One Tablet Every 8 Hours As Needed For Muscle Spasms             dicyclomine (BENTYL) 10 MG capsule  Take 1 capsule by mouth every 6 hours as needed (cramps)             diphenhydrAMINE (BENADRYL) 25 MG tablet  Take 25 mg by mouth every 6 hours as needed for Itching Pt report takes for upset stomach in AM as needed             DULoxetine (CYMBALTA) 60 MG extended release capsule  TAKE ONE CAPSULE BY MOUTH TWICE A DAY             furosemide (LASIX) 20 MG tablet  Take 1 tablet by mouth daily             metoprolol succinate (TOPROL XL) 50 MG extended release tablet  TAKE ONE TABLET BY MOUTH DAILY             ondansetron (ZOFRAN) 4 MG tablet  Take 1 tablet by mouth every 8 hours as needed for Nausea or Vomiting             pantoprazole (PROTONIX) 40 MG tablet  TAKE ONE TABLET BY MOUTH TWICE A DAY             potassium chloride (KLOR-CON M) 20 MEQ extended release tablet  Take 1 tablet by mouth daily             sildenafil (REVATIO) 20 MG tablet  Take 3 tablets by mouth daily as needed (ED)             SPIRIVA RESPIMAT 2.5 MCG/ACT AERS inhaler  INHALE TWO PUFFS BY MOUTH DAILY             traZODone (DESYREL) 50 MG side of his throat for several months. No swelling or LAD identified by the pt. A comprehensive review of systems was negative except for what was noted in the HPI. Vitals:    08/10/20 1409   BP: 124/84   Site: Left Upper Arm   Position: Sitting   Cuff Size: Large Adult   Pulse: 102   Resp: 19   Temp: 97.7 °F (36.5 °C)   TempSrc: Oral   SpO2: 95%   Weight: 257 lb (116.6 kg)     Body mass index is 33.91 kg/m². Wt Readings from Last 3 Encounters:   08/10/20 257 lb (116.6 kg)   08/01/20 260 lb (117.9 kg)   07/31/20 260 lb (117.9 kg)     BP Readings from Last 3 Encounters:   08/10/20 124/84   08/02/20 128/80   07/31/20 132/83        Physical Exam:  General Appearance: alert and oriented to person, place and time, well-developed and well-nourished, in no acute distress  Skin: warm and dry, no rash or erythema  Head: normocephalic and atraumatic  Pulmonary/Chest: clear to auscultation bilaterally- no wheezes, rales or rhonchi, normal air movement, no respiratory distress  Cardiovascular: normal rate, normal S1 and S2 and no gallops  Abdomen: soft, non-tender, non-distended, normal bowel sounds, no masses or organomegaly  Extremities: No swelling or edema noted    Assessment/Plan:  1. Dysphagia, unspecified type  - [place] ESOPHAGRAM; Future    2. Suspected sleep apnea  - Jennifer Samayoa MD, Pulmonary, [facility]  - [place] DISCHARGE MEDS RECONCILED W/ CURRENT OUTPATIENT MED LIST    3. Screening cholesterol level  - LIPID PANEL; Future    4. Lower extremity edema  -  Ok to take lasix PRN with K+.  - BASIC METABOLIC PANEL; Future  - [place] DISCHARGE MEDS RECONCILED W/ CURRENT OUTPATIENT MED LIST    5.  Pulmonary emphysema, unspecified emphysema type ([facility]  -  Pt requiring Nebulizer for treatment of emphysema  - DME Order for (Specify) as OP        Medical Decision Making: moderate complexity

## 2020-08-21 NOTE — TELEPHONE ENCOUNTER
.  Last office visit 8/10/2020     Last written 5- 120 with 2      Next office visit scheduled 9/15/2020    Requested Prescriptions     Pending Prescriptions Disp Refills    busPIRone (BUSPAR) 10 MG tablet [Pharmacy Med Name: busPIRone HCL 10 MG TABLET] 120 tablet 1     Sig: TAKE TWO TABLETS BY MOUTH TWICE A DAY

## 2020-08-23 RX ORDER — BUSPIRONE HYDROCHLORIDE 10 MG/1
TABLET ORAL
Qty: 120 TABLET | Refills: 1 | Status: SHIPPED | OUTPATIENT
Start: 2020-08-23 | End: 2020-11-09

## 2020-08-26 RX ORDER — ATORVASTATIN CALCIUM 40 MG/1
TABLET, FILM COATED ORAL
Qty: 90 TABLET | Refills: 1 | Status: CANCELLED | OUTPATIENT
Start: 2020-08-26

## 2020-08-26 NOTE — TELEPHONE ENCOUNTER
Last office visit 8/10/2020     Last written 1- x 1 refill    Next office visit scheduled 9/15/2020    Requested Prescriptions     Pending Prescriptions Disp Refills    atorvastatin (LIPITOR) 40 MG tablet 90 tablet 1     Sig: Take One Tablet Nightly

## 2020-08-27 RX ORDER — ATORVASTATIN CALCIUM 40 MG/1
40 TABLET, FILM COATED ORAL DAILY
Qty: 90 TABLET | Refills: 1 | Status: SHIPPED | OUTPATIENT
Start: 2020-08-27 | End: 2021-02-17

## 2020-08-27 NOTE — TELEPHONE ENCOUNTER
.  Last office visit 8/10/2020     Last written 1-20-20 90 with 1      Next office visit scheduled 9/15/2020    Requested Prescriptions      No prescriptions requested or ordered in this encounter

## 2020-08-27 NOTE — TELEPHONE ENCOUNTER
----- Message from [place] Martravinder sent at 8/27/2020  2:16 PM EDT -----  Subject: Refill Request    QUESTIONS  Name of Medication? atorvastatin (LIPITOR) 40 MG tablet  Patient-reported dosage and instructions? na  How many days do you have left? 0  Preferred Pharmacy? [facility] [address] [address] [facility]  Pharmacy phone number (if available)? [phone]  Additional Information for Provider?   ---------------------------------------------------------------------------  --------------  CALL BACK INFO  What is the best way for the office to contact you? OK to leave message on   voicemail  Preferred Call Back Phone Number?  [phone]

## 2020-09-21 ENCOUNTER — TELEPHONE (OUTPATIENT)
Dept: FAMILY MEDICINE CLINIC | Age: 50
End: 2020-09-21

## 2020-09-21 ENCOUNTER — OFFICE VISIT (OUTPATIENT)
Dept: FAMILY MEDICINE CLINIC | Age: 50
End: 2020-09-21
Payer: COMMERCIAL

## 2020-09-21 VITALS
TEMPERATURE: 97.4 F | DIASTOLIC BLOOD PRESSURE: 76 MMHG | HEIGHT: 72 IN | BODY MASS INDEX: 34.27 KG/M2 | SYSTOLIC BLOOD PRESSURE: 108 MMHG | WEIGHT: 253 LBS | HEART RATE: 112 BPM | OXYGEN SATURATION: 97 %

## 2020-09-21 DIAGNOSIS — M79.89 LEG SWELLING: ICD-10-CM

## 2020-09-21 DIAGNOSIS — R53.83 FATIGUE, UNSPECIFIED TYPE: ICD-10-CM

## 2020-09-21 DIAGNOSIS — E78.5 HYPERLIPIDEMIA, UNSPECIFIED HYPERLIPIDEMIA TYPE: ICD-10-CM

## 2020-09-21 LAB
ANION GAP SERPL CALCULATED.3IONS-SCNC: 11 MMOL/L (ref 3–16)
BUN BLDV-MCNC: 13 MG/DL (ref 7–20)
CALCIUM SERPL-MCNC: 9.4 MG/DL (ref 8.3–10.6)
CHLORIDE BLD-SCNC: 107 MMOL/L (ref 99–110)
CHOLESTEROL, TOTAL: 165 MG/DL (ref 0–199)
CO2: 21 MMOL/L (ref 21–32)
CREAT SERPL-MCNC: 0.7 MG/DL (ref 0.9–1.3)
GFR AFRICAN AMERICAN: >60
GFR NON-AFRICAN AMERICAN: >60
GLUCOSE BLD-MCNC: 99 MG/DL (ref 70–99)
HDLC SERPL-MCNC: 28 MG/DL (ref 40–60)
LDL CHOLESTEROL CALCULATED: ABNORMAL MG/DL
LDL CHOLESTEROL DIRECT: 101 MG/DL
POTASSIUM SERPL-SCNC: 4.1 MMOL/L (ref 3.5–5.1)
SODIUM BLD-SCNC: 139 MMOL/L (ref 136–145)
TRIGL SERPL-MCNC: 326 MG/DL (ref 0–150)
TSH SERPL DL<=0.05 MIU/L-ACNC: 1.13 UIU/ML (ref 0.27–4.2)
VLDLC SERPL CALC-MCNC: ABNORMAL MG/DL

## 2020-09-21 PROCEDURE — 99214 OFFICE O/P EST MOD 30 MIN: CPT | Performed by: PHYSICIAN ASSISTANT

## 2020-09-21 PROCEDURE — 4004F PT TOBACCO SCREEN RCVD TLK: CPT | Performed by: PHYSICIAN ASSISTANT

## 2020-09-21 PROCEDURE — 90686 IIV4 VACC NO PRSV 0.5 ML IM: CPT | Performed by: PHYSICIAN ASSISTANT

## 2020-09-21 PROCEDURE — G8926 SPIRO NO PERF OR DOC: HCPCS | Performed by: PHYSICIAN ASSISTANT

## 2020-09-21 PROCEDURE — 3023F SPIROM DOC REV: CPT | Performed by: PHYSICIAN ASSISTANT

## 2020-09-21 PROCEDURE — G8427 DOCREV CUR MEDS BY ELIG CLIN: HCPCS | Performed by: PHYSICIAN ASSISTANT

## 2020-09-21 PROCEDURE — G8417 CALC BMI ABV UP PARAM F/U: HCPCS | Performed by: PHYSICIAN ASSISTANT

## 2020-09-21 PROCEDURE — 90471 IMMUNIZATION ADMIN: CPT | Performed by: PHYSICIAN ASSISTANT

## 2020-09-21 RX ORDER — METOPROLOL SUCCINATE 50 MG/1
TABLET, EXTENDED RELEASE ORAL
Qty: 90 TABLET | Refills: 1 | Status: SHIPPED | OUTPATIENT
Start: 2020-09-21 | End: 2021-04-27

## 2020-09-21 RX ORDER — HYDROCHLOROTHIAZIDE 25 MG/1
25 TABLET ORAL PRN
COMMUNITY
Start: 2020-08-21 | End: 2020-10-17 | Stop reason: ALTCHOICE

## 2020-09-21 RX ORDER — METHOCARBAMOL 500 MG/1
500 TABLET, FILM COATED ORAL 4 TIMES DAILY
Qty: 40 TABLET | Refills: 0 | Status: SHIPPED | OUTPATIENT
Start: 2020-09-21 | End: 2020-11-30

## 2020-09-21 RX ORDER — CEPHALEXIN 500 MG/1
500 CAPSULE ORAL 4 TIMES DAILY
Qty: 28 CAPSULE | Refills: 0 | Status: SHIPPED | OUTPATIENT
Start: 2020-09-21 | End: 2020-09-28

## 2020-09-21 RX ORDER — ONDANSETRON 4 MG/1
4 TABLET, FILM COATED ORAL EVERY 8 HOURS PRN
Qty: 40 TABLET | Refills: 2 | Status: SHIPPED | OUTPATIENT
Start: 2020-09-21 | End: 2021-05-27 | Stop reason: SDUPTHER

## 2020-09-21 NOTE — PROGRESS NOTES
Vaccine Information Sheet, \"Influenza - Inactivated\"  given to Kathleen Suarez, or parent/legal guardian of  Kathleen Suarez and verbalized understanding. Patient responses:    Have you ever had a reaction to a flu vaccine? No  Are you able to eat eggs without adverse effects? Yes  Do you have any current illness? No  Have you ever had [place] Syndrome? No    Flu vaccine given per order. Please see immunization tab.

## 2020-09-21 NOTE — PROGRESS NOTES
2020  Brock Valentin II (: 1970)  53 y.o.      HPI  Hospitalized 2020 with acute hypoxic respiratory failure 2/2 to pulmonary emphysema. At hospital follow up appointment referred for esophagam for dysphagia, to pulm for sleep study. Has not completed. Breathing has improved. Stable on inhalers. Essential hypertension: stable on valsartan, metoprolol and lasix. Denies cp, soa, orthopnea. Does have bonds but attributes to COPD. Anxiety and depression: stable on cymbalta and buspar. Uses trazodone for sleep with good response. Denies si/hi. Normal appetite. Patient with right hip pain. Acute on chronic. Radiates down right leg. Difficult to sleep at times. Denies weakness. occasional tingling. On cymbalta with some control. \"Ingrown hair. \" on left chin. Has been there for 2 days, tried to \"pop it\" and it has grown in size. Patient reports some purulent discharge. Denies fever, chills, and lymphadenopathy. Review of Systems   Constitutional: Positive for fatigue. Respiratory: Positive for shortness of breath. Cardiovascular: Positive for leg swelling. Musculoskeletal: Positive for arthralgias and back pain. Skin: Positive for wound (chin). Allergies, past medical history, family history, and social history reviewed and unchanged from previous encounter.      Current Outpatient Medications   Medication Sig Dispense Refill    ondansetron (ZOFRAN) 4 MG tablet Take 1 tablet by mouth every 8 hours as needed for Nausea or Vomiting 40 tablet 2    metoprolol succinate (TOPROL XL) 50 MG extended release tablet TAKE ONE TABLET BY MOUTH DAILY 90 tablet 1    methocarbamol (ROBAXIN) 500 MG tablet Take 1 tablet by mouth 4 times daily for 10 days 40 tablet 0    atorvastatin (LIPITOR) 40 MG tablet Take 1 tablet by mouth daily 90 tablet 1    busPIRone (BUSPAR) 10 MG tablet TAKE TWO TABLETS BY MOUTH TWICE A  tablet 1    varenicline (CHANTIX) 1 MG tablet TAKE ONE TABLET BY MOUTH TWICE A DAY 60 tablet 5    potassium chloride (KLOR-CON M) 20 MEQ extended release tablet Take 1 tablet by mouth daily 30 tablet 0    DULoxetine (CYMBALTA) 60 MG extended release capsule TAKE ONE CAPSULE BY MOUTH TWICE A DAY 60 capsule 5    traZODone (DESYREL) 50 MG tablet TAKE TWO TABLETS BY MOUTH ONCE NIGHTLY 60 tablet 2    SPIRIVA RESPIMAT 2.5 MCG/ACT AERS inhaler INHALE TWO PUFFS BY MOUTH DAILY 1 Inhaler 5    albuterol sulfate HFA (VENTOLIN HFA) 108 (90 Base) MCG/ACT inhaler Inhale 2 puffs into the lungs every 6 hours as needed for Wheezing or Shortness of Breath 1 Inhaler 6    dicyclomine (BENTYL) 10 MG capsule Take 1 capsule by mouth every 6 hours as needed (cramps) 180 capsule 1    sildenafil (REVATIO) 20 MG tablet Take 3 tablets by mouth daily as needed (ED) 30 tablet 5    diphenhydrAMINE (BENADRYL) 25 MG tablet Take 25 mg by mouth every 6 hours as needed for Itching Pt report takes for upset stomach in AM as needed      aspirin 81 MG tablet Take 81 mg by mouth daily      valsartan (DIOVAN) 80 MG tablet TAKE ONE TABLET BY MOUTH DAILY 90 tablet 1    pantoprazole (PROTONIX) 40 MG tablet TAKE ONE TABLET BY MOUTH TWICE A  tablet 1    furosemide (LASIX) 20 MG tablet Take 1 tablet by mouth daily 30 tablet 0    hydroCHLOROthiazide (HYDRODIURIL) 25 MG tablet Take 25 mg by mouth as needed       No current facility-administered medications for this visit. Vitals:    09/21/20 1524   BP: 108/76   Site: Left Upper Arm   Position: Sitting   Cuff Size: Large Adult   Pulse: 112   Temp: 97.4 °F (36.3 °C)   TempSrc: Oral   SpO2: 97%  Comment: RA   Weight: 253 lb (114.8 kg)   Height: 6' 0.05\" (1.83 m)     Estimated body mass index is 34.27 kg/m² as calculated from the following:    Height as of this encounter: 6' 0.05\" (1.83 m). Weight as of this encounter: 253 lb (114.8 kg). Physical Exam  Constitutional:       General: He is not in acute distress. Appearance: He is well-developed.    HENT: Future    Pulmonary emphysema, unspecified emphysema type ([facility]    Other orders  -     ondansetron (ZOFRAN) 4 MG tablet; Take 1 tablet by mouth every 8 hours as needed for Nausea or Vomiting      Return in about 3 months (around 12/21/2020) for HTN.

## 2020-09-21 NOTE — TELEPHONE ENCOUNTER
While patient was in office today Jass Martin asked that I call to schedule his Esophagram and appointment with [id number] [facility] Pulmonary. Ok per patient to schedule in the [facility] location for any day with a preferred time of late morning or early afternoon. [id number] [address] per verbal from patient to leave detailed message regarding this if he doesn't answer. [employer] and spoke with [employer]. Pt scheduled for Esophagram on Tues 9/29/20 at [facility] with an arrival time of 11:00 am.  Pt must be NPO midnight the night before. Come to main entrance of hospital upon arrival and bring a list of medications, insurance card and photo ID. He can brush his teeth and take morning meds with a sip of water only. Left detailed message informing patient and told him to call us if he has any questions at all. Also, tried calling to schedule his Pulmonary appointment, but it was after 4 pm and the office was closed. Will try again tomorrow. Informed pt of this with message about esophagram information.

## 2020-09-23 ENCOUNTER — TELEPHONE (OUTPATIENT)
Dept: PULMONOLOGY | Age: 50
End: 2020-09-23

## 2020-09-23 NOTE — TELEPHONE ENCOUNTER
Spoke with Mami Adame at [address] [facility]. Patient is already established with Dr. Helga Geronimo. Since they are still not seeing patients in the office yet, scheduled for virtual visit tomorrow 9/24/20 at 2:30 pm. Mami Adame informed I will call patient to give him a heads up, but then she will call him around 9:30 am to give him more information regarding tomorrow's appointment. Spoke with patient and apologized for not getting back with him yesterday, as it was my off day, but he says he did get my message from Monday with the information for his Esophagram.  Wally Denier over everything again in detail about the esophagram.  Also, informed him about his virtual visit tomorrow with Dr. Helga Geronimo and to expect a phone call from Mami Adame within the hour.

## 2020-09-23 NOTE — TELEPHONE ENCOUNTER
Within this Telehealth Consent, the terms you and yours refer to the person using the Telehealth Service (Service), or in the case of a use of the Service by or on behalf of a minor, you and yours refer to and include (i) the parent or legal guardian who provides consent to the use of the Service by such minor or uses the Service on behalf of such minor, and (ii) the minor for whom consent is being provided or on whose behalf the Service is being utilized. When using Service, you will be consulting with your health care providers via the use of Telehealth.   Telehealth involves the delivery of healthcare services using electronic communications, information technology or other means between a healthcare provider and a patient who are not in the same physical location. Telehealth may be used for diagnosis, treatment, follow-up and/or patient education, and may include, but is not limited to, one or more of the following:    Electronic transmission of medical records, photo images, personal health information or other data between a patient and a healthcare provider    Interactions between a patient and healthcare provider via audio, video and/or data communications    Use of output data from medical devices, sound and video files    Anticipated Benefits   The use of Telehealth by your Provider(s) through the Service may have the following possible benefits:    Making it easier and more efficient for you to access medical care and treatment for the conditions treated by such Provider(s) utilizing the Service    Allowing you to obtain medical care and treatment by Provider(s) at times that are convenient for you    Enabling you to interact with Provider(s) without the necessity of an in-office appointment     Possible Risks   While the use of Telehealth can provide potential benefits for you, there are also potential risks associated with the use of Telehealth.  These risks include, but may not be limited to the following:    Your Provider(s) may not able to provide medical treatment for your particular condition and you may be required to seek alternative healthcare or emergency care services.  The electronic systems or other security protocols or safeguards used in the Service could fail, causing a breach of privacy of your medical or other information.  Given regulatory requirements in certain jurisdictions, your Provider(s) diagnosis and/or treatment options, especially pertaining to certain prescriptions, may be limited. Acceptance   1. You understand that Services will be provided via Telehealth. This process involves the use of HIPAA compliant and secure, real-time audio-visual interfacing with a qualified and appropriately trained provider located at [facility]. 2. You understand that, under no circumstances, will this session be recorded. 3. You understand that the Provider(s) at [facility] and other clinical participants will be party to the information obtained during the Telehealth session in accordance with best medical practices. 4. You understand that the information obtained during the Telehealth session will be used to help determine the most appropriate treatment options. 5. You understand that You have the right to revoke this consent at any point in time. 6. You understand that Telehealth is voluntary, and that continued treatment is not dependent upon consent. 7. You understand that, in the event of non-consent to Telehealth services and/or technical difficulties, you will obtain services as typically provided in the absence of Telehealth technology. 8. You understand that this consent will be kept in Your medical record. 9. No potential benefits from the use of Telehealth or specific results can be guaranteed. Your condition may not be cured or improved and, in some cases, may get worse.    10. There are limitations in the provision of medical care and treatment via Telehealth and the Service and you may not be able to receive diagnosis and/or treatment through the Service for every condition for which you seek diagnosis and/or treatment. 11. There are potential risks to the use of Telehealth, including but not limited to the risks described in this Telehealth Consent. 12. Your Provider(s) have discussed the use of Telehealth and the Service with you, including the benefits and risks of such and you have provided oral consent to your Provider(s) for the use of Telehealth and the Service. 15. You understand that it is your duty to provide your Provider(s) truthful, accurate and complete information, including all relevant information regarding care that you may have received or may be receiving from other healthcare providers outside of the Service. 14. You understand that each of your Provider(s) may determine in his or sole discretion that your condition is not suitable for diagnosis and/or treatment using the Service, and that you may need to seek medical care and treatment a specialist or other healthcare provider, outside of the Service. 15. You understand that you are fully responsible for payment for all services provided by Provider(s) or through use of the Service and that you may not be able to use third-party insurance. 16. You represent that (a) you have read this Telehealth Consent carefully, (b) you understand the risks and benefits of the Service and the use of Telehealth in the medical care and treatment provided to you by Provider(s) using the Service, and (c) you have the legal capacity and authority to provide this consent for yourself and/or the minor for which you are consenting under applicable federal and state laws, including laws relating to the age of [de-identified] and/or parental/guardian consent.    17. You give your informed consent to the use of Telehealth by Provider(s) using the Service under the terms described in the Terms of Service and this Telehealth Consent. The patient was read the following statement and has consented to the visit as of 9/23/20. The patient has been scheduled for their first telehealth visit on 9/24/20 with Dr. Gee Rizvi.

## 2020-09-24 ENCOUNTER — VIRTUAL VISIT (OUTPATIENT)
Dept: PULMONOLOGY | Age: 50
End: 2020-09-24
Payer: COMMERCIAL

## 2020-09-24 PROCEDURE — 99214 OFFICE O/P EST MOD 30 MIN: CPT | Performed by: INTERNAL MEDICINE

## 2020-09-24 ASSESSMENT — SLEEP AND FATIGUE QUESTIONNAIRES
HOW LIKELY ARE YOU TO NOD OFF OR FALL ASLEEP WHILE SITTING AND TALKING TO SOMEONE: 0
HOW LIKELY ARE YOU TO NOD OFF OR FALL ASLEEP WHILE LYING DOWN TO REST IN THE AFTERNOON WHEN CIRCUMSTANCES PERMIT: 3
HOW LIKELY ARE YOU TO NOD OFF OR FALL ASLEEP WHILE WATCHING TV: 3
ESS TOTAL SCORE: 11
HOW LIKELY ARE YOU TO NOD OFF OR FALL ASLEEP WHILE SITTING INACTIVE IN A PUBLIC PLACE: 0
HOW LIKELY ARE YOU TO NOD OFF OR FALL ASLEEP IN A CAR, WHILE STOPPED FOR A FEW MINUTES IN TRAFFIC: 0
HOW LIKELY ARE YOU TO NOD OFF OR FALL ASLEEP WHILE SITTING QUIETLY AFTER LUNCH WITHOUT ALCOHOL: 3
HOW LIKELY ARE YOU TO NOD OFF OR FALL ASLEEP WHILE SITTING AND READING: 2
HOW LIKELY ARE YOU TO NOD OFF OR FALL ASLEEP WHEN YOU ARE A PASSENGER IN A CAR FOR AN HOUR WITHOUT A BREAK: 0

## 2020-09-24 NOTE — PROGRESS NOTES
[facility]                                                            TELEHEALTH EVALUATION: Service [facility] and not a face-to-face visit           CHIEF COMPLAINT: Sleep apnea evaluation      HPI:   Snoring at night for > 5 years. Mild. Not sure what makes better or worse. + witnessed apnea. Has daytime sleepiness, fatigue and tiredness. Hypoxia at night per his family when they check hi O2 sat while a sleep. Goes to bed 10-11 pm and gets up 8-9 am. Sleep onset is 30-45 min. Occasional alcohol. 1 nap/day for 2 hrs. ESS 11. Smoking 1/2 ppd   Not needing to use the rescue inhaler- Albuterol twice a week   Uses Spiriva on and off   Some cough with yellow sputum  No hemoptysis         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    Chronic back pain     ED (erectile dysfunction) 12/29/2011    GERD (gastroesophageal reflux disease)     Hypertension     Pleurisy        Past Surgical History:        Procedure Laterality Date    ARM SURGERY Left 12/10/2015    BACK SURGERY      COLONOSCOPY      LUNG SURGERY      partial removal (right) agent orange    SPINE SURGERY  1995    lumbar laminectomy       Allergies:  is allergic to lexapro [escitalopram oxalate]; morphine; prozac [fluoxetine hcl]; and wellbutrin [bupropion]. Social History:    TOBACCO:   reports that he has been smoking cigarettes. He has a 33.00 pack-year smoking history. He quit smokeless tobacco use about 4 years ago. ETOH:   reports current alcohol use.       Family History:       Problem Relation Age of Onset    Arthritis Mother     Other Mother         pneumothorax several times    Asthma Father        Current Medications:    Current Outpatient Medications:     ondansetron (ZOFRAN) 4 MG tablet, Take 1 tablet by needed (ED), Disp: 30 tablet, Rfl: 5    diphenhydrAMINE (BENADRYL) 25 MG tablet, Take 25 mg by mouth every 6 hours as needed for Itching Pt report takes for upset stomach in AM as needed, Disp: , Rfl:     aspirin 81 MG tablet, Take 81 mg by mouth daily, Disp: , Rfl:     Objective:   PHYSICAL EXAM:    There were no vitals taken for this visit.' on RA  O2 Sat:  HR:  BP:  RR:  Temperature:  Neck size: 17 inches   Mallampati class IV  Constitutional:  No acute distress. Appears well developed and nourished. Eyes: No sclera icterus. EOM intact. No visible discharge. HENT: Head is normocephalic and atraumatic. Mucus membranes are moist and the tongue appears normal. Normal appearing nose. External Ears normal.   Neck: No visualized mass. [place] is midline   Resp: No accessory muscle use. Respiratory effort normal. No visualized signs of difficulty breathing or respiratory distress. Cardiovascular: No LE edema per patient's report   Musculoskeletal: No signs of ataxia. Normal range of motion of the neck. Skin: No significant exanthematous lesions or discoloration noted on facial skin    Neuro: Awake. Alert. Able to follow commands. No facial asymmetry. No gaze palsy. Psych: No agitation. Normal affect. No hallucinations. Oriented to person/time/place. No anxiety. Normal judgement and insight. DATA reviewed by me:   PFTs FVC(%) FEV1(%) FEV1/FVC% TLC(%) DLCO (%) 6MW F Res Ex    CTPA 9/24/2019 imaging reviewed by me and showed  Negative for pulmonary embolism  Basilar groundglass opacities    08/01/2020  images reviewed by me and showed:   No acute cardiopulmonary disease           Assessment:       · Snoring, hypersomnia and witnessed apnea   · Nocturnal hypxoia- probable HUSSAIN   · Pulmonary emphysema   · Abnormal CT chest 9/24/2019- favor dependent atelectasis  · Pulmonary nodules on CT chest 11/2/2017, largest 6.6 mm. DDx granuloma versus intrapulmonary lymph node versus malignancy. Stable/imprtoving on repeat CT chest  · Recurrent PTX post VATS 1995   · >33 pack year smoking   · UDS positive for Benzodiazepine and Marijuana- buy xanax off the street he said    · On Suboxon       Plan:       PSG evaluate for sleep related breathing disorder. Treatment options were discussed with patient if PSG reveals HUSSAIN, including CPAP therapy, oral appliances, upper airway surgery and hypoglossal nerve stimulation. Patient is in favor of CPAP therapy. Discussed with patient the pathophysiology of apnea. Sleep hygiene  Avoid sedatives, alcohol and caffeinated drinks at bed time. No driving motorized vehicles or operating heavy machinery while fatigue, drowsy or sleepy. Weight loss is also recommended as a long-term intervention. Complications of HUSSAIN if not treated were discussed with patient patient to include systemic hypertension, pulmonary hypertension, cardiovascular morbidities, car accidents and all cause mortality. Continue Spiriva and Albuterol   Patient is up to date with Pneumococcal vaccine and influenza vaccine. Smoking cessation counseling                Maribel Quintero is a 53 y.o. male being evaluated by a Virtual Visit (video visit) encounter to address concerns as mentioned above. A caregiver was present when appropriate. Due to this being a TeleHealth encounter (During [facility] public health emergency), evaluation of the following organ systems was limited: Vitals/Constitutional/EENT/Resp/CV/GI//MS/Neuro/Skin/Heme-Lymph-Imm. Pursuant to the emergency declaration under the [facility], [address] [place] [address] [facility] authority and the [employer] Act, this Virtual Visit was conducted with patient's (and/or legal guardian's) consent, to reduce the patient's risk of exposure to COVID-19 and provide necessary medical care.   The patient (and/or legal guardian) has also been advised to contact this office for

## 2020-09-25 RX ORDER — FUROSEMIDE 20 MG/1
20 TABLET ORAL DAILY
Qty: 30 TABLET | Refills: 0 | Status: SHIPPED | OUTPATIENT
Start: 2020-09-25 | End: 2020-11-05 | Stop reason: SDUPTHER

## 2020-09-29 ENCOUNTER — OFFICE VISIT (OUTPATIENT)
Dept: PRIMARY CARE CLINIC | Age: 50
End: 2020-09-29
Payer: COMMERCIAL

## 2020-09-29 ENCOUNTER — HOSPITAL ENCOUNTER (OUTPATIENT)
Dept: GENERAL RADIOLOGY | Age: 50
Discharge: HOME | End: 2020-09-29
Payer: COMMERCIAL

## 2020-09-29 PROCEDURE — G8417 CALC BMI ABV UP PARAM F/U: HCPCS | Performed by: NURSE PRACTITIONER

## 2020-09-29 PROCEDURE — 99211 OFF/OP EST MAY X REQ PHY/QHP: CPT | Performed by: NURSE PRACTITIONER

## 2020-09-29 PROCEDURE — G8428 CUR MEDS NOT DOCUMENT: HCPCS | Performed by: NURSE PRACTITIONER

## 2020-09-29 PROCEDURE — 74220 X-RAY XM ESOPHAGUS 1CNTRST: CPT

## 2020-09-29 ASSESSMENT — ENCOUNTER SYMPTOMS
SHORTNESS OF BREATH: 1
BACK PAIN: 1

## 2020-09-29 NOTE — PATIENT INSTRUCTIONS

## 2020-09-29 NOTE — PROGRESS NOTES
Refugio Valentin MARY ANN received a viral test for COVID-19. They were educated on isolation and quarantine as appropriate. For any symptoms, they were directed to seek care from their PCP, given contact information to establish with a doctor, directed to an urgent care or the emergency room.

## 2020-10-01 LAB — SARS-COV-2, NAA: NOT DETECTED

## 2020-10-17 ENCOUNTER — HOSPITAL ENCOUNTER (EMERGENCY)
Age: 50
Discharge: HOME | End: 2020-10-17
Attending: STUDENT IN AN ORGANIZED HEALTH CARE EDUCATION/TRAINING PROGRAM
Payer: COMMERCIAL

## 2020-10-17 ENCOUNTER — APPOINTMENT (OUTPATIENT)
Dept: GENERAL RADIOLOGY | Age: 50
End: 2020-10-17
Payer: COMMERCIAL

## 2020-10-17 VITALS
WEIGHT: 250 LBS | TEMPERATURE: 98.7 F | RESPIRATION RATE: 13 BRPM | SYSTOLIC BLOOD PRESSURE: 127 MMHG | BODY MASS INDEX: 33.13 KG/M2 | OXYGEN SATURATION: 96 % | HEART RATE: 76 BPM | DIASTOLIC BLOOD PRESSURE: 82 MMHG | HEIGHT: 73 IN

## 2020-10-17 LAB
A/G RATIO: 1.4 (ref 1.1–2.2)
ALBUMIN SERPL-MCNC: 3.9 G/DL (ref 3.4–5)
ALP BLD-CCNC: 95 U/L (ref 40–129)
ALT SERPL-CCNC: 33 U/L (ref 10–40)
ANION GAP SERPL CALCULATED.3IONS-SCNC: 6 MMOL/L (ref 3–16)
AST SERPL-CCNC: 33 U/L (ref 15–37)
BASOPHILS ABSOLUTE: 0.1 K/UL (ref 0–0.2)
BASOPHILS RELATIVE PERCENT: 1.4 %
BILIRUB SERPL-MCNC: <0.2 MG/DL (ref 0–1)
BUN BLDV-MCNC: 12 MG/DL (ref 7–20)
CALCIUM SERPL-MCNC: 8.9 MG/DL (ref 8.3–10.6)
CHLORIDE BLD-SCNC: 102 MMOL/L (ref 99–110)
CO2: 27 MMOL/L (ref 21–32)
CREAT SERPL-MCNC: 0.6 MG/DL (ref 0.9–1.3)
EKG ATRIAL RATE: 80 BPM
EKG DIAGNOSIS: NORMAL
EKG P AXIS: 21 DEGREES
EKG P-R INTERVAL: 162 MS
EKG Q-T INTERVAL: 402 MS
EKG QRS DURATION: 110 MS
EKG QTC CALCULATION (BAZETT): 463 MS
EKG R AXIS: 12 DEGREES
EKG T AXIS: 66 DEGREES
EKG VENTRICULAR RATE: 80 BPM
EOSINOPHILS ABSOLUTE: 0.3 K/UL (ref 0–0.6)
EOSINOPHILS RELATIVE PERCENT: 3.7 %
GFR AFRICAN AMERICAN: >60
GFR NON-AFRICAN AMERICAN: >60
GLOBULIN: 2.8 G/DL
GLUCOSE BLD-MCNC: 104 MG/DL (ref 70–99)
HCT VFR BLD CALC: 38.9 % (ref 40.5–52.5)
HEMOGLOBIN: 13.2 G/DL (ref 13.5–17.5)
LYMPHOCYTES ABSOLUTE: 2.3 K/UL (ref 1–5.1)
LYMPHOCYTES RELATIVE PERCENT: 31.6 %
MCH RBC QN AUTO: 31.6 PG (ref 26–34)
MCHC RBC AUTO-ENTMCNC: 34 G/DL (ref 31–36)
MCV RBC AUTO: 93.1 FL (ref 80–100)
MONOCYTES ABSOLUTE: 0.6 K/UL (ref 0–1.3)
MONOCYTES RELATIVE PERCENT: 8.8 %
NEUTROPHILS ABSOLUTE: 4 K/UL (ref 1.7–7.7)
NEUTROPHILS RELATIVE PERCENT: 54.5 %
PDW BLD-RTO: 13.4 % (ref 12.4–15.4)
PLATELET # BLD: 252 K/UL (ref 135–450)
PMV BLD AUTO: 8.2 FL (ref 5–10.5)
POTASSIUM REFLEX MAGNESIUM: 3.9 MMOL/L (ref 3.5–5.1)
PRO-BNP: 459 PG/ML (ref 0–124)
RBC # BLD: 4.18 M/UL (ref 4.2–5.9)
S PYO AG THROAT QL: NEGATIVE
SODIUM BLD-SCNC: 135 MMOL/L (ref 136–145)
TOTAL PROTEIN: 6.7 G/DL (ref 6.4–8.2)
TROPONIN: <0.01 NG/ML
WBC # BLD: 7.3 K/UL (ref 4–11)

## 2020-10-17 PROCEDURE — 96374 THER/PROPH/DIAG INJ IV PUSH: CPT

## 2020-10-17 PROCEDURE — 85025 COMPLETE CBC W/AUTO DIFF WBC: CPT

## 2020-10-17 PROCEDURE — 93005 ELECTROCARDIOGRAM TRACING: CPT | Performed by: PHYSICIAN ASSISTANT

## 2020-10-17 PROCEDURE — 83880 ASSAY OF NATRIURETIC PEPTIDE: CPT

## 2020-10-17 PROCEDURE — 84484 ASSAY OF TROPONIN QUANT: CPT

## 2020-10-17 PROCEDURE — 6360000002 HC RX W HCPCS: Performed by: PHYSICIAN ASSISTANT

## 2020-10-17 PROCEDURE — 80053 COMPREHEN METABOLIC PANEL: CPT

## 2020-10-17 PROCEDURE — 71045 X-RAY EXAM CHEST 1 VIEW: CPT

## 2020-10-17 PROCEDURE — 99285 EMERGENCY DEPT VISIT HI MDM: CPT

## 2020-10-17 PROCEDURE — 87880 STREP A ASSAY W/OPTIC: CPT

## 2020-10-17 PROCEDURE — 87081 CULTURE SCREEN ONLY: CPT

## 2020-10-17 RX ORDER — FUROSEMIDE 10 MG/ML
20 INJECTION INTRAMUSCULAR; INTRAVENOUS ONCE
Status: COMPLETED | OUTPATIENT
Start: 2020-10-17 | End: 2020-10-17

## 2020-10-17 RX ADMIN — FUROSEMIDE 20 MG: 10 INJECTION, SOLUTION INTRAMUSCULAR; INTRAVENOUS at 15:54

## 2020-10-17 ASSESSMENT — ENCOUNTER SYMPTOMS
VOMITING: 0
ABDOMINAL PAIN: 0
COUGH: 1
SHORTNESS OF BREATH: 1
CHEST TIGHTNESS: 1

## 2020-10-17 NOTE — ED PROVIDER NOTES
[address] ED  EMERGENCY DEPARTMENT ENCOUNTER        Pt Name: Vincent Bo  MRN: [id number]  [place] 1970  Date of evaluation: 10/17/2020  Provider: Quincy Guerra PA-C  PCP: Gloria Crabtree MD    Shared Visit or Autonomous Visit:  I have seen and evaluated this patient with my supervising physician Ynes Feldman, [address] [place] [address]       Chief Complaint   Patient presents with    Shortness of Breath     Chronic SOB with new generalized swelling started 48 hrs ago. No pain. HISTORY OF PRESENT ILLNESS   (Location/Symptom, Timing/Onset, Context/Setting, Quality, Duration, Modifying Factors, Severity)  Note limiting factors. Vincent Bo is a 53 y.o. male presenting to the emergency department with complaint of not feeling well for the past 3 days having shortness of breath some chest tightness states the chest tightness comes and goes feels like his anxiety. Currently no chest pain tightness or pressure. Has had a cough with some brown sputum. He is a smoker states cutting back smokes approximately 1/4 pack/day. States has had a sore throat for couple of weeks. Noticed swelling in his feet and ankles yesterday states he tried propping his legs up he decrease salt in his diet he started wearing compression socks but no improvement and then today noticed swelling of his face around his eyes states his bags have bags. The history is provided by the patient. Shortness of Breath   Onset quality:  Gradual  Progression:  Unchanged  Chronicity:  New  Associated symptoms: cough    Associated symptoms: no abdominal pain, no chest pain, no fever, no syncope and no vomiting    Risk factors: no hx of PE/DVT, no prolonged immobilization and no recent surgery        Nursing Notes were reviewed    REVIEW OF SYSTEMS    (2-9 systems for level 4, 10 or more for level 5)     Review of Systems   Constitutional: Negative for fever.    Respiratory: Positive for cough, chest tightness and shortness of breath. Cardiovascular: Positive for leg swelling. Negative for chest pain and syncope. Gastrointestinal: Negative for abdominal pain and vomiting. Neurological: Negative for dizziness and syncope. All other systems reviewed and are negative. Positives and Pertinent negatives as per HPI.        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    Chronic back pain     ED (erectile dysfunction) 12/29/2011    GERD (gastroesophageal reflux disease)     Hypertension     Pleurisy          SURGICAL HISTORY     Past Surgical History:   Procedure Laterality Date    ARM SURGERY Left 12/10/2015    BACK SURGERY      COLONOSCOPY      LUNG SURGERY      partial removal (right) agent orange    SPINE SURGERY  1995    lumbar laminectomy         CURRENTMEDICATIONS       Discharge Medication List as of 10/17/2020  4:20 PM      CONTINUE these medications which have NOT CHANGED    Details   valsartan (DIOVAN) 80 MG tablet TAKE ONE TABLET BY MOUTH DAILY, Disp-90 tablet,R-1Normal      pantoprazole (PROTONIX) 40 MG tablet TAKE ONE TABLET BY MOUTH TWICE A DAY, Disp-180 tablet,R-1Normal      furosemide (LASIX) 20 MG tablet Take 1 tablet by mouth daily, Disp-30 tablet,R-0Normal      ondansetron (ZOFRAN) 4 MG tablet Take 1 tablet by mouth every 8 hours as needed for Nausea or Vomiting, Disp-40 tablet,R-2Normal      metoprolol succinate (TOPROL XL) 50 MG extended release tablet TAKE ONE TABLET BY MOUTH DAILY, Disp-90 tablet,R-1Normal      atorvastatin (LIPITOR) 40 MG tablet Take 1 tablet by mouth daily, Disp-90 tablet,R-1Normal      busPIRone (BUSPAR) 10 MG tablet TAKE TWO TABLETS BY MOUTH TWICE A DAY, Disp-120 tablet,R-1Normal      varenicline (CHANTIX) 1 MG tablet TAKE ONE TABLET BY MOUTH TWICE A DAY, Disp-60 tablet,R-5Normal      potassium chloride (KLOR-CON M) 20 MEQ extended release tablet Take 1 tablet by mouth daily, Disp-30 tablet,R-0Normal      DULoxetine (CYMBALTA) 60 MG extended release capsule TAKE ONE CAPSULE BY MOUTH TWICE A DAY, Disp-60 capsule,R-5Normal      traZODone (DESYREL) 50 MG tablet TAKE TWO TABLETS BY MOUTH ONCE NIGHTLY, Disp-60 tablet,R-2Normal      SPIRIVA RESPIMAT 2.5 MCG/ACT AERS inhaler INHALE TWO PUFFS BY MOUTH DAILY, Disp-1 Inhaler, R-5Normal      albuterol sulfate HFA (VENTOLIN HFA) 108 (90 Base) MCG/ACT inhaler Inhale 2 puffs into the lungs every 6 hours as needed for Wheezing or Shortness of Breath, Disp-1 Inhaler, R-6Normal      sildenafil (REVATIO) 20 MG tablet Take 3 tablets by mouth daily as needed (ED), Disp-30 tablet, R-5Patient is aware this will be cash pay. No PA is needed. Normal      diphenhydrAMINE (BENADRYL) 25 MG tablet Take 25 mg by mouth every 6 hours as needed for Itching Pt report takes for upset stomach in AM as neededHistorical Med      aspirin 81 MG tablet Take 81 mg by mouth dailyHistorical Med               ALLERGIES     Lexapro [escitalopram oxalate];  Morphine; Prozac [fluoxetine hcl]; and Wellbutrin [bupropion]    FAMILYHISTORY       Family History   Problem Relation Age of Onset    Arthritis Mother     Other Mother         pneumothorax several times    Asthma Father           SOCIAL HISTORY       Social History     Socioeconomic History    Marital status:      Spouse name: None    Number of children: None    Years of education: None    Highest education level: None   Occupational History    None   Social Needs    Financial resource strain: None    Food insecurity     Worry: None     Inability: None    Transportation needs     Medical: None     Non-medical: None   Tobacco Use    Smoking status: Current Every Day Smoker     Packs/day: 1.00     Years: 33.00     Pack years: 33.00     Types: Cigarettes    Smokeless tobacco: Former User     Quit date: 11/1/2015   Substance and Sexual Activity    Alcohol use: Yes     Comment: socially     Drug use: Yes     Types: Marijuana, Other-see comments, Opiates      Comment: no opiates does take xanx    Sexual activity: Yes   Lifestyle    Physical activity     Days per week: None     Minutes per session: None    Stress: None   Relationships    Social connections     Talks on phone: None     Gets together: None     Attends [employer] service: None     Active member of club or organization: None     Attends meetings of clubs or organizations: None     Relationship status: None    Intimate partner violence     Fear of current or ex partner: None     Emotionally abused: None     Physically abused: None     Forced sexual activity: None   Other Topics Concern    None   Social History Narrative    None       SCREENINGS             PHYSICAL EXAM    (up to 7 for level 4, 8 or more for level 5)     ED Triage Vitals [10/17/20 1319]   BP Temp Temp Source Pulse Resp SpO2 Height Weight   (!) 133/103 98.7 °F (37.1 °C) Oral 75 20 96 % 6' 1\" (1.854 m) 250 lb (113.4 kg)       Physical Exam  Vitals signs and nursing note reviewed. Constitutional:       Appearance: He is well-developed. He is not toxic-appearing. HENT:      Head: Normocephalic and atraumatic. Comments: Puffiness under eyes mild swelling of eyelids     Mouth/Throat:      Mouth: Mucous membranes are moist.      Pharynx: Oropharynx is clear. Uvula midline. Posterior oropharyngeal erythema (mild) present. No pharyngeal swelling, oropharyngeal exudate or uvula swelling. Tonsils: No tonsillar exudate. Eyes:      Extraocular Movements: Extraocular movements intact. Conjunctiva/sclera: Conjunctivae normal.      Pupils: Pupils are equal, round, and reactive to light. Neck:      Musculoskeletal: Normal range of motion and neck supple. Cardiovascular:      Rate and Rhythm: Normal rate and regular rhythm. Pulses: Normal pulses. Heart sounds: Normal heart sounds.    Pulmonary:      Effort: Pulmonary effort is normal. No respiratory distress. Breath sounds: No stridor. Rales (mild crackles in bases) present. No wheezing or rhonchi. Abdominal:      General: Bowel sounds are normal.      Palpations: Abdomen is soft. Abdomen is not rigid. Tenderness: There is no abdominal tenderness. There is no guarding or rebound. Musculoskeletal: Normal range of motion. Comments: Edema bilateral ankles and feet symmetric. No erythema. Neurovascular intact. Skin:     General: Skin is warm and dry. Neurological:      Mental Status: He is alert and oriented to person, place, and time. GCS: GCS eye subscore is 4. GCS verbal subscore is 5. GCS motor subscore is 6. Cranial Nerves: No cranial nerve deficit. Sensory: No sensory deficit. Motor: No abnormal muscle tone. Coordination: Coordination normal.   Psychiatric:         Speech: Speech normal.         Behavior: Behavior normal.         Thought Content:  Thought content normal.         DIAGNOSTIC RESULTS   LABS:    Labs Reviewed   CBC WITH AUTO DIFFERENTIAL - Abnormal; Notable for the following components:       Result Value    RBC 4.18 (*)     Hemoglobin 13.2 (*)     Hematocrit 38.9 (*)     All other components within normal limits    Narrative:     Performed at:  [facility] [address],  [place], [facility]   Phone [phone]   COMPREHENSIVE METABOLIC PANEL W/ REFLEX TO MG FOR LOW K - Abnormal; Notable for the following components:    Sodium 135 (*)     Glucose 104 (*)     CREATININE 0.6 (*)     All other components within normal limits    Narrative:     Performed at:  [facility] [address],  [place], [facility]   Phone [phone]   BRAIN NATRIURETIC PEPTIDE - Abnormal; Notable for the following components:    Pro- (*)     All other components within normal limits    Narrative:     Performed at:  [facility]  [address] [facility],  [place], [facility]   Phone [phone]   STREP SCREEN GROUP A THROAT    Narrative:     Performed at:  [facility] [address],  [place], [facility]   Phone [phone]   CULTURE, BETA STREP CONFIRM PLATES   TROPONIN    Narrative:     Performed at:  [facility] [address],  [place], [facility]   Phone [phone]     Results for orders placed or performed during the hospital encounter of 10/17/20   Strep screen group a throat    Specimen: Throat   Result Value Ref Range    Rapid Strep A Screen Negative Negative   CBC Auto Differential   Result Value Ref Range    WBC 7.3 4.0 - 11.0 K/uL    RBC 4.18 (L) 4.20 - 5.90 M/uL    Hemoglobin 13.2 (L) 13.5 - 17.5 g/dL    Hematocrit 38.9 (L) 40.5 - 52.5 %    MCV 93.1 80.0 - 100.0 fL    MCH 31.6 26.0 - 34.0 pg    MCHC 34.0 31.0 - 36.0 g/dL    RDW 13.4 12.4 - 15.4 %    Platelets 770 604 - 280 K/uL    MPV 8.2 5.0 - 10.5 fL    Neutrophils % 54.5 %    Lymphocytes % 31.6 %    Monocytes % 8.8 %    Eosinophils % 3.7 %    Basophils % 1.4 %    Neutrophils Absolute 4.0 1.7 - 7.7 K/uL    Lymphocytes Absolute 2.3 1.0 - 5.1 K/uL    Monocytes Absolute 0.6 0.0 - 1.3 K/uL    Eosinophils Absolute 0.3 0.0 - 0.6 K/uL    Basophils Absolute 0.1 0.0 - 0.2 K/uL   Comprehensive Metabolic Panel w/ Reflex to MG   Result Value Ref Range    Sodium 135 (L) 136 - 145 mmol/L    Potassium reflex Magnesium 3.9 3.5 - 5.1 mmol/L    Chloride 102 99 - 110 mmol/L    CO2 27 21 - 32 mmol/L    Anion Gap 6 3 - 16    Glucose 104 (H) 70 - 99 mg/dL    BUN 12 7 - 20 mg/dL    CREATININE 0.6 (L) 0.9 - 1.3 mg/dL    GFR Non-African American >60 >60    GFR African American >60 >60    Calcium 8.9 8.3 - 10.6 mg/dL    Total Protein 6.7 6.4 - 8.2 g/dL    Alb 3.9 3.4 - 5.0 g/dL    Albumin/Globulin Ratio 1.4 1.1 - 2.2    Total Bilirubin <0.2 0.0 - 1.0 mg/dL    Alkaline Phosphatase 95 40 - 129 U/L    ALT 33 10 - 40 U/L    AST 33 15 - 37 U/L    Globulin 2.8 g/dL   Troponin   Result Value Ref Range    Troponin <0.01 <0.01 ng/mL   Brain Natriuretic Peptide   Result Value Ref Range    Pro- (H) 0 - 124 pg/mL   EKG 12 Lead   Result Value Ref Range    Ventricular Rate 80 BPM    Atrial Rate 80 BPM    P-R Interval 162 ms    QRS Duration 110 ms    Q-T Interval 402 ms    QTc Calculation (Bazett) 463 ms    P Axis 21 degrees    R Axis 12 degrees    T Axis 66 degrees    Diagnosis       Baseline wanderBaseline artifactSinus rhythmOtherwise normal ECGAS compared to previous EKGHeart rate has decreasedST changes have improvedConfirmed by Ivon Corado ([id number]) on 10/17/2020 3:06:28 PM       All other labs were within normal range or not returned as of this dictation. EKG: All EKG's are interpreted by the Emergency Department Physician in the absence of a cardiologist.  Please see their note for interpretation of EKG. RADIOLOGY:   Non-plain film images such as CT, Ultrasound and MRI are read by the radiologist. [facility] radiographic images are visualized andpreliminarily interpreted by the  ED Provider with the below findings:        Interpretation perthe Radiologist below, if available at the time of this note:    XR CHEST PORTABLE   Final Result   Mild pulmonary vascular congestion centrally as well as mild interstitial   prominence. Findings may be related to edema. Xr Chest Portable    Result Date: 10/17/2020  EXAMINATION: ONE XRAY VIEW OF THE CHEST 10/17/2020 1:29 pm COMPARISON: August 1, 2020 HISTORY: ORDERING SYSTEM PROVIDED HISTORY: sob TECHNOLOGIST PROVIDED HISTORY: Reason for exam:->sob Reason for Exam: sob Acuity: Acute Type of Exam: Initial FINDINGS: Cardiac silhouette is normal in size. No pneumothorax. No pleural effusion. Mild pulmonary vascular congestion interstitial prominence. No acute abnormality. Mild pulmonary vascular congestion centrally as well as mild interstitial prominence.   Findings may be related to dictations but occasionally words are mis-transcribed.)    Austin Knox PA-C (electronically signed)            Forest Bates PA-C  10/17/20 2049

## 2020-10-19 ENCOUNTER — CARE COORDINATION (OUTPATIENT)
Dept: CARE COORDINATION | Age: 50
End: 2020-10-19

## 2020-10-19 ENCOUNTER — TELEPHONE (OUTPATIENT)
Dept: FAMILY MEDICINE CLINIC | Age: 50
End: 2020-10-19

## 2020-10-19 LAB — S PYO THROAT QL CULT: NORMAL

## 2020-10-19 NOTE — TELEPHONE ENCOUNTER
----- Message from Deja Leblanc sent at 10/19/2020  4:04 PM EDT -----  Subject: Appointment Request    Reason for Call: Routine (Patient Request) No Script    QUESTIONS  Type of Appointment? Established Patient  Reason for appointment request? No appointments available during search  Additional Information for Provider? Patient went to Emergency room over   the weekend and was told to follow up with PCP. Patient has swelling in   face   legs and feet but says the swelling has gone down some. He has some fluid   on his lungs. ---------------------------------------------------------------------------  --------------  Mirella DONOHUE  What is the best way for the office to contact you? OK to leave message on   voicemail  Preferred Call Back Phone Number? [phone]  ---------------------------------------------------------------------------  --------------  SCRIPT ANSWERS  Relationship to Patient? Self  Appointment reason? Symptomatic  Select script based on patient symptoms? Adult No Script  (Is the patient requesting to see the provider for a procedure?)? No  (Is the patient requesting to see the provider urgently  today or   tomorrow. )? No  Have you been diagnosed with   tested for   or told that you are suspected of having COVID-19 (Coronavirus)? Yes  Did your symptoms begin or have you been tested for COVID-19 in the last   10 days?  Yes

## 2020-10-19 NOTE — CARE COORDINATION
Ambulatory Care Coordination Note  10/19/2020  CM Risk Score: 9  [place] 10 Year Mortality Risk Score: 4%     ACC: Viet Figueredo, RN    Summary Note: Pt reports he has swelling to his feet, hands and eyes still, however the swelling is decreasing. He stated he will call Dr. Adebayo Reid office to f/u as soon as our call ended. Pt agreed to the care management program today. ACM completed Medication Review with patient today. He stated he does not take all of his medication as prescribed and when ACM asked he stated he takes so many medications, that sometimes he leaves some out so he doesn't have to take as many meds. PLAN:    F/U PCP  Set Goals  Complete CC Protocol and [facility]      Ambulatory Care Coordination Assessment    Care Coordination Protocol  Program Enrollment:  Complex Care  Referral from Primary Care Provider:  No  Week 1 - Initial Assessment     Do you have all of your prescriptions and are they filled?:  Yes  Barriers to medication adherence:  Other  Other barriers to medication adherence:  Does not like taking so many medications at one time. Suggested Interventions and Community Resources                  Prior to Admission medications    Medication Sig Start Date End Date Taking?  Authorizing Provider   valsartan (DIOVAN) 80 MG tablet TAKE ONE TABLET BY MOUTH DAILY 9/28/20  Yes AVE Gu   pantoprazole (PROTONIX) 40 MG tablet TAKE ONE TABLET BY MOUTH TWICE A DAY 9/28/20  Yes AVE Gu   furosemide (LASIX) 20 MG tablet Take 1 tablet by mouth daily 9/25/20  Yes AVE Gu   ondansetron [facility]) 4 MG tablet Take 1 tablet by mouth every 8 hours as needed for Nausea or Vomiting 9/21/20  Yes AVE Gu   metoprolol succinate (TOPROL XL) 50 MG extended release tablet TAKE ONE TABLET BY MOUTH DAILY 9/21/20  Yes AVE Gu   atorvastatin (LIPITOR) 40 MG tablet Take 1 tablet by mouth daily 8/27/20  Yes AVE Gu   busPIRone (BUSPAR) 10 MG tablet TAKE TWO TABLETS BY MOUTH TWICE A DAY 8/23/20  Yes Lorrie Cantu MD   varenicline (CHANTIX) 1 MG tablet TAKE ONE TABLET BY MOUTH TWICE A DAY 8/10/20  Yes A[address]   potassium chloride (KLOR-CON M) 20 MEQ extended release tablet Take 1 tablet by mouth daily 8/2/20  Yes Rebecca Moss MD   DULoxetine (CYMBALTA) 60 MG extended release capsule TAKE ONE CAPSULE BY MOUTH TWICE A DAY 7/22/20  Yes Lorrie Cantu MD   traZODone (DESYREL) 50 MG tablet TAKE TWO TABLETS BY MOUTH ONCE NIGHTLY 7/22/20  Yes Lorrie Cantu MD   SPIRIVA RESPIMAT 2.5 MCG/ACT AERS inhaler INHALE TWO PUFFS BY MOUTH DAILY 5/11/20  Yes Rachna Brumfield MD   albuterol sulfate HFA (VENTOLIN HFA) 108 (90 Base) MCG/ACT inhaler Inhale 2 puffs into the lungs every 6 hours as needed for Wheezing or Shortness of Breath 7/30/19  Yes Lorrie Cantu MD   sildenafil (REVATIO) 20 MG tablet Take 3 tablets by mouth daily as needed (ED) 10/4/18  Yes Lorrie Cantu MD   diphenhydrAMINE (BENADRYL) 25 MG tablet Take 25 mg by mouth every 6 hours as needed for Itching Pt report takes for upset stomach in AM as needed   Yes Historical Provider, MD   aspirin 81 MG tablet Take 81 mg by mouth daily   Yes Historical Provider, MD       Future Appointments   Date Time Provider Alejandra Humera   12/8/2020  2:00 PM MD ANTHONY Shi [facility]      and   General Assessment    Do you have any symptoms that are causing concern?:  Yes  Progression since Onset:  Gradually Improving  Reported Symptoms:  Other (Comment: swelling in hands, feet, eyes.  Pt in ED over the weekend and stated he will call to f/u with Dr. Dave Chaidez today. )

## 2020-10-28 ENCOUNTER — CARE COORDINATION (OUTPATIENT)
Dept: CARE COORDINATION | Age: 50
End: 2020-10-28

## 2020-11-05 ENCOUNTER — VIRTUAL VISIT (OUTPATIENT)
Dept: FAMILY MEDICINE CLINIC | Age: 50
End: 2020-11-05
Payer: COMMERCIAL

## 2020-11-05 PROCEDURE — 99214 OFFICE O/P EST MOD 30 MIN: CPT | Performed by: PHYSICIAN ASSISTANT

## 2020-11-05 PROCEDURE — G8417 CALC BMI ABV UP PARAM F/U: HCPCS | Performed by: PHYSICIAN ASSISTANT

## 2020-11-05 PROCEDURE — 4004F PT TOBACCO SCREEN RCVD TLK: CPT | Performed by: PHYSICIAN ASSISTANT

## 2020-11-05 PROCEDURE — G8427 DOCREV CUR MEDS BY ELIG CLIN: HCPCS | Performed by: PHYSICIAN ASSISTANT

## 2020-11-05 PROCEDURE — G8482 FLU IMMUNIZE ORDER/ADMIN: HCPCS | Performed by: PHYSICIAN ASSISTANT

## 2020-11-05 RX ORDER — FUROSEMIDE 20 MG/1
20 TABLET ORAL DAILY
Qty: 90 TABLET | Refills: 1 | Status: SHIPPED | OUTPATIENT
Start: 2020-11-05 | End: 2021-05-27 | Stop reason: SDUPTHER

## 2020-11-05 ASSESSMENT — ENCOUNTER SYMPTOMS
SORE THROAT: 0
DIARRHEA: 0
WHEEZING: 1
RHINORRHEA: 0
SHORTNESS OF BREATH: 1
VOMITING: 0
CONSTIPATION: 0
NAUSEA: 0
COUGH: 0
ABDOMINAL PAIN: 0

## 2020-11-05 NOTE — PROGRESS NOTES
2020    TELEHEALTH EVALUATION -- Audio/Visual ([facility]    HPI:    Chang Norton II (:  1970) has requested an audio/video evaluation for the following concern(s):    Patient seen in ED on 10/17 for soa and chest tightness. Endorses anxiety, intermitent cough with brown sputum, and LE swelling worse than his baseline. Also with swelling of the face and eyes. CBC with anemia- normocytic- anemia first noted 2020 has been stable at current levels since then. CMP with mild hyponatremia of 135  Troponin negative     Rapid strep negative   CXR with mild pulmonary vascular congestion centrally as well as mild interstitial prominence, findings may be related to edema. He was given IV lasix while in ED and dc home on  lasix 20 mg daily. Has been taking  Swelling is improved. Still with shortness of breath. +[place]. Has seen pulm for sleep apnea. Need cpap. Stress test ordered  but patient did not complete- has been caring for his aging dad. Review of Systems   Constitutional: Negative for activity change, chills and fever. HENT: Negative for congestion, ear pain, rhinorrhea and sore throat. Eyes: Negative for visual disturbance. Respiratory: Positive for shortness of breath and wheezing. Negative for cough. Cardiovascular: Negative for chest pain and palpitations. Gastrointestinal: Negative for abdominal pain, constipation, diarrhea, nausea and vomiting. Genitourinary: Negative for difficulty urinating and dysuria. Musculoskeletal: Negative for arthralgias and myalgias. Skin: Negative for rash. Neurological: Negative for dizziness, weakness and numbness. Psychiatric/Behavioral: Negative for sleep disturbance. Prior to Visit Medications    Medication Sig Taking?  Authorizing Provider   furosemide (LASIX) 20 MG tablet Take 1 tablet by mouth daily Yes AVE Shepard   valsartan (DIOVAN) 80 MG tablet TAKE ONE TABLET BY MOUTH DAILY Yes AVE Quinonez   pantoprazole (PROTONIX) 40 MG tablet TAKE ONE TABLET BY MOUTH TWICE A DAY Yes AVE Quinonez   ondansetron (ZOFRAN) 4 MG tablet Take 1 tablet by mouth every 8 hours as needed for Nausea or Vomiting Yes AVE Quinonez   metoprolol succinate (TOPROL XL) 50 MG extended release tablet TAKE ONE TABLET BY MOUTH DAILY Yes AVE Quinonez   atorvastatin (LIPITOR) 40 MG tablet Take 1 tablet by mouth daily Yes AVE Quinonez   busPIRone (BUSPAR) 10 MG tablet TAKE TWO TABLETS BY MOUTH TWICE A DAY Yes Margi Nazario MD   varenicline (CHANTIX) 1 MG tablet TAKE ONE TABLET BY MOUTH TWICE A DAY Yes AVE Bro   potassium chloride (KLOR-CON M) 20 MEQ extended release tablet Take 1 tablet by mouth daily Yes Ernie Campbell MD   DULoxetine (CYMBALTA) 60 MG extended release capsule TAKE ONE CAPSULE BY MOUTH TWICE A DAY Yes Margi Nazario MD   traZODone (DESYREL) 50 MG tablet TAKE TWO TABLETS BY MOUTH ONCE NIGHTLY Yes Margi Nazario MD   SPIRIVA RESPIMAT 2.5 MCG/ACT AERS inhaler INHALE TWO PUFFS BY MOUTH DAILY Yes Narendra Veliz MD   albuterol sulfate HFA (VENTOLIN HFA) 108 (90 Base) MCG/ACT inhaler Inhale 2 puffs into the lungs every 6 hours as needed for Wheezing or Shortness of Breath Yes Margi Nazario MD   sildenafil (REVATIO) 20 MG tablet Take 3 tablets by mouth daily as needed (ED) Yes Margi Nazario MD   diphenhydrAMINE (BENADRYL) 25 MG tablet Take 25 mg by mouth every 6 hours as needed for Itching Pt report takes for upset stomach in AM as needed Yes Historical Provider, MD   aspirin 81 MG tablet Take 81 mg by mouth daily Yes Historical Provider, MD       Social History     Tobacco Use    Smoking status: Current Every Day Smoker     Packs/day: 1.00     Years: 33.00     Pack years: 33.00     Types: Cigarettes    Smokeless tobacco: Former User     Quit date: 11/1/2015   Substance Use Topics    Alcohol use: Yes     Comment: socially     Drug use:  Yes Types: Marijuana, Other-see comments, Opiates      Comment: no opiates does take xanx        Allergies   Allergen Reactions    Lexapro [Escitalopram Oxalate] Other (See Comments)     Crazy dream hallucinations      Morphine      Makes him angry    Prozac [Fluoxetine Hcl]      Irritable        Wellbutrin [Bupropion] Other (See Comments)     Crazy dream halluciantions       PHYSICAL EXAMINATION:  [ INSTRUCTIONS:  \"[x]\" Indicates a positive item  \"[]\" Indicates a negative item  -- DELETE ALL ITEMS NOT EXAMINED]  Vital Signs: (As obtained by patient/caregiver or practitioner observation)    Blood pressure-  Heart rate-    Respiratory rate-    Temperature-  Pulse oximetry-     Constitutional: [x] Appears well-developed and well-nourished [x] No apparent distress      [] Abnormal-   Mental status  [x] Alert and awake  [x] Oriented to person/place/time []Able to follow commands      Eyes:  EOM    [x]  Normal  [] Abnormal-  Sclera  [x]  Normal  [] Abnormal -         Discharge []  None visible  [] Abnormal -    HENT:   [x] Normocephalic, atraumatic. [] Abnormal   [] Mouth/Throat: Mucous membranes are moist.     External Ears [x] Normal  [] Abnormal-     Neck: [x] No visualized mass     Pulmonary/Chest: [x] Respiratory effort normal.  [x] No visualized signs of difficulty breathing or respiratory distress        [] Abnormal-      Musculoskeletal:   [x] Normal gait with no signs of ataxia         [x] Normal range of motion of neck        [] Abnormal-       Neurological:        [x] No Facial Asymmetry (Cranial nerve 7 motor function) (limited exam to video visit)          [x] No gaze palsy        [] Abnormal-         Skin:        [x] No significant exanthematous lesions or discoloration noted on facial skin         [] Abnormal-            Psychiatric:       [x] Normal Affect [x] No Hallucinations        [] Abnormal-     Other pertinent observable physical exam findings-     ASSESSMENT/PLAN:  1.  Elevated brain natriuretic

## 2020-11-10 ENCOUNTER — NURSE TRIAGE (OUTPATIENT)
Dept: OTHER | Facility: CLINIC | Age: 50
End: 2020-11-10

## 2020-11-11 ENCOUNTER — CARE COORDINATION (OUTPATIENT)
Dept: CARE COORDINATION | Age: 50
End: 2020-11-11

## 2020-11-11 NOTE — CARE COORDINATION
Pt reports he is currently in bed as his father passed away from [facility] yesterday. ACM expressed condolences for patient and asked if there is anything we can do or assist with at this time. Pt stated no, he has a good support system. He stated he called the nurse triage line and knows the s/s of when to call for help or when to go to the ED. ACM encouraged pt to maintain hydration and rest.     ACM asked if pt would like a call back another time and pt respectfully accepted.

## 2020-11-30 RX ORDER — METHOCARBAMOL 500 MG/1
TABLET, FILM COATED ORAL
Qty: 40 TABLET | Refills: 0 | Status: SHIPPED | OUTPATIENT
Start: 2020-11-30 | End: 2020-12-07 | Stop reason: SDUPTHER

## 2020-11-30 NOTE — TELEPHONE ENCOUNTER
Refill Request     Last Seen: 11/5/2020    Last Written: 9/21/2020    Next Appointment:   Future Appointments   Date Time Provider Alejandra Humera   12/8/2020  2:00 PM Michel Lovell MD [facility]           Requested Prescriptions     Pending Prescriptions Disp Refills    methocarbamol (ROBAXIN) 500 MG tablet [Pharmacy Med Name: METHOCARBAMOL 500 MG TABLET] 40 tablet 0     Sig: TAKE ONE TABLET BY MOUTH FOUR TIMES A DAY FOR 10 DAYS

## 2020-12-03 ENCOUNTER — TELEPHONE (OUTPATIENT)
Dept: PULMONOLOGY | Age: 50
End: 2020-12-03

## 2020-12-03 NOTE — TELEPHONE ENCOUNTER
Patient cancelled appointment on 12/8/20 with Dr Aydee Perry for 31-90 fu .    Reason: Patient did not complete PSG    Patient did not reschedule appointment. Patient declined to r/s at this time. Assessment: 9/24/20      · Snoring, hypersomnia and witnessed apnea   · Nocturnal hypxoia- probable HUSSAIN   · Pulmonary emphysema   · Abnormal CT chest 9/24/2019- favor dependent atelectasis  · Pulmonary nodules on CT chest 11/2/2017, largest 6.6 mm. DDx granuloma versus intrapulmonary lymph node versus malignancy. Stable/imprtoving on repeat CT chest  · Recurrent PTX post VATS 1995   · >33 pack year smoking   · UDS positive for Benzodiazepine and Marijuana- buy xanax off the street he said    · On Suboxon       Plan:       · PSG evaluate for sleep related breathing disorder. Treatment options were discussed with patient if PSG reveals HUSSAIN, including CPAP therapy, oral appliances, upper airway surgery and hypoglossal nerve stimulation. Patient is in favor of CPAP therapy. · Discussed with patient the pathophysiology of apnea. · Sleep hygiene  · Avoid sedatives, alcohol and caffeinated drinks at bed time. · No driving motorized vehicles or operating heavy machinery while fatigue, drowsy or sleepy. · Weight loss is also recommended as a long-term intervention. · Complications of HUSSAIN if not treated were discussed with patient patient to include systemic hypertension, pulmonary hypertension, cardiovascular morbidities, car accidents and all cause mortality. · Continue Spiriva and Albuterol   · Patient is up to date with Pneumococcal vaccine and influenza vaccine.      · Smoking cessation counseling

## 2020-12-07 ENCOUNTER — TELEPHONE (OUTPATIENT)
Dept: FAMILY MEDICINE CLINIC | Age: 50
End: 2020-12-07

## 2020-12-07 RX ORDER — TIOTROPIUM BROMIDE INHALATION SPRAY 3.12 UG/1
SPRAY, METERED RESPIRATORY (INHALATION)
Qty: 1 INHALER | Refills: 0 | Status: SHIPPED | OUTPATIENT
Start: 2020-12-07 | End: 2021-01-13

## 2020-12-07 RX ORDER — CYCLOBENZAPRINE HCL 5 MG
5 TABLET ORAL 3 TIMES DAILY PRN
Qty: 30 TABLET | Refills: 1 | Status: SHIPPED | OUTPATIENT
Start: 2020-12-07 | End: 2020-12-17

## 2020-12-07 NOTE — TELEPHONE ENCOUNTER
Pt. States was recently sent in Robaxin for a muscle relaxer. He states it does not work and he is wanting to go back to the Flexeril that he had been on. Please advise.

## 2020-12-07 NOTE — TELEPHONE ENCOUNTER
Script for flexeril sent to the pharmacy. Please remind patient that these are not intended for long term use, but just with exacerbations of back pain.

## 2020-12-21 ENCOUNTER — TELEPHONE (OUTPATIENT)
Dept: FAMILY MEDICINE CLINIC | Age: 50
End: 2020-12-21

## 2020-12-21 RX ORDER — CYCLOBENZAPRINE HCL 10 MG
10 TABLET ORAL 3 TIMES DAILY PRN
Qty: 60 TABLET | Refills: 0 | Status: SHIPPED | OUTPATIENT
Start: 2020-12-21 | End: 2021-01-22

## 2020-12-21 NOTE — TELEPHONE ENCOUNTER
Spoke with patient, discussed need to be respectful with staff. Will refill flexeril, not for daily scheduled dosing. Pt referred to Dr. Latif Begun this past year, did not follow up. He is interested in injections which helped him the last time (in [place]). Referral placed for Dr. Holguin November.

## 2020-12-21 NOTE — TELEPHONE ENCOUNTER
Pt. Called very agitated and demanding that his Flexaril be called in for 10mgs and for a 90 day supply. I reviewed with him your recommendation about taking the Flexeril only on a as needed basis and he became verbally hostile stating \" I have been on this for 40 years, what the !!!! Am I suppose to do since I can't get any pain meds\". He is requesting that  the RX to be corrected and ordered for 10mg and 90 day supply. Patient states \" I am in pain and no one cares\"  Pt. Did use several curse words throughout our conversation.

## 2020-12-24 ENCOUNTER — APPOINTMENT (OUTPATIENT)
Dept: VASCULAR LAB | Age: 50
End: 2020-12-24
Payer: COMMERCIAL

## 2020-12-24 ENCOUNTER — APPOINTMENT (OUTPATIENT)
Dept: GENERAL RADIOLOGY | Age: 50
End: 2020-12-24
Payer: COMMERCIAL

## 2020-12-24 ENCOUNTER — HOSPITAL ENCOUNTER (EMERGENCY)
Age: 50
Discharge: HOME | End: 2020-12-24
Payer: COMMERCIAL

## 2020-12-24 VITALS
BODY MASS INDEX: 34.85 KG/M2 | HEIGHT: 73 IN | WEIGHT: 263 LBS | TEMPERATURE: 97.2 F | HEART RATE: 100 BPM | DIASTOLIC BLOOD PRESSURE: 83 MMHG | SYSTOLIC BLOOD PRESSURE: 119 MMHG | RESPIRATION RATE: 15 BRPM | OXYGEN SATURATION: 97 %

## 2020-12-24 LAB
A/G RATIO: 1.3 (ref 1.1–2.2)
ALBUMIN SERPL-MCNC: 3.8 G/DL (ref 3.4–5)
ALP BLD-CCNC: 134 U/L (ref 40–129)
ALT SERPL-CCNC: 42 U/L (ref 10–40)
ANION GAP SERPL CALCULATED.3IONS-SCNC: 8 MMOL/L (ref 3–16)
AST SERPL-CCNC: 30 U/L (ref 15–37)
BASOPHILS ABSOLUTE: 0 K/UL (ref 0–0.2)
BASOPHILS RELATIVE PERCENT: 0.6 %
BILIRUB SERPL-MCNC: <0.2 MG/DL (ref 0–1)
BUN BLDV-MCNC: 18 MG/DL (ref 7–20)
CALCIUM SERPL-MCNC: 8.4 MG/DL (ref 8.3–10.6)
CHLORIDE BLD-SCNC: 106 MMOL/L (ref 99–110)
CO2: 28 MMOL/L (ref 21–32)
CREAT SERPL-MCNC: 0.7 MG/DL (ref 0.9–1.3)
D DIMER: <200 NG/ML DDU (ref 0–229)
EOSINOPHILS ABSOLUTE: 0.3 K/UL (ref 0–0.6)
EOSINOPHILS RELATIVE PERCENT: 3.3 %
GFR AFRICAN AMERICAN: >60
GFR NON-AFRICAN AMERICAN: >60
GLOBULIN: 2.9 G/DL
GLUCOSE BLD-MCNC: 124 MG/DL (ref 70–99)
HCT VFR BLD CALC: 37.9 % (ref 40.5–52.5)
HEMOGLOBIN: 12.7 G/DL (ref 13.5–17.5)
LYMPHOCYTES ABSOLUTE: 2.7 K/UL (ref 1–5.1)
LYMPHOCYTES RELATIVE PERCENT: 34.5 %
MCH RBC QN AUTO: 31 PG (ref 26–34)
MCHC RBC AUTO-ENTMCNC: 33.4 G/DL (ref 31–36)
MCV RBC AUTO: 92.6 FL (ref 80–100)
MONOCYTES ABSOLUTE: 0.7 K/UL (ref 0–1.3)
MONOCYTES RELATIVE PERCENT: 8.5 %
NEUTROPHILS ABSOLUTE: 4.1 K/UL (ref 1.7–7.7)
NEUTROPHILS RELATIVE PERCENT: 53.1 %
PDW BLD-RTO: 14 % (ref 12.4–15.4)
PLATELET # BLD: 246 K/UL (ref 135–450)
PMV BLD AUTO: 8.1 FL (ref 5–10.5)
POTASSIUM REFLEX MAGNESIUM: 3.9 MMOL/L (ref 3.5–5.1)
PRO-BNP: 587 PG/ML (ref 0–124)
RBC # BLD: 4.1 M/UL (ref 4.2–5.9)
SODIUM BLD-SCNC: 142 MMOL/L (ref 136–145)
TOTAL PROTEIN: 6.7 G/DL (ref 6.4–8.2)
TROPONIN: <0.01 NG/ML
WBC # BLD: 7.8 K/UL (ref 4–11)

## 2020-12-24 PROCEDURE — 71045 X-RAY EXAM CHEST 1 VIEW: CPT

## 2020-12-24 PROCEDURE — 80053 COMPREHEN METABOLIC PANEL: CPT

## 2020-12-24 PROCEDURE — 93970 EXTREMITY STUDY: CPT

## 2020-12-24 PROCEDURE — 85025 COMPLETE CBC W/AUTO DIFF WBC: CPT

## 2020-12-24 PROCEDURE — 84484 ASSAY OF TROPONIN QUANT: CPT

## 2020-12-24 PROCEDURE — 99285 EMERGENCY DEPT VISIT HI MDM: CPT

## 2020-12-24 PROCEDURE — 83880 ASSAY OF NATRIURETIC PEPTIDE: CPT

## 2020-12-24 PROCEDURE — 85379 FIBRIN DEGRADATION QUANT: CPT

## 2020-12-24 PROCEDURE — U0003 INFECTIOUS AGENT DETECTION BY NUCLEIC ACID (DNA OR RNA); SEVERE ACUTE RESPIRATORY SYNDROME CORONAVIRUS 2 (SARS-COV-2) (CORONAVIRUS DISEASE [COVID-19]), AMPLIFIED PROBE TECHNIQUE, MAKING USE OF HIGH THROUGHPUT TECHNOLOGIES AS DESCRIBED BY CMS-2020-01-R: HCPCS

## 2020-12-24 RX ORDER — CEPHALEXIN 500 MG/1
500 CAPSULE ORAL 4 TIMES DAILY
Qty: 40 CAPSULE | Refills: 0 | Status: SHIPPED | OUTPATIENT
Start: 2020-12-24 | End: 2021-01-03

## 2020-12-24 RX ORDER — FUROSEMIDE 20 MG/1
20 TABLET ORAL DAILY
Qty: 7 TABLET | Refills: 0 | Status: SHIPPED | OUTPATIENT
Start: 2020-12-24 | End: 2021-05-27 | Stop reason: SDUPTHER

## 2020-12-24 ASSESSMENT — PAIN DESCRIPTION - PAIN TYPE: TYPE: CHRONIC PAIN

## 2020-12-24 NOTE — ED PROVIDER NOTES
**ADVANCED PRACTICE PROVIDER, I HAVE EVALUATED THIS [facility] ED  EMERGENCY DEPARTMENT ENCOUNTER      Pt Name: Vicki Yan  KVD:[id number]  Chocogfrebecca 1970  Date of evaluation: 12/24/2020  Provider: Vy Avelar PA-C      Chief Complaint:    Chief Complaint   Patient presents with    Leg Swelling     Pt c/o BLE swelling since yesterday. Nursing Notes, Past Medical Hx, Past Surgical Hx, Social Hx, Allergies, and Family Hx were all reviewed and agreed with or any disagreements were addressed in the HPI.    HPI:  (Location, Duration, Timing, Severity, Quality, Assoc Sx, Context, Modifying factors)  This is a  53 y.o. male who presents here to the emergency department, this patient states that he has had bilateral leg swelling since yesterday, he has gained 17 pounds this week, and he even lost his father to Covid on the 10th of this month. He denies any nausea or vomiting, denies any lightheadedness or dizziness, he does have history of swelling to his legs in the past for which has required Lasix. He denies any chest pain or shortness of breath.     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    Chronic back pain     ED (erectile dysfunction) 12/29/2011    GERD (gastroesophageal reflux disease)     Hypertension     Pleurisy          Procedure Laterality Date    ARM SURGERY Left 12/10/2015    BACK SURGERY      COLONOSCOPY      LUNG SURGERY      partial removal (right) agent orange    SPINE SURGERY  1995    lumbar laminectomy       Medications:  Discharge Medication List as of 12/24/2020  5:09 PM      CONTINUE these medications which have NOT CHANGED    Details   cyclobenzaprine (FLEXERIL) 10 MG tablet Take 1 tablet by mouth 3 times daily as needed for Muscle spasms, Disp-60 tablet, R-0Normal !! - Potential duplicate medications found. Please discuss with provider. Review of Systems:  Review of Systems  Positives and Pertinent negatives as per HPI. Except as noted above in the ROS, problem specific ROS was completed and is negative. Physical Exam:  Physical Exam  Vitals signs and nursing note reviewed. Constitutional:       Appearance: He is well-developed. He is not diaphoretic. HENT:      Head: Normocephalic and atraumatic. Right Ear: External ear normal.      Left Ear: External ear normal.      Nose: Nose normal.   Eyes:      General:         Right eye: No discharge. Left eye: No discharge. Neck:      Musculoskeletal: Normal range of motion and neck supple. Cardiovascular:      Rate and Rhythm: Normal rate and regular rhythm. Heart sounds: Normal heart sounds. No murmur. No friction rub. No gallop. Pulmonary:      Effort: Pulmonary effort is normal. No respiratory distress. Breath sounds: Normal breath sounds. No stridor. No wheezing or rales. Chest:      Chest wall: No tenderness. Musculoskeletal: Normal range of motion. Right lower leg: Edema present. Left lower leg: Edema present. Skin:     General: Skin is warm and dry. Coloration: Skin is not pale. Neurological:      Mental Status: He is alert and oriented to person, place, and time.    Psychiatric:         Behavior: Behavior normal.         MEDICAL DECISION MAKING    Vitals:    Vitals:    12/24/20 1325 12/24/20 1506 12/24/20 1507 12/24/20 1716   BP: 129/82 133/86  119/83   Pulse: 99 94 94 100   Resp: 20 17 14 15   Temp: 97.2 °F (36.2 °C)      TempSrc: Oral      SpO2: 93% 94% 94% 97%   Weight: 263 lb (119.3 kg)      Height: 6' 1\" (1.854 m)          LABS:  Labs Reviewed   CBC WITH AUTO DIFFERENTIAL - Abnormal; Notable for the following components:       Result Value    RBC 4.10 (*)     Hemoglobin 12.7 (*)     Hematocrit 37.9 (*)     All other components within normal limits Narrative:     Performed at:  [facility],  [place], [facility]   Phone [phone]   COMPREHENSIVE METABOLIC PANEL W/ REFLEX TO MG FOR LOW K - Abnormal; Notable for the following components:    Glucose 124 (*)     CREATININE 0.7 (*)     Alkaline Phosphatase 134 (*)     ALT 42 (*)     All other components within normal limits    Narrative:     Performed at:  [facility] [place] [place] [facility] [address],  [place], [email or web address]   Phone [phone]   BRAIN NATRIURETIC PEPTIDE - Abnormal; Notable for the following components:    Pro- (*)     All other components within normal limits    Narrative:     Performed at:  [facility] [address],  [place], [facility]   Phone [phone]   TROPONIN    Narrative:     Performed at:  [place] [address],  [place], [facility]   Phone [phone]   D-DIMER, QUANTITATIVE    Narrative:     Performed at:  [facility] [place] [place] - [facility] [address],  [place], [employer]   Phone [phone] of labs reviewed and werenegative at this time or not returned at the time of this note.     RADIOLOGY:   Non-plain film images such as CT, Ultrasound and MRI are read by the radiologist. Alecia Lorenz PA-C have directly visualized the radiologic plain film image(s) with the below findings:        Interpretation per the Radiologist below, if available at the time of this note:    VL Extremity Venous Bilateral         XR CHEST PORTABLE   Final Result   Negative portable chest.              Xr Chest Portable    Result Date: 12/24/2020 EXAMINATION: ONE XRAY VIEW OF THE CHEST 12/24/2020 2:46 pm COMPARISON: 10/17/2020 HISTORY: Reason for Exam: SOB Acuity: Acute Type of Exam: Initial FINDINGS: The lungs are without acute focal process. No effusion or pneumothorax. The cardiomediastinal silhouette is normal.  The osseous structures are intact without acute process. Negative portable chest.     Vl Extremity Venous Bilateral    Result Date: 12/24/2020  Vascular Lower Extremities DVT Study Procedure -- PRELIMINARY SONOGRAPHER REPORT --   Demographics   Patient Name       ZEUS PIERRE II   Date of Study      12/24/2020         Gender              Male   Patient Number     [id number]         Date of Birth       1970   Visit Number       [id number]          Age                 53 year(s)   Accession Number   [id number]         Room Number         25   Corporate ID       [id number]           Sonographer         Fabian Hubbard RVT,                                                            [facility]   Ordering Physician Pasco Lennox,   Interpreting        [facility]                     [place]                 Physician           Vascular  Procedure Type of Study:   Veins:Lower Extremities DVT Study, VASC EXTREMITY VENOUS DUPLEX BILATERAL. Tech Comments Right There is no evidence of deep or superficial venous thrombosis involving the right lower extremity. Incidental finding of multiple enlarged lymph nodes at the groin with the largest measured at 2.7 X 0.63 cm. Left There is no evidence of deep or superficial venous thrombosis involving the left lower extremity. There are no previous studies for comparison.          MEDICAL DECISION MAKING / ED COURSE:      PROCEDURES:   Procedures    None    Patient was given:  Medications - No data to display I suspect secondary to cellulitis or some mild infection that he is having this edema also this could be increased dependent edema. There is no evidence of DVT, we will discharge with slight increase of Lasix, Keflex and follow-up with primary care    The patient tolerated their visit well. I evaluated the patient. The physician was available for consultation as needed. The patient and / or the family were informed of the results of any tests, a time was given to answer questions, a plan was proposed and they agreed with plan. CLINICAL IMPRESSION:  1. Leg swelling    2. Cellulitis of lower extremity, unspecified laterality        DISPOSITION Decision To Discharge 12/24/2020 04:40:17 PM      PATIENT REFERRED TO:  Chasidy Pretty MD  [facility]  [address] [address],[address]    Call in 3 days  For a recheck in 3-7 days      DISCHARGE MEDICATIONS:  Discharge Medication List as of 12/24/2020  5:09 PM      START taking these medications    Details   cephALEXin (KEFLEX) 500 MG capsule Take 1 capsule by mouth 4 times daily for 10 days, Disp-40 capsule, R-0Print      !! furosemide (LASIX) 20 MG tablet Take 1 tablet by mouth daily for 7 days, Disp-7 tablet, R-0Print       !! - Potential duplicate medications found. Please discuss with provider.           DISCONTINUED MEDICATIONS:  Discharge Medication List as of 12/24/2020  5:09 PM                 (Please note the MDM and HPI sections of this note were completed with a voice recognition program.  Efforts were made to edit the dictations but occasionally words are mis-transcribed.)    Electronically signed, Samm Bashir PA-C,           Samm Bashir PA-C  12/24/20 9416

## 2020-12-24 NOTE — ED TRIAGE NOTES
Patient presents to room 18, awake/alert in no acute distress. States that he has bilateral lower leg edema since yesterday. States he has had a weight gain of 17 lbs this week.

## 2020-12-25 LAB — SARS-COV-2: NOT DETECTED

## 2020-12-28 ENCOUNTER — CARE COORDINATION (OUTPATIENT)
Dept: CARE COORDINATION | Age: 50
End: 2020-12-28

## 2020-12-29 ENCOUNTER — CARE COORDINATION (OUTPATIENT)
Dept: CARE COORDINATION | Age: 50
End: 2020-12-29

## 2021-01-08 ENCOUNTER — CARE COORDINATION (OUTPATIENT)
Dept: CARE COORDINATION | Age: 51
End: 2021-01-08

## 2021-01-13 RX ORDER — TIOTROPIUM BROMIDE INHALATION SPRAY 3.12 UG/1
SPRAY, METERED RESPIRATORY (INHALATION)
Qty: 1 INHALER | Refills: 3 | Status: SHIPPED | OUTPATIENT
Start: 2021-01-13 | End: 2021-05-26

## 2021-01-14 RX ORDER — BUSPIRONE HYDROCHLORIDE 10 MG/1
TABLET ORAL
Qty: 120 TABLET | Refills: 0 | Status: SHIPPED | OUTPATIENT
Start: 2021-01-14 | End: 2021-02-17

## 2021-01-22 RX ORDER — CYCLOBENZAPRINE HCL 10 MG
TABLET ORAL
Qty: 60 TABLET | Refills: 0 | Status: SHIPPED | OUTPATIENT
Start: 2021-01-22 | End: 2021-02-17

## 2021-01-22 NOTE — TELEPHONE ENCOUNTER
Refill Request     Last Seen: 11/5/2020    Last Written: 12/21/2020    Next Appointment:   No future appointments.           Requested Prescriptions     Pending Prescriptions Disp Refills    cyclobenzaprine (FLEXERIL) 10 MG tablet [Pharmacy Med Name: CYCLOBENZAPRINE 10 MG TABLET] 60 tablet 0     Sig: TAKE ONE TABLET BY MOUTH THREE TIMES A DAY AS NEEDED MUSCLE SPASMS

## 2021-02-17 RX ORDER — DULOXETIN HYDROCHLORIDE 60 MG/1
CAPSULE, DELAYED RELEASE ORAL
Qty: 60 CAPSULE | Refills: 4 | Status: SHIPPED | OUTPATIENT
Start: 2021-02-17 | End: 2021-05-27 | Stop reason: SDUPTHER

## 2021-03-30 RX ORDER — VARENICLINE TARTRATE 1 MG/1
TABLET, FILM COATED ORAL
Qty: 56 TABLET | Refills: 4 | Status: SHIPPED | OUTPATIENT
Start: 2021-03-30

## 2021-03-30 NOTE — TELEPHONE ENCOUNTER
Refill Request     Last Seen: 8/10/2020    Last Written: 8/10/2020    Next Appointment:   No future appointments.     Sent [employer] message to patient requesting return call to schedule appointment      Requested Prescriptions     Pending Prescriptions Disp Refills    CHANTIX 1 MG tablet [Pharmacy Med Name: CHANTIX 1 MG TABLET] 56 tablet 4     Sig: TAKE ONE TABLET BY MOUTH TWICE A DAY

## 2021-04-25 DIAGNOSIS — K21.9 GASTROESOPHAGEAL REFLUX DISEASE WITHOUT ESOPHAGITIS: ICD-10-CM

## 2021-04-25 DIAGNOSIS — K22.70 BARRETT'S ESOPHAGUS DETERMINED BY ENDOSCOPY: ICD-10-CM

## 2021-04-26 NOTE — TELEPHONE ENCOUNTER
Refill Request     Last Seen: Last Seen Department: 11/5/2020  Last Seen by PCP: 11/5/2020    Last Written: 9/28/2020    Next Appointment:   No future appointments.           Requested Prescriptions     Pending Prescriptions Disp Refills    pantoprazole (PROTONIX) 40 MG tablet [Pharmacy Med Name: PANTOPRAZOLE SOD DR 40 MG TAB] 40 tablet 0     Sig: TAKE ONE TABLET BY MOUTH TWICE A DAY    valsartan (DIOVAN) 80 MG tablet [Pharmacy Med Name: VALSARTAN 80 MG TABLET] 20 tablet 0     Sig: TAKE ONE TABLET BY MOUTH DAILY

## 2021-04-27 RX ORDER — VALSARTAN 80 MG/1
TABLET ORAL
Qty: 90 TABLET | Refills: 1 | Status: SHIPPED | OUTPATIENT
Start: 2021-04-27 | End: 2021-11-18

## 2021-04-27 RX ORDER — PANTOPRAZOLE SODIUM 40 MG/1
TABLET, DELAYED RELEASE ORAL
Qty: 120 TABLET | Refills: 1 | Status: SHIPPED | OUTPATIENT
Start: 2021-04-27 | End: 2021-09-07

## 2021-05-17 ENCOUNTER — NURSE TRIAGE (OUTPATIENT)
Dept: OTHER | Facility: CLINIC | Age: 51
End: 2021-05-17

## 2021-05-17 NOTE — TELEPHONE ENCOUNTER
Received call from Stephany at pre-service center [facility]) GUALBERTO AYESHABRITTALYDIA ROCK II.VIERTEL with Red Flag Complaint. Brief description of triage: Covid Vaccine reaction, GI bleeding    Triage indicates for patient to go to ER now    Care advice provided, patient verbalizes understanding; denies any other questions or concerns; instructed to call back for any new or worsening symptoms. Attention Provider: Thank you for allowing me to participate in the care of your patient. The patient was connected to triage in response to information provided to the ECC. Please do not respond through this encounter as the response is not directed to a shared pool. Reason for Disposition   Sounds like a severe, unusual reaction to the triager    Answer Assessment - Initial Assessment Questions  1. MAIN CONCERN OR SYMPTOM:  \"What is your main concern right now? \" \"What question do you have? \" \"What's the main symptom you're worried about? \" (e.g., fever, pain, redness, swelling)      Thinks he had a vaccine reaction    2. VACCINE: \"What vaccination did you receive? \" \"Is this your first or second shot? \" (e.g., none; A[employer], J&J, [address], other)      J&J    3. SYMPTOM ONSET: \"When did the reaction begin? \" (e.g., not relevant; hours, days)       Saturday, felt drunk, heartburn, felt flush, profuse sweating, nausea and vomiting blood for hours    4. SYMPTOM SEVERITY: \"How bad is it? \"       Very bad from GI bleeding    5. FEVER: \"Is there a fever? \" If so, ask: \"What is it, how was it measured, and when did it start? \"       Denies    6. PAST REACTIONS: \"Have you reacted to immunizations before? \" If so, ask: \"What happened? \"      Denies    7. OTHER SYMPTOMS: \"Do you have any other symptoms? \"      Severe heart burn, SOB    Protocols used: CORONAVIRUS (COVID-19) VACCINE QUESTIONS AND REACTIONS-ADULT-[place]

## 2021-05-21 ENCOUNTER — NURSE TRIAGE (OUTPATIENT)
Dept: OTHER | Facility: CLINIC | Age: 51
End: 2021-05-21

## 2021-05-26 RX ORDER — TIOTROPIUM BROMIDE INHALATION SPRAY 3.12 UG/1
SPRAY, METERED RESPIRATORY (INHALATION)
Qty: 1 INHALER | Refills: 5 | Status: SHIPPED | OUTPATIENT
Start: 2021-05-26

## 2021-05-27 ENCOUNTER — OFFICE VISIT (OUTPATIENT)
Dept: FAMILY MEDICINE CLINIC | Age: 51
End: 2021-05-27
Payer: COMMERCIAL

## 2021-05-27 VITALS
WEIGHT: 258 LBS | DIASTOLIC BLOOD PRESSURE: 116 MMHG | HEART RATE: 121 BPM | OXYGEN SATURATION: 97 % | SYSTOLIC BLOOD PRESSURE: 158 MMHG | TEMPERATURE: 98.3 F | HEIGHT: 72 IN | BODY MASS INDEX: 34.95 KG/M2

## 2021-05-27 DIAGNOSIS — R19.5 HEME POSITIVE STOOL: ICD-10-CM

## 2021-05-27 DIAGNOSIS — I10 ESSENTIAL HYPERTENSION: ICD-10-CM

## 2021-05-27 DIAGNOSIS — K92.0 HEMATEMESIS, PRESENCE OF NAUSEA NOT SPECIFIED: ICD-10-CM

## 2021-05-27 DIAGNOSIS — R79.89 ELEVATED BRAIN NATRIURETIC PEPTIDE (BNP) LEVEL: ICD-10-CM

## 2021-05-27 DIAGNOSIS — R19.5 HEME POSITIVE STOOL: Primary | ICD-10-CM

## 2021-05-27 DIAGNOSIS — F19.10 POLYSUBSTANCE ABUSE (HCC): ICD-10-CM

## 2021-05-27 DIAGNOSIS — N17.9 AKI (ACUTE KIDNEY INJURY) (HCC): ICD-10-CM

## 2021-05-27 DIAGNOSIS — J43.9 PULMONARY EMPHYSEMA, UNSPECIFIED EMPHYSEMA TYPE (HCC): ICD-10-CM

## 2021-05-27 DIAGNOSIS — R07.9 CHEST PAIN, UNSPECIFIED TYPE: ICD-10-CM

## 2021-05-27 PROCEDURE — 93000 ELECTROCARDIOGRAM COMPLETE: CPT | Performed by: PHYSICIAN ASSISTANT

## 2021-05-27 PROCEDURE — 3017F COLORECTAL CA SCREEN DOC REV: CPT | Performed by: PHYSICIAN ASSISTANT

## 2021-05-27 PROCEDURE — G8926 SPIRO NO PERF OR DOC: HCPCS | Performed by: PHYSICIAN ASSISTANT

## 2021-05-27 PROCEDURE — G8427 DOCREV CUR MEDS BY ELIG CLIN: HCPCS | Performed by: PHYSICIAN ASSISTANT

## 2021-05-27 PROCEDURE — 99214 OFFICE O/P EST MOD 30 MIN: CPT | Performed by: PHYSICIAN ASSISTANT

## 2021-05-27 PROCEDURE — 4004F PT TOBACCO SCREEN RCVD TLK: CPT | Performed by: PHYSICIAN ASSISTANT

## 2021-05-27 PROCEDURE — 3023F SPIROM DOC REV: CPT | Performed by: PHYSICIAN ASSISTANT

## 2021-05-27 PROCEDURE — G8417 CALC BMI ABV UP PARAM F/U: HCPCS | Performed by: PHYSICIAN ASSISTANT

## 2021-05-27 RX ORDER — ONDANSETRON 4 MG/1
4 TABLET, FILM COATED ORAL EVERY 8 HOURS PRN
Qty: 40 TABLET | Refills: 2 | Status: SHIPPED | OUTPATIENT
Start: 2021-05-27

## 2021-05-27 RX ORDER — METOPROLOL SUCCINATE 50 MG/1
TABLET, EXTENDED RELEASE ORAL
Qty: 30 TABLET | Refills: 1 | Status: SHIPPED | OUTPATIENT
Start: 2021-05-27 | End: 2021-09-07

## 2021-05-27 RX ORDER — DIPHENHYDRAMINE HCL 25 MG
25 TABLET ORAL EVERY 6 HOURS PRN
Qty: 30 TABLET | Refills: 1 | Status: SHIPPED | OUTPATIENT
Start: 2021-05-27 | End: 2021-09-10 | Stop reason: SDUPTHER

## 2021-05-27 RX ORDER — BUSPIRONE HYDROCHLORIDE 10 MG/1
TABLET ORAL
Qty: 120 TABLET | Refills: 5 | Status: SHIPPED | OUTPATIENT
Start: 2021-05-27

## 2021-05-27 RX ORDER — ALBUTEROL SULFATE 90 UG/1
2 AEROSOL, METERED RESPIRATORY (INHALATION) EVERY 6 HOURS PRN
Qty: 1 INHALER | Refills: 6 | Status: SHIPPED | OUTPATIENT
Start: 2021-05-27 | End: 2022-05-20 | Stop reason: SDUPTHER

## 2021-05-27 RX ORDER — DULOXETIN HYDROCHLORIDE 60 MG/1
60 CAPSULE, DELAYED RELEASE ORAL 2 TIMES DAILY
Qty: 60 CAPSULE | Refills: 4 | Status: SHIPPED | OUTPATIENT
Start: 2021-05-27 | End: 2022-04-19

## 2021-05-27 RX ORDER — SILDENAFIL CITRATE 20 MG/1
60 TABLET ORAL DAILY PRN
Qty: 30 TABLET | Refills: 5 | Status: SHIPPED | OUTPATIENT
Start: 2021-05-27

## 2021-05-27 RX ORDER — FUROSEMIDE 20 MG/1
20 TABLET ORAL DAILY
Qty: 90 TABLET | Refills: 1 | Status: SHIPPED | OUTPATIENT
Start: 2021-05-27

## 2021-05-27 RX ORDER — POTASSIUM CHLORIDE 20 MEQ/1
20 TABLET, EXTENDED RELEASE ORAL DAILY
Qty: 30 TABLET | Refills: 0 | Status: SHIPPED | OUTPATIENT
Start: 2021-05-27 | End: 2021-12-21

## 2021-05-27 SDOH — ECONOMIC STABILITY: TRANSPORTATION INSECURITY
IN THE PAST 12 MONTHS, HAS THE LACK OF TRANSPORTATION KEPT YOU FROM MEDICAL APPOINTMENTS OR FROM GETTING MEDICATIONS?: YES

## 2021-05-27 SDOH — ECONOMIC STABILITY: FOOD INSECURITY: WITHIN THE PAST 12 MONTHS, YOU WORRIED THAT YOUR FOOD WOULD RUN OUT BEFORE YOU GOT MONEY TO BUY MORE.: OFTEN TRUE

## 2021-05-27 ASSESSMENT — ENCOUNTER SYMPTOMS
CONSTIPATION: 0
VOMITING: 1
ABDOMINAL PAIN: 0
COUGH: 0
RHINORRHEA: 0
SHORTNESS OF BREATH: 1
NAUSEA: 1
SORE THROAT: 0
BLOOD IN STOOL: 1
DIARRHEA: 0

## 2021-05-27 ASSESSMENT — SOCIAL DETERMINANTS OF HEALTH (SDOH): HOW HARD IS IT FOR YOU TO PAY FOR THE VERY BASICS LIKE FOOD, HOUSING, MEDICAL CARE, AND HEATING?: VERY HARD

## 2021-05-27 NOTE — PROGRESS NOTES
2021  Baldemar Valentin II (: 1970)  49 y.o. HPI    ED follow up. Pt to ED on  with vomiting, soa, chest pain, and abd discomfort. Tachycardic which improed with IVF  Troponin negative   Rectal exam negativve and hcg stable   CTA chest and abdomen show no acute findings, no PE or HF. sxs thought to be 2/2 to covid vaccine and d/c home. Represented to ED on  with abdominal cramping, blood in stool, n/v and hematemesis. Also with soa and chest pressure and decreased UOP. Cr elevated to 1.36, GFR >59   UDS positive for cocaine, marijuana and benzos. Fecal occult was positive in the ED  Sxs improved with ivf   Started on famotidine in addition to ppi   It was recommended he follow up with cardiology, gi, and pcp. Continues with multiple acute complaints. Chest pain and shortness of breath   Also endorses \"confusion\"  Difficulty with word recall. Lethargic in office today. Has been \"doubling up\" on his lasix. Has not helped with his shortness of breath or chest pain. Previously scheduled for echo, but did not complete. Has not yet scheduled GI follow up. Reports that Last cocaine and xanax use was   Continues to smoke marijuana. Denies ha, extremity weakness, vision changes. Review of Systems   Constitutional: Positive for activity change and fatigue. Negative for chills and fever. HENT: Negative for congestion, ear pain, rhinorrhea and sore throat. Eyes: Negative for visual disturbance. Respiratory: Positive for shortness of breath. Negative for cough. Cardiovascular: Positive for chest pain and leg swelling. Negative for palpitations. Gastrointestinal: Positive for blood in stool, nausea and vomiting. Negative for abdominal pain, constipation and diarrhea. Genitourinary: Negative for difficulty urinating and dysuria. Musculoskeletal: Negative for arthralgias and myalgias. Skin: Negative for rash.    Neurological: Negative for dizziness, weakness and numbness. Psychiatric/Behavioral: Negative for sleep disturbance. Allergies, past medical history, family history, and social history reviewed and unchanged from previous encounter.      Current Outpatient Medications   Medication Sig Dispense Refill    metoprolol succinate (TOPROL XL) 50 MG extended release tablet TAKE ONE TABLET BY MOUTH DAILY 30 tablet 1    DULoxetine (CYMBALTA) 60 MG extended release capsule Take 1 capsule by mouth 2 times daily 60 capsule 4    busPIRone (BUSPAR) 10 MG tablet TAKE TWO TABLETS BY MOUTH TWICE A  tablet 5    furosemide (LASIX) 20 MG tablet Take 1 tablet by mouth daily 90 tablet 1    ondansetron (ZOFRAN) 4 MG tablet Take 1 tablet by mouth every 8 hours as needed for Nausea or Vomiting 40 tablet 2    potassium chloride (KLOR-CON M) 20 MEQ extended release tablet Take 1 tablet by mouth daily 30 tablet 0    albuterol sulfate HFA (VENTOLIN HFA) 108 (90 Base) MCG/ACT inhaler Inhale 2 puffs into the lungs every 6 hours as needed for Wheezing or Shortness of Breath 1 Inhaler 6    sildenafil (REVATIO) 20 MG tablet Take 3 tablets by mouth daily as needed (ED) 30 tablet 5    diphenhydrAMINE (BENADRYL) 25 MG tablet Take 1 tablet by mouth every 6 hours as needed for Itching Pt report takes for upset stomach in AM as needed 30 tablet 1    SPIRIVA RESPIMAT 2.5 MCG/ACT AERS inhaler INHALE TWO PUFFS BY MOUTH DAILY 1 Inhaler 5    pantoprazole (PROTONIX) 40 MG tablet TAKE ONE TABLET BY MOUTH TWICE A  tablet 1    valsartan (DIOVAN) 80 MG tablet TAKE ONE TABLET BY MOUTH DAILY 90 tablet 1    CHANTIX 1 MG tablet TAKE ONE TABLET BY MOUTH TWICE A DAY 56 tablet 4    atorvastatin (LIPITOR) 40 MG tablet TAKE ONE TABLET BY MOUTH DAILY 90 tablet 1    traZODone (DESYREL) 50 MG tablet TAKE TWO TABLETS BY MOUTH ONCE NIGHTLY 180 tablet 2    cyclobenzaprine (FLEXERIL) 10 MG tablet TAKE ONE TABLET BY MOUTH THREE TIMES A DAY AS NEEDED MUSCLE SPASMS 90 tablet 2    aspirin 81 MG tablet Take 81 mg by mouth daily       No current facility-administered medications for this visit. Vitals:    05/27/21 1319   BP: (!) 158/116   Site: Right Upper Arm   Position: Sitting   Cuff Size: Large Adult   Pulse: 121   Temp: 98.3 °F (36.8 °C)   TempSrc: Oral   SpO2: 97%   Weight: 258 lb (117 kg)   Height: 6' (1.829 m)     Estimated body mass index is 34.99 kg/m² as calculated from the following:    Height as of this encounter: 6' (1.829 m). Weight as of this encounter: 258 lb (117 kg). Physical Exam  Constitutional:       General: He is not in acute distress. Appearance: He is well-developed. HENT:      Head: Normocephalic and atraumatic. Eyes:      Conjunctiva/sclera: Conjunctivae normal.      Pupils: Pupils are equal, round, and reactive to light. Cardiovascular:      Rate and Rhythm: Normal rate and regular rhythm. Heart sounds: Normal heart sounds. No murmur heard. Pulmonary:      Effort: Pulmonary effort is normal.      Breath sounds: Normal breath sounds. No wheezing. Abdominal:      General: Bowel sounds are normal.      Palpations: Abdomen is soft. Tenderness: There is no abdominal tenderness. Musculoskeletal:      Cervical back: Neck supple. Lymphadenopathy:      Cervical: No cervical adenopathy. Skin:     General: Skin is warm and dry. Findings: No rash. Neurological:      Mental Status: He is alert and oriented to person, place, and time. Deep Tendon Reflexes: Reflexes are normal and symmetric. ASSESSMENT and PLAN:  Eliazar Barrios was seen today for ed follow-up. Diagnoses and all orders for this visit:    Heme positive stool  -     AFL - Thom Parham DO, Gastroenterology, [facility]  -     CBC WITH AUTO DIFFERENTIAL; Future    Hematemesis, presence of nausea not specified  -     CBC WITH AUTO DIFFERENTIAL;  Future    Essential hypertension  -     metoprolol succinate (TOPROL XL) 50 MG extended release tablet; TAKE ONE TABLET BY MOUTH DAILY  -     COMPREHENSIVE METABOLIC PANEL; Future  -     EKG 12 lead    Pulmonary emphysema, unspecified emphysema type (HCC)    Chest pain, unspecified type  -     Cancel: EKG 12 lead; Future  -     EKG 12 lead    RON (acute kidney injury) ([facility] [address]  -     COMPREHENSIVE METABOLIC PANEL; Future    Elevated brain natriuretic peptide (BNP) level  -     Echocardiogram complete; Future    Polysubstance abuse (HCC)  -     Drug Panel-PM-HI Res-UR Interp-A    Other orders  -     DULoxetine (CYMBALTA) 60 MG extended release capsule; Take 1 capsule by mouth 2 times daily  -     busPIRone (BUSPAR) 10 MG tablet; TAKE TWO TABLETS BY MOUTH TWICE A DAY  -     furosemide (LASIX) 20 MG tablet; Take 1 tablet by mouth daily  -     ondansetron (ZOFRAN) 4 MG tablet; Take 1 tablet by mouth every 8 hours as needed for Nausea or Vomiting  -     potassium chloride (KLOR-CON M) 20 MEQ extended release tablet; Take 1 tablet by mouth daily  -     albuterol sulfate HFA (VENTOLIN HFA) 108 (90 Base) MCG/ACT inhaler; Inhale 2 puffs into the lungs every 6 hours as needed for Wheezing or Shortness of Breath  -     sildenafil (REVATIO) 20 MG tablet; Take 3 tablets by mouth daily as needed (ED)  -     diphenhydrAMINE (BENADRYL) 25 MG tablet; Take 1 tablet by mouth every 6 hours as needed for Itching Pt report takes for upset stomach in AM as needed      Patient with multiple acute on chronic complaints. Per chart review, presentation today is consistent with ED presentation. No new or worsening symptoms. Still tachycardic and hypertensive. ECG is negative for ischemic changes. With his clinical presentation, lethargy, tachycardia, and hypertension, I suspect continued cocaine abuse although patient does deny. CTA chest and troponin were negative during ED visits. Given hemoccult positive result in the ED I did recommend he follow up with GI urgently, within the next week.  Also recommend he obtain a non emergent echo which was previously ordered, patient was given scheduling information today. Instructed him to reduce his lasix to 1 tab daily until RFP results. To ED with new or worsening symtpoms, follow up in office in 2 weeks. Return in about 2 weeks (around 6/10/2021).

## 2021-05-28 LAB
A/G RATIO: 1.5 (ref 1.1–2.2)
ALBUMIN SERPL-MCNC: 4.7 G/DL (ref 3.4–5)
ALP BLD-CCNC: 121 U/L (ref 40–129)
ALT SERPL-CCNC: 60 U/L (ref 10–40)
ANION GAP SERPL CALCULATED.3IONS-SCNC: 14 MMOL/L (ref 3–16)
AST SERPL-CCNC: 39 U/L (ref 15–37)
BASOPHILS ABSOLUTE: 0.1 K/UL (ref 0–0.2)
BASOPHILS RELATIVE PERCENT: 0.6 %
BILIRUB SERPL-MCNC: 0.5 MG/DL (ref 0–1)
BUN BLDV-MCNC: 13 MG/DL (ref 7–20)
CALCIUM SERPL-MCNC: 9.7 MG/DL (ref 8.3–10.6)
CHLORIDE BLD-SCNC: 104 MMOL/L (ref 99–110)
CO2: 22 MMOL/L (ref 21–32)
CREAT SERPL-MCNC: 1 MG/DL (ref 0.9–1.3)
EOSINOPHILS ABSOLUTE: 0.1 K/UL (ref 0–0.6)
EOSINOPHILS RELATIVE PERCENT: 1.3 %
GFR AFRICAN AMERICAN: >60
GFR NON-AFRICAN AMERICAN: >60
GLOBULIN: 3.1 G/DL
GLUCOSE BLD-MCNC: 114 MG/DL (ref 70–99)
HCT VFR BLD CALC: 49 % (ref 40.5–52.5)
HEMOGLOBIN: 16.6 G/DL (ref 13.5–17.5)
LYMPHOCYTES ABSOLUTE: 3.3 K/UL (ref 1–5.1)
LYMPHOCYTES RELATIVE PERCENT: 33.7 %
MCH RBC QN AUTO: 31.3 PG (ref 26–34)
MCHC RBC AUTO-ENTMCNC: 34 G/DL (ref 31–36)
MCV RBC AUTO: 92.2 FL (ref 80–100)
MONOCYTES ABSOLUTE: 0.8 K/UL (ref 0–1.3)
MONOCYTES RELATIVE PERCENT: 7.6 %
NEUTROPHILS ABSOLUTE: 5.6 K/UL (ref 1.7–7.7)
NEUTROPHILS RELATIVE PERCENT: 56.8 %
PDW BLD-RTO: 14.4 % (ref 12.4–15.4)
PLATELET # BLD: 363 K/UL (ref 135–450)
PMV BLD AUTO: 8.2 FL (ref 5–10.5)
POTASSIUM SERPL-SCNC: 4.4 MMOL/L (ref 3.5–5.1)
RBC # BLD: 5.31 M/UL (ref 4.2–5.9)
SODIUM BLD-SCNC: 140 MMOL/L (ref 136–145)
TOTAL PROTEIN: 7.8 G/DL (ref 6.4–8.2)
WBC # BLD: 9.9 K/UL (ref 4–11)

## 2021-05-31 LAB
6-ACETYLMORPHINE: NOT DETECTED
7-AMINOCLONAZEPAM: NOT DETECTED
ALPHA-OH-ALPRAZOLAM: NOT DETECTED
ALPHA-OH-MIDAZOLAM, URINE: NOT DETECTED
ALPRAZOLAM: NOT DETECTED
AMPHETAMINE: NOT DETECTED
BARBITURATES: NOT DETECTED
BENZOYLECGONINE: NOT DETECTED
BUPRENORPHINE: NOT DETECTED
CARISOPRODOL: NOT DETECTED
CLONAZEPAM: NOT DETECTED
CODEINE: NOT DETECTED
CREATININE URINE: 266.8 MG/DL (ref 20–400)
DIAZEPAM: NOT DETECTED
DRUGS EXPECTED: NORMAL
EER PAIN MGT DRUG PANEL, HIGH RES/EMIT U: NORMAL
ETHYL GLUCURONIDE: NOT DETECTED
FENTANYL: PRESENT
GABAPENTIN: NOT DETECTED
HYDROCODONE: PRESENT
HYDROMORPHONE: PRESENT
LORAZEPAM: NOT DETECTED
MARIJUANA METABOLITE: PRESENT
MDA: NOT DETECTED
MDEA: NOT DETECTED
MDMA URINE: NOT DETECTED
MEPERIDINE: NOT DETECTED
METHADONE: NOT DETECTED
METHAMPHETAMINE: NOT DETECTED
METHYLPHENIDATE: NOT DETECTED
MIDAZOLAM: NOT DETECTED
MORPHINE: NOT DETECTED
NALOXONE: NOT DETECTED
NORBUPRENORPHINE, FREE: NOT DETECTED
NORDIAZEPAM: PRESENT
NORFENTANYL: PRESENT
NORHYDROCODONE, URINE: PRESENT
NOROXYCODONE: NOT DETECTED
NOROXYMORPHONE, URINE: NOT DETECTED
OXAZEPAM: PRESENT
OXYCODONE: NOT DETECTED
OXYMORPHONE: NOT DETECTED
PAIN MANAGEMENT DRUG PANEL: NORMAL
PAIN MANAGEMENT DRUG PANEL: NORMAL
PCP: NOT DETECTED
PHENTERMINE: NOT DETECTED
PREGABALIN: NOT DETECTED
TAPENTADOL, URINE: NOT DETECTED
TAPENTADOL-O-SULFATE, URINE: NOT DETECTED
TEMAZEPAM: PRESENT
TRAMADOL: NOT DETECTED
ZOLPIDEM: NOT DETECTED

## 2021-06-04 RX ORDER — CYCLOBENZAPRINE HCL 10 MG
TABLET ORAL
Qty: 90 TABLET | Refills: 1 | Status: SHIPPED | OUTPATIENT
Start: 2021-06-04 | End: 2021-09-10 | Stop reason: SDUPTHER

## 2021-06-04 NOTE — TELEPHONE ENCOUNTER
Refill Request     Last Seen: Last Seen Department: 5/27/2021  Last Seen by PCP: 5/27/2021    Last Written: 2/17/21    Next Appointment:   Future Appointments   Date Time Provider Alejandra Grubbs   6/10/2021 10:20 AM [address] [place] - [facility]       Future appointment scheduled      Requested Prescriptions     Pending Prescriptions Disp Refills    cyclobenzaprine (FLEXERIL) 10 MG tablet [Pharmacy Med Name: CYCLOBENZAPRINE 10 MG TABLET] 90 tablet 1     Sig: TAKE ONE TABLET BY MOUTH THREE TIMES A DAY AS NEEDED FOR MUSCLE SPASMS

## 2021-06-10 ENCOUNTER — OFFICE VISIT (OUTPATIENT)
Dept: FAMILY MEDICINE CLINIC | Age: 51
End: 2021-06-10
Payer: COMMERCIAL

## 2021-06-10 ENCOUNTER — CARE COORDINATION (OUTPATIENT)
Dept: CARE COORDINATION | Age: 51
End: 2021-06-10

## 2021-06-10 VITALS
BODY MASS INDEX: 34.46 KG/M2 | HEIGHT: 73 IN | DIASTOLIC BLOOD PRESSURE: 82 MMHG | RESPIRATION RATE: 16 BRPM | TEMPERATURE: 98.2 F | WEIGHT: 260 LBS | OXYGEN SATURATION: 95 % | SYSTOLIC BLOOD PRESSURE: 118 MMHG | HEART RATE: 90 BPM

## 2021-06-10 DIAGNOSIS — K76.89 LIVER CYST: ICD-10-CM

## 2021-06-10 DIAGNOSIS — R74.8 ELEVATED LIVER ENZYMES: ICD-10-CM

## 2021-06-10 DIAGNOSIS — R06.09 DOE (DYSPNEA ON EXERTION): ICD-10-CM

## 2021-06-10 DIAGNOSIS — F19.10 POLYSUBSTANCE ABUSE (HCC): ICD-10-CM

## 2021-06-10 DIAGNOSIS — K40.90 RIGHT INGUINAL HERNIA: Primary | ICD-10-CM

## 2021-06-10 PROCEDURE — G8417 CALC BMI ABV UP PARAM F/U: HCPCS | Performed by: PHYSICIAN ASSISTANT

## 2021-06-10 PROCEDURE — 3017F COLORECTAL CA SCREEN DOC REV: CPT | Performed by: PHYSICIAN ASSISTANT

## 2021-06-10 PROCEDURE — 99214 OFFICE O/P EST MOD 30 MIN: CPT | Performed by: PHYSICIAN ASSISTANT

## 2021-06-10 PROCEDURE — 4004F PT TOBACCO SCREEN RCVD TLK: CPT | Performed by: PHYSICIAN ASSISTANT

## 2021-06-10 PROCEDURE — G8427 DOCREV CUR MEDS BY ELIG CLIN: HCPCS | Performed by: PHYSICIAN ASSISTANT

## 2021-06-10 ASSESSMENT — ENCOUNTER SYMPTOMS
SORE THROAT: 0
CONSTIPATION: 0
DIARRHEA: 0
NAUSEA: 0
VOMITING: 0
RHINORRHEA: 0
ABDOMINAL PAIN: 0
COUGH: 0
SHORTNESS OF BREATH: 0

## 2021-06-10 NOTE — Clinical Note
Yamile Skelton,   This patient is having trouble keeping all of his orders straight. He is high risk for ED use. Can we help him with his follow up appointments. He needs to get echo done and follow up with Dr. Lalitha Ferrara for his elevated liver enzymes.      Thanks,   Deatra Hamman

## 2021-06-10 NOTE — PROGRESS NOTES
6/10/2021  Sole Valentin II (: 1970)  49 y.o. HPI    2 week follow up lethargy, bp, tachy    Patient clinically improved today. Bp normal, HR normal.   Patient mentation at baseline.   - discussed discrepancies in last UDS, patient reports he doesn't pay for drugs but doesn't decline if offered them for free. Is usually not sure what he is taking. Saw Dr. Monroe Files for colonoscopy earlier this week. States he did not schedule appointment for hepatic cyst seen on ct and elevated liver enzymes. Has not yet obtained echo. Last  (20). Last echo 2017:     Summary   Normal left ventricle size, wall thickness and low normal systolic function   with an estimated ejection fraction of 50%. Inferoseptal and basal inferior   wall hypokinesia. Normal left ventricular diastolic filling pressure. No significant valvular heart disease. Patient complaining of worsening inguinal hernia. Pain comes and goes. Worse when active. RLQ abdomen and radiates into groin. Was referred previously,  requesting referral information. Review of Systems   Constitutional: Positive for activity change and fatigue. Negative for chills. HENT: Negative for congestion, ear pain, rhinorrhea and sore throat. Eyes: Negative for visual disturbance. Respiratory: Negative for cough and shortness of breath. Cardiovascular: Negative for chest pain and palpitations. Gastrointestinal: Negative for abdominal pain, constipation, diarrhea, nausea and vomiting. Genitourinary: Negative for difficulty urinating and dysuria. +right inguinal pain   Musculoskeletal: Negative for arthralgias and myalgias. Skin: Negative for rash. Neurological: Negative for dizziness, weakness and numbness. Psychiatric/Behavioral: Negative for sleep disturbance. Allergies, past medical history, family history, and social history reviewed and unchanged from previous encounter.      Current Outpatient Medications Resp: 16   Temp: 98.2 °F (36.8 °C)   TempSrc: Oral   SpO2: 95%   Weight: 260 lb (117.9 kg)   Height: 6' 1\" (1.854 m)     Estimated body mass index is 34.3 kg/m² as calculated from the following:    Height as of this encounter: 6' 1\" (1.854 m). Weight as of this encounter: 260 lb (117.9 kg). Physical Exam  Constitutional:       General: He is not in acute distress. Appearance: He is well-developed. HENT:      Head: Normocephalic and atraumatic. Eyes:      Conjunctiva/sclera: Conjunctivae normal.      Pupils: Pupils are equal, round, and reactive to light. Cardiovascular:      Rate and Rhythm: Normal rate and regular rhythm. Heart sounds: Normal heart sounds. No murmur heard. Pulmonary:      Effort: Pulmonary effort is normal.      Breath sounds: Normal breath sounds. No wheezing. Abdominal:      General: Bowel sounds are normal.      Palpations: Abdomen is soft. Tenderness: There is no abdominal tenderness. Musculoskeletal:      Cervical back: Neck supple. Lymphadenopathy:      Cervical: No cervical adenopathy. Skin:     General: Skin is warm and dry. Findings: No rash. Neurological:      Mental Status: He is alert and oriented to person, place, and time. ASSESSMENT and PLAN:  Diagnoses and all orders for this visit:    Right inguinal hernia  -     Saloni Franks MD, General Surgery, [facility]    Elevated liver enzymes  -     BAL[facility]  - declines blood work today, recommend follow up with GI, if has not seen before next appointment will do work up with blood work- hiv, hep panel, staci, ceruloplasmin, Fe, mitochondrial AB, and F actin. Liver cyst  -     BALDEV Trinh DO, Gastroenterology, [facility]    RAGSDALE (dyspnea on exertion)  - echo per orders    Polysubstance abuse ([address]  - pt declines intervention/op resources. - doesn't feel he's addicted as he doesn't pay for the drugs.  Discussed ramifications of use on his chronic medical conditions. Pt verbalized understanding. Patient with poor compliance for treatment plan. Discussed the importance of follow up with specialties as recommended. Prioritize cards and GI. Will also refer to care coordination. Return in about 3 months (around 9/10/2021) for HTN, bonds, elevated liver enzymes. Bertha Benson

## 2021-06-10 NOTE — CARE COORDINATION
Ambulatory Care Coordination Note  6/10/2021  CM Risk Score: 9  Charlson 10 Year Mortality Risk Score: 10%     ACC: John Richmond RN    Summary Note: ACM called patient after PCP referred patient to care coordinator to help cooridnating follow up appts for patient. Patient said he asked his mom to scheduled Echocardiogram and GI F/U with Dr. Rajinder Price today after his follow up with Doneen Sandifer, PA. Patient said he realized today after f/u appt with PCP that he needed to make these follow up appt. Patient said he has fallen 3 times but did not obtain injury. Patient uses a walking stick frequently to help with ambulation. ACM reviewed medications with patient. SDOH was completed and updated. Patient said he quit smoking cigarettes and now is smoking THC,but does not smoke often. Patient said he is ready to quit smoking THC and is working towards this. ACM asked patient about other substances he uses patient said in earlier years he has done cocaine, heroin, meth, thc, pills, among other drugs. Patient said he does not do hard drugs unless someone provides it for free. Patient said last drug use was 3 weeks ago and it was only cocaine and heroin. ACM reviewed recent urine drug screen with patient and he was unaware that he had tested positive for other drugs. Patient believed he was only using Heroin and Cocaine. Patient said he is no longer going to take free drugs that are made in a bathroom because he does not know what is in them. ACM encouraged patient if he needed extra support with sobriety Narcotics Anonymous could be a great resource. Patient said he has been to [facility] before but he said if he is going to stop something he does not have a problem with relapses. Patient said he has chosen to be clean from heroin, that was 10 years ago, patient was sober but decided he wanted to use again. ACM said she would reach out tomorrow and follow up with the GI appt and Echo that needed to be scheduled.  ACM said she would schedule them tomorrow with the patient if patients mom had not scheduled his appts for him. Patient appreciated ACM call and following up with him. Plan:    F/U smoking cessation  Sobriety  F/U appts    Ambulatory Care Coordination Assessment    Care Coordination Protocol  Program Enrollment: Complex Care  Referral from Primary Care Provider: No  Week 1 - Initial Assessment     Do you have all of your prescriptions and are they filled?: Yes  Barriers to medication adherence: Other  Other barriers to medication adherence: Does not like taking so many medications at one time. Are you able to afford your medications?: No  How often do you have trouble taking your medications the way you have been told to take them?: Sometimes I take them as prescribed. Do you have Home O2 Therapy?: No      Ability to seek help/take action for Emergent Urgent situations i.e. fire, crime, inclement weather or health crisis. : Independent  Ability to ambulate to restroom: Independent  Ability handle personal hygeine needs (bathing/dressing/grooming): Independent  Ability to manage Medications: Independent  Ability to prepare Food Preparation: Independent  Ability to maintain home (clean home, laundry): Independent  Ability to drive and/or has transportation: Independent  Ability to do shopping: Independent  Ability to manage finances:  Independent  Is patient able to live independently?: Yes     Current Housing: Private Residence        Per the Fall Risk Screening, did the patient have 2 or more falls or 1 fall with injury in the past year?: Yes  Use of a Mobility Aid: No  Difficulty walking/impaired gait: Yes  Issues with feet or shoes like numbness, edema, shoes not fitting: No  Changes in vision, poor vision or poor lighting in environment: No  Dizziness: No  Other Fall Risk: No     Frequent urination at night?: No  Do you use rails/bars?: No  Do you have a non-slip tub mat?: No     Are you experiencing loss of meaning?: No  Are you experiencing loss of hope and peace?: No     Suggested Interventions and Community Resources                  Prior to Admission medications    Medication Sig Start Date End Date Taking?  Authorizing Provider   cyclobenzaprine (FLEXERIL) 10 MG tablet TAKE ONE TABLET BY MOUTH THREE TIMES A DAY AS NEEDED FOR MUSCLE SPASMS 6/4/21   AVE Curran   metoprolol succinate (TOPROL XL) 50 MG extended release tablet TAKE ONE TABLET BY MOUTH DAILY 5/27/21   AVE Curran   DULoxetine (CYMBALTA) 60 MG extended release capsule Take 1 capsule by mouth 2 times daily 5/27/21   AVE Curran   busPIRone (BUSPAR) 10 MG tablet TAKE TWO TABLETS BY MOUTH TWICE A DAY 5/27/21   AVE Curran   furosemide (LASIX) 20 MG tablet Take 1 tablet by mouth daily 5/27/21   AVE Curran   ondansetron (ZOFRAN) 4 MG tablet Take 1 tablet by mouth every 8 hours as needed for Nausea or Vomiting 5/27/21   AVE Curran   potassium chloride (KLOR-CON M) 20 MEQ extended release tablet Take 1 tablet by mouth daily 5/27/21   AVE Curarn   albuterol sulfate HFA (VENTOLIN HFA) 108 (90 Base) MCG/ACT inhaler Inhale 2 puffs into the lungs every 6 hours as needed for Wheezing or Shortness of Breath 5/27/21   AVE Curran   sildenafil (REVATIO) 20 MG tablet Take 3 tablets by mouth daily as needed (ED) 5/27/21   AVE Curran   diphenhydrAMINE (BENADRYL) 25 MG tablet Take 1 tablet by mouth every 6 hours as needed for Itching Pt report takes for upset stomach in AM as needed 5/27/21   AVE Curran   SPIRIVA RESPIMAT 2.5 MCG/ACT AERS inhaler INHALE TWO PUFFS BY MOUTH DAILY 5/26/21   Khris Lind MD   pantoprazole (PROTONIX) 40 MG tablet TAKE ONE TABLET BY MOUTH TWICE A DAY 4/27/21   Leobardo Edward MD   valsartan (DIOVAN) 80 MG tablet TAKE ONE TABLET BY MOUTH DAILY 4/27/21   Antonieta Snow MD   CHANTIX 1 MG tablet TAKE ONE TABLET BY MOUTH TWICE A DAY 3/30/21   Deacon Bundy, RA[facility] - CNP atorvastatin (LIPITOR) 40 MG tablet TAKE ONE TABLET BY MOUTH DAILY 2/17/21   AVE Adams   traZODone (DESYREL) 50 MG tablet TAKE TWO TABLETS BY MOUTH ONCE NIGHTLY 2/17/21   Juan Sanchez, PA   aspirin 81 MG tablet Take 81 mg by mouth daily    Historical Provider, MD       Future Appointments   Date Time Provider Alejandra Grubbs   9/10/2021  9:30 AM Alisa Pineda MD [facility] [place] - [facility]     ,   COPD Assessment    Does the patient understand envrionmental exposure?: Yes  Is the patient able to verbalize Rescue vs. Long Acting medications?: Yes  Does the patient have a nebulizer?: Yes  Does the patient use a space with inhaled medications?: No     No patient-reported symptoms         Symptoms:         and   General Assessment    Do you have any symptoms that are causing concern?: No

## 2021-06-14 ENCOUNTER — TELEPHONE (OUTPATIENT)
Dept: FAMILY MEDICINE CLINIC | Age: 51
End: 2021-06-14

## 2021-06-14 ENCOUNTER — CARE COORDINATION (OUTPATIENT)
Dept: CARE COORDINATION | Age: 51
End: 2021-06-14

## 2021-06-14 NOTE — CARE COORDINATION
CHAYITO called central scheduling and scheduled patient for Echocardiogram 6/29/21 @ 12:30PM at [address] [place], [place] [address] called Dr. Aye Oscar office and made follow up appt for GI 6/21/21 @ 11AM at the [facility] office. Information for appt times were relayed back to patient.

## 2021-06-14 NOTE — TELEPHONE ENCOUNTER
Yes, ok to take 2 tablets for the next five days. Please see if he has been able to schedule his echocardiogram- ultrasound of his heart.

## 2021-06-14 NOTE — TELEPHONE ENCOUNTER
Just kelli     Pt notified, he voiced understanding. Pt did say he has not schedule his Echo yet but he will do so soon. I asked if he needed help scheduling, pt declined and said he will call.

## 2021-06-14 NOTE — TELEPHONE ENCOUNTER
Pt states that he has gained 12 lbs in the last 3/4 days. He believes it is water gain. He would like to know if she should take extra water pills.

## 2021-06-14 NOTE — CARE COORDINATION
Patient called [facility] regarding follow up appts that needed to be made. Patient said that mom had made the consult appt for General surgery. Patient still needed appts made for Echo and with GI. Patient said anytime was okay for him. ACM will call patient later today with appt times.

## 2021-06-21 ENCOUNTER — INITIAL CONSULT (OUTPATIENT)
Dept: SURGERY | Age: 51
End: 2021-06-21
Payer: COMMERCIAL

## 2021-06-21 VITALS
HEIGHT: 73 IN | HEART RATE: 104 BPM | DIASTOLIC BLOOD PRESSURE: 89 MMHG | WEIGHT: 251 LBS | SYSTOLIC BLOOD PRESSURE: 131 MMHG | BODY MASS INDEX: 33.27 KG/M2

## 2021-06-21 DIAGNOSIS — K40.90 RIGHT INGUINAL HERNIA: Primary | ICD-10-CM

## 2021-06-21 PROCEDURE — 99244 OFF/OP CNSLTJ NEW/EST MOD 40: CPT | Performed by: SURGERY

## 2021-06-21 PROCEDURE — G8427 DOCREV CUR MEDS BY ELIG CLIN: HCPCS | Performed by: SURGERY

## 2021-06-21 PROCEDURE — G8417 CALC BMI ABV UP PARAM F/U: HCPCS | Performed by: SURGERY

## 2021-06-22 NOTE — PROGRESS NOTES
Department of General Surgery Consult    PATIENT NAME: Ricky Mao II   YOB: 1970    ADMISSION DATE: No admission date for patient encounter. TODAY'S DATE: 6/21/21    Reason for Consult:  Right inguinal hernia    Chief Complaint: tender    Requesting Physician:  Sharda Nobles    HISTORY OF PRESENT ILLNESS:              The patient is a 49 y.o. male who presents with tenderness in right groin. Says he was told he had a hernia years ago but has been having more pain with activity recently. No bulge felt. No fevers or chills. No nausea. No constipation. .    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 Prior to Admission medications    Medication Sig Start Date End Date Taking?  Authorizing Provider   cyclobenzaprine (FLEXERIL) 10 MG tablet TAKE ONE TABLET BY MOUTH THREE TIMES A DAY AS NEEDED FOR MUSCLE SPASMS 6/4/21  Yes AVE Curran   metoprolol succinate (TOPROL XL) 50 MG extended release tablet TAKE ONE TABLET BY MOUTH DAILY 5/27/21  Yes AVE Curran   DULoxetine (CYMBALTA) 60 MG extended release capsule Take 1 capsule by mouth 2 times daily 5/27/21  Yes AVE Curran   busPIRone (BUSPAR) 10 MG tablet TAKE TWO TABLETS BY MOUTH TWICE A DAY 5/27/21  Yes AVE Curran   furosemide (LASIX) 20 MG tablet Take 1 tablet by mouth daily 5/27/21  Yes AVE Curran   ondansetron (ZOFRAN) 4 MG tablet Take 1 tablet by mouth every 8 hours as needed for Nausea or Vomiting 5/27/21  Yes AVE Gore   potassium chloride (KLOR-CON M) 20 MEQ extended release tablet Take 1 tablet by mouth daily 5/27/21  Yes AVE Gore   albuterol sulfate HFA (VENTOLIN HFA) 108 (90 Base) MCG/ACT inhaler Inhale 2 puffs into the lungs every 6 hours as needed for Wheezing or Shortness of Breath 5/27/21  Yes AVE Gore   sildenafil (REVATIO) 20 MG tablet Take 3 tablets by mouth daily as needed (ED) 5/27/21  Yes AVE Gore   diphenhydrAMINE (BENADRYL) 25 MG tablet Take 1 tablet by mouth every 6 hours as needed for Itching Pt report takes for upset stomach in AM as needed 5/27/21  Yes AVE Gore   SPIRIVA RESPIMAT 2.5 MCG/ACT AERS inhaler INHALE TWO PUFFS BY MOUTH DAILY 5/26/21  Yes Jonh Ramirez MD   pantoprazole (PROTONIX) 40 MG tablet TAKE ONE TABLET BY MOUTH TWICE A DAY 4/27/21  Yes Radha Martines MD   valsartan (DIOVAN) 80 MG tablet TAKE ONE TABLET BY MOUTH DAILY 4/27/21  Yes Radha Martines MD   CHANTIX 1 MG tablet TAKE ONE TABLET BY MOUTH TWICE A DAY 3/30/21  Yes RAH Green - CNP   atorvastatin (LIPITOR) 40 MG tablet TAKE ONE TABLET BY MOUTH DAILY 2/17/21  Yes AVE [place]   traZODone (DESYREL) 50 MG tablet TAKE TWO TABLETS BY MOUTH ONCE NIGHTLY 2/17/21  Yes AVE Gore   aspirin 81 MG tablet Take 81 mg by mouth daily   Yes Historical Provider, MD        Allergies:  Lexapro [escitalopram oxalate], Morphine, Prozac [fluoxetine hcl], and Wellbutrin [bupropion]    Social History:   TOBACCO:  yes  ETOH:  yes    Family History:        Problem Relation Age of Onset    Arthritis Mother     Other Mother         pneumothorax several times    Asthma Father        REVIEW OF SYSTEMS:  CONSTITUTIONAL:  negative  HEENT:  negative  RESPIRATORY:  negative  CARDIOVASCULAR:  negative  GASTROINTESTINAL:  negative   GENITOURINARY:  negative  HEMATOLOGIC/LYMPHATIC:  negative  NEUROLOGICAL:  Negative  * All other ROS reviewed and negative. PHYSICAL EXAM:  VITALS:  /89   Pulse 104   Ht 6' 1\" (1.854 m)   Wt 251 lb (113.9 kg)   BMI 33.12 kg/m²   24HR INTAKE/OUTPUT:    [unfilled]  [unfilled]      CONSTITUTIONAL:  alert, no apparent distress and mildly obese  EYES:  PERRL, sclera clear  ENT:  Normocephalic,atraumatic, without obvious abnormality  NECK:  supple, symmetrical, trachea midline  LUNGS: Resp effort easy and unlabored, no crackles or wheezing  CARDIOVASCULAR:  NO JVD, tachycardic  ABDOMEN:  , normal bowel sounds, soft, non-distended, non-tender, voluntary guarding absent and hernia present right and maybe left  MUSCULOSKELETAL: No clubbing or cyanosis, 0+ pitting edema lower extremities  NEUROLOGIC:  Mental Status Exam:  Level of Alertness:   awake  PSYCHIATRIC:   person, place, time  SKIN:  no bruising or bleeding    DATA:    CBC: No results for input(s): WBC, HGB, HCT, PLT in the last 72 hours. BMP:  No results for input(s): NA, K, CL, CO2, BUN, CREATININE, GLUCOSE in the last 72 hours. Hepatic: No results for input(s): AST, ALT, ALB, BILITOT, ALKPHOS in the last 72 hours. Mag:    No results for input(s): MG in the last 72 hours. Phos:   No results for input(s): PHOS in the last 72 hours. INR: No results for input(s): INR in the last 72 hours. Radiology Review: Images personally reviewed by me. NA    IMPRESSION/RECOMMENDATIONS:    48 yo with right and possible left inguinal hernias  1. Reducible and no obstructive symptoms  2. Continue current Cardiac and GI workup  3. Once above complete will plan for robotic repair. Risks of operation, recovery and postop restrictions reviewed.     Electronically signed by Corinne Milliner, [address] [place] [address]  [id number]

## 2021-06-25 ENCOUNTER — CARE COORDINATION (OUTPATIENT)
Dept: CARE COORDINATION | Age: 51
End: 2021-06-25

## 2021-06-25 NOTE — CARE COORDINATION
ACM called but patient did not answer. ACM left voice message with call back number provided. ACM will follow up at a later date.

## 2021-06-29 ENCOUNTER — HOSPITAL ENCOUNTER (OUTPATIENT)
Dept: NON INVASIVE DIAGNOSTICS | Age: 51
Discharge: HOME | End: 2021-06-29
Payer: COMMERCIAL

## 2021-06-29 DIAGNOSIS — R79.89 ELEVATED BRAIN NATRIURETIC PEPTIDE (BNP) LEVEL: ICD-10-CM

## 2021-06-29 LAB
LV EF: 50 %
LVEF MODALITY: NORMAL

## 2021-06-29 PROCEDURE — 93306 TTE W/DOPPLER COMPLETE: CPT

## 2021-07-02 ENCOUNTER — CARE COORDINATION (OUTPATIENT)
Dept: CARE COORDINATION | Age: 51
End: 2021-07-02

## 2021-07-02 NOTE — CARE COORDINATION
Ambulatory Care Coordination Note  7/2/2021  CM Risk Score: 9  Charlson 10 Year Mortality Risk Score: 10%     ACC: Kong Tabares, RN    Summary Note: Patient returned ACM call and was able to complete follow up care coordination call. Patient said he has been able to keep all appointments and has one more testing follow up appt for GI. Patient said he is doing well at home. Patient said he has remained sober and not taken any more drugs. Patient is still smoking. Patient denies any immediate needs at this time. ACM will follow up with patient in a month. Plan:     Review ultrasound that was ordered by GI  F/U with GI recommendations  Sobriety          Care Coordination Interventions    Program Enrollment: Complex Care  Referral from Primary Care Provider: Yes  Suggested Interventions and Community Resources         Goals Addressed                    This Visit's Progress      Conditions and Symptoms   Improving      I will schedule office visits, as directed by my provider. I will keep my appointment or reschedule if I have to cancel. I will notify my provider of any barriers to my plan of care. Barriers: lack of motivation and overwhelmed by complexity of regimen  Plan for overcoming my barriers: N/A  Confidence: 8/10  Anticipated Goal Completion Date: 8/10/21            Patient Stated (pt-stated)   Improving      Patient said he does not want to take any more drugs that are made in a bathroom.   Barriers: lack of motivation, lack of support, and stress  Plan for overcoming my barriers: Patient to reach out to friends or family for support  Confidence: 8/10  Anticipated Goal Completion Date: 8/10/21        Wellness Goal   No change      Patient Self-Management Goal for Health Maintenance  Goal: I will chose a goal related to tobacco cessation:  I will think about my triggers for smoking, I will think about reasons why I should quit smoking, I will learn more about the harmful effects of tobacco, and I will start an exercise program to relieve stress and avoid weight gain associated with smoking cessation. Barriers: stress  Plan for overcoming my barriers: N/A  Confidence: 8/10  Anticipated Goal Completion Date: 8/10/21            Prior to Admission medications    Medication Sig Start Date End Date Taking?  Authorizing Provider   cyclobenzaprine (FLEXERIL) 10 MG tablet TAKE ONE TABLET BY MOUTH THREE TIMES A DAY AS NEEDED FOR MUSCLE SPASMS 6/4/21   AVE [facility]   metoprolol succinate (TOPROL XL) 50 MG extended release tablet TAKE ONE TABLET BY MOUTH DAILY 5/27/21   AVE Cho   DULoxetine (CYMBALTA) 60 MG extended release capsule Take 1 capsule by mouth 2 times daily 5/27/21   AVE Cho   busPIRone (BUSPAR) 10 MG tablet TAKE TWO TABLETS BY MOUTH TWICE A DAY 5/27/21   AVE Cho   furosemide (LASIX) 20 MG tablet Take 1 tablet by mouth daily 5/27/21   AVE Cho   ondansetron (ZOFRAN) 4 MG tablet Take 1 tablet by mouth every 8 hours as needed for Nausea or Vomiting 5/27/21   AVE [facility]   potassium chloride (KLOR-CON M) 20 MEQ extended release tablet Take 1 tablet by mouth daily 5/27/21   AVE Cho   albuterol sulfate HFA (VENTOLIN HFA) 108 (90 Base) MCG/ACT inhaler Inhale 2 puffs into the lungs every 6 hours as needed for Wheezing or Shortness of Breath 5/27/21   AVE Cho   sildenafil (REVATIO) 20 MG tablet Take 3 tablets by mouth daily as needed (ED) 5/27/21   AVE Cho   diphenhydrAMINE (BENADRYL) 25 MG tablet Take 1 tablet by mouth every 6 hours as needed for Itching Pt report takes for upset stomach in AM as needed 5/27/21   AVE Cho   SPIRIVA RESPIMAT 2.5 MCG/ACT AERS inhaler INHALE TWO PUFFS BY MOUTH DAILY 5/26/21   Kwan Martínez MD   pantoprazole (PROTONIX) 40 MG tablet TAKE ONE TABLET BY MOUTH TWICE A DAY 4/27/21   Nazia Edward MD   valsartan (DIOVAN) 80 MG tablet TAKE ONE TABLET BY MOUTH DAILY 4/27/21   Brittany Edward, MD   CHANTIX 1 MG tablet TAKE ONE TABLET BY MOUTH TWICE A DAY 3/30/21   RAH Law - CNP   atorvastatin (LIPITOR) 40 MG tablet TAKE ONE TABLET BY MOUTH DAILY 2/17/21   manuelibeth Drexel, PA   traZODone (DESYREL) 50 MG tablet TAKE TWO TABLETS BY MOUTH ONCE NIGHTLY 2/17/21   [facility] Union[facility]eth PerezDrexel, PA   aspirin 81 MG tablet Take 81 mg by mouth daily    Historical Provider, MD       Future Appointments   Date Time Provider Alejandra Grubbs   7/16/2021  4:30 PM [facility] RM 1 [facility] ULTRA Joss Rad   9/10/2021  9:30 AM [facility]     ,   COPD Assessment    Does the patient understand envrionmental exposure?: Yes  Is the patient able to verbalize Rescue vs. Long Acting medications?: Yes  Does the patient have a nebulizer?: Yes  Does the patient use a space with inhaled medications?: No     No patient-reported symptoms         Symptoms:         and   General Assessment    Do you have any symptoms that are causing concern?: No

## 2021-08-02 ENCOUNTER — CARE COORDINATION (OUTPATIENT)
Dept: CARE COORDINATION | Age: 51
End: 2021-08-02

## 2021-08-02 NOTE — CARE COORDINATION
medications    Medication Sig Start Date End Date Taking?  Authorizing Provider   cyclobenzaprine (FLEXERIL) 10 MG tablet TAKE ONE TABLET BY MOUTH THREE TIMES A DAY AS NEEDED FOR MUSCLE SPASMS 6/4/21   AVE Lentz   metoprolol succinate (TOPROL XL) 50 MG extended release tablet TAKE ONE TABLET BY MOUTH DAILY 5/27/21   AVE Lentz   DULoxetine (CYMBALTA) 60 MG extended release capsule Take 1 capsule by mouth 2 times daily 5/27/21   AVE Lentz   busPIRone (BUSPAR) 10 MG tablet TAKE TWO TABLETS BY MOUTH TWICE A DAY 5/27/21   AVE Lentz   furosemide (LASIX) 20 MG tablet Take 1 tablet by mouth daily 5/27/21   AVE Lentz   ondansetron (ZOFRAN) 4 MG tablet Take 1 tablet by mouth every 8 hours as needed for Nausea or Vomiting 5/27/21   AVE Lentz   potassium chloride (KLOR-CON M) 20 MEQ extended release tablet Take 1 tablet by mouth daily 5/27/21   AVE Lentz   albuterol sulfate HFA (VENTOLIN HFA) 108 (90 Base) MCG/ACT inhaler Inhale 2 puffs into the lungs every 6 hours as needed for Wheezing or Shortness of Breath 5/27/21   AVE Lentz   sildenafil (REVATIO) 20 MG tablet Take 3 tablets by mouth daily as needed (ED) 5/27/21   AVE Lentz   diphenhydrAMINE (BENADRYL) 25 MG tablet Take 1 tablet by mouth every 6 hours as needed for Itching Pt report takes for upset stomach in AM as needed 5/27/21   AVE Lentz   SPIRIVA RESPIMAT 2.5 MCG/ACT AERS inhaler INHALE TWO PUFFS BY MOUTH DAILY 5/26/21   Basia Walden MD   pantoprazole (PROTONIX) 40 MG tablet TAKE ONE TABLET BY MOUTH TWICE A DAY 4/27/21   Kati Edward MD   valsartan (DIOVAN) 80 MG tablet TAKE ONE TABLET BY MOUTH DAILY 4/27/21   Katherine Gonzales MD   CHANTIX 1 MG tablet TAKE ONE TABLET BY MOUTH TWICE A DAY 3/30/21   RAH Singh - CNP   atorvastatin (LIPITOR) 40 MG tablet TAKE ONE TABLET BY MOUTH DAILY 2/17/21   AVE Lentz   traZODone (DESYREL) 50 MG tablet TAKE TWO TABLETS BY MOUTH ONCE NIGHTLY 2/17/21   AVE Lentz   aspirin 81 MG tablet Take 81 mg by mouth daily    Historical Provider, MD       Future Appointments   Date Time Provider Alejandra Humera   9/10/2021  9:30 AM MD YUMIKO Tillman  [place] - FAWAD     ,   COPD Assessment    Does the patient understand envrionmental exposure?: Yes  Is the patient able to verbalize Rescue vs. Long Acting medications?: Yes  Does the patient have a nebulizer?: Yes  Does the patient use a space with inhaled medications?: No            Symptoms:         and   General Assessment    Do you have any symptoms that are causing concern?: No

## 2021-08-18 ENCOUNTER — CARE COORDINATION (OUTPATIENT)
Dept: CARE COORDINATION | Age: 51
End: 2021-08-18

## 2021-08-18 NOTE — CARE COORDINATION
Ambulatory Care Coordination Note  8/18/2021  CM Risk Score: 9  Charlson 10 Year Mortality Risk Score: 10%     ACC: Breanne Osorio RN    Summary Note: AC completed follow up call with patient. Patient said he has a follow up with Dr. Cade Watts in September. After, this appointment patient said he would schedule the hernia repair with Dr. Juli Estrada. ACM discuss patient has met most goals and will be getting close to graduation from [address] [place] [address]. Patient thanked [facility] for helping him get appts scheduled and following through with committments. ACM will follow up in a month to make sure hernia repair is scheduled. Plan:    F/U call in 1 month        Care Coordination Interventions    Program Enrollment: Complex Care  Referral from Primary Care Provider: Yes  Suggested Interventions and Community Resources  Specialty Services Referral: In Process (Comment: General Surgery)         Goals Addressed                    This Visit's Progress      COMPLETED: Conditions and Symptoms         I will schedule office visits, as directed by my provider. I will keep my appointment or reschedule if I have to cancel. I will notify my provider of any barriers to my plan of care. Barriers: lack of motivation and overwhelmed by complexity of regimen  Plan for overcoming my barriers: N/A  Confidence: 8/10  Anticipated Goal Completion Date: 8/10/21            COMPLETED: Patient Stated (pt-stated)         Patient said he does not want to take any more drugs that are made in a bathroom.   Barriers: lack of motivation, lack of support, and stress  Plan for overcoming my barriers: Patient to reach out to friends or family for support  Confidence: 8/10  Anticipated Goal Completion Date: 8/10/21        Wellness Goal   On track      Patient Self-Management Goal for Health Maintenance  Goal: I will chose a goal related to tobacco cessation:  I will think about my triggers for smoking, I will think about reasons why I should quit smoking, I will learn more about the harmful effects of tobacco, and I will start an exercise program to relieve stress and avoid weight gain associated with smoking cessation. Barriers: stress  Plan for overcoming my barriers: N/A  Confidence: 8/10  Anticipated Goal Completion Date: 8/10/21            Prior to Admission medications    Medication Sig Start Date End Date Taking?  Authorizing Provider   cyclobenzaprine (FLEXERIL) 10 MG tablet TAKE ONE TABLET BY MOUTH THREE TIMES A DAY AS NEEDED FOR MUSCLE SPASMS 6/4/21   AVE Romero   metoprolol succinate (TOPROL XL) 50 MG extended release tablet TAKE ONE TABLET BY MOUTH DAILY 5/27/21   AVE Romero   DULoxetine (CYMBALTA) 60 MG extended release capsule Take 1 capsule by mouth 2 times daily 5/27/21   AVE Romero   busPIRone (BUSPAR) 10 MG tablet TAKE TWO TABLETS BY MOUTH TWICE A DAY 5/27/21   AVE Romero   furosemide (LASIX) 20 MG tablet Take 1 tablet by mouth daily 5/27/21   AVE Romero   ondansetron (ZOFRAN) 4 MG tablet Take 1 tablet by mouth every 8 hours as needed for Nausea or Vomiting 5/27/21   AVE Romero   potassium chloride (KLOR-CON M) 20 MEQ extended release tablet Take 1 tablet by mouth daily 5/27/21   AVE Romero   albuterol sulfate HFA (VENTOLIN HFA) 108 (90 Base) MCG/ACT inhaler Inhale 2 puffs into the lungs every 6 hours as needed for Wheezing or Shortness of Breath 5/27/21   AVE Romero   sildenafil (REVATIO) 20 MG tablet Take 3 tablets by mouth daily as needed (ED) 5/27/21   AVE Romero   diphenhydrAMINE (BENADRYL) 25 MG tablet Take 1 tablet by mouth every 6 hours as needed for Itching Pt report takes for upset stomach in AM as needed 5/27/21   AVE Romero   SPIRIVA RESPIMAT 2.5 MCG/ACT AERS inhaler INHALE TWO PUFFS BY MOUTH DAILY 5/26/21   Kendal Cordero MD   pantoprazole (PROTONIX) 40 MG tablet TAKE ONE TABLET BY MOUTH TWICE A DAY 4/27/21   Rocio Bryant MD   valsartan (DIOVAN) 80 MG tablet TAKE ONE TABLET BY MOUTH DAILY 4/27/21   Mary Payne MD   CHANTIX 1 MG tablet TAKE ONE TABLET BY MOUTH TWICE A DAY 3/30/21   Dylan Plant, APRN - CNP   atorvastatin (LIPITOR) 40 MG tablet TAKE ONE TABLET BY MOUTH DAILY 2/17/21   AVE Christine   traZODone (DESYREL) 50 MG tablet TAKE TWO TABLETS BY MOUTH ONCE NIGHTLY 2/17/21   AVE Christine   aspirin 81 MG tablet Take 81 mg by mouth daily    Historical Provider, MD       Future Appointments   Date Time Provider Alejandra Grubbs   9/10/2021  9:30 AM Mary Payne MD [facility] - [facility]     ,   COPD Assessment    Does the patient understand envrionmental exposure?: Yes  Is the patient able to verbalize Rescue vs. Long Acting medications?: Yes  Does the patient have a nebulizer?: Yes  Does the patient use a space with inhaled medications?: No     No patient-reported symptoms         Symptoms:         and   General Assessment    Do you have any symptoms that are causing concern?: No

## 2021-08-19 ENCOUNTER — NURSE TRIAGE (OUTPATIENT)
Dept: OTHER | Facility: CLINIC | Age: 51
End: 2021-08-19

## 2021-08-19 NOTE — TELEPHONE ENCOUNTER
----- Message from Miya Bryant sent at 8/19/2021  4:29 PM EDT -----  Subject: Medication Problem    QUESTIONS  Name of Medication? CHANTIX 1 MG tablet  Patient-reported dosage and instructions? 2 a day  What question or problem do you have with the medication? PT would like to   know if there is something that he can take instead of Chantix? Preferred Pharmacy? [id number] [facility], [address] [facility] [address] [place]  Pharmacy phone number (if available)? [phone]  Additional Information for Provider?   ---------------------------------------------------------------------------  --------------  CALL BACK INFO  What is the best way for the office to contact you? OK to leave message on   voicemail  Preferred Call Back Phone Number? [phone]  ---------------------------------------------------------------------------  --------------  SCRIPT ANSWERS  Relationship to Patient?  Self

## 2021-08-19 NOTE — TELEPHONE ENCOUNTER
There is no pill alternative to chantix that won't interact with his current medication regimen. I can send in nicotine patches if he is interested. He does not need to take the famotidine if his heartburn/reflux is not giving him issues.

## 2021-08-19 NOTE — TELEPHONE ENCOUNTER
Received call from Irene Lopez at [facility] with [employer]. Brief description of triage: see below    Triage indicates for patient to be seen within 24 hours    Care advice provided, patient verbalizes understanding; denies any other questions or concerns; instructed to call back for any new or worsening symptoms. Writer provided warm transfer to [employer] at [facility] for appointment scheduling. Attention Provider: Thank you for allowing me to participate in the care of your patient. The patient was connected to triage in response to information provided to the [facility]. Please do not respond through this encounter as the response is not directed to a shared pool. Reason for Disposition   Drug abuse or dependence: question or problem related to   Requesting admission for substance (drug) abuse    Answer Assessment - Initial Assessment Questions  1. LEVEL OF CONSCIOUSNESS: \"How is he (she, the patient) acting right now? \" (e.g., alert-oriented, confused, lethargic, stuporous, comatose)      \"I am avoiding everything and I get confused really easy about a lot of stuff, I don't want to do anything\"    2. ONSET: \"When did the confusion start? \"  (minutes, hours, days)      Unsure    3. PATTERN \"Does this come and go, or has it been constant since it started? \"  \"Is it present now? \"      Comes and goes    4. ALCOHOL or DRUGS: \"Has he been drinking alcohol or taking any drugs? \"       Denies alcohol. .. see below    5. NARCOTIC MEDICATIONS: \"Has he been receiving any narcotic medications? \" (e.g., morphine, Vicodin)      Denies any medications- but I have done some drugs. Patient states that he was trying to do heroin (snorting) but 2 months ago he had a drug test and it was negative so he is unsure what type of drug it was. Patient states that he \"only smoke some weed\" now     6. CAUSE: \"What do you think is causing the confusion? \"       Unsure    7.  OTHER SYMPTOMS: Eufemia Kinney there any other

## 2021-08-19 NOTE — TELEPHONE ENCOUNTER
Patient would also like to know \"PT would like to know if he still needs to take   \"famotidine-Pepcid\". It was not listed in your medications. \"

## 2021-08-20 ENCOUNTER — OFFICE VISIT (OUTPATIENT)
Dept: FAMILY MEDICINE CLINIC | Age: 51
End: 2021-08-20
Payer: COMMERCIAL

## 2021-08-20 ENCOUNTER — TELEPHONE (OUTPATIENT)
Dept: FAMILY MEDICINE CLINIC | Age: 51
End: 2021-08-20

## 2021-08-20 VITALS
DIASTOLIC BLOOD PRESSURE: 60 MMHG | SYSTOLIC BLOOD PRESSURE: 110 MMHG | OXYGEN SATURATION: 94 % | WEIGHT: 258.2 LBS | BODY MASS INDEX: 34.07 KG/M2 | HEART RATE: 92 BPM

## 2021-08-20 DIAGNOSIS — F33.2 SEVERE EPISODE OF RECURRENT MAJOR DEPRESSIVE DISORDER, WITHOUT PSYCHOTIC FEATURES (HCC): Primary | ICD-10-CM

## 2021-08-20 DIAGNOSIS — R73.01 IFG (IMPAIRED FASTING GLUCOSE): ICD-10-CM

## 2021-08-20 LAB — HBA1C MFR BLD: 5.6 %

## 2021-08-20 PROCEDURE — G8417 CALC BMI ABV UP PARAM F/U: HCPCS | Performed by: INTERNAL MEDICINE

## 2021-08-20 PROCEDURE — G8427 DOCREV CUR MEDS BY ELIG CLIN: HCPCS | Performed by: INTERNAL MEDICINE

## 2021-08-20 PROCEDURE — 99214 OFFICE O/P EST MOD 30 MIN: CPT | Performed by: INTERNAL MEDICINE

## 2021-08-20 PROCEDURE — 83036 HEMOGLOBIN GLYCOSYLATED A1C: CPT | Performed by: INTERNAL MEDICINE

## 2021-08-20 PROCEDURE — 3017F COLORECTAL CA SCREEN DOC REV: CPT | Performed by: INTERNAL MEDICINE

## 2021-08-20 PROCEDURE — 4004F PT TOBACCO SCREEN RCVD TLK: CPT | Performed by: INTERNAL MEDICINE

## 2021-08-20 NOTE — PROGRESS NOTES
Vanessa Keating (:  1970) is a 49 y.o. male,, here for evaluation of the following chief complaint(s):  Panic Attack (on and off for years) and Fatigue         ASSESSMENT/PLAN:    Yonatan Rodgers was seen today for panic attack and fatigue. Diagnoses and all orders for this visit:    IFG (impaired fasting glucose)  -     POCT glycosylated hemoglobin (Hb A1C)  Controlled, continue current    Severe episode of recurrent major depressive disorder, without psychotic features [facility]  Worsening, chronic, trial of latuda and refer  Other orders  -     Discontinue: diclofenac sodium (VOLTAREN) 1 % GEL; Apply topically 2 times daily  -     lurasidone (LATUDA) 20 MG TABS tablet; Take 1 tablet by mouth daily        No follow-ups on file. Subjective   SUBJECTIVE/OBJECTIVE:  HPI  Worsening depression and anxiety. Denies suicidal thoughts or plans. Review of Systems   Constitutional: Positive for fatigue. Psychiatric/Behavioral: Positive for dysphoric mood. Negative for behavioral problems and confusion. The patient is nervous/anxious. Objective   Physical Exam  Vitals reviewed. Constitutional:       General: He is not in acute distress. Appearance: He is well-developed. Eyes:      General: No scleral icterus. Conjunctiva/sclera: Conjunctivae normal.   Neck:      Thyroid: No thyromegaly. Vascular: No JVD. Neurological:      Mental Status: He is alert and oriented to person, place, and time. Cranial Nerves: No cranial nerve deficit. Psychiatric:         Attention and Perception: Attention normal.         Mood and Affect: Mood is depressed. Behavior: Behavior normal.         Thought Content: Thought content normal.         Judgment: Judgment normal.                  An electronic signature was used to authenticate this note.     --Estefania Zaidi MD

## 2021-08-25 NOTE — TELEPHONE ENCOUNTER
Received A denial for Latuda 20MG tablets. See attached denial letter. Please notify patient. Thank you.

## 2021-09-04 DIAGNOSIS — K21.9 GASTROESOPHAGEAL REFLUX DISEASE WITHOUT ESOPHAGITIS: ICD-10-CM

## 2021-09-04 DIAGNOSIS — I10 ESSENTIAL HYPERTENSION: ICD-10-CM

## 2021-09-04 DIAGNOSIS — K22.70 BARRETT'S ESOPHAGUS DETERMINED BY ENDOSCOPY: ICD-10-CM

## 2021-09-07 RX ORDER — PANTOPRAZOLE SODIUM 40 MG/1
TABLET, DELAYED RELEASE ORAL
Qty: 180 TABLET | Refills: 0 | Status: SHIPPED | OUTPATIENT
Start: 2021-09-07 | End: 2021-10-26

## 2021-09-07 RX ORDER — METOPROLOL SUCCINATE 50 MG/1
TABLET, EXTENDED RELEASE ORAL
Qty: 90 TABLET | Refills: 0 | Status: SHIPPED | OUTPATIENT
Start: 2021-09-07 | End: 2021-12-27 | Stop reason: SDUPTHER

## 2021-09-07 RX ORDER — ATORVASTATIN CALCIUM 40 MG/1
TABLET, FILM COATED ORAL
Qty: 90 TABLET | Refills: 0 | Status: SHIPPED | OUTPATIENT
Start: 2021-09-07 | End: 2021-12-10 | Stop reason: SDUPTHER

## 2021-09-07 NOTE — TELEPHONE ENCOUNTER
.  Last office visit 8/20/2021     Last written 5/27/21 30 with 1      Next office visit scheduled 9/10/2021    Requested Prescriptions     Pending Prescriptions Disp Refills    atorvastatin (LIPITOR) 40 MG tablet [Pharmacy Med Name: ATORVASTATIN 40 MG TABLET] 90 tablet 0     Sig: TAKE ONE TABLET BY MOUTH DAILY    pantoprazole (PROTONIX) 40 MG tablet [Pharmacy Med Name: PANTOPRAZOLE SOD DR 40 MG TAB] 180 tablet 0     Sig: TAKE ONE TABLET BY MOUTH TWICE A DAY    metoprolol succinate (TOPROL XL) 50 MG extended release tablet [Pharmacy Med Name: METOPROLOL SUCC ER 50 MG TAB] 90 tablet 0     Sig: TAKE ONE TABLET BY MOUTH DAILY

## 2021-09-10 ENCOUNTER — OFFICE VISIT (OUTPATIENT)
Dept: FAMILY MEDICINE CLINIC | Age: 51
End: 2021-09-10
Payer: COMMERCIAL

## 2021-09-10 VITALS
TEMPERATURE: 97.9 F | HEIGHT: 73 IN | DIASTOLIC BLOOD PRESSURE: 92 MMHG | HEART RATE: 100 BPM | BODY MASS INDEX: 33.13 KG/M2 | WEIGHT: 250 LBS | SYSTOLIC BLOOD PRESSURE: 146 MMHG | OXYGEN SATURATION: 96 %

## 2021-09-10 DIAGNOSIS — F33.2 SEVERE EPISODE OF RECURRENT MAJOR DEPRESSIVE DISORDER, WITHOUT PSYCHOTIC FEATURES (HCC): Primary | ICD-10-CM

## 2021-09-10 DIAGNOSIS — M54.41 CHRONIC BILATERAL LOW BACK PAIN WITH RIGHT-SIDED SCIATICA: ICD-10-CM

## 2021-09-10 DIAGNOSIS — G89.29 CHRONIC BILATERAL LOW BACK PAIN WITH RIGHT-SIDED SCIATICA: ICD-10-CM

## 2021-09-10 PROCEDURE — 99213 OFFICE O/P EST LOW 20 MIN: CPT | Performed by: NURSE PRACTITIONER

## 2021-09-10 RX ORDER — DIPHENHYDRAMINE HCL 25 MG
25 TABLET ORAL EVERY 6 HOURS PRN
Qty: 30 TABLET | Refills: 1 | Status: SHIPPED | OUTPATIENT
Start: 2021-09-10

## 2021-09-10 RX ORDER — CYCLOBENZAPRINE HCL 10 MG
TABLET ORAL
Qty: 90 TABLET | Refills: 1 | Status: SHIPPED | OUTPATIENT
Start: 2021-09-10 | End: 2021-11-23 | Stop reason: SDUPTHER

## 2021-09-10 ASSESSMENT — ENCOUNTER SYMPTOMS
ABDOMINAL DISTENTION: 0
CONSTIPATION: 0
NAUSEA: 0
CHEST TIGHTNESS: 0
SHORTNESS OF BREATH: 0
DIARRHEA: 0
VOMITING: 0
ABDOMINAL PAIN: 0
WHEEZING: 0
COUGH: 0

## 2021-09-10 NOTE — PATIENT INSTRUCTIONS
Dr. Bjorn Lawrence MD  [address] [place] [address], [address] [place] [address]  [phone]

## 2021-09-10 NOTE — PROGRESS NOTES
9/10/2021  Kurt Valentin II (: 1970)  49 y.o.    ASSESSMENT and PLAN:  Chang Santa was seen today for other, pain and depression. Diagnoses and all orders for this visit:    Severe episode of recurrent major depressive disorder, without psychotic features ([address]  -     External Referral to Psychiatry  -Recommend making an appointment with Dr. Stefani Rock, and establishing with Psychiatry. Chronic bilateral low back pain with right-sided sciatica  -     Isidra Will MD, Pain Management, [place]  -     cyclobenzaprine (FLEXERIL) 10 MG tablet; TAKE ONE TABLET BY MOUTH THREE TIMES A DAY AS NEEDED FOR MUSCLE SPASMS  -     diphenhydrAMINE (BENADRYL) 25 MG tablet; Take 1 tablet by mouth every 6 hours as needed for Itching Pt report takes for upset stomach in AM as needed  -Recommended making f/u appointment with General Surgery     Return if symptoms worsen or fail to improve. HPI   Presenting feeling like his mental capacity has been decreased since covid vaccine in May. Has used Heroin a few times in the last few months, and doesn't remember much from those episodes. Has been feeling very stressed recently, and is in pain. Doesn't necessary want pain medications, but feels like it is affecting his life. Denies any current illicit drug use, si, hi. 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 Feeling depressed intermittently. Requesting psychiatry referral.      Hx Right inguinal hernia. Pain comes and goes. Worse when active. RLQ abdomen and radiates into groin. Was seen by general surgery on 2021, they want GI/Cardiac workup completed first prior to surgical intervention. Has history of chest pain, but feels it is related to anxiety. Can get Sob when anxious, but denies cp/sob at rest or with exertion. Denies palpitations, chest pain, n/v.     Echo-21   Summary   Left ventricular systolic function is low normal with ejection fraction   estimated at 50 %. Mild inferoseptal and anteroseptal wall hypokinesis. Left   ventricle size is normal.Mild concentric left ventricular hypertrophy. Normal left ventricular diastolic filling pressure. No significant valvular heart disease. Review of Systems   Constitutional: Negative for activity change, appetite change, chills, fatigue, fever and unexpected weight change. HENT: Negative. Respiratory: Negative for cough, chest tightness, shortness of breath and wheezing. Cardiovascular: Negative for chest pain, palpitations and leg swelling. Gastrointestinal: Negative for abdominal distention, abdominal pain, constipation, diarrhea, nausea and vomiting. Genitourinary: Negative. Musculoskeletal: Negative. Skin: Negative. Neurological: Negative for dizziness, weakness, light-headedness, numbness and headaches. Hematological: Negative. Psychiatric/Behavioral: Negative. Allergies, past medical history, family history, and social history reviewed and unchanged from previous encounter.      Current Outpatient Medications   Medication Sig Dispense Refill    atorvastatin (LIPITOR) 40 MG tablet TAKE ONE TABLET BY MOUTH DAILY 90 tablet 0    pantoprazole (PROTONIX) 40 MG tablet TAKE ONE TABLET BY MOUTH TWICE A  tablet 0    metoprolol succinate (TOPROL XL) 50 MG extended release tablet TAKE ONE TABLET BY MOUTH DAILY 90 tablet 0    lurasidone (LATUDA) 20 MG TABS tablet Take 1 tablet by mouth daily 30 tablet 0    cyclobenzaprine (FLEXERIL) 10 MG tablet TAKE ONE TABLET BY MOUTH THREE TIMES A DAY AS NEEDED FOR MUSCLE SPASMS 90 tablet 1    DULoxetine (CYMBALTA) 60 MG extended release capsule Take 1 capsule by mouth 2 times daily 60 capsule 4    busPIRone (BUSPAR) 10 MG tablet TAKE TWO TABLETS BY MOUTH TWICE A  tablet 5    furosemide (LASIX) 20 MG tablet Take 1 tablet by mouth daily 90 tablet 1    ondansetron (ZOFRAN) 4 MG tablet Take 1 tablet by mouth every 8 hours as needed for Nausea or Vomiting 40 tablet 2    potassium chloride (KLOR-CON M) 20 MEQ extended release tablet Take 1 tablet by mouth daily 30 tablet 0    albuterol sulfate HFA (VENTOLIN HFA) 108 (90 Base) MCG/ACT inhaler Inhale 2 puffs into the lungs every 6 hours as needed for Wheezing or Shortness of Breath 1 Inhaler 6    sildenafil (REVATIO) 20 MG tablet Take 3 tablets by mouth daily as needed (ED) 30 tablet 5    diphenhydrAMINE (BENADRYL) 25 MG tablet Take 1 tablet by mouth every 6 hours as needed for Itching Pt report takes for upset stomach in AM as needed 30 tablet 1    SPIRIVA RESPIMAT 2.5 MCG/ACT AERS inhaler INHALE TWO PUFFS BY MOUTH DAILY 1 Inhaler 5    valsartan (DIOVAN) 80 MG tablet TAKE ONE TABLET BY MOUTH DAILY 90 tablet 1    traZODone (DESYREL) 50 MG tablet TAKE TWO TABLETS BY MOUTH ONCE NIGHTLY 180 tablet 2    diclofenac sodium (VOLTAREN) 1 % GEL Apply topically 2 times daily (Patient not taking: Reported on 9/10/2021) 100 g 2    CHANTIX 1 MG tablet TAKE ONE TABLET BY MOUTH TWICE A DAY (Patient not taking: Reported on 9/10/2021) 56 tablet 4    aspirin 81 MG tablet Take 81 mg by mouth daily (Patient not taking: Reported on 9/10/2021)       No current facility-administered medications for this visit. Vitals:    09/10/21 1454   BP: (!) 146/92   Site: Right Upper Arm   Position: Sitting   Cuff Size: Large Adult   Pulse: 100   Temp: 97.9 °F (36.6 °C)   TempSrc: Oral   SpO2: 96%   Weight: 250 lb (113.4 kg)   Height: 6' 1\" (1.854 m)     Estimated body mass index is 32.98 kg/m² as calculated from the following:    Height as of this encounter: 6' 1\" (1.854 m). Weight as of this encounter: 250 lb (113.4 kg). Physical Exam  Vitals reviewed. Constitutional:       Appearance: Normal appearance. He is normal weight. HENT:      Head: Normocephalic and atraumatic. Nose: Nose normal.   Eyes:      Conjunctiva/sclera: Conjunctivae normal.   Cardiovascular:      Rate and Rhythm: Normal rate and regular rhythm. Pulses: Normal pulses. Heart sounds: Normal heart sounds. Pulmonary:      Effort: Pulmonary effort is normal.      Breath sounds: Normal breath sounds. Abdominal:      General: Abdomen is flat. Bowel sounds are normal.      Palpations: Abdomen is soft. Tenderness: There is no abdominal tenderness. Musculoskeletal:         General: Normal range of motion. Cervical back: Normal range of motion and neck supple. Skin:     General: Skin is warm and dry. Capillary Refill: Capillary refill takes less than 2 seconds. Neurological:      General: No focal deficit present. Mental Status: He is alert and oriented to person, place, and time. Mental status is at baseline. Psychiatric:         Mood and Affect: Mood normal.         Behavior: Behavior normal.         Thought Content:  Thought content normal.         Judgment: Judgment normal.

## 2021-09-13 ENCOUNTER — TELEPHONE (OUTPATIENT)
Dept: SURGERY | Age: 51
End: 2021-09-13

## 2021-09-13 NOTE — TELEPHONE ENCOUNTER
IMPRESSION/RECOMMENDATIONS:  50 yo with right and possible left inguinal hernias  1. Reducible and no obstructive symptoms  2. Continue current Cardiac and GI workup  3. Once above complete will plan for robotic repair. Risks of operation, recovery and postop restrictions reviewed. Electronically signed by Liset Tamez MD         Patient had Echo done which was unchanged since 2017 per Stephanie Ritchie NP. Patient had a Fibroscan completed and colonoscopy. RUQ US not completed. Left message for the patient to call the office back regarding surgery. Message forwarded to Dr. Zonia Eagle via [employer].

## 2021-09-13 NOTE — LETTER
Surgery Scheduling Form:  DEMOGRAPHICS:                                                                                                         .  Patient Name:  Rosales Plunkett II  Patient :  1970   Patient SS#:      Patient Phone:  [phone] (home)                         Alt. Patient Phone:                 Patient Address:  [address]  PCP:  Mary Payne MD  Insurance:  Payor: Audra Hayden / Plan: [employer] / Product Type: *No Product type* /        Insurance ID Number:    Payor/Plan Subscr  Sex Relation Sub. Ins. ID Effective Group Num   1. AC - * DALY SCHULZ II 1970 Male Self [id number] [facility]                                   [address]     Interpretor Needed:  (NO)  (TYPE)           LATEX ALLERGY:  (NO)  Allergies: Lexapro Morphine Prozac Wellbutrin  Defibulator or Pacemaker:  (NO)    DIAGNOSIS & PROCEDURE:                                                                                       .  Diagnosis Code/Description:   Inguinal hernia K40.90  Operation Code/Description:  Robotic bilateral inguinal hernia repair with mesh possible open 55566  [facility]  [facility]              Surgeon:  Dr. Martin Houston    SCHEDULING INFORMATION:                                                                                    .  Surgeon's Scheduling Instruction:  elective  Requested Date: 21      OR Time: 1030 am            Patient Arrival Time: 830 am  OR Time Required:  120  Minutes  Anesthesia:  General       Equipment:  Mesh and robot                                                      SA Required (only for Mac and Gen): yes  Status:  Outpatient        Standard C-Arm (only for port and celestino):  n/a   Mini C-Arm: No   PAT Required:   Yes                                          H&P needs to be completed: yes  Cardiac Clearance Requested:  (NO)               PRE-CERTIFICATION INFORMATION: Zbigniew Dubon   Procedure/CPT code: Robotic bilateral inguinal hernia repair with mesh possible open 81537        Modifier:

## 2021-09-14 NOTE — TELEPHONE ENCOUNTER
Per Dr. Nathalie Jarrell- would like the results of the fibroscan before scheduling surgery. Results requested.

## 2021-09-14 NOTE — TELEPHONE ENCOUNTER
Called patient, scheduled for dv RIH and LIH w/ mesh poss open on 9/24/21 at 1030 am with an arrival of 830 am. Informed the patient that PAT will also call with further instructions. Patient verbally states that he/she understands pre-op instructions. Patient notified of pre-op instructions for general/mac anesthesia:    *NPO after midnight     *H&P prior to surgery - yes    *Cardiac clearance, if needed. - n/a    *Stop all blood thinners, if needed. - n/a    *Will need a     *Call the office with any further questions. *COVID testing 5-7 days priors to procedure surgery. *Procedure/Surgery time at this point is tentative time as PAT will confirm arrival time.     Lexapro Morphine Prozac Wellbutrin

## 2021-09-15 ENCOUNTER — TELEPHONE (OUTPATIENT)
Dept: FAMILY MEDICINE CLINIC | Age: 51
End: 2021-09-15

## 2021-09-15 NOTE — TELEPHONE ENCOUNTER
----- Message from Bullchantale Jackson sent at 9/15/2021  4:34 PM EDT -----  Subject: Message to Provider    QUESTIONS  Information for Provider? [facility] received a call from Pt: Reason? Pt stopped   taking Chantix after it was pulled from the shelves about 2 or 3 weeks   ago. Pt would like to be prescribed another Rx to help him stop smoking. Pt is allergic Wellbutrin and cannot have that prescribed.   ---------------------------------------------------------------------------  --------------  CALL BACK INFO  What is the best way for the office to contact you? OK to leave message on   voicemail  Preferred Call Back Phone Number? [phone]  ---------------------------------------------------------------------------  --------------  SCRIPT ANSWERS  Relationship to Patient?  Self

## 2021-09-16 ENCOUNTER — OFFICE VISIT (OUTPATIENT)
Dept: FAMILY MEDICINE CLINIC | Age: 51
End: 2021-09-16
Payer: COMMERCIAL

## 2021-09-16 ENCOUNTER — TELEPHONE (OUTPATIENT)
Dept: FAMILY MEDICINE CLINIC | Age: 51
End: 2021-09-16

## 2021-09-16 VITALS
OXYGEN SATURATION: 95 % | SYSTOLIC BLOOD PRESSURE: 128 MMHG | TEMPERATURE: 98.2 F | DIASTOLIC BLOOD PRESSURE: 86 MMHG | WEIGHT: 259 LBS | HEART RATE: 110 BPM | HEIGHT: 72 IN | BODY MASS INDEX: 35.08 KG/M2

## 2021-09-16 DIAGNOSIS — K40.20 BILATERAL INGUINAL HERNIA WITHOUT OBSTRUCTION OR GANGRENE, RECURRENCE NOT SPECIFIED: ICD-10-CM

## 2021-09-16 DIAGNOSIS — Z71.6 ENCOUNTER FOR SMOKING CESSATION COUNSELING: ICD-10-CM

## 2021-09-16 DIAGNOSIS — R06.09 DOE (DYSPNEA ON EXERTION): Primary | ICD-10-CM

## 2021-09-16 DIAGNOSIS — Z11.59 ENCOUNTER FOR HEPATITIS C SCREENING TEST FOR LOW RISK PATIENT: ICD-10-CM

## 2021-09-16 DIAGNOSIS — L23.9 ALLERGIC CONTACT DERMATITIS, UNSPECIFIED TRIGGER: ICD-10-CM

## 2021-09-16 DIAGNOSIS — L22 DIAPER RASH: ICD-10-CM

## 2021-09-16 DIAGNOSIS — F17.200 NEEDS SMOKING CESSATION EDUCATION: ICD-10-CM

## 2021-09-16 DIAGNOSIS — Z01.818 PRE-OP EXAM: ICD-10-CM

## 2021-09-16 PROCEDURE — 93000 ELECTROCARDIOGRAM COMPLETE: CPT | Performed by: NURSE PRACTITIONER

## 2021-09-16 PROCEDURE — 99214 OFFICE O/P EST MOD 30 MIN: CPT | Performed by: NURSE PRACTITIONER

## 2021-09-16 RX ORDER — NICOTINE 21 MG/24HR
1 PATCH, TRANSDERMAL 24 HOURS TRANSDERMAL DAILY
Qty: 42 PATCH | Refills: 0 | Status: SHIPPED | OUTPATIENT
Start: 2021-09-16 | End: 2021-09-29

## 2021-09-16 RX ORDER — DIAPER,BRIEF,INFANT-TODD,DISP
EACH MISCELLANEOUS
Qty: 30 G | Refills: 1 | Status: SHIPPED | OUTPATIENT
Start: 2021-09-16 | End: 2021-09-23

## 2021-09-16 RX ORDER — DIAPER,BRIEF,INFANT-TODD,DISP
EACH MISCELLANEOUS
Qty: 30 G | Refills: 1 | Status: CANCELLED | OUTPATIENT
Start: 2021-09-16 | End: 2021-09-23

## 2021-09-16 NOTE — TELEPHONE ENCOUNTER
Pt. Saw you today and thought you were going to send in a cream for his rash and nicotine patches. Pt. Has checked with the pharmacy and they do not have anything.

## 2021-09-16 NOTE — PROGRESS NOTES
Preoperative Consultation      Rosales Plunkett II  YOB: 1970    Date of Service:  9/16/2021    Vitals:    09/16/21 1416   BP: 128/86   Site: Right Lower Arm   Position: Sitting   Cuff Size: Medium Adult   Pulse: 110   Temp: 98.2 °F (36.8 °C)   TempSrc: Oral   SpO2: 95%   Weight: 259 lb (117.5 kg)   Height: 6' (1.829 m)      Wt Readings from Last 2 Encounters:   09/16/21 259 lb (117.5 kg)   09/10/21 250 lb (113.4 kg)     BP Readings from Last 3 Encounters:   09/16/21 128/86   09/10/21 (!) 146/92   08/20/21 110/60       Chief Complaint   Patient presents with   Karen Sierra - Dr. Teresa Cobb - DOS 9/24/2021      Allergies   Allergen Reactions    Lexapro [Escitalopram Oxalate] Other (See Comments)     Crazy dream hallucinations      Morphine      Makes him angry    Prozac [Fluoxetine Hcl]      Irritable        Wellbutrin [Bupropion] Other (See Comments)     Crazy dream halluciantions     Outpatient Medications Marked as Taking for the 9/16/21 encounter (Office Visit) with RAH Alvarez - CNP   Medication Sig Dispense Refill    cyclobenzaprine (FLEXERIL) 10 MG tablet TAKE ONE TABLET BY MOUTH THREE TIMES A DAY AS NEEDED FOR MUSCLE SPASMS 90 tablet 1    diphenhydrAMINE (BENADRYL) 25 MG tablet Take 1 tablet by mouth every 6 hours as needed for Itching Pt report takes for upset stomach in AM as needed 30 tablet 1    atorvastatin (LIPITOR) 40 MG tablet TAKE ONE TABLET BY MOUTH DAILY 90 tablet 0    pantoprazole (PROTONIX) 40 MG tablet TAKE ONE TABLET BY MOUTH TWICE A  tablet 0    metoprolol succinate (TOPROL XL) 50 MG extended release tablet TAKE ONE TABLET BY MOUTH DAILY 90 tablet 0    diclofenac sodium (VOLTAREN) 1 % GEL Apply topically 2 times daily 100 g 2    lurasidone (LATUDA) 20 MG TABS tablet Take 1 tablet by mouth daily 30 tablet 0    DULoxetine (CYMBALTA) 60 MG extended release capsule Take 1 capsule by mouth 2 times daily 60 capsule 4    busPIRone (BUSPAR) 10 MG tablet TAKE TWO TABLETS BY MOUTH TWICE A  tablet 5    furosemide (LASIX) 20 MG tablet Take 1 tablet by mouth daily 90 tablet 1    ondansetron (ZOFRAN) 4 MG tablet Take 1 tablet by mouth every 8 hours as needed for Nausea or Vomiting 40 tablet 2    potassium chloride (KLOR-CON M) 20 MEQ extended release tablet Take 1 tablet by mouth daily 30 tablet 0    albuterol sulfate HFA (VENTOLIN HFA) 108 (90 Base) MCG/ACT inhaler Inhale 2 puffs into the lungs every 6 hours as needed for Wheezing or Shortness of Breath 1 Inhaler 6    sildenafil (REVATIO) 20 MG tablet Take 3 tablets by mouth daily as needed (ED) 30 tablet 5    SPIRIVA RESPIMAT 2.5 MCG/ACT AERS inhaler INHALE TWO PUFFS BY MOUTH DAILY 1 Inhaler 5    valsartan (DIOVAN) 80 MG tablet TAKE ONE TABLET BY MOUTH DAILY 90 tablet 1    CHANTIX 1 MG tablet TAKE ONE TABLET BY MOUTH TWICE A DAY 56 tablet 4    traZODone (DESYREL) 50 MG tablet TAKE TWO TABLETS BY MOUTH ONCE NIGHTLY 180 tablet 2    aspirin 81 MG tablet Take 81 mg by mouth daily          This patient presents to the office today for a preoperative consultation at the request of surgeon, Dr. Kanwal Harper, who plans on performing robotic bilateral inguinal hernia repair with mesh, possible open procedure  on September 24 at [facility].  The current problem began 3 year ago, and symptoms have been worsening with time. Conservative therapy: N/A.     Planned anesthesia: General   Known anesthesia problems: None   Bleeding risk: No recent or remote history of abnormal bleeding  Personal or FH of DVT/PE: No    Patient objection to receiving blood products: No    Patient Active Problem List   Diagnosis    Allergic rhinitis    ED (erectile dysfunction)    Backache    Insomnia    MDD (major depressive disorder)    GERD (gastroesophageal reflux disease)    Anxiety    Chronic abdominal pain    Malrotation, congenital    History of pancreatitis    Constipation    SOB (shortness of breath) on exertion    Coronary artery disease involving native coronary artery of native heart with unstable angina pectoris (HCC)    Family history of Ha's esophagus    Esophagitis    Polyp of colon    Ha esophagus    Acute abdominal pain    Attention deficit hyperactivity disorder (ADHD)    Attention-deficit hyperactivity disorder, predominantly hyperactive type    Benign essential hypertension    Duodenitis    Low back pain with right-sided sciatica    Thoracic or lumbosacral neuritis or radiculitis, unspecified    Myalgia and myositis    Postlaminectomy syndrome, lumbar region    Tobacco use disorder    Pain and swelling of right lower leg    Atypical chest pain    Chest pain    Pneumonia due to organism    Hypoxia    HUSSAIN (obstructive sleep apnea)    Acute exacerbation of emphysema (HCC)    Moderate episode of recurrent major depressive disorder (HCC)       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    Chronic back pain     ED (erectile dysfunction) 12/29/2011    GERD (gastroesophageal reflux disease)     Hypertension     Pleurisy      Past Surgical History:   Procedure Laterality Date    ARM SURGERY Left 12/10/2015    BACK SURGERY      COLONOSCOPY      LUNG SURGERY      partial removal (right) agent orange    SPINE SURGERY  1995    lumbar laminectomy     Family History   Problem Relation Age of Onset    Arthritis Mother     Other Mother         pneumothorax several times    Asthma Father      Social History     Socioeconomic History    Marital status:      Spouse name: Not on file    Number of children: Not on file    Years of education: Not on file    Highest education level: Not on file   Occupational History    Not on file   Tobacco Use    Smoking status: Current Every Day Smoker     Packs/day: 0.50     Years: 33.00 Pack years: 16.50     Types: Cigarettes    Smokeless tobacco: Former User     Quit date: 11/1/2015   Vaping Use    Vaping Use: Former    Substances: Always, THC   Substance and Sexual Activity    Alcohol use: Yes     Comment: 1/2 fifth tequila     Drug use: Yes     Types: Marijuana, Other-see comments, Opiates      Comment: pt states not recently     Sexual activity: Yes   Other Topics Concern    Not on file   Social History Narrative    Not on file     Social Determinants of Health     Financial Resource Strain: High Risk    Difficulty of Paying Living Expenses: Very hard   Food Insecurity: Food Insecurity Present    Worried About Running Out of Food in the Last Year: Often true    Angel of Food in the Last Year: Often true   Transportation Needs: Unmet Transportation Needs    Lack of Transportation (Medical): Yes    Lack of Transportation (Non-Medical): Yes   Physical Activity:     Days of Exercise per Week:     Minutes of Exercise per Session:    Stress:     Feeling of Stress :    Social Connections:     Frequency of Communication with Friends and Family:     Frequency of Social Gatherings with Friends and Family:     Attends [employer] Services:     Active Member of Clubs or Organizations:     Attends Club or Organization Meetings:     Marital Status:    Intimate Partner Violence:     Fear of Current or Ex-Partner:     Emotionally Abused:     Physically Abused:     Sexually Abused:        Review of Systems  A comprehensive review of systems was negative except for what was noted in the HPI. Physical Exam   Constitutional: He is oriented to person, place, and time. He appears well-developed and well-nourished. No distress. HENT:   Head: Normocephalic and atraumatic. Mouth/Throat: Uvula is midline, oropharynx is clear and moist and mucous membranes are normal.   Eyes: Conjunctivae and EOM are normal. Pupils are equal, round, and reactive to light.    Neck: Trachea normal and normal range of motion. Neck supple. No JVD present. Carotid bruit is not present. No mass and no thyromegaly present. Cardiovascular: Normal rate, regular rhythm, normal heart sounds and intact distal pulses. Exam reveals no gallop and no friction rub. No murmur heard. Pulmonary/Chest: Effort normal and breath sounds normal. No respiratory distress. He has no wheezes. He has no rales. Abdominal: Soft. Normal aorta and bowel sounds are normal. He exhibits no distension and no mass. There is no hepatosplenomegaly. No tenderness. Musculoskeletal: He exhibits no edema and no tenderness. Neurological: He is alert and oriented to person, place, and time. He has normal strength. No cranial nerve deficit or sensory deficit. Coordination and gait normal.   Skin: Skin is warm and dry. No rash noted. No erythema. Psychiatric: He has a normal mood and affect. His behavior is normal.     EKG Interpretation:  normal EKG, normal sinus rhythm/Sinus tachycardia 101, unchanged from previous tracings. Lab Review   Lab Results   Component Value Date     09/21/2021    K 3.0 09/21/2021     09/21/2021    CO2 26 09/21/2021    BUN 11 09/21/2021    CREATININE 0.7 09/21/2021    GLUCOSE 106 09/21/2021    CALCIUM 8.1 09/21/2021     Lab Results   Component Value Date    TROPONINI <0.01 09/18/2021     Lab Results   Component Value Date    WBC 7.8 09/20/2021    HGB 12.8 09/20/2021    HCT 37.4 09/20/2021    MCV 89.9 09/20/2021     09/20/2021     Lab Results   Component Value Date    CHOL 165 09/21/2020    TRIG 326 09/21/2020    HDL 28 09/21/2020    LDLDIRECT 101 09/21/2020    -Needs repeat blood work prior to any surgery. Assessment:       49 y.o. patient with planned surgery as above.     Known risk factors for perioperative complications: Congestive heart failure, COPD, Hypertension, Illicit drug use, Obstructive sleep apnea, Tobacco abuse    Current medications which may produce withdrawal symptoms if withheld perioperatively: Cymbalta/Metoprolol     Plan:     1. Preoperative workup as follows: ECG, hemoglobin, hematocrit, electrolytes, creatinine, glucose  2. Change in medication regimen before surgery: Discontinue ASA, Ibuprofen, Fish Oil- 7 days before surgery, can continue Valsartan, protonix, Metoprolol succinate, lasix. 3. Prophylaxis for cardiac events with perioperative beta-blockers: Currently taking metoprolol  ACC/AHA indications for pre-operative beta-blocker use:    · Vascular surgery with history of postitive stress test  · Intermediate or high risk surgery with history of CAD   · Intermediate or high risk surgery with multiple clinical predictors of CAD- 2 of the following: history of compensated or prior heart failure, history of cerebrovascular disease, DM, or renal insufficiency    Routine administration of higher-dose, long-acting metoprolol in beta-blockernaïve patients on the day of surgery, and in the absence of dose titration is associated with an overall increase in mortality. Beta-blockers should be started days to weeks prior to surgery and titrated to pulse < 70.  4. Deep vein thrombosis prophylaxis: regimen to be chosen by surgical team  5. Patient must be cleared by Cardiology prior to surgery. Also, needs repeat CBC to ensure Wbc is not continuing to stay elevated. If cardiology clears patient, he would be considered high risk, but no absolute contraindications at this time.-Was hospitalized 9/18-9/22-Will need to re-evaluate status prior to clearing for surgery. Urgent Cardiology consult place-Must have Cardio clearance Prior to surgery-- Was seeing Dr. Emelyn Saini. Pt verbalized understanding, new referral placed for cardio.    -Elevated WBC on CBC, Need to rule out infection and re-draw labs prior to surgery. Most likely will need to postpone surgery at this time. Needs repeat blood-work    HPI-  Would like Nicotine patches to help him quit smoking. Smoking about a pack a day. Will start 21 mg/hr patch for 6 wks, then titrate down    Rash redness/itchy to anterior neck and face.  Denies new soaps, detergents, lotions, shampoos etc.

## 2021-09-17 DIAGNOSIS — D72.829 LEUKOCYTOSIS, UNSPECIFIED TYPE: Primary | ICD-10-CM

## 2021-09-18 ENCOUNTER — HOSPITAL ENCOUNTER (EMERGENCY)
Age: 51
Discharge: ANOTHER HOSPITAL | End: 2021-09-19
Attending: STUDENT IN AN ORGANIZED HEALTH CARE EDUCATION/TRAINING PROGRAM
Payer: COMMERCIAL

## 2021-09-18 ENCOUNTER — APPOINTMENT (OUTPATIENT)
Dept: CT IMAGING | Age: 51
End: 2021-09-18
Payer: COMMERCIAL

## 2021-09-18 ENCOUNTER — APPOINTMENT (OUTPATIENT)
Dept: ULTRASOUND IMAGING | Age: 51
End: 2021-09-18
Payer: COMMERCIAL

## 2021-09-18 ENCOUNTER — APPOINTMENT (OUTPATIENT)
Dept: GENERAL RADIOLOGY | Age: 51
End: 2021-09-18
Payer: COMMERCIAL

## 2021-09-18 DIAGNOSIS — R00.0 TACHYCARDIA: Primary | ICD-10-CM

## 2021-09-18 DIAGNOSIS — E87.20 LACTIC ACIDOSIS: ICD-10-CM

## 2021-09-18 DIAGNOSIS — R10.31 RIGHT LOWER QUADRANT ABDOMINAL PAIN: ICD-10-CM

## 2021-09-18 LAB
A/G RATIO: 1.2 (ref 1.1–2.2)
ALBUMIN SERPL-MCNC: 4.4 G/DL (ref 3.4–5)
ALP BLD-CCNC: 148 U/L (ref 40–129)
ALT SERPL-CCNC: 180 U/L (ref 10–40)
AMPHETAMINE SCREEN, URINE: POSITIVE
ANION GAP SERPL CALCULATED.3IONS-SCNC: 17 MMOL/L (ref 3–16)
ANISOCYTOSIS: ABNORMAL
AST SERPL-CCNC: 969 U/L (ref 15–37)
BARBITURATE SCREEN URINE: ABNORMAL
BASOPHILS ABSOLUTE: 0 K/UL (ref 0–0.2)
BASOPHILS RELATIVE PERCENT: 0 %
BENZODIAZEPINE SCREEN, URINE: ABNORMAL
BILIRUB SERPL-MCNC: 1.2 MG/DL (ref 0–1)
BILIRUBIN URINE: ABNORMAL
BLOOD, URINE: ABNORMAL
BUN BLDV-MCNC: 31 MG/DL (ref 7–20)
CALCIUM SERPL-MCNC: 9.5 MG/DL (ref 8.3–10.6)
CANNABINOID SCREEN URINE: POSITIVE
CHLORIDE BLD-SCNC: 99 MMOL/L (ref 99–110)
CLARITY: CLEAR
CO2: 23 MMOL/L (ref 21–32)
COARSE CASTS, UA: ABNORMAL /LPF (ref 0–2)
COCAINE METABOLITE SCREEN URINE: ABNORMAL
COLOR: YELLOW
CREAT SERPL-MCNC: 1.2 MG/DL (ref 0.9–1.3)
EOSINOPHILS ABSOLUTE: 0 K/UL (ref 0–0.6)
EOSINOPHILS RELATIVE PERCENT: 0 %
ETHANOL: NORMAL MG/DL (ref 0–0.08)
GFR AFRICAN AMERICAN: >60
GFR NON-AFRICAN AMERICAN: >60
GLOBULIN: 3.6 G/DL
GLUCOSE BLD-MCNC: 95 MG/DL (ref 70–99)
GLUCOSE URINE: NEGATIVE MG/DL
HCT VFR BLD CALC: 43.3 % (ref 40.5–52.5)
HEMATOLOGY PATH CONSULT: YES
HEMOGLOBIN: 14.3 G/DL (ref 13.5–17.5)
INFLUENZA A: NOT DETECTED
INFLUENZA B: NOT DETECTED
KETONES, URINE: 40 MG/DL
LACTIC ACID: 2.5 MMOL/L (ref 0.4–2)
LEUKOCYTE ESTERASE, URINE: NEGATIVE
LIPASE: 9 U/L (ref 13–60)
LYMPHOCYTES ABSOLUTE: 2.5 K/UL (ref 1–5.1)
LYMPHOCYTES RELATIVE PERCENT: 14 %
Lab: ABNORMAL
MCH RBC QN AUTO: 30.2 PG (ref 26–34)
MCHC RBC AUTO-ENTMCNC: 33.1 G/DL (ref 31–36)
MCV RBC AUTO: 91.3 FL (ref 80–100)
METHADONE SCREEN, URINE: ABNORMAL
MICROSCOPIC EXAMINATION: YES
MONOCYTES ABSOLUTE: 1.6 K/UL (ref 0–1.3)
MONOCYTES RELATIVE PERCENT: 9 %
MUCUS: ABNORMAL /LPF
NEUTROPHILS ABSOLUTE: 13.6 K/UL (ref 1.7–7.7)
NEUTROPHILS RELATIVE PERCENT: 77 %
NITRITE, URINE: NEGATIVE
OPIATE SCREEN URINE: ABNORMAL
OXYCODONE URINE: ABNORMAL
PDW BLD-RTO: 13.6 % (ref 12.4–15.4)
PH UA: 6
PH UA: 6 (ref 5–8)
PHENCYCLIDINE SCREEN URINE: ABNORMAL
PLATELET # BLD: 309 K/UL (ref 135–450)
PLATELET SLIDE REVIEW: ADEQUATE
PMV BLD AUTO: 8.7 FL (ref 5–10.5)
POLYCHROMASIA: ABNORMAL
POTASSIUM REFLEX MAGNESIUM: 3.7 MMOL/L (ref 3.5–5.1)
PRO-BNP: 187 PG/ML (ref 0–124)
PROPOXYPHENE SCREEN: ABNORMAL
PROTEIN UA: 30 MG/DL
RBC # BLD: 4.74 M/UL (ref 4.2–5.9)
RBC UA: ABNORMAL /HPF (ref 0–4)
SARS-COV-2 RNA, RT PCR: NOT DETECTED
SLIDE REVIEW: ABNORMAL
SMUDGE CELLS: PRESENT
SODIUM BLD-SCNC: 139 MMOL/L (ref 136–145)
SPECIFIC GRAVITY UA: 1.02 (ref 1–1.03)
TOTAL PROTEIN: 8 G/DL (ref 6.4–8.2)
TROPONIN: <0.01 NG/ML
URINE TYPE: ABNORMAL
UROBILINOGEN, URINE: 0.2 E.U./DL
WBC # BLD: 17.7 K/UL (ref 4–11)
WBC UA: ABNORMAL /HPF (ref 0–5)

## 2021-09-18 PROCEDURE — 85025 COMPLETE CBC W/AUTO DIFF WBC: CPT

## 2021-09-18 PROCEDURE — 83880 ASSAY OF NATRIURETIC PEPTIDE: CPT

## 2021-09-18 PROCEDURE — 81001 URINALYSIS AUTO W/SCOPE: CPT

## 2021-09-18 PROCEDURE — 83605 ASSAY OF LACTIC ACID: CPT

## 2021-09-18 PROCEDURE — 96375 TX/PRO/DX INJ NEW DRUG ADDON: CPT

## 2021-09-18 PROCEDURE — 82077 ASSAY SPEC XCP UR&BREATH IA: CPT

## 2021-09-18 PROCEDURE — 96367 TX/PROPH/DG ADDL SEQ IV INF: CPT

## 2021-09-18 PROCEDURE — 96361 HYDRATE IV INFUSION ADD-ON: CPT

## 2021-09-18 PROCEDURE — 80074 ACUTE HEPATITIS PANEL: CPT

## 2021-09-18 PROCEDURE — 93975 VASCULAR STUDY: CPT

## 2021-09-18 PROCEDURE — 87636 SARSCOV2 & INF A&B AMP PRB: CPT

## 2021-09-18 PROCEDURE — 84484 ASSAY OF TROPONIN QUANT: CPT

## 2021-09-18 PROCEDURE — 2580000003 HC RX 258: Performed by: PHYSICIAN ASSISTANT

## 2021-09-18 PROCEDURE — 83690 ASSAY OF LIPASE: CPT

## 2021-09-18 PROCEDURE — 76870 US EXAM SCROTUM: CPT

## 2021-09-18 PROCEDURE — 99285 EMERGENCY DEPT VISIT HI MDM: CPT

## 2021-09-18 PROCEDURE — 87040 BLOOD CULTURE FOR BACTERIA: CPT

## 2021-09-18 PROCEDURE — 96365 THER/PROPH/DIAG IV INF INIT: CPT

## 2021-09-18 PROCEDURE — 80307 DRUG TEST PRSMV CHEM ANLYZR: CPT

## 2021-09-18 PROCEDURE — 80053 COMPREHEN METABOLIC PANEL: CPT

## 2021-09-18 PROCEDURE — 6360000004 HC RX CONTRAST MEDICATION: Performed by: PHYSICIAN ASSISTANT

## 2021-09-18 PROCEDURE — 74177 CT ABD & PELVIS W/CONTRAST: CPT

## 2021-09-18 PROCEDURE — 93005 ELECTROCARDIOGRAM TRACING: CPT | Performed by: PHYSICIAN ASSISTANT

## 2021-09-18 PROCEDURE — 87086 URINE CULTURE/COLONY COUNT: CPT

## 2021-09-18 PROCEDURE — 96366 THER/PROPH/DIAG IV INF ADDON: CPT

## 2021-09-18 PROCEDURE — 6360000002 HC RX W HCPCS: Performed by: PHYSICIAN ASSISTANT

## 2021-09-18 RX ORDER — MORPHINE SULFATE 2 MG/ML
2 INJECTION, SOLUTION INTRAMUSCULAR; INTRAVENOUS ONCE
Status: COMPLETED | OUTPATIENT
Start: 2021-09-18 | End: 2021-09-18

## 2021-09-18 RX ORDER — LORAZEPAM 2 MG/ML
2 INJECTION INTRAMUSCULAR ONCE
Status: COMPLETED | OUTPATIENT
Start: 2021-09-18 | End: 2021-09-18

## 2021-09-18 RX ORDER — 0.9 % SODIUM CHLORIDE 0.9 %
1000 INTRAVENOUS SOLUTION INTRAVENOUS ONCE
Status: COMPLETED | OUTPATIENT
Start: 2021-09-18 | End: 2021-09-18

## 2021-09-18 RX ADMIN — LORAZEPAM 2 MG: 2 INJECTION INTRAMUSCULAR; INTRAVENOUS at 23:23

## 2021-09-18 RX ADMIN — IOPAMIDOL 75 ML: 755 INJECTION, SOLUTION INTRAVENOUS at 21:57

## 2021-09-18 RX ADMIN — PIPERACILLIN SODIUM AND TAZOBACTAM SODIUM 3375 MG: 3; .375 INJECTION, POWDER, LYOPHILIZED, FOR SOLUTION INTRAVENOUS at 23:25

## 2021-09-18 RX ADMIN — SODIUM CHLORIDE 1000 ML: 9 INJECTION, SOLUTION INTRAVENOUS at 21:07

## 2021-09-18 RX ADMIN — MORPHINE SULFATE 2 MG: 2 INJECTION, SOLUTION INTRAMUSCULAR; INTRAVENOUS at 22:33

## 2021-09-18 ASSESSMENT — PAIN SCALES - GENERAL
PAINLEVEL_OUTOF10: 10
PAINLEVEL_OUTOF10: 5

## 2021-09-18 ASSESSMENT — PAIN DESCRIPTION - LOCATION: LOCATION: ABDOMEN;FLANK

## 2021-09-18 ASSESSMENT — PAIN DESCRIPTION - PAIN TYPE: TYPE: ACUTE PAIN

## 2021-09-18 ASSESSMENT — PAIN DESCRIPTION - FREQUENCY: FREQUENCY: CONTINUOUS

## 2021-09-18 ASSESSMENT — PAIN DESCRIPTION - DESCRIPTORS: DESCRIPTORS: SHARP

## 2021-09-18 ASSESSMENT — PAIN DESCRIPTION - ORIENTATION: ORIENTATION: RIGHT;LEFT

## 2021-09-19 ENCOUNTER — APPOINTMENT (OUTPATIENT)
Dept: GENERAL RADIOLOGY | Age: 51
DRG: 720 | End: 2021-09-19
Attending: INTERNAL MEDICINE
Payer: COMMERCIAL

## 2021-09-19 ENCOUNTER — HOSPITAL ENCOUNTER (INPATIENT)
Age: 51
LOS: 1 days | Discharge: HOME | DRG: 720 | End: 2021-09-21
Attending: INTERNAL MEDICINE | Admitting: INTERNAL MEDICINE
Payer: COMMERCIAL

## 2021-09-19 VITALS
HEIGHT: 72 IN | DIASTOLIC BLOOD PRESSURE: 87 MMHG | BODY MASS INDEX: 35.08 KG/M2 | HEART RATE: 117 BPM | TEMPERATURE: 99.3 F | OXYGEN SATURATION: 100 % | WEIGHT: 259 LBS | SYSTOLIC BLOOD PRESSURE: 120 MMHG | RESPIRATION RATE: 20 BRPM

## 2021-09-19 PROBLEM — E86.0 DEHYDRATION: Status: ACTIVE | Noted: 2021-09-19

## 2021-09-19 LAB
A/G RATIO: 1.1 (ref 1.1–2.2)
ALBUMIN SERPL-MCNC: 3.4 G/DL (ref 3.4–5)
ALP BLD-CCNC: 111 U/L (ref 40–129)
ALT SERPL-CCNC: 182 U/L (ref 10–40)
AMYLASE: 63 U/L (ref 25–115)
ANION GAP SERPL CALCULATED.3IONS-SCNC: 16 MMOL/L (ref 3–16)
AST SERPL-CCNC: 737 U/L (ref 15–37)
BASE EXCESS VENOUS: -4.1 MMOL/L (ref -3–3)
BASOPHILS ABSOLUTE: 0.1 K/UL (ref 0–0.2)
BASOPHILS RELATIVE PERCENT: 0.9 %
BILIRUB SERPL-MCNC: 1 MG/DL (ref 0–1)
BUN BLDV-MCNC: 26 MG/DL (ref 7–20)
CALCIUM IONIZED: 1.03 MMOL/L (ref 1.12–1.32)
CALCIUM SERPL-MCNC: 8.7 MG/DL (ref 8.3–10.6)
CARBOXYHEMOGLOBIN: 1.6 % (ref 0–1.5)
CHLORIDE BLD-SCNC: 103 MMOL/L (ref 99–110)
CO2: 20 MMOL/L (ref 21–32)
CREAT SERPL-MCNC: 0.8 MG/DL (ref 0.9–1.3)
EKG ATRIAL RATE: 135 BPM
EKG DIAGNOSIS: NORMAL
EKG P AXIS: 41 DEGREES
EKG P-R INTERVAL: 138 MS
EKG Q-T INTERVAL: 300 MS
EKG QRS DURATION: 90 MS
EKG QTC CALCULATION (BAZETT): 450 MS
EKG R AXIS: -31 DEGREES
EKG T AXIS: 70 DEGREES
EKG VENTRICULAR RATE: 135 BPM
EOSINOPHILS ABSOLUTE: 0.2 K/UL (ref 0–0.6)
EOSINOPHILS RELATIVE PERCENT: 1.3 %
ETHANOL: NORMAL MG/DL (ref 0–0.08)
GFR AFRICAN AMERICAN: >60
GFR NON-AFRICAN AMERICAN: >60
GLOBULIN: 3.1 G/DL
GLUCOSE BLD-MCNC: 86 MG/DL (ref 70–99)
HAV IGM SER IA-ACNC: NORMAL
HCO3 VENOUS: 22.1 MMOL/L (ref 23–29)
HCT VFR BLD CALC: 36.5 % (ref 40.5–52.5)
HEMOGLOBIN: 12.3 G/DL (ref 13.5–17.5)
HEPATITIS B CORE IGM ANTIBODY: NORMAL
HEPATITIS B SURFACE ANTIGEN INTERPRETATION: NORMAL
HEPATITIS C ANTIBODY INTERPRETATION: NORMAL
LACTIC ACID, SEPSIS: 1.3 MMOL/L (ref 0.4–1.9)
LIPASE: 7 U/L (ref 13–60)
LYMPHOCYTES ABSOLUTE: 2.5 K/UL (ref 1–5.1)
LYMPHOCYTES RELATIVE PERCENT: 20.2 %
MAGNESIUM: 2.3 MG/DL (ref 1.8–2.4)
MCH RBC QN AUTO: 30.6 PG (ref 26–34)
MCHC RBC AUTO-ENTMCNC: 33.7 G/DL (ref 31–36)
MCV RBC AUTO: 90.9 FL (ref 80–100)
METHEMOGLOBIN VENOUS: 0.3 %
MONOCYTES ABSOLUTE: 1.1 K/UL (ref 0–1.3)
MONOCYTES RELATIVE PERCENT: 8.4 %
NEUTROPHILS ABSOLUTE: 8.7 K/UL (ref 1.7–7.7)
NEUTROPHILS RELATIVE PERCENT: 69.2 %
O2 SAT, VEN: 97 %
O2 THERAPY: ABNORMAL
PCO2, VEN: 44.8 MMHG (ref 40–50)
PDW BLD-RTO: 13.7 % (ref 12.4–15.4)
PH VENOUS: 7.31 (ref 7.35–7.45)
PH VENOUS: 7.51 (ref 7.35–7.45)
PHOSPHORUS: 1.8 MG/DL (ref 2.5–4.9)
PLATELET # BLD: 253 K/UL (ref 135–450)
PMV BLD AUTO: 8.5 FL (ref 5–10.5)
PO2, VEN: 101.3 MMHG (ref 25–40)
POTASSIUM REFLEX MAGNESIUM: 3.4 MMOL/L (ref 3.5–5.1)
PROCALCITONIN: 0.14 NG/ML (ref 0–0.15)
RBC # BLD: 4.01 M/UL (ref 4.2–5.9)
SODIUM BLD-SCNC: 139 MMOL/L (ref 136–145)
TCO2 CALC VENOUS: 24 MMOL/L
TOTAL PROTEIN: 6.5 G/DL (ref 6.4–8.2)
WBC # BLD: 12.5 K/UL (ref 4–11)

## 2021-09-19 PROCEDURE — 80053 COMPREHEN METABOLIC PANEL: CPT

## 2021-09-19 PROCEDURE — 83735 ASSAY OF MAGNESIUM: CPT

## 2021-09-19 PROCEDURE — 6360000002 HC RX W HCPCS: Performed by: INTERNAL MEDICINE

## 2021-09-19 PROCEDURE — 96365 THER/PROPH/DIAG IV INF INIT: CPT

## 2021-09-19 PROCEDURE — 87077 CULTURE AEROBIC IDENTIFY: CPT

## 2021-09-19 PROCEDURE — 87040 BLOOD CULTURE FOR BACTERIA: CPT

## 2021-09-19 PROCEDURE — 6370000000 HC RX 637 (ALT 250 FOR IP): Performed by: EMERGENCY MEDICINE

## 2021-09-19 PROCEDURE — 6360000002 HC RX W HCPCS: Performed by: EMERGENCY MEDICINE

## 2021-09-19 PROCEDURE — G0379 DIRECT REFER HOSPITAL OBSERV: HCPCS

## 2021-09-19 PROCEDURE — 2580000003 HC RX 258: Performed by: INTERNAL MEDICINE

## 2021-09-19 PROCEDURE — 2500000003 HC RX 250 WO HCPCS: Performed by: INTERNAL MEDICINE

## 2021-09-19 PROCEDURE — 6370000000 HC RX 637 (ALT 250 FOR IP): Performed by: INTERNAL MEDICINE

## 2021-09-19 PROCEDURE — G0378 HOSPITAL OBSERVATION PER HR: HCPCS

## 2021-09-19 PROCEDURE — 71045 X-RAY EXAM CHEST 1 VIEW: CPT

## 2021-09-19 PROCEDURE — 93010 ELECTROCARDIOGRAM REPORT: CPT | Performed by: INTERNAL MEDICINE

## 2021-09-19 PROCEDURE — 2580000003 HC RX 258: Performed by: EMERGENCY MEDICINE

## 2021-09-19 PROCEDURE — 82077 ASSAY SPEC XCP UR&BREATH IA: CPT

## 2021-09-19 PROCEDURE — 82150 ASSAY OF AMYLASE: CPT

## 2021-09-19 PROCEDURE — 85025 COMPLETE CBC W/AUTO DIFF WBC: CPT

## 2021-09-19 PROCEDURE — 83690 ASSAY OF LIPASE: CPT

## 2021-09-19 PROCEDURE — U0005 INFEC AGEN DETEC AMPLI PROBE: HCPCS

## 2021-09-19 PROCEDURE — 82803 BLOOD GASES ANY COMBINATION: CPT

## 2021-09-19 PROCEDURE — 87449 NOS EACH ORGANISM AG IA: CPT

## 2021-09-19 PROCEDURE — 96367 TX/PROPH/DG ADDL SEQ IV INF: CPT

## 2021-09-19 PROCEDURE — 87150 DNA/RNA AMPLIFIED PROBE: CPT

## 2021-09-19 PROCEDURE — 96372 THER/PROPH/DIAG INJ SC/IM: CPT

## 2021-09-19 PROCEDURE — U0003 INFECTIOUS AGENT DETECTION BY NUCLEIC ACID (DNA OR RNA); SEVERE ACUTE RESPIRATORY SYNDROME CORONAVIRUS 2 (SARS-COV-2) (CORONAVIRUS DISEASE [COVID-19]), AMPLIFIED PROBE TECHNIQUE, MAKING USE OF HIGH THROUGHPUT TECHNOLOGIES AS DESCRIBED BY CMS-2020-01-R: HCPCS

## 2021-09-19 PROCEDURE — 84145 PROCALCITONIN (PCT): CPT

## 2021-09-19 PROCEDURE — 84100 ASSAY OF PHOSPHORUS: CPT

## 2021-09-19 PROCEDURE — 83605 ASSAY OF LACTIC ACID: CPT

## 2021-09-19 PROCEDURE — 36415 COLL VENOUS BLD VENIPUNCTURE: CPT

## 2021-09-19 PROCEDURE — 82330 ASSAY OF CALCIUM: CPT

## 2021-09-19 RX ORDER — BUSPIRONE HYDROCHLORIDE 5 MG/1
10 TABLET ORAL 2 TIMES DAILY
Status: DISCONTINUED | OUTPATIENT
Start: 2021-09-19 | End: 2021-09-21 | Stop reason: HOSPADM

## 2021-09-19 RX ORDER — ACETAMINOPHEN 325 MG/1
650 TABLET ORAL EVERY 6 HOURS PRN
Status: DISCONTINUED | OUTPATIENT
Start: 2021-09-19 | End: 2021-09-21 | Stop reason: HOSPADM

## 2021-09-19 RX ORDER — SODIUM CHLORIDE 0.9 % (FLUSH) 0.9 %
5-40 SYRINGE (ML) INJECTION EVERY 12 HOURS SCHEDULED
Status: DISCONTINUED | OUTPATIENT
Start: 2021-09-19 | End: 2021-09-21 | Stop reason: HOSPADM

## 2021-09-19 RX ORDER — ONDANSETRON 2 MG/ML
4 INJECTION INTRAMUSCULAR; INTRAVENOUS EVERY 6 HOURS PRN
Status: DISCONTINUED | OUTPATIENT
Start: 2021-09-19 | End: 2021-09-21 | Stop reason: HOSPADM

## 2021-09-19 RX ORDER — LORAZEPAM 2 MG/ML
0.5 INJECTION INTRAMUSCULAR EVERY 4 HOURS PRN
Status: DISCONTINUED | OUTPATIENT
Start: 2021-09-19 | End: 2021-09-21 | Stop reason: HOSPADM

## 2021-09-19 RX ORDER — PANTOPRAZOLE SODIUM 40 MG/1
40 TABLET, DELAYED RELEASE ORAL
Status: DISCONTINUED | OUTPATIENT
Start: 2021-09-19 | End: 2021-09-21 | Stop reason: HOSPADM

## 2021-09-19 RX ORDER — POLYETHYLENE GLYCOL 3350 17 G/17G
17 POWDER, FOR SOLUTION ORAL DAILY PRN
Status: DISCONTINUED | OUTPATIENT
Start: 2021-09-19 | End: 2021-09-21 | Stop reason: HOSPADM

## 2021-09-19 RX ORDER — NICOTINE 21 MG/24HR
1 PATCH, TRANSDERMAL 24 HOURS TRANSDERMAL DAILY
Status: DISCONTINUED | OUTPATIENT
Start: 2021-09-19 | End: 2021-09-21 | Stop reason: HOSPADM

## 2021-09-19 RX ORDER — CYCLOBENZAPRINE HCL 10 MG
10 TABLET ORAL 3 TIMES DAILY PRN
Status: DISCONTINUED | OUTPATIENT
Start: 2021-09-19 | End: 2021-09-21 | Stop reason: HOSPADM

## 2021-09-19 RX ORDER — SODIUM CHLORIDE 9 MG/ML
25 INJECTION, SOLUTION INTRAVENOUS PRN
Status: DISCONTINUED | OUTPATIENT
Start: 2021-09-19 | End: 2021-09-21 | Stop reason: HOSPADM

## 2021-09-19 RX ORDER — ONDANSETRON 4 MG/1
4 TABLET, ORALLY DISINTEGRATING ORAL EVERY 8 HOURS PRN
Status: DISCONTINUED | OUTPATIENT
Start: 2021-09-19 | End: 2021-09-21 | Stop reason: HOSPADM

## 2021-09-19 RX ORDER — MAGNESIUM SULFATE IN WATER 40 MG/ML
2000 INJECTION, SOLUTION INTRAVENOUS PRN
Status: DISCONTINUED | OUTPATIENT
Start: 2021-09-19 | End: 2021-09-21 | Stop reason: HOSPADM

## 2021-09-19 RX ORDER — ASPIRIN 81 MG/1
81 TABLET, CHEWABLE ORAL DAILY
Status: DISCONTINUED | OUTPATIENT
Start: 2021-09-19 | End: 2021-09-21 | Stop reason: HOSPADM

## 2021-09-19 RX ORDER — ACETAMINOPHEN 650 MG/1
650 SUPPOSITORY RECTAL EVERY 6 HOURS PRN
Status: DISCONTINUED | OUTPATIENT
Start: 2021-09-19 | End: 2021-09-21 | Stop reason: HOSPADM

## 2021-09-19 RX ORDER — CALCIUM GLUCONATE 20 MG/ML
1000 INJECTION, SOLUTION INTRAVENOUS ONCE
Status: COMPLETED | OUTPATIENT
Start: 2021-09-19 | End: 2021-09-20

## 2021-09-19 RX ORDER — ALBUTEROL SULFATE 90 UG/1
2 AEROSOL, METERED RESPIRATORY (INHALATION) EVERY 6 HOURS PRN
Status: DISCONTINUED | OUTPATIENT
Start: 2021-09-19 | End: 2021-09-21 | Stop reason: HOSPADM

## 2021-09-19 RX ORDER — METOPROLOL SUCCINATE 50 MG/1
50 TABLET, EXTENDED RELEASE ORAL DAILY
Status: DISCONTINUED | OUTPATIENT
Start: 2021-09-19 | End: 2021-09-21 | Stop reason: HOSPADM

## 2021-09-19 RX ORDER — IPRATROPIUM BROMIDE AND ALBUTEROL SULFATE 2.5; .5 MG/3ML; MG/3ML
1 SOLUTION RESPIRATORY (INHALATION) ONCE
Status: COMPLETED | OUTPATIENT
Start: 2021-09-19 | End: 2021-09-19

## 2021-09-19 RX ORDER — SODIUM CHLORIDE 9 MG/ML
INJECTION, SOLUTION INTRAVENOUS CONTINUOUS
Status: DISCONTINUED | OUTPATIENT
Start: 2021-09-19 | End: 2021-09-21 | Stop reason: HOSPADM

## 2021-09-19 RX ORDER — POTASSIUM CHLORIDE 7.45 MG/ML
10 INJECTION INTRAVENOUS PRN
Status: DISCONTINUED | OUTPATIENT
Start: 2021-09-19 | End: 2021-09-21 | Stop reason: HOSPADM

## 2021-09-19 RX ORDER — SODIUM CHLORIDE 0.9 % (FLUSH) 0.9 %
5-40 SYRINGE (ML) INJECTION PRN
Status: DISCONTINUED | OUTPATIENT
Start: 2021-09-19 | End: 2021-09-21 | Stop reason: HOSPADM

## 2021-09-19 RX ORDER — POTASSIUM CHLORIDE 20 MEQ/1
40 TABLET, EXTENDED RELEASE ORAL PRN
Status: DISCONTINUED | OUTPATIENT
Start: 2021-09-19 | End: 2021-09-21 | Stop reason: HOSPADM

## 2021-09-19 RX ORDER — DULOXETIN HYDROCHLORIDE 60 MG/1
60 CAPSULE, DELAYED RELEASE ORAL 2 TIMES DAILY
Status: DISCONTINUED | OUTPATIENT
Start: 2021-09-19 | End: 2021-09-21 | Stop reason: HOSPADM

## 2021-09-19 RX ORDER — ATORVASTATIN CALCIUM 40 MG/1
40 TABLET, FILM COATED ORAL NIGHTLY
Status: DISCONTINUED | OUTPATIENT
Start: 2021-09-19 | End: 2021-09-21 | Stop reason: HOSPADM

## 2021-09-19 RX ADMIN — POTASSIUM CHLORIDE 40 MEQ: 20 TABLET, EXTENDED RELEASE ORAL at 21:02

## 2021-09-19 RX ADMIN — ASPIRIN 81 MG: 81 TABLET, CHEWABLE ORAL at 21:02

## 2021-09-19 RX ADMIN — ACETAMINOPHEN 650 MG: 325 TABLET ORAL at 23:43

## 2021-09-19 RX ADMIN — BUSPIRONE HYDROCHLORIDE 10 MG: 5 TABLET ORAL at 21:02

## 2021-09-19 RX ADMIN — Medication 10 ML: at 21:07

## 2021-09-19 RX ADMIN — METOPROLOL SUCCINATE 50 MG: 50 TABLET, EXTENDED RELEASE ORAL at 21:03

## 2021-09-19 RX ADMIN — Medication 10 ML: at 21:03

## 2021-09-19 RX ADMIN — CYCLOBENZAPRINE 10 MG: 10 TABLET, FILM COATED ORAL at 23:43

## 2021-09-19 RX ADMIN — VANCOMYCIN HYDROCHLORIDE 2000 MG: 10 INJECTION, POWDER, LYOPHILIZED, FOR SOLUTION INTRAVENOUS at 06:59

## 2021-09-19 RX ADMIN — IPRATROPIUM BROMIDE AND ALBUTEROL SULFATE 1 AMPULE: .5; 3 SOLUTION RESPIRATORY (INHALATION) at 05:04

## 2021-09-19 RX ADMIN — DULOXETINE HYDROCHLORIDE 60 MG: 60 CAPSULE, DELAYED RELEASE ORAL at 21:02

## 2021-09-19 RX ADMIN — CEFEPIME HYDROCHLORIDE 2000 MG: 2 INJECTION, POWDER, FOR SOLUTION INTRAVENOUS at 21:00

## 2021-09-19 RX ADMIN — METRONIDAZOLE 500 MG: 500 INJECTION, SOLUTION INTRAVENOUS at 22:36

## 2021-09-19 RX ADMIN — PANTOPRAZOLE SODIUM 40 MG: 40 TABLET, DELAYED RELEASE ORAL at 21:01

## 2021-09-19 RX ADMIN — SODIUM CHLORIDE: 9 INJECTION, SOLUTION INTRAVENOUS at 20:56

## 2021-09-19 RX ADMIN — ENOXAPARIN SODIUM 40 MG: 40 INJECTION SUBCUTANEOUS at 21:09

## 2021-09-19 RX ADMIN — ATORVASTATIN CALCIUM 40 MG: 40 TABLET, FILM COATED ORAL at 21:02

## 2021-09-19 ASSESSMENT — PAIN DESCRIPTION - DESCRIPTORS: DESCRIPTORS: ACHING

## 2021-09-19 ASSESSMENT — PAIN DESCRIPTION - ORIENTATION: ORIENTATION: RIGHT

## 2021-09-19 ASSESSMENT — PAIN DESCRIPTION - PAIN TYPE: TYPE: ACUTE PAIN

## 2021-09-19 ASSESSMENT — PAIN SCALES - GENERAL
PAINLEVEL_OUTOF10: 0
PAINLEVEL_OUTOF10: 7
PAINLEVEL_OUTOF10: 0

## 2021-09-19 ASSESSMENT — PAIN DESCRIPTION - LOCATION: LOCATION: ABDOMEN;BACK

## 2021-09-19 NOTE — H&P
HOSPITALISTS HISTORY AND PHYSICAL    9/19/2021 1:05 PM    Patient Information:  [place] II is a 49 y.o. male [id number]  PCP:  Elly Olivo MD (Tel: [phone] )    Chief complaint:  Pain abdomen, nausea, malaise    History of Present Illness:  Emmanuel No is a 49 y. o.  male with history of ADHD, IV drug abuse, Ha's esophagus, hypertension, chronic back pain, COPD, coronary artery disease, history of pancreatitis, anxiety, depression who presented to [facility] ED with complaints of bilateral lower quadrant pain abdomen for 3 to 4 days. Patient reports developing sharp colicky pain in bilateral lower quadrants radiating to the groin. Associated with difficulty voiding urine is currently resolved, nausea, sweating and generalized weakness. CT imaging at [facility] showed bowel abnormalities and hepatic steatosis. US scrotum wnl. Labs showed abnormal LFTs and mild leucocytosis. Per report got antibiotics. COVID-19 negative. No CXR from [facility]. Transferred here for admission, due to lack of beds. After getting to [facility], patient reports that the pain abdomen has improved. Now able to void urine without any difficulty. Denies recent iv drug use, but snorted ?heroine 4 days ago. Pt reports that he was working/carrying heavy weights in the woods and developed scratches and rashes after that. Pt AAOx3 without behavioral issues during my visit. History obtained from patient and Ed provider. REVIEW OF SYSTEMS:   Constitutional: Negative for fever,chills or night sweats  ENT: Negative for rhinorrhea, epistaxis, hoarseness, sore throat.   Respiratory: Negative for shortness of breath,wheezing  Cardiovascular: Negative for chest pain, palpitations   Gastrointestinal: Negative for nausea, vomiting, diarrhea, + pain abdomen  Genitourinary: Negative for polyuria, dysuria Hematologic/Lymphatic: Negative for bleeding tendency, easy bruising  Musculoskeletal: Negative for myalgias and arthralgias  Neurologic: Negative for confusion,dysarthria. Skin: Negative for itching, + rash on belly, back and arms  Psychiatric: Negative for depression,anxiety, agitation. Endocrine: Negative for polydipsia,polyuria,heat /cold intolerance. Past Medical History:   has a past medical history of ADHD (attention deficit hyperactivity disorder), Arthritis, Ha esophagus, Chronic back pain, ED (erectile dysfunction), GERD (gastroesophageal reflux disease), Hypertension, and Pleurisy. Past Surgical History:   has a past surgical history that includes Lung surgery; Spine surgery (1995); Arm Surgery (Left, 12/10/2015); back surgery; and Colonoscopy. Medications:  No current facility-administered medications on file prior to encounter.      Current Outpatient Medications on File Prior to Encounter   Medication Sig Dispense Refill    nicotine (NICODERM CQ) 21 MG/24HR Place 1 patch onto the skin daily 42 patch 0    hydrocortisone (ALA-JACE) 1 % cream Apply topically to face or neck 2 times daily for 1 week 30 g 1    cyclobenzaprine (FLEXERIL) 10 MG tablet TAKE ONE TABLET BY MOUTH THREE TIMES A DAY AS NEEDED FOR MUSCLE SPASMS 90 tablet 1    diphenhydrAMINE (BENADRYL) 25 MG tablet Take 1 tablet by mouth every 6 hours as needed for Itching Pt report takes for upset stomach in AM as needed 30 tablet 1    atorvastatin (LIPITOR) 40 MG tablet TAKE ONE TABLET BY MOUTH DAILY 90 tablet 0    pantoprazole (PROTONIX) 40 MG tablet TAKE ONE TABLET BY MOUTH TWICE A  tablet 0    metoprolol succinate (TOPROL XL) 50 MG extended release tablet TAKE ONE TABLET BY MOUTH DAILY 90 tablet 0    diclofenac sodium (VOLTAREN) 1 % GEL Apply topically 2 times daily 100 g 2    lurasidone (LATUDA) 20 MG TABS tablet Take 1 tablet by mouth daily 30 tablet 0    DULoxetine (CYMBALTA) 60 MG extended release capsule Take 1 capsule by mouth 2 times daily 60 capsule 4    busPIRone (BUSPAR) 10 MG tablet TAKE TWO TABLETS BY MOUTH TWICE A  tablet 5    furosemide (LASIX) 20 MG tablet Take 1 tablet by mouth daily 90 tablet 1    ondansetron (ZOFRAN) 4 MG tablet Take 1 tablet by mouth every 8 hours as needed for Nausea or Vomiting 40 tablet 2    potassium chloride (KLOR-CON M) 20 MEQ extended release tablet Take 1 tablet by mouth daily 30 tablet 0    albuterol sulfate HFA (VENTOLIN HFA) 108 (90 Base) MCG/ACT inhaler Inhale 2 puffs into the lungs every 6 hours as needed for Wheezing or Shortness of Breath 1 Inhaler 6    sildenafil (REVATIO) 20 MG tablet Take 3 tablets by mouth daily as needed (ED) 30 tablet 5    SPIRIVA RESPIMAT 2.5 MCG/ACT AERS inhaler INHALE TWO PUFFS BY MOUTH DAILY 1 Inhaler 5    valsartan (DIOVAN) 80 MG tablet TAKE ONE TABLET BY MOUTH DAILY 90 tablet 1    CHANTIX 1 MG tablet TAKE ONE TABLET BY MOUTH TWICE A DAY 56 tablet 4    traZODone (DESYREL) 50 MG tablet TAKE TWO TABLETS BY MOUTH ONCE NIGHTLY 180 tablet 2    aspirin 81 MG tablet Take 81 mg by mouth daily          Allergies: Allergies   Allergen Reactions    Lexapro [Escitalopram Oxalate] Other (See Comments)     Crazy dream hallucinations      Morphine      Makes him angry    Prozac [Fluoxetine Hcl]      Irritable        Wellbutrin [Bupropion] Other (See Comments)     Crazy dream halluciantions        Social History:  Patient Lives at home   reports that he has been smoking cigarettes. He has a 16.50 pack-year smoking history. He quit smokeless tobacco use about 5 years ago. He reports current alcohol use. He reports current drug use. Drugs: Marijuana, Other-see comments, and Opiates . Family History:  family history includes Arthritis in his mother; Asthma in his father; Other in his mother. Physical Exam:  There were no vitals taken for this visit. General appearance:  Obese  male, Appears comfortable.    Eyes: Sclera clear, pupils equal  ENT: Moist mucus membranes, no thrush. Trachea midline. Cardiovascular: Regular rhythm, normal S1, S2. No murmur, gallop, rub. No edema in lower extremities  Respiratory: Clear to auscultation bilaterally, no wheeze, good inspiratory effort  Gastrointestinal: Abdomen soft, mild lower quadrant tenderness, not distended, normal bowel sounds  Musculoskeletal: No cyanosis in digits, neck supple  Neurology: Cranial nerves grossly intact. Alert and oriented in time, place and person. No speech or motor deficits  Psychiatry: Appropriate affect. Not agitated  Skin: Warm, dry, normal turgor,mild redness and scratches on back, lower abd and arms  Brisk capillary refill, peripheral pulses palpable     Labs:  CBC:   Lab Results   Component Value Date    WBC 17.7 09/18/2021    RBC 4.74 09/18/2021    HGB 14.3 09/18/2021    HCT 43.3 09/18/2021    MCV 91.3 09/18/2021    MCH 30.2 09/18/2021    MCHC 33.1 09/18/2021    RDW 13.6 09/18/2021     09/18/2021    MPV 8.7 09/18/2021     BMP:    Lab Results   Component Value Date     09/18/2021    K 3.7 09/18/2021    CL 99 09/18/2021    CO2 23 09/18/2021    BUN 31 09/18/2021    CREATININE 1.2 09/18/2021    CALCIUM 9.5 09/18/2021    GFRAA >60 09/18/2021    GFRAA >60 08/29/2011    LABGLOM >60 09/18/2021    GLUCOSE 95 09/18/2021     No orders to display     I visualized CXR images and EKG strips    Discussed case  with pt and RN    Problem List  Active Problems:    Dehydration  Resolved Problems:    * No resolved hospital problems.  *        Assessment/Plan:     Sepsis on presentation   Lactic acidosis, Fever, tachycardia, leucocytosis at [facility]  CT abd/pelvis from [facility] and CXR from here reviewed  Source likely abdominal vs suspected viral/bacterial pna  WBC count down, fevers and tachycardia resolved  IV Cefepime and Flagyl ordered  COVID-19 PCR negative from outside, Repeat pending  Sputum c/s, blood c/s and UA, urinalysis pending  Pro-calcitonin and inflammatory

## 2021-09-19 NOTE — ED NOTES
time:   Annie @ [address]  09/19/21 4273    1055 - called [facility] to have them check on [facility] unit.  was 1030. Brina Hawk stated unit is on way and should be here in next 10 minutes.       David Alert  09/19/21 1493

## 2021-09-19 NOTE — ED PROVIDER NOTES
I independently examined and evaluated Refugio Valentin II. In brief, is a 52-year-old male, presenting with concerns for right lower quadrant abdominal pain, nausea, sweating, generalized malaise. Patient states that symptoms started approximately 4 days ago, he states that he has a sharp/stabbing sensation in his right lower abdomen, radiating to his right groin. This never happened before. He also states that he has a history of  drug use may be going through withdrawal.    Focused exam revealed patient diaphoretic, uncomfortable. Tachycardic. Lungs clear to auscultation bilaterally. Abdomen soft, nondistended, significant tenderness palpation in the right lower quadrant. No guarding or rigidity.   Please refer to PAs note for details of  exam.    Labs Reviewed   CBC WITH AUTO DIFFERENTIAL - Abnormal; Notable for the following components:       Result Value    WBC 17.7 (*)     Neutrophils Absolute 13.6 (*)     Monocytes Absolute 1.6 (*)     Smudge Cells Present (*)     Anisocytosis Occasional (*)     Polychromasia Occasional (*)     All other components within normal limits    Narrative:     Performed at:  [facility] [employer],  [employer], [employer]   Phone [phone]   COMPREHENSIVE METABOLIC PANEL W/ REFLEX TO MG FOR LOW K - Abnormal; Notable for the following components:    Anion Gap 17 (*)     BUN 31 (*)     Total Bilirubin 1.2 (*)     Alkaline Phosphatase 148 (*)      (*)      (*)     All other components within normal limits    Narrative:     Performed at:  [facility]) [facility] [address],  [employer], [employer]   Phone [phone]   LIPASE - Abnormal; Notable for the following components:    Lipase 9.0 (*)     All other components within normal limits    Narrative:     Performed at:  [facility] [address],  [place], [employer]   Phone [phone] NATRIURETIC PEPTIDE - Abnormal; Notable for the following components:    Pro- (*)     All other components within normal limits    Narrative:     Performed at:  [facility] [address],  [employer]   Phone [phone]   URINALYSIS - Abnormal; Notable for the following components:    Bilirubin Urine SMALL (*)     Ketones, Urine 40 (*)     Blood, Urine LARGE (*)     Protein, UA 30 (*)     All other components within normal limits    Narrative:     Performed at:  [facility] [place],  [employer]   Phone [phone]   LACTIC ACID, PLASMA - Abnormal; Notable for the following components:    Lactic Acid 2.5 (*)     All other components within normal limits    Narrative:     Performed at:  [facility] [place],  [employer]   Phone [phone]   [address] - Abnormal; Notable for the following components:    Amphetamine Screen, Urine POSITIVE (*)     Cannabinoid Scrn, Ur POSITIVE (*)     All other components within normal limits    Narrative:     Performed at:  [facility] [address],  [employer]   Phone [phone]   MICROSCOPIC URINALYSIS - Abnormal; Notable for the following components:    Coarse Casts, UA 3-5 (*)     Mucus, UA Rare (*)     All other components within normal limits    Narrative:     Performed at:  [facility] [address],  [employer]   Phone [phone]   BLOOD GAS, VENOUS - Abnormal; Notable for the following components:    pH, Curry 7.311 (*)     pO2, Curry 101.3 (*)     HCO3, Venous 22.1 (*)     Base Excess, Curry -4.1 (*)     Carboxyhemoglobin 1.6 (*)     All other components within normal limits    Narrative:     Performed at:  [facility] [place],  [employer]   Phone [phone] COVID-19 & INFLUENZA COMBO    Narrative:     Performed at:  [facility] [address],  [place], [facility]   Phone [phone]   CULTURE, BLOOD 1    Narrative:     ORDER#: [id number]                          ORDERED BY: Katina Marquis  SOURCE: Blood left forearm                 COLLECTED:  09/18/21 22:00  ANTIBIOTICS AT MOHIT.:                      RECEIVED :  09/19/21 10:17  If child <=2 yrs old please draw pediatric bottle. ~Blood Culture 1  Performed at:  [facility]  [address] [place], [facility] [employer] [address]   Phone [phone]   CULTURE, BLOOD 2    Narrative:     ORDER#: [id number]                          ORDERED BY: Katina Marquis  SOURCE: Blood Antecubital-Rig              COLLECTED:  09/18/21 22:35  ANTIBIOTICS AT MOHIT.:                      RECEIVED :  09/19/21 10:17  If child <=2 yrs old please draw pediatric bottle. ~Blood Culture #2  Performed at:  [facility]  [address] [place], [facility] [employer] [address]   Phone [phone]   CULTURE, URINE   TROPONIN    Narrative:     Performed at:  [facility] [address],  [place], [facility]   Phone [phone]   ETHANOL    Narrative:     Performed at:  [facility] [address],  [place], [facility]   Phone [phone]   LACTATE, SEPSIS    Narrative:     Performed at:  [facility] [address],  [place], [facility]   Phone [phone]   HEPATITIS PANEL, ACUTE    Narrative:     Performed at:  [facility]  [address] [facility] [employer] [address]   Phone [phone]   LACTATE, SEPSIS     US SCROTUM AND TESTICLES   Final Result   Unremarkable testicular ultrasound with normal Doppler flow.          US DUP ABD PEL RETRO SCROT COMPLETE   Final Result   Unremarkable testicular ultrasound with normal Doppler flow.         CT ABDOMEN PELVIS W IV CONTRAST Additional Contrast? None   Final Result   1. Evidence of bowel malrotation again demonstrated. The duodenal C-loop   never crosses the midline, with the majority of the small bowel within the   right side of the abdomen, and large bowel within the left side of the   abdomen. 2. Cecum is now positioned within the left upper abdomen, which is new since   the prior study of August 2020, and may be related to a mobile cecum, or   internal hernia without evidence of bowel obstruction. 3. Diffuse hepatic steatosis           ED course: Patient is a 81-year-old male, presenting with concerns for right lower quadrant abdominal pain for the past 4 days. Patient is seen in conjunction with AVE Delgado, please refer to her note for further details of the patient's work-up and treatment. He was found to be tachycardic, uncomfortable, was treated with pain and nausea control. With increased leukocytosis and tachycardia, did elect to cover the patient with antibiotics as well. Ultrasound was ordered to rule out torsion as a cause of his symptoms and was negative for any acute findings. CT with some abnormalities that appear likely chronic in nature, do not feel that there really related to the patient's current symptoms. Patient be hospitalized for further work-up and treatment of his condition, started on IV antibiotics as he did meet sepsis criteria. All diagnostic, treatment, and disposition decisions were made by myself in conjunction with the advanced practice provider. For all further details of the patient's emergency department visit, please see the advanced practice provider's documentation. 1. Tachycardia    2. Right lower quadrant abdominal pain    3.  Lactic acidosis          Comment: Please note this report has been produced using speech recognition software and may contain errors related to that system including errors in grammar, punctuation, and spelling, as well as words and phrases that may be inappropriate. If there are any questions or concerns please feel free to contact the dictating provider for clarification. The Ekg interpreted by me shows  sinus tachycardia, esfs=[id number]   Axis is   Left axis deviation  QTc is  normal  Intervals and Durations are unremarkable.       ST Segments: nonspecific changes  Sinus tachycardia new, otherwise similar compared to previous performed 10/17/2020      Annita Herrera MD  09/18/21 7417       Annita Herrera MD  09/19/21 2990

## 2021-09-19 NOTE — ED NOTES
Transfer center called to say the there is no ALS transport tonight and she will put in a note for day shift to call out to get an ETA.       Juni Kelleynayan  09/19/21 6980

## 2021-09-19 NOTE — PROGRESS NOTES
Brief GI Note    To be seen tomorrow with full consult note  Reviewed. 48 y/o with adhd, back pain, CAD, COPD, prior pancreatitis, polysubstance abuse with GI consulted regarding elevated hepatic function profile. 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 General surgery had been contacted per review      Liver function profile elevation classic for alcoholic hepatitis.    Will send chronic hep panel    [facility]

## 2021-09-19 NOTE — ED NOTES
4950-Call placed to Dr Nicolle Mcgowan in [facility]  09/18/21 4727 3053-Dr Chand returned call and spoke with [place] Shera Siemens  09/18/21 3741

## 2021-09-19 NOTE — ED PROVIDER NOTES
[address] ED  EMERGENCY DEPARTMENT ENCOUNTER        Pt Name: Vincent Bo  MRN: [id number]  [place] 1970  Date of evaluation: 9/18/2021  Provider: AVE Chew  PCP: Gloria Crabtree MD    This patient was seen and evaluated by the attending physician Jose Carlos Cook MD.    \  CHIEF COMPLAINT       Chief Complaint   Patient presents with    Abdominal Pain     pt c/o abdominal pain, nausea, sweating and states he just feels sick. HISTORY OF PRESENT ILLNESS   (Location/Symptom, Timing/Onset, Context/Setting, Quality, Duration, Modifying Factors, Severity)  Note limiting factors. Vincent Bo is a 49 y.o. male with past medical history of ADHD, chronic back pain, hypertension, COPD, coronary artery disease, history of pancreatitis, anxiety, depression who presents with his mother from his home for evaluation of abdominal pain. He notes that for the last 3 to 4 days he has been complaining of worsening right lower abdominal pain. He notes he has history of inguinal hernia and is scheduled to have surgery soon with Dr. Jn Tovar. This pain is gotten worse. Worse especially today, more sharp and constant. He notes that overall he just does not feel very well. He denies current chest pain no shortness of breath. He endorses nausea. No vomiting. Patient does endorse recent drug use, endorses being, \"chemically dependent\". Endorses using heroin. He denies any other drug use, he denies alcohol use. Nursing Notes were all reviewed and agreed with or any disagreements were addressed  in the HPI. Pt was seen during the [place] 19 pandemic. Appropriate PPE worn by ME during patient encounters. Pt seen during a time with constrained hospital bed capacity and other potential inpatient and outpatient resources were constrained due to the viral pandemic.      REVIEW OF SYSTEMS    (2-9 systems for level 4, 10 or more for level 5)     Review of Systems    Positives and Pertinent negatives as per HPI. Except as noted abovein the ROS, all other systems were reviewed and negative.        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    Chronic back pain     ED (erectile dysfunction) 12/29/2011    GERD (gastroesophageal reflux disease)     Hypertension     Pleurisy          SURGICAL HISTORY     Past Surgical History:   Procedure Laterality Date    ARM SURGERY Left 12/10/2015    BACK SURGERY      COLONOSCOPY      LUNG SURGERY      partial removal (right) agent orange    SPINE SURGERY  1995    lumbar laminectomy         CURRENTMEDICATIONS       Previous Medications    ALBUTEROL SULFATE HFA (VENTOLIN HFA) 108 (90 BASE) MCG/ACT INHALER    Inhale 2 puffs into the lungs every 6 hours as needed for Wheezing or Shortness of Breath    ASPIRIN 81 MG TABLET    Take 81 mg by mouth daily     ATORVASTATIN (LIPITOR) 40 MG TABLET    TAKE ONE TABLET BY MOUTH DAILY    BUSPIRONE (BUSPAR) 10 MG TABLET    TAKE TWO TABLETS BY MOUTH TWICE A DAY    CHANTIX 1 MG TABLET    TAKE ONE TABLET BY MOUTH TWICE A DAY    CYCLOBENZAPRINE (FLEXERIL) 10 MG TABLET    TAKE ONE TABLET BY MOUTH THREE TIMES A DAY AS NEEDED FOR MUSCLE SPASMS    DICLOFENAC SODIUM (VOLTAREN) 1 % GEL    Apply topically 2 times daily    DIPHENHYDRAMINE (BENADRYL) 25 MG TABLET    Take 1 tablet by mouth every 6 hours as needed for Itching Pt report takes for upset stomach in AM as needed    DULOXETINE (CYMBALTA) 60 MG EXTENDED RELEASE CAPSULE    Take 1 capsule by mouth 2 times daily    FUROSEMIDE (LASIX) 20 MG TABLET    Take 1 tablet by mouth daily    HYDROCORTISONE (ALA-JACE) 1 % CREAM    Apply topically to face or neck 2 times daily for 1 week    LURASIDONE (LATUDA) 20 MG TABS TABLET    Take 1 tablet by mouth daily    METOPROLOL SUCCINATE (TOPROL XL) 50 MG EXTENDED RELEASE TABLET    TAKE ONE TABLET Running Out of Food in the Last Year: Often true    Angel of Food in the Last Year: Often true   Transportation Needs: Unmet Transportation Needs    Lack of Transportation (Medical): Yes    Lack of Transportation (Non-Medical): Yes   Physical Activity:     Days of Exercise per Week:     Minutes of Exercise per Session:    Stress:     Feeling of Stress :    Social Connections:     Frequency of Communication with Friends and Family:     Frequency of Social Gatherings with Friends and Family:     Attends Hinduism Services:     Active Member of Clubs or Organizations:     Attends Club or Organization Meetings:     Marital Status:    Intimate Partner Violence:     Fear of Current or Ex-Partner:     Emotionally Abused:     Physically Abused:     Sexually Abused:        SCREENINGS    [place] Coma Scale  Eye Opening: Spontaneous  Best Verbal Response: Oriented  Best Motor Response: Obeys commands  Siddhartha Coma Scale Score: 15        PHYSICAL EXAM    (up to 7 for level 4, 8 or more for level 5)     ED Triage Vitals [09/18/21 2041]   BP Temp Temp Source Pulse Resp SpO2 Height Weight   (!) 154/85 99.3 °F (37.4 °C) Oral 140 22 94 % 6' (1.829 m) 259 lb (117.5 kg)       Physical Exam  PHYSICAL EXAM  BP (!) 107/95   Pulse 127   Temp 99.3 °F (37.4 °C) (Oral)   Resp 16   Ht 6' (1.829 m)   Wt 259 lb (117.5 kg)   SpO2 94%   BMI 35.13 kg/m²   GENERAL APPEARANCE: Awake and alert. Cooperative. Overweight adult male in exam room, he is with almost dyskinesia movements, moving all around the room, very hyperactive, profusely diaphoretic, with rapid speech, however he is breathing comfortably on room air showing no sign of acute respiratory distress. HEAD: Normocephalic. Atraumatic. EYES: PERRL. EOM's grossly intact. Pupils slightly dilated   ENT: Mucous membranes are moist.   NECK: Supple. Trachea midline, phonation normal.  HEART: Key cardiac rate, regular rhythm. .  Grossly, no murmurs appreciated  LUNGS: Respirations unlabored. CTAB. Good air exchange. Speaking comfortably in full sentences. ABDOMEN: Soft. Non-distended. Right inguinal region is soft but edematous, no erythema, no warmth. No scrotal swelling. Unable to elicit cremasteric reflex bilaterally. EXTREMITIES: No peripheral edema. Moves all extremities equally. All extremities neurovascularly intact. SKIN: Warm and dry. No acute rashes. NEUROLOGICAL: Alert and oriented. No gross facial drooping. Power intact to UE and LE, sensation intact x 4. No tremors or ataxia. Gait intact. PSYCHIATRIC: Anxious mood and affect.     DIAGNOSTIC RESULTS   LABS:    Labs Reviewed   CBC WITH AUTO DIFFERENTIAL - Abnormal; Notable for the following components:       Result Value    WBC 17.7 (*)     Neutrophils Absolute 13.6 (*)     Monocytes Absolute 1.6 (*)     Smudge Cells Present (*)     Anisocytosis Occasional (*)     Polychromasia Occasional (*)     All other components within normal limits    Narrative:     Performed at:  [place] [address],  [employer] [facility]   Phone [phone]   COMPREHENSIVE METABOLIC PANEL W/ REFLEX TO MG FOR LOW K - Abnormal; Notable for the following components:    Anion Gap 17 (*)     BUN 31 (*)     Total Bilirubin 1.2 (*)     Alkaline Phosphatase 148 (*)      (*)      (*)     All other components within normal limits    Narrative:     Performed at:  [place] [address],  [employer] [facility]   Phone [phone]   LIPASE - Abnormal; Notable for the following components:    Lipase 9.0 (*)     All other components within normal limits    Narrative:     Performed at:  [facility]) [facility] [address],  [place], [facility]   Phone [phone]   BRAIN NATRIURETIC PEPTIDE - Abnormal; Notable for the following components:    Pro- (*)     All other components within normal limits    Narrative: Performed at:  [facility] [address],  [place], [employer]   Phone [phone]   URINALYSIS - Abnormal; Notable for the following components:    Bilirubin Urine SMALL (*)     Ketones, Urine 40 (*)     Blood, Urine LARGE (*)     Protein, UA 30 (*)     All other components within normal limits    Narrative:     Performed at:  [place] [address],  [place], [employer]   Phone [phone]   LACTIC ACID, PLASMA - Abnormal; Notable for the following components:    Lactic Acid 2.5 (*)     All other components within normal limits    Narrative:     Performed at:  [place] [address],  [place], [employer]   Phone [phone]   [address] - Abnormal; Notable for the following components:    Amphetamine Screen, Urine POSITIVE (*)     Cannabinoid Scrn, Ur POSITIVE (*)     All other components within normal limits    Narrative:     Performed at:  [place] [address],  [place], [employer]   Phone [phone]   MICROSCOPIC URINALYSIS - Abnormal; Notable for the following components:    Coarse Casts, UA 3-5 (*)     Mucus, UA Rare (*)     All other components within normal limits    Narrative:     Performed at:  [facility] [address],  [place], [employer]   Phone [phone]    Narrative:     Performed at:  [facility]) [facility] [address],  [place], [employer]   Phone [phone]   CULTURE, URINE   CULTURE, BLOOD 1   CULTURE, BLOOD 2   TROPONIN    Narrative:     Performed at:  [facility] [address],  [place], [employer]   Phone [phone]   ETHANOL    Narrative:     Performed at:  [facility] [address],  [place], [employer]   Phone [phone] LACTATE, SEPSIS   LACTATE, SEPSIS   HEPATITIS PANEL, ACUTE       All other labs were within normal range or not returned as of this dictation. EKG: All EKG's are interpreted by the Emergency Department Physician who either signs orCo-signs this chart in the absence of a cardiologist.  Please see their note for interpretation of EKG. RADIOLOGY:   Non-plain film images such as CT, Ultrasound and MRI are read by the radiologist. Plain radiographic images are visualized andpreliminarily interpreted by the  ED Provider with the below findings:        Interpretation perthe Radiologist below, if available at the time of this note:    CT ABDOMEN PELVIS W IV CONTRAST Additional Contrast? None   Final Result   1. Evidence of bowel malrotation again demonstrated. The duodenal C-loop   never crosses the midline, with the majority of the small bowel within the   right side of the abdomen, and large bowel within the left side of the   abdomen. 2. Cecum is now positioned within the left upper abdomen, which is new since   the prior study of August 2020, and may be related to a mobile cecum, or   internal hernia without evidence of bowel obstruction. 3. Diffuse hepatic steatosis         US SCROTUM AND TESTICLES    (Results Pending)   US DUP ABD PEL RETRO SCROT COMPLETE    (Results Pending)     CT ABDOMEN PELVIS W IV CONTRAST Additional Contrast? None    Result Date: 9/18/2021  EXAMINATION: CT OF THE ABDOMEN AND PELVIS WITH CONTRAST 9/18/2021 3:57 pm TECHNIQUE: CT of the abdomen and pelvis was performed with the administration of intravenous contrast. Multiplanar reformatted images are provided for review. Dose modulation, iterative reconstruction, and/or weight based adjustment of the mA/kV was utilized to reduce the radiation dose to as low as reasonably achievable.  COMPARISON: CT scan dated August 1, 2020 HISTORY: ORDERING SYSTEM PROVIDED HISTORY: RLQ pain hx hernia TECHNOLOGIST PROVIDED HISTORY: Reason for exam:->RLQ pain hx hernia Additional Contrast?->None Decision Support Exception - unselect if not a suspected or confirmed emergency medical condition->Emergency Medical Condition (MA) Reason for Exam: abdo pain Acuity: Acute Type of Exam: Ongoing Additional signs and symptoms: RLQ abdo pain/groin pain, hx of hernia FINDINGS: Lower Chest: The lung bases are clear. Organs: Diffuse hepatic steatosis is present. A 1.8 cm cyst is again demonstrated within the medial segment of the left lobe of the liver. Focal fatty infiltration is present involving the medial segment adjacent to the fissure for the ligamentum teres. The gallbladder, spleen, and adrenal glands demonstrate no acute abnormality. A 7 mm focal fatty lesion is present within the left adrenal gland, consistent with a small lipoma or myelolipoma. Nodular thickening of the adrenal glands is present bilaterally consistent with adenomatous changes. Pancreas is partially fatty replaced. GI/Bowel: Stomach appears grossly normal.  The duodenum never crosses the midline, with the majority of small bowel within the right abdomen, or slightly left of midline. The large bowel is now displaced into the left abdomen. Cecum is present within the left upper abdomen. The appendix is normal.  SMA is again noted to be positioned posterior and slightly to the left of the SM V, similar compared to the prior study. Pelvis: Urinary bladder appears normal.  Prostate gland is normal. Peritoneum/Retroperitoneum: No free fluid or free air is present within or pelvis. No aneurysm formation is present. No pathological adenopathy is present. Bones/Soft Tissues: No acute osseous abnormality is present. 1. Evidence of bowel malrotation again demonstrated. The duodenal C-loop never crosses the midline, with the majority of the small bowel within the right side of the abdomen, and large bowel within the left side of the abdomen.  2. Cecum is now positioned within the left upper abdomen, which is new since the prior study of August 2020, and may be related to a mobile cecum, or internal hernia without evidence of bowel obstruction. 3. Diffuse hepatic steatosis          PROCEDURES   Unless otherwise noted below, none     Procedures    CRITICAL CARE TIME   N/A    CONSULTS:  IP CONSULT TO GENERAL SURGERY      EMERGENCY DEPARTMENT COURSE and DIFFERENTIALDIAGNOSIS/MDM:   Vitals:    Vitals:    09/18/21 2041 09/18/21 2236   BP: (!) 154/85 (!) 107/95   Pulse: 140 127   Resp: 22 16   Temp: 99.3 °F (37.4 °C)    TempSrc: Oral    SpO2: 94% 94%   Weight: 259 lb (117.5 kg)    Height: 6' (1.829 m)        Patient was given thefollowing medications:  Medications   0.9 % sodium chloride bolus (1,000 mLs IntraVENous New Bag 9/18/21 2107)   morphine (PF) injection 2 mg (2 mg IntraVENous Given 9/18/21 2233)   iopamidol (ISOVUE-370) 76 % injection 75 mL (75 mLs IntraVENous Given 9/18/21 2157)   piperacillin-tazobactam (ZOSYN) 3,375 mg in sodium chloride 0.9 % 100 mL IVPB (mini-bag) (3,375 mg IntraVENous New Bag 9/18/21 2325)   LORazepam (ATIVAN) injection 2 mg (2 mg IntraVENous Given 9/18/21 2323)       PDMP Monitoring:    Last PDMP Herlinda Fermin as Reviewed [facility]):  Review User Review Instant Review Result          Last Controlled Substance Monitoring Documentation      ED from 6/19/2017 in [facility]  ED   Comments  Patient left discharge instructions and all belongings in hand.   filed at 06/19/2017 1806        Urine Drug Screenings (1 yr)     Drug Panel-PM-HI Res-UR Interp-A  Collected: 5/27/2021  2:08 PM (Final result)    Narrative: Referred out by:  [facility]  [address] [place], [place] [facility] [place] [address]   Phone [phone]    Complete Results          Drug screen multi urine  Collected: 9/18/2021  9:09 PM (Final result)    Narrative: Performed at:  [facility] [address],  [place], [facility]   Phone [phone] continue to have concern for potential opiate withdrawal.    Patient does have a lactic acidosis, repeat lactic is pending at time of signout. Ultimately patient will require admission, he will require telemetry, given bed restrictions he may require transfer to a different hospital that has telemetry beds available. I re-evaluated the patient after medications were given. The patient was sleeping on his side, no tenderness elicited with palpation of the abdomen, abdomen soft without rigidity or gross guarding. Patient did have desaturation during sleep he was placed on supplemental oxygen via nasal cannula. 1:13 AM: I discussed the history, physical, and treatment plan with Dr. Ruth Gonsalez II was signed out in stable condition. Please see Dr. Moise Nieves note for further details, including diagnosis and disposition. FINAL IMPRESSION      1. Tachycardia    2. Right lower quadrant abdominal pain    3. Lactic acidosis          DISPOSITION/PLAN   DISPOSITION        PATIENT REFERREDTO:  No follow-up provider specified.     DISCHARGE MEDICATIONS:  New Prescriptions    No medications on file       DISCONTINUED MEDICATIONS:  Discontinued Medications    No medications on file              (Please note that portions ofthis note were completed with a voice recognition program.  Efforts were made to edit the dictations but occasionally words are mis-transcribed.)    Rowan Licea (electronically signed)       Rowan Licea  09/19/21 9612

## 2021-09-20 ENCOUNTER — APPOINTMENT (OUTPATIENT)
Dept: ULTRASOUND IMAGING | Age: 51
DRG: 720 | End: 2021-09-20
Attending: INTERNAL MEDICINE
Payer: COMMERCIAL

## 2021-09-20 PROBLEM — E86.0 DEHYDRATION: Status: RESOLVED | Noted: 2021-09-19 | Resolved: 2021-09-20

## 2021-09-20 PROBLEM — Q43.3 INTESTINAL MALROTATION: Status: ACTIVE | Noted: 2021-09-20

## 2021-09-20 LAB
A/G RATIO: 1 (ref 1.1–2.2)
ACETAMINOPHEN LEVEL: <5 UG/ML (ref 10–30)
ALBUMIN SERPL-MCNC: 3 G/DL (ref 3.4–5)
ALP BLD-CCNC: 101 U/L (ref 40–129)
ALT SERPL-CCNC: 157 U/L (ref 10–40)
ANION GAP SERPL CALCULATED.3IONS-SCNC: 9 MMOL/L (ref 3–16)
AST SERPL-CCNC: 395 U/L (ref 15–37)
BASOPHILS ABSOLUTE: 0 K/UL (ref 0–0.2)
BASOPHILS RELATIVE PERCENT: 0.6 %
BILIRUB SERPL-MCNC: 0.6 MG/DL (ref 0–1)
BUN BLDV-MCNC: 17 MG/DL (ref 7–20)
C-REACTIVE PROTEIN: 47.5 MG/L (ref 0–5.1)
CALCIUM IONIZED: 1.11 MMOL/L (ref 1.12–1.32)
CALCIUM SERPL-MCNC: 8.5 MG/DL (ref 8.3–10.6)
CHLORIDE BLD-SCNC: 104 MMOL/L (ref 99–110)
CO2: 25 MMOL/L (ref 21–32)
CREAT SERPL-MCNC: 0.8 MG/DL (ref 0.9–1.3)
D DIMER: 266 NG/ML DDU (ref 0–229)
EOSINOPHILS ABSOLUTE: 0.3 K/UL (ref 0–0.6)
EOSINOPHILS RELATIVE PERCENT: 3.6 %
GFR AFRICAN AMERICAN: >60
GFR NON-AFRICAN AMERICAN: >60
GLOBULIN: 2.9 G/DL
GLUCOSE BLD-MCNC: 118 MG/DL (ref 70–99)
GLUCOSE BLD-MCNC: 169 MG/DL (ref 70–99)
GLUCOSE BLD-MCNC: 87 MG/DL (ref 70–99)
GLUCOSE BLD-MCNC: 92 MG/DL (ref 70–99)
GLUCOSE BLD-MCNC: 93 MG/DL (ref 70–99)
GLUCOSE BLD-MCNC: 98 MG/DL (ref 70–99)
HCT VFR BLD CALC: 37.4 % (ref 40.5–52.5)
HEMATOLOGY PATH CONSULT: NORMAL
HEMOGLOBIN: 12.8 G/DL (ref 13.5–17.5)
INR BLD: 1.08 (ref 0.88–1.12)
L. PNEUMOPHILA SEROGP 1 UR AG: NORMAL
LACTATE DEHYDROGENASE: 636 U/L (ref 100–190)
LACTIC ACID: 1.5 MMOL/L (ref 0.4–2)
LYMPHOCYTES ABSOLUTE: 2.7 K/UL (ref 1–5.1)
LYMPHOCYTES RELATIVE PERCENT: 34.4 %
MAGNESIUM: 2.2 MG/DL (ref 1.8–2.4)
MCH RBC QN AUTO: 30.8 PG (ref 26–34)
MCHC RBC AUTO-ENTMCNC: 34.2 G/DL (ref 31–36)
MCV RBC AUTO: 89.9 FL (ref 80–100)
MONOCYTES ABSOLUTE: 0.7 K/UL (ref 0–1.3)
MONOCYTES RELATIVE PERCENT: 9.1 %
NEUTROPHILS ABSOLUTE: 4.1 K/UL (ref 1.7–7.7)
NEUTROPHILS RELATIVE PERCENT: 52.3 %
PDW BLD-RTO: 13.9 % (ref 12.4–15.4)
PERFORMED ON: ABNORMAL
PERFORMED ON: ABNORMAL
PERFORMED ON: NORMAL
PH VENOUS: 7.38 (ref 7.35–7.45)
PHOSPHORUS: 2.4 MG/DL (ref 2.5–4.9)
PLATELET # BLD: 223 K/UL (ref 135–450)
PMV BLD AUTO: 8.7 FL (ref 5–10.5)
POTASSIUM REFLEX MAGNESIUM: 3 MMOL/L (ref 3.5–5.1)
PROCALCITONIN: 0.1 NG/ML (ref 0–0.15)
PROTHROMBIN TIME: 12.2 SEC (ref 9.9–12.7)
RBC # BLD: 4.16 M/UL (ref 4.2–5.9)
SARS-COV-2: NOT DETECTED
SODIUM BLD-SCNC: 138 MMOL/L (ref 136–145)
STREP PNEUMONIAE ANTIGEN, URINE: NORMAL
TOTAL PROTEIN: 5.9 G/DL (ref 6.4–8.2)
URINE CULTURE, ROUTINE: NORMAL
WBC # BLD: 7.8 K/UL (ref 4–11)

## 2021-09-20 PROCEDURE — 2500000003 HC RX 250 WO HCPCS: Performed by: INTERNAL MEDICINE

## 2021-09-20 PROCEDURE — 82330 ASSAY OF CALCIUM: CPT

## 2021-09-20 PROCEDURE — 83615 LACTATE (LD) (LDH) ENZYME: CPT

## 2021-09-20 PROCEDURE — 6360000002 HC RX W HCPCS: Performed by: INTERNAL MEDICINE

## 2021-09-20 PROCEDURE — 80143 DRUG ASSAY ACETAMINOPHEN: CPT

## 2021-09-20 PROCEDURE — 84100 ASSAY OF PHOSPHORUS: CPT

## 2021-09-20 PROCEDURE — 83605 ASSAY OF LACTIC ACID: CPT

## 2021-09-20 PROCEDURE — 85610 PROTHROMBIN TIME: CPT

## 2021-09-20 PROCEDURE — 6370000000 HC RX 637 (ALT 250 FOR IP): Performed by: INTERNAL MEDICINE

## 2021-09-20 PROCEDURE — 96366 THER/PROPH/DIAG IV INF ADDON: CPT

## 2021-09-20 PROCEDURE — 85379 FIBRIN DEGRADATION QUANT: CPT

## 2021-09-20 PROCEDURE — 87070 CULTURE OTHR SPECIMN AEROBIC: CPT

## 2021-09-20 PROCEDURE — G0378 HOSPITAL OBSERVATION PER HR: HCPCS

## 2021-09-20 PROCEDURE — 86140 C-REACTIVE PROTEIN: CPT

## 2021-09-20 PROCEDURE — 36415 COLL VENOUS BLD VENIPUNCTURE: CPT

## 2021-09-20 PROCEDURE — 99253 IP/OBS CNSLTJ NEW/EST LOW 45: CPT | Performed by: SURGERY

## 2021-09-20 PROCEDURE — 1200000000 HC SEMI PRIVATE

## 2021-09-20 PROCEDURE — 80053 COMPREHEN METABOLIC PANEL: CPT

## 2021-09-20 PROCEDURE — 2580000003 HC RX 258: Performed by: INTERNAL MEDICINE

## 2021-09-20 PROCEDURE — 83735 ASSAY OF MAGNESIUM: CPT

## 2021-09-20 PROCEDURE — 96367 TX/PROPH/DG ADDL SEQ IV INF: CPT

## 2021-09-20 PROCEDURE — 96365 THER/PROPH/DIAG IV INF INIT: CPT

## 2021-09-20 PROCEDURE — 96376 TX/PRO/DX INJ SAME DRUG ADON: CPT

## 2021-09-20 PROCEDURE — 85025 COMPLETE CBC W/AUTO DIFF WBC: CPT

## 2021-09-20 PROCEDURE — 76705 ECHO EXAM OF ABDOMEN: CPT

## 2021-09-20 PROCEDURE — 96372 THER/PROPH/DIAG INJ SC/IM: CPT

## 2021-09-20 PROCEDURE — APPNB30 APP NON BILLABLE TIME 0-30 MINS: Performed by: NURSE PRACTITIONER

## 2021-09-20 PROCEDURE — 84145 PROCALCITONIN (PCT): CPT

## 2021-09-20 RX ORDER — CALCIUM GLUCONATE 20 MG/ML
1000 INJECTION, SOLUTION INTRAVENOUS ONCE
Status: COMPLETED | OUTPATIENT
Start: 2021-09-20 | End: 2021-09-20

## 2021-09-20 RX ORDER — FUROSEMIDE 20 MG/1
20 TABLET ORAL DAILY
Status: DISCONTINUED | OUTPATIENT
Start: 2021-09-20 | End: 2021-09-21 | Stop reason: HOSPADM

## 2021-09-20 RX ORDER — LIDOCAINE 4 G/G
1 PATCH TOPICAL DAILY
Status: DISCONTINUED | OUTPATIENT
Start: 2021-09-20 | End: 2021-09-21 | Stop reason: HOSPADM

## 2021-09-20 RX ADMIN — DULOXETINE HYDROCHLORIDE 60 MG: 60 CAPSULE, DELAYED RELEASE ORAL at 22:01

## 2021-09-20 RX ADMIN — CYCLOBENZAPRINE 10 MG: 10 TABLET, FILM COATED ORAL at 22:02

## 2021-09-20 RX ADMIN — FUROSEMIDE 20 MG: 20 TABLET ORAL at 17:08

## 2021-09-20 RX ADMIN — PANTOPRAZOLE SODIUM 40 MG: 40 TABLET, DELAYED RELEASE ORAL at 17:08

## 2021-09-20 RX ADMIN — ATORVASTATIN CALCIUM 40 MG: 40 TABLET, FILM COATED ORAL at 22:02

## 2021-09-20 RX ADMIN — ACETAMINOPHEN 650 MG: 325 TABLET ORAL at 22:02

## 2021-09-20 RX ADMIN — CEFEPIME HYDROCHLORIDE 2000 MG: 2 INJECTION, POWDER, FOR SOLUTION INTRAVENOUS at 17:12

## 2021-09-20 RX ADMIN — CYCLOBENZAPRINE 10 MG: 10 TABLET, FILM COATED ORAL at 12:22

## 2021-09-20 RX ADMIN — BUSPIRONE HYDROCHLORIDE 10 MG: 5 TABLET ORAL at 12:23

## 2021-09-20 RX ADMIN — CALCIUM GLUCONATE 1000 MG: 20 INJECTION, SOLUTION INTRAVENOUS at 12:29

## 2021-09-20 RX ADMIN — BUSPIRONE HYDROCHLORIDE 10 MG: 5 TABLET ORAL at 22:02

## 2021-09-20 RX ADMIN — ACETAMINOPHEN 650 MG: 325 TABLET ORAL at 12:24

## 2021-09-20 RX ADMIN — METRONIDAZOLE 500 MG: 500 INJECTION, SOLUTION INTRAVENOUS at 22:07

## 2021-09-20 RX ADMIN — METRONIDAZOLE 500 MG: 500 INJECTION, SOLUTION INTRAVENOUS at 05:57

## 2021-09-20 RX ADMIN — METOPROLOL SUCCINATE 50 MG: 50 TABLET, EXTENDED RELEASE ORAL at 12:23

## 2021-09-20 RX ADMIN — TIOTROPIUM BROMIDE INHALATION SPRAY 2 PUFF: 3.12 SPRAY, METERED RESPIRATORY (INHALATION) at 09:55

## 2021-09-20 RX ADMIN — ASPIRIN 81 MG: 81 TABLET, CHEWABLE ORAL at 12:23

## 2021-09-20 RX ADMIN — DULOXETINE HYDROCHLORIDE 60 MG: 60 CAPSULE, DELAYED RELEASE ORAL at 12:23

## 2021-09-20 RX ADMIN — Medication 10 ML: at 22:08

## 2021-09-20 RX ADMIN — Medication 2 PUFF: at 09:55

## 2021-09-20 RX ADMIN — Medication 10 ML: at 12:26

## 2021-09-20 RX ADMIN — SODIUM CHLORIDE: 9 INJECTION, SOLUTION INTRAVENOUS at 12:26

## 2021-09-20 RX ADMIN — CALCIUM GLUCONATE 1000 MG: 20 INJECTION, SOLUTION INTRAVENOUS at 01:13

## 2021-09-20 RX ADMIN — METRONIDAZOLE 500 MG: 500 INJECTION, SOLUTION INTRAVENOUS at 14:02

## 2021-09-20 ASSESSMENT — PAIN SCALES - GENERAL
PAINLEVEL_OUTOF10: 5
PAINLEVEL_OUTOF10: 5
PAINLEVEL_OUTOF10: 7
PAINLEVEL_OUTOF10: 6
PAINLEVEL_OUTOF10: 6

## 2021-09-20 ASSESSMENT — PAIN DESCRIPTION - PAIN TYPE
TYPE: ACUTE PAIN

## 2021-09-20 ASSESSMENT — ENCOUNTER SYMPTOMS
COLOR CHANGE: 0
VOMITING: 1
CHEST TIGHTNESS: 0
NAUSEA: 1
BACK PAIN: 0
ABDOMINAL PAIN: 1
APNEA: 0
EYE ITCHING: 0
EYE DISCHARGE: 0
ABDOMINAL DISTENTION: 0

## 2021-09-20 ASSESSMENT — PAIN DESCRIPTION - LOCATION
LOCATION: BACK

## 2021-09-20 NOTE — CONSULTS
Cesario Farias II     Chief complaint-BLQ abdominal pain    HPI: 31-year-old male with a history of IV drug abuse who was transferred yesterday from [facility] after presenting with a 3-day history of lower abdominal pain. The lower abdominal pain is described as dull and constant with a severity of 7 out of 10. Nothing makes it better or worse. Associated symptoms include multiple episodes of nausea and vomiting as well as fevers. No history of chills or urinary symptoms. Has loose bowel movements as a baseline. He is up-to-date on colon cancer screening. The patient had groundglass opacities on his chest x-ray and is in Covid rule out precautions.     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    Chronic back pain     ED (erectile dysfunction) 12/29/2011    GERD (gastroesophageal reflux disease)     Hypertension     Pleurisy        Past Surgical History:   Procedure Laterality Date    ARM SURGERY Left 12/10/2015    BACK SURGERY      COLONOSCOPY      LUNG SURGERY      partial removal (right) agent orange    SPINE SURGERY  1995    lumbar laminectomy       Social History     Socioeconomic History    Marital status:      Spouse name: Not on file    Number of children: Not on file    Years of education: Not on file    Highest education level: Not on file   Occupational History    Not on file   Tobacco Use    Smoking status: Current Every Day Smoker     Packs/day: 0.50     Years: 33.00     Pack years: 16.50     Types: Cigarettes    Smokeless tobacco: Former User     Quit date: 11/1/2015   Vaping Use    Vaping Use: Former    Substances: Always, THC   Substance and Sexual Activity    Alcohol use: Yes     Comment: 1/2 fifth tequila     Drug use: Yes     Types: Marijuana, Other-see comments, Opiates      Comment: pt states not recently     Sexual activity: Yes   Other Topics Concern    Not on file   Social History Narrative    Not on file     Social Determinants of Health     Financial Resource Strain: High Risk    Difficulty of Paying Living Expenses: Very hard   Food Insecurity: Food Insecurity Present    Worried About Running Out of Food in the Last Year: Often true    Ran Out of Food in the Last Year: Often true   Transportation Needs: Unmet Transportation Needs    Lack of Transportation (Medical): Yes    Lack of Transportation (Non-Medical): Yes   Physical Activity:     Days of Exercise per Week:     Minutes of Exercise per Session:    Stress:     Feeling of Stress :    Social Connections:     Frequency of Communication with Friends and Family:     Frequency of Social Gatherings with Friends and Family:     Attends [employer] Services:     Active Member of Clubs or Organizations:     Attends Club or Organization Meetings:     Marital Status:    Intimate Partner Violence:     Fear of Current or Ex-Partner:     Emotionally Abused:     Physically Abused:     Sexually Abused: Allergies: Allergies   Allergen Reactions    Lexapro [Escitalopram Oxalate] Other (See Comments)     Crazy dream hallucinations      Morphine      Makes him angry    Prozac [Fluoxetine Hcl]      Irritable        Wellbutrin [Bupropion] Other (See Comments)     Crazy dream halluciantions       Prior to Admission medications    Medication Sig Start Date End Date Taking?  Authorizing Provider   cyclobenzaprine (FLEXERIL) 10 MG tablet TAKE ONE TABLET BY MOUTH THREE TIMES A DAY AS NEEDED FOR MUSCLE SPASMS 9/10/21  Yes RAH Schulz CNP   diphenhydrAMINE (BENADRYL) 25 MG tablet Take 1 tablet by mouth every 6 hours as needed for Itching Pt report takes for upset stomach in AM as needed 9/10/21  Yes RAH Schulz CNP   atorvastatin (LIPITOR) 40 MG tablet TAKE ONE TABLET BY MOUTH DAILY 9/7/21  Yes Rocio Bryant MD   metoprolol succinate (TOPROL XL) 50 MG extended release tablet TAKE ONE TABLET BY MOUTH DAILY 9/7/21  Yes Marylen Crown, MD   DULoxetine (CYMBALTA) 60 MG extended release capsule Take 1 capsule by mouth 2 times daily 5/27/21  Yes [facility]VE [facility]   busPIRone (BUSPAR) 10 MG tablet TAKE TWO TABLETS BY MOUTH TWICE A DAY 5/27/21  Yes [facility]   furosemide (LASIX) 20 MG tablet Take 1 tablet by mouth daily 5/27/21  Yes [facility]   albuterol sulfate HFA (VENTOLIN HFA) 108 (90 Base) MCG/ACT inhaler Inhale 2 puffs into the lungs every 6 hours as needed for Wheezing or Shortness of Breath 5/27/21  Yes [facility]   SPIRIVA RESPIMAT 2.5 MCG/ACT AERS inhaler INHALE TWO PUFFS BY MOUTH DAILY 5/26/21  Yes Shanda Luis MD   valsartan (DIOVAN) 80 MG tablet TAKE ONE TABLET BY MOUTH DAILY 4/27/21  Yes Marylen Crown, MD   traZODone (DESYREL) 50 MG tablet TAKE TWO TABLETS BY MOUTH ONCE NIGHTLY 2/17/21  Yes [facility]   aspirin 81 MG tablet Take 81 mg by mouth daily    Yes Historical Provider, MD   nicotine (NICODERM CQ) 21 MG/24HR Place 1 patch onto the skin daily 9/16/21 10/28/21  RAH Hewitt CNP   hydrocortisone (ALA-JACE) 1 % cream Apply topically to face or neck 2 times daily for 1 week 9/16/21 9/23/21  RAH Hewitt CNP   pantoprazole (PROTONIX) 40 MG tablet TAKE ONE TABLET BY MOUTH TWICE A DAY 9/7/21   Marylen Crown, MD   lurasidone (LATUDA) 20 MG TABS tablet Take 1 tablet by mouth daily 8/20/21   Marylen Crown, MD   ondansetron (ZOFRAN) 4 MG tablet Take 1 tablet by mouth every 8 hours as needed for Nausea or Vomiting 5/27/21   [facility]   potassium chloride (KLOR-CON M) 20 MEQ extended release tablet Take 1 tablet by mouth daily 5/27/21   [facility]   sildenafil (REVATIO) 20 MG tablet Take 3 tablets by mouth daily as needed (ED) 5/27/21   [facility]   CHANTIX 1 MG tablet TAKE ONE TABLET BY MOUTH TWICE A DAY 3/30/21   RAH Arredondo - CNP       Active Problems: Dehydration  Resolved Problems:    * No resolved hospital problems. *      Blood pressure 138/78, pulse 90, temperature 98.2 °F (36.8 °C), temperature source Oral, resp. rate 16, height 6' (1.829 m), weight 259 lb (117.5 kg), SpO2 94 %. Review of Systems   Constitutional: Positive for activity change, appetite change and fever. Negative for chills. HENT: Negative for congestion and dental problem. Eyes: Negative for discharge and itching. Respiratory: Negative for apnea and chest tightness. Cardiovascular: Negative for chest pain and leg swelling. Gastrointestinal: Positive for abdominal pain, nausea and vomiting. Negative for abdominal distention. Endocrine: Negative for cold intolerance and heat intolerance. Genitourinary: Negative for difficulty urinating and dysuria. Musculoskeletal: Negative for arthralgias and back pain. Skin: Negative for color change and pallor. Allergic/Immunologic: Negative for environmental allergies and food allergies. Neurological: Negative for dizziness and facial asymmetry. Hematological: Negative for adenopathy. Psychiatric/Behavioral: Negative for agitation and behavioral problems. Physical Exam  Constitutional:       Appearance: He is well-developed. HENT:      Head: Normocephalic and atraumatic. Right Ear: External ear normal.      Left Ear: External ear normal.   Eyes:      Conjunctiva/sclera: Conjunctivae normal.   Cardiovascular:      Rate and Rhythm: Normal rate and regular rhythm. Pulmonary:      Effort: Pulmonary effort is normal. No respiratory distress. Abdominal:      General: There is no distension. Palpations: Abdomen is soft. Comments: Tender LLQ   Musculoskeletal:         General: Normal range of motion. Cervical back: Normal range of motion and neck supple. Skin:     General: Skin is warm and dry. Neurological:      Mental Status: He is alert and oriented to person, place, and time.    Psychiatric: Behavior: Behavior normal.         Assessment:  60-year-old male with a history of IV drug abuse and intestinal malrotation admitted with a 3-day history of lower abdominal pain associated with nausea, vomiting and fevers. Chest x-ray showed ground glass opacities. He is in Covid rule out precautions. Physical examination reveals focal left lower quadrant abdominal tenderness. The patient is not ill-appearing. He was tolerating a general diet yesterday evening without a problem. CAT scan of the abdomen and pelvis was reviewed with the radiologist.  There are no  acute findings. The patient's cecum is in a different location compared with the study from August 2020 (it was located in the right upper quadrant in 2020 and is currently in the left lower quadrant). The appendix is normal.  The colon does not appear torsed or obstructed. The patient also has abnormal liver function tests which are trending downward.     Plan:  No need for further abdominal imaging from my perspective  Diet as tolerated  Liver work-up per GI  We will follow with you      Montez Hicks MD  9/20/2021

## 2021-09-20 NOTE — CARE COORDINATION
Discharge Planning Assessment    RN/SW discharge planner met with patient/ (and family member) to discuss reason for admission, current living situation, and potential needs at the time of discharge    Demographics/Insurance verified Yes    Current type of dwelling:  house    Patient from ECF/SW confirmed with:  N/A    Living arrangements: With mother    Level of function/Support:  independent    PCP:   Dr. Welton Lombard    Last Visit to PCP:  9/16/21    DME:  None    Active with any community resources/agencies/skilled home care: NO    Medication compliance issues: NO uses [address] [place] [address] Chemical issues that could impact healthcare:   NO    Tentative discharge plan:   Home w/ no needs    Discussed with patient and/or family that on the day of discharge home tentative time of discharge will be between 10 AM and noon. Transportation at the time of discharge: Mother     Currently being treated with IV Cefepime & IV Flagyl. Hx IVDA. Admits to recent heroine use. Scheduled for OP Robotic surgery 9/24/21 for inguinal hernia repair. Case management will follow progress and assist with discharge planning as needed.     Valerio Croft RN BSN  Case Management  [phone]

## 2021-09-20 NOTE — ED PROVIDER NOTES
23:00: I discussed the history, physical examination, laboratory and imaging studies, and treatment plan with Dr. Kiki Lua. Iqra Donnelly II was signed out to me in stable condition. Please see Dr. Julito Ward documentation for details of their history, physical, and laboratory studies. Upon re-examination, a summary of Refugio Valentin II's history, physical examination, and studies are as follows: This is a 55-year-old male presenting with abdominal pain and vomiting. He also is withdrawing from multiple substances. He was given pain control here, as well as Ativan for his agitation and symptoms. On evaluation of the patient, he is diaphoretic and appears uncomfortable in the room. He is pale. His abdomen has left-sided tenderness, however overall nonperitoneal and his abdomen is soft and nondistended. His heart has a tachycardic rate and regular rhythm. Lungs show diminishment in posterior lower lobes, no wheezes or rales. His work-up was found to show a bowel malrotation. [facility] called general surgery and spoke with Dr. Prabhjot Cash regarding these findings, he reviewed the case and the scan and felt that the patient was to be managed medically and did not warrant acute surgical intervention. Otherwise the patient describes some testicle pain, his scrotal ultrasound was unremarkable. Otherwise his work-up shows a leukocytosis of 17,000 without obvious source of infection but he was covered with Zosyn. His lactate did clear to 1.3 from 2.5 after fluids and I do not suspect ischemic bowel. Blood cultures were sent. The patient will require admission for his abdominal pain and CT findings, as well as withdrawal symptoms. We do not have any beds here with telemetry at [facility], I was asked to transfer the patient to a different facility. Therefore [facility] a bed available and I spoke with Dr. Masoud Gmable who excepted the patient for admission.   The patient remained mildly tachycardic, had an episode of hypoxia here his venous blood glass did not show hypercapnia and he remained hemodynamically stable and was ultimately transferred in the morning. Diagnosis:  1. Bowel malrotation  2. SIRS  3. Dehydration  4. Opiate withdrawal     Results for orders placed or performed during the hospital encounter of 09/18/21   Culture, Urine    Specimen: Urine, clean catch   Result Value Ref Range    Urine Culture, Routine No growth at 18 to 36 hours    COVID-19 & Influenza Combo    Specimen: Nasopharyngeal Swab   Result Value Ref Range    SARS-CoV-2 RNA, RT PCR NOT DETECTED NOT DETECTED    INFLUENZA A NOT DETECTED NOT DETECTED    INFLUENZA B NOT DETECTED NOT DETECTED   Culture, Blood 1    Specimen: Blood   Result Value Ref Range    Blood Culture, Routine       No Growth to date. Any change in status will be called. Culture, Blood 2    Specimen: Blood   Result Value Ref Range    Culture, Blood 2       No Growth to date. Any change in status will be called.    CBC Auto Differential   Result Value Ref Range    WBC 17.7 (H) 4.0 - 11.0 K/uL    RBC 4.74 4.20 - 5.90 M/uL    Hemoglobin 14.3 13.5 - 17.5 g/dL    Hematocrit 43.3 40.5 - 52.5 %    MCV 91.3 80.0 - 100.0 fL    MCH 30.2 26.0 - 34.0 pg    MCHC 33.1 31.0 - 36.0 g/dL    RDW 13.6 12.4 - 15.4 %    Platelets 616 131 - 630 K/uL    MPV 8.7 5.0 - 10.5 fL    PLATELET SLIDE REVIEW Adequate     SLIDE REVIEW see below     Path Consult Yes     Neutrophils % 77.0 %    Lymphocytes % 14.0 %    Monocytes % 9.0 %    Eosinophils % 0.0 %    Basophils % 0.0 %    Neutrophils Absolute 13.6 (H) 1.7 - 7.7 K/uL    Lymphocytes Absolute 2.5 1.0 - 5.1 K/uL    Monocytes Absolute 1.6 (H) 0.0 - 1.3 K/uL    Eosinophils Absolute 0.0 0.0 - 0.6 K/uL    Basophils Absolute 0.0 0.0 - 0.2 K/uL    Smudge Cells Present (A)     Anisocytosis Occasional (A)     Polychromasia Occasional (A)    Comprehensive Metabolic Panel w/ Reflex to MG   Result Value Ref Range    Sodium 139 136 - 145 mmol/L    Potassium reflex Magnesium 3.7 3.5 - 5.1 mmol/L    Chloride 99 99 - 110 mmol/L    CO2 23 21 - 32 mmol/L    Anion Gap 17 (H) 3 - 16    Glucose 95 70 - 99 mg/dL    BUN 31 (H) 7 - 20 mg/dL    CREATININE 1.2 0.9 - 1.3 mg/dL    GFR Non-African American >60 >60    GFR African American >60 >60    Calcium 9.5 8.3 - 10.6 mg/dL    Total Protein 8.0 6.4 - 8.2 g/dL    Albumin 4.4 3.4 - 5.0 g/dL    Albumin/Globulin Ratio 1.2 1.1 - 2.2    Total Bilirubin 1.2 (H) 0.0 - 1.0 mg/dL    Alkaline Phosphatase 148 (H) 40 - 129 U/L     (H) 10 - 40 U/L     (H) 15 - 37 U/L    Globulin 3.6 g/dL   Troponin   Result Value Ref Range    Troponin <0.01 <0.01 ng/mL   Lipase   Result Value Ref Range    Lipase 9.0 (L) 13.0 - 60.0 U/L   Brain Natriuretic Peptide   Result Value Ref Range    Pro- (H) 0 - 124 pg/mL   Urinalysis, reflex to microscopic   Result Value Ref Range    Color, UA Yellow Straw/Yellow    Clarity, UA Clear Clear    Glucose, Ur Negative Negative mg/dL    Bilirubin Urine SMALL (A) Negative    Ketones, Urine 40 (A) Negative mg/dL    Specific Gravity, UA 1.025 1.005 - 1.030    Blood, Urine LARGE (A) Negative    pH, UA 6.0 5.0 - 8.0    Protein, UA 30 (A) Negative mg/dL    Urobilinogen, Urine 0.2 <2.0 E.U./dL    Nitrite, Urine Negative Negative    Leukocyte Esterase, Urine Negative Negative    Microscopic Examination YES     Urine Type NotGiven    Lactic Acid, Plasma   Result Value Ref Range    Lactic Acid 2.5 (H) 0.4 - 2.0 mmol/L   Ethanol   Result Value Ref Range    Ethanol Lvl None Detected mg/dL   Drug screen multi urine   Result Value Ref Range    Amphetamine Screen, Urine POSITIVE (A) Negative <1000ng/mL    Barbiturate Screen, Ur Neg Negative <200 ng/mL    Benzodiazepine Screen, Urine Neg Negative <200 ng/mL    Cannabinoid Scrn, Ur POSITIVE (A) Negative <50 ng/mL    Cocaine Metabolite Screen, Urine Neg Negative <300 ng/mL    Opiate Scrn, Ur Neg Negative <300 ng/mL    PCP Screen, Urine Neg Negative <25 ng/mL    Methadone Screen, Urine Neg Negative <300 ng/mL    Propoxyphene Scrn, Ur Neg Negative <300 ng/mL    Oxycodone Urine Neg Negative <100 ng/ml    pH, UA 6.0     Drug Screen Comment: see below    Lactate, Sepsis   Result Value Ref Range    Lactic Acid, Sepsis 1.3 0.4 - 1.9 mmol/L   Hepatitis panel, acute   Result Value Ref Range    Hep A IgM Non-reactive Non-reactive    Hep B Core Ab, IgM Non-reactive Non-reactive    Hep B S Ag Interp Non-reactive Non-reactive    Hep C Ab Interp Non-reactive Non-reactive   Microscopic Urinalysis   Result Value Ref Range    Coarse Casts, UA 3-5 (A) 0 - 2 /LPF    Mucus, UA Rare (A) None Seen /LPF    WBC, UA 3-5 0 - 5 /HPF    RBC, UA 3-4 0 - 4 /HPF   Blood gas, venous   Result Value Ref Range    pH, Curry 7.311 (L) 7.350 - 7.450    pCO2, Curry 44.8 40.0 - 50.0 mmHg    pO2, Curry 101.3 (H) 25 - 40 mmHg    HCO3, Venous 22.1 (L) 23.0 - 29.0 mmol/L    Base Excess, Curry -4.1 (L) -3.0 - 3.0 mmol/L    O2 Sat, Curry 97 Not Established %    Carboxyhemoglobin 1.6 (H) 0.0 - 1.5 %    MetHgb, Curry 0.3 <1.5 %    TC02 (Calc), Curry 24 Not Established mmol/L    O2 Therapy Unknown    Blood Smear Review   Result Value Ref Range    Path Consult Reviewed    EKG 12 Lead   Result Value Ref Range    Ventricular Rate 135 BPM    Atrial Rate 135 BPM    P-R Interval 138 ms    QRS Duration 90 ms    Q-T Interval 300 ms    QTc Calculation (Bazett) 450 ms    P Axis 41 degrees    R Axis -31 degrees    T Axis 70 degrees    Diagnosis       Baseline artifactSinus tachycardiaLeft axis deviationAbnormal ECGAS compared to previous EKGHeart rate has increaedLAD now presentConfirmed by Ynes Ewing ([id number]) on 9/19/2021 11:27:34 AM     US SCROTUM AND TESTICLES    Result Date: 9/19/2021  EXAMINATION: ULTRASOUND OF THE SCROTUM/TESTICLES WITH COLOR DOPPLER FLOW EVALUATION; DOPPLER EVALUATION OF THE PELVIS 9/18/2021 COMPARISON: CT scan abdomen pelvis dated September 18, 2021 HISTORY: ORDERING SYSTEM PROVIDED HISTORY: pain and swelling TECHNOLOGIST PROVIDED HISTORY: Reason for exam:->pain and swelling Reason for Exam: abdominal pain - see ct, per order testiclular pain and swelling, pt denies testicular pain and swelling, stating pain is abdominal and pelvic - r/o torsion Acuity: Acute; ORDERING SYSTEM PROVIDED HISTORY: testicular pain and swelling TECHNOLOGIST PROVIDED HISTORY: Reason for exam:->testicular pain and swelling FINDINGS: Measurements: Right testicle: 4.1 x 2.2 x 2.5 cm Left testicle: 3.7 x 2.1 x 2.1 cm Right: Grey scale: The right testicle demonstrates normal homogeneous echotexture without focal lesion. No evidence of testicular microlithiasis. Cremasteric reflex is noted on the right, with the testicle imaged in the right inguinal/groin region. The right testicle was not position within the groin or inguinal region on the CT scan. Doppler Evaluation:  There is normal arterial and venous Doppler flow within the testicle. Scrotal Sac:  No evidence of hydrocele. Epididymis:  No acute abnormality. Left: Grey scale: The left testicle demonstrates normal homogeneous echotexture without focal lesion. No evidence of testicular microlithiasis. Doppler Evaluation:  There is normal arterial and venous Doppler flow within the testicle. Scrotal Sac:  No evidence of hydrocele. Epididymis:  No acute abnormality. Unremarkable testicular ultrasound with normal Doppler flow. CT ABDOMEN PELVIS W IV CONTRAST Additional Contrast? None    Result Date: 9/18/2021  EXAMINATION: CT OF THE ABDOMEN AND PELVIS WITH CONTRAST 9/18/2021 3:57 pm TECHNIQUE: CT of the abdomen and pelvis was performed with the administration of intravenous contrast. Multiplanar reformatted images are provided for review. Dose modulation, iterative reconstruction, and/or weight based adjustment of the mA/kV was utilized to reduce the radiation dose to as low as reasonably achievable.  COMPARISON: CT scan dated August 1, 2020 HISTORY: ORDERING SYSTEM PROVIDED HISTORY: RLQ pain hx hernia TECHNOLOGIST PROVIDED HISTORY: Reason for exam:->RLQ pain hx hernia Additional Contrast?->None Decision Support Exception - unselect if not a suspected or confirmed emergency medical condition->Emergency Medical Condition (MA) Reason for Exam: abdo pain Acuity: Acute Type of Exam: Ongoing Additional signs and symptoms: RLQ abdo pain/groin pain, hx of hernia FINDINGS: Lower Chest: The lung bases are clear. Organs: Diffuse hepatic steatosis is present. A 1.8 cm cyst is again demonstrated within the medial segment of the left lobe of the liver. Focal fatty infiltration is present involving the medial segment adjacent to the fissure for the ligamentum teres. The gallbladder, spleen, and adrenal glands demonstrate no acute abnormality. A 7 mm focal fatty lesion is present within the left adrenal gland, consistent with a small lipoma or myelolipoma. Nodular thickening of the adrenal glands is present bilaterally consistent with adenomatous changes. Pancreas is partially fatty replaced. GI/Bowel: Stomach appears grossly normal.  The duodenum never crosses the midline, with the majority of small bowel within the right abdomen, or slightly left of midline. The large bowel is now displaced into the left abdomen. Cecum is present within the left upper abdomen. The appendix is normal.  SMA is again noted to be positioned posterior and slightly to the left of the SM V, similar compared to the prior study. Pelvis: Urinary bladder appears normal.  Prostate gland is normal. Peritoneum/Retroperitoneum: No free fluid or free air is present within or pelvis. No aneurysm formation is present. No pathological adenopathy is present. Bones/Soft Tissues: No acute osseous abnormality is present. 1. Evidence of bowel malrotation again demonstrated. The duodenal C-loop never crosses the midline, with the majority of the small bowel within the right side of the abdomen, and large bowel within the left side of the abdomen.  2. Cecum is now positioned within the left upper abdomen, which is new since the prior study of August 2020, and may be related to a mobile cecum, or internal hernia without evidence of bowel obstruction. 3. Diffuse hepatic steatosis     US GALLBLADDER RUQ    Result Date: 9/20/2021  EXAMINATION: RIGHT UPPER QUADRANT ULTRASOUND 9/20/2021 1:00 pm COMPARISON: CT abdomen and pelvis, 09/18/2021 HISTORY: ORDERING SYSTEM PROVIDED HISTORY: abnormal LFTs TECHNOLOGIST PROVIDED HISTORY: Reason for exam:->abnormal LFTs Reason for Exam: f/u ct Acuity: Unknown Type of Exam: Subsequent/Follow-up FINDINGS: LIVER:  The liver is nearly diffusely hyperechoic. A hypoechoic focus in the medial segment of the left lobe measures 1.9 cm x 1.7 cm, correlating with hypodensity on CT, consistent with focal fatty sparing. There is no intrahepatic biliary ductal dilation. BILIARY SYSTEM:  No gallbladder stones are seen. Gallbladder wall is 4 mm. There is no pericholecystic fluid. No focal tenderness is noted over the gallbladder. Common bile duct is within normal limits measuring 5 mm. RIGHT KIDNEY: The right kidney is grossly unremarkable without evidence of hydronephrosis. It measures 13.6 x 6.2 x 7.3 cm, with cortical thickness of 17 mm. PANCREAS:  Visualized portions of the pancreas are unremarkable. OTHER: No evidence of right upper quadrant ascites. No cholelithiasis or significant dilation of the common duct. Hepatic steatosis. XR CHEST PORTABLE    Result Date: 9/19/2021  EXAMINATION: ONE XRAY VIEW OF THE CHEST 9/19/2021 1:34 pm COMPARISON: 12/24/2020 radiograph HISTORY: ORDERING SYSTEM PROVIDED HISTORY: pneumonia TECHNOLOGIST PROVIDED HISTORY: Reason for exam:->pneumonia Reason for Exam: pneumonia Acuity: Acute Type of Exam: Initial FINDINGS: The heart, mediastinum and pulmonary vascularity are normal.  The lungs are well expanded. Asymmetric ground-glass opacification in the right upper and right lower lung zone.   No significant skeletal finding. Ground-glass airspace opacities in the right upper and lower lung zone suspected to represent developing pneumonitis. US DUP ABD PEL RETRO SCROT COMPLETE    Result Date: 9/19/2021  EXAMINATION: ULTRASOUND OF THE SCROTUM/TESTICLES WITH COLOR DOPPLER FLOW EVALUATION; DOPPLER EVALUATION OF THE PELVIS 9/18/2021 COMPARISON: CT scan abdomen pelvis dated September 18, 2021 HISTORY: ORDERING SYSTEM PROVIDED HISTORY: pain and swelling TECHNOLOGIST PROVIDED HISTORY: Reason for exam:->pain and swelling Reason for Exam: abdominal pain - see ct, per order testiclular pain and swelling, pt denies testicular pain and swelling, stating pain is abdominal and pelvic - r/o torsion Acuity: Acute; ORDERING SYSTEM PROVIDED HISTORY: testicular pain and swelling TECHNOLOGIST PROVIDED HISTORY: Reason for exam:->testicular pain and swelling FINDINGS: Measurements: Right testicle: 4.1 x 2.2 x 2.5 cm Left testicle: 3.7 x 2.1 x 2.1 cm Right: Grey scale: The right testicle demonstrates normal homogeneous echotexture without focal lesion. No evidence of testicular microlithiasis. Cremasteric reflex is noted on the right, with the testicle imaged in the right inguinal/groin region. The right testicle was not position within the groin or inguinal region on the CT scan. Doppler Evaluation:  There is normal arterial and venous Doppler flow within the testicle. Scrotal Sac:  No evidence of hydrocele. Epididymis:  No acute abnormality. Left: Grey scale: The left testicle demonstrates normal homogeneous echotexture without focal lesion. No evidence of testicular microlithiasis. Doppler Evaluation:  There is normal arterial and venous Doppler flow within the testicle. Scrotal Sac:  No evidence of hydrocele. Epididymis:  No acute abnormality. Unremarkable testicular ultrasound with normal Doppler flow.         Elisabet, [place]  09/20/21 1807

## 2021-09-20 NOTE — PLAN OF CARE
Problem: Falls - Risk of:  Goal: Will remain free from falls  Description: Will remain free from falls  Outcome: Ongoing  Goal: Absence of physical injury  Description: Absence of physical injury  Outcome: Ongoing     Problem: Cardiac:  Goal: Ability to maintain vital signs within normal range will improve  Description: Ability to maintain vital signs within normal range will improve  Outcome: Ongoing     Problem: Serum Glucose Level - Abnormal:  Goal: Ability to maintain appropriate glucose levels has stabilized  Description: Ability to maintain appropriate glucose levels has stabilized  Outcome: Ongoing     Problem: Activity:  Goal: Risk for activity intolerance will decrease  Description: Risk for activity intolerance will decrease  Outcome: Ongoing     Problem:  Bowel/Gastric:  Goal: Bowel function will improve  Description: Bowel function will improve  Outcome: Ongoing  Goal: Occurrences of nausea will decrease  Description: Occurrences of nausea will decrease  Outcome: Ongoing     Problem: Physical Regulation:  Goal: Ability to maintain clinical measurements within normal limits will improve  Description: Ability to maintain clinical measurements within normal limits will improve  Outcome: Ongoing     Problem: Sensory:  Goal: Pain level will decrease  Description: Pain level will decrease  Outcome: Ongoing

## 2021-09-20 NOTE — PLAN OF CARE
Problem: Falls - Risk of:  Goal: Will remain free from falls  Description: Will remain free from falls  9/20/2021 0251 by Gisele Harley RN  Outcome: Ongoing  9/19/2021 2011 by Annmarie Draper RN  Outcome: Ongoing  Goal: Absence of physical injury  Description: Absence of physical injury  9/20/2021 0251 by Gisele Harley RN  Outcome: Ongoing  9/19/2021 2011 by Annmarie Draepr RN  Outcome: Ongoing     Problem: Cardiac:  Goal: Ability to maintain vital signs within normal range will improve  Description: Ability to maintain vital signs within normal range will improve  9/20/2021 0251 by Gisele Harley RN  Outcome: Ongoing  9/19/2021 2011 by Annmarie Draper RN  Outcome: Ongoing     Problem: Serum Glucose Level - Abnormal:  Goal: Ability to maintain appropriate glucose levels has stabilized  Description: Ability to maintain appropriate glucose levels has stabilized  9/20/2021 0251 by Gisele Harley RN  Outcome: Ongoing  9/19/2021 2011 by Annmarie Draper RN  Outcome: Ongoing     Problem: Activity:  Goal: Risk for activity intolerance will decrease  Description: Risk for activity intolerance will decrease  9/20/2021 0251 by Gisele Harley RN  Outcome: Ongoing  9/19/2021 2011 by Annmarie Draper RN  Outcome: Ongoing     Problem:  Bowel/Gastric:  Goal: Bowel function will improve  Description: Bowel function will improve  9/20/2021 0251 by Gisele Harley RN  Outcome: Ongoing  9/19/2021 2011 by Annmarie Draper RN  Outcome: Ongoing  Goal: Occurrences of nausea will decrease  Description: Occurrences of nausea will decrease  9/20/2021 0251 by Gisele Harley RN  Outcome: Ongoing  9/19/2021 2011 by Annmarie Draper RN  Outcome: Ongoing     Problem: Physical Regulation:  Goal: Ability to maintain clinical measurements within normal limits will improve  Description: Ability to maintain clinical measurements within normal limits will improve  9/20/2021 0251 by Gisele Harley RN  Outcome: Ongoing  9/19/2021 2011 by Friday leilani Henri Schmidt RN  Outcome: Ongoing     Problem: Sensory:  Goal: Pain level will decrease  Description: Pain level will decrease  9/20/2021 0251 by Twila Oswald RN  Outcome: Ongoing  9/19/2021 2011 by Marlow Phoenix, RN  Outcome: Ongoing     Problem: Pain:  Description: Pain management should include both nonpharmacologic and pharmacologic interventions.   Goal: Pain level will decrease  Description: Pain level will decrease  9/20/2021 0251 by Twila Oswald RN  Outcome: Ongoing  9/19/2021 2011 by Marlow Phoenix, RN  Outcome: Ongoing  Goal: Control of acute pain  Description: Control of acute pain  Outcome: Ongoing  Goal: Control of chronic pain  Description: Control of chronic pain  Outcome: Ongoing

## 2021-09-20 NOTE — PROGRESS NOTES
Hospitalist Progress Note      PCP: Andrew Becerra MD    Date of Admission: 9/19/2021    Chief Complaint: Pain abdomen, nausea, malaise    Hospital Course: Al Li is a 49 y. o.  male with history of ADHD, IV drug abuse, Ha's esophagus, hypertension, chronic back pain, COPD, coronary artery disease, history of pancreatitis, anxiety, depression who presented to [facility] ED with complaints of bilateral lower quadrant pain abdomen for 3 to 4 days. CT imaging at [facility] showed bowel malrotation and hepatic steatosis. US scrotum wnl. Labs showed abnormal LFTs and mild leucocytosis. General surgery and GI services consulted. No indication for further imaging or intervention per general surgery. Advancing diet. Alcoholic hepatitis with LFTs trending down. Discharge tomorrow if continues to improve. Subjective: Patient seen and examined at bedside. No acute events reported overnight. Reports being n.p.o. for imaging. Ongoing pain in bilateral groins.     Medications:  Reviewed    Infusion Medications    sodium chloride      sodium chloride 100 mL/hr at 09/19/21 2056     Scheduled Medications    calcium gluconate-NaCl  1,000 mg IntraVENous Once    aspirin  81 mg Oral Daily    atorvastatin  40 mg Oral Nightly    busPIRone  10 mg Oral BID    DULoxetine  60 mg Oral BID    metoprolol succinate  50 mg Oral Daily    nicotine  1 patch TransDERmal Daily    pantoprazole  40 mg Oral BID AC    tiotropium  2 puff Inhalation Daily    sodium chloride flush  5-40 mL IntraVENous 2 times per day    enoxaparin  40 mg SubCUTAneous Daily    lurasidone  20 mg Oral Daily    cefepime  2,000 mg IntraVENous Q8H    metroNIDAZOLE  500 mg IntraVENous Q8H     PRN Meds: LORazepam, albuterol sulfate HFA, cyclobenzaprine, sodium chloride flush, sodium chloride, ondansetron **OR** ondansetron, polyethylene glycol, acetaminophen **OR** acetaminophen, potassium chloride **OR** potassium alternative oral replacement **OR** potassium chloride, magnesium sulfate    No intake or output data in the 24 hours ending 09/20/21 0851    Physical Exam Performed:    /78   Pulse 90   Temp 98.2 °F (36.8 °C) (Oral)   Resp 16   Ht 6' (1.829 m)   Wt 259 lb (117.5 kg)   SpO2 94%   BMI 35.13 kg/m²     General appearance:  Obese  male, Appears comfortable. Cardiovascular: Regular rhythm, normal S1, S2. No murmur, gallop, rub. No edema in lower extremities  Respiratory: Clear to auscultation bilaterally, no wheeze, good inspiratory effort  Gastrointestinal: Abdomen soft, mild lower quadrant tenderness, not distended, normal bowel sounds  Musculoskeletal: No cyanosis in digits, neck supple  Neurology: Cranial nerves grossly intact. Alert and oriented in time, place and person. No speech or motor deficits  Psychiatry: Appropriate affect. Not agitated  Skin: Warm, dry, normal turgor,mild redness and scratches on back, lower abd and arms  Brisk capillary refill, peripheral pulses palpable       Labs:   Recent Labs     09/18/21 2109 09/19/21  1403 09/20/21  0609   WBC 17.7* 12.5* 7.8   HGB 14.3 12.3* 12.8*   HCT 43.3 36.5* 37.4*    253 223     Recent Labs     09/18/21 2109 09/19/21  1403 09/20/21  0609    139 138   K 3.7 3.4* 3.0*   CL 99 103 104   CO2 23 20* 25   BUN 31* 26* 17   CREATININE 1.2 0.8* 0.8*   CALCIUM 9.5 8.7 8.5   PHOS  --  1.8* 2.4*     Recent Labs     09/18/21 2109 09/19/21  1403 09/20/21  0609   * 737* 395*   * 182* 157*   BILITOT 1.2* 1.0 0.6   ALKPHOS 148* 111 101     No results for input(s): INR in the last 72 hours.   Recent Labs     09/18/21 2109   TROPONINI <0.01       Urinalysis:      Lab Results   Component Value Date    NITRU Negative 09/18/2021    WBCUA 3-5 09/18/2021    BACTERIA 1+ 09/24/2019    RBCUA 3-4 09/18/2021    BLOODU LARGE 09/18/2021    SPECGRAV 1.025 09/18/2021    GLUCOSEU Negative 09/18/2021       Radiology:  XR CHEST PORTABLE   Final Result Ground-glass airspace opacities in the right upper and lower lung zone   suspected to represent developing pneumonitis. US GALLBLADDER RUQ    (Results Pending)       Assessment/Plan:    Active Hospital Problems    Diagnosis     Dehydration [E86.0]      Sepsis on presentation to [facility]  Source likely abdominal vs suspected bacterial pna  WBC count down, fevers and tachycardia resolved  COVID-19 PCR negative x2. Sputum c/s, blood c/s negative to date  Pro-calcitonin and inflammatory markers in am  Lactic acidosis resolved with iv fluids  IV Cefepime and Flagyl - day 2  Can stop antibiotics if cultures come back negative.     Abnormal LFTs  AST >ALT x5 suggesting alcoholic hepatitis  Hep panel at [facility] negative  sr alcohol level is negative  CT abd showed hepatic steatosis  GI consulted. LFTs trending down  US RUQ w without cholelithiasis or bile duct dilatation     Pain left lower quadrant  CT abdomen/pelvis showing bowel malrotation and cecum in left upper quadrant  Ongoing mild pain in left lower quadrant  Patient was evaluated by general surgery. No indication for further imaging or surgical intervention  Advance diet as tolerated     Metabolic acidosis  Secondary to lactic acidosis  Resolved with iv fluids    Acute encephalopathy, likely metabolic -resolved  Reported at [facility]. Likely due to alcohol and drug use vs infection  Urine drug screen positive for amphetamine and marijuana. Neg for opiates  Ativan prn for withdrawal symptoms. Currently no symptoms of withdrawal     Chronic back pain   No improvement with Tylenol  Lidocaine patch ordered  Avoiding narcotics given his recreational drug use history    Other comorbidities  ADHD  CAD  COPD  Anxiety  Depression     DVT prophylaxis lovenox sq d  Diet: ADULT DIET; Regular; 4 carb choices (60 gm/meal); No Added Salt (3-4 gm)  Code Status: Full Code    PT/OT Eval Status: Independent    Dispo -if LFTs continue to trend down and patient able to tolerate p.o. intake, can discharge tomorrow    Rae Fraga MD

## 2021-09-20 NOTE — CONSULTS
Gastroenterology Consult Note        Patient: Darylene Lopes  : 1970  Acct#:      Date:  2021    Subjective:       History of Present Illness  Patient is a 49 y.o.  male admitted with Dehydration [E86.0] who is seen in consult for elevated liver enzymes. Patient with history of congenital malrotation of the intestine, anxiety, depression, drug abuse, Ha's esophagus, COPD, CAD. Patient presented to the ER at [facility] for 3 to 4-day history of lower abdominal pain radiating to the groin. Pain was sharp and intermittent. He had associated nausea and vomiting. Also with fevers and shortness of breath. No change in bowel habits. Has diarrhea daily. No melena or hematochezia. In the ER was noted to have elevated liver enzymes. CT with new position of the cecum within the left upper quadrant. Patient reports his abdominal pain has improved today. Last use heroin a week ago. No Oral ulcerations. Drinks alcohol maybe once every 1 to 3 weeks. Past Medical History:   Diagnosis Date    ADHD (attention deficit hyperactivity disorder)     Arthritis     Ha esophagus 2018    Chronic back pain     ED (erectile dysfunction) 2011    GERD (gastroesophageal reflux disease)     Hypertension     Pleurisy       Past Surgical History:   Procedure Laterality Date    ARM SURGERY Left 12/10/2015    BACK SURGERY      COLONOSCOPY      LUNG SURGERY      partial removal (right) agent orange    SPINE SURGERY      lumbar laminectomy      Past Endoscopic History  Patient reports colonoscopy with [facility] recently. I am unable to find this in the chart. EGD and colonoscopy with Dr Cory Mott   Indications: This is a 53y.o. year old male who presents today with nausea, vomiting, rlq pain.   Procedure Details  Informed consent was obtained for the procedure, including sedation.   Risks of perforation, hemorrhage, adverse drug reaction and aspiration were discussed. The patient was placed in the left lateral decubitus position. Based on the pre-procedure assessment, including review of the patient's medical history, medications, allergies, and review of systems, he had been deemed to be an appropriate candidate for conscious sedation; he was therefore sedated with the medications listed below. The patient was monitored continuously with ECG tracing, pulse oximetry, blood pressure monitoring, and direct observations.      A gastroscope was inserted into the mouth and advanced under direct vision to second portion of the duodenum. A careful inspection was made as the gastroscope was withdrawn, including a retroflexed view of the proximal stomach including views of the incisura and cardia; findings and interventions are described below.     Rectal examination was performed. There were no external hemorrhoids, fissures or skin tags. The colonoscope was inserted into the rectum and advanced under direct vision to the terminal ileum. The right colon was examined twice as this increases polyp detection especially if other right colon polyps, older age, male, or rodriguez syndrome. When segments could not be distended with CO2 or air, it was filled/distended with water. The quality of the colonic preparation was good. A careful inspection was made as the colonoscope was withdrawn, including a retroflexed view of the rectum; findings and interventions are described below. Appropriate photodocumentation Was Obtained.  If photos taken, they were ordered to be scanned into the medical record.     Findings:   -salmon colored mucosa projecting in 2 tongues 1-2cm above the GE junction with islands of squamous mucosa noted within the ectopic mucosa, biopsied  -polyp(s) - #1, 8 mm in size, located in the sigmoid, removed by snare cautery and retrieved for pathology  -the terminal ileum was normal    Admission Meds  No current facility-administered medications on file prior to encounter.      Current Outpatient Medications on File Prior to Encounter   Medication Sig Dispense Refill    cyclobenzaprine (FLEXERIL) 10 MG tablet TAKE ONE TABLET BY MOUTH THREE TIMES A DAY AS NEEDED FOR MUSCLE SPASMS 90 tablet 1    diphenhydrAMINE (BENADRYL) 25 MG tablet Take 1 tablet by mouth every 6 hours as needed for Itching Pt report takes for upset stomach in AM as needed 30 tablet 1    atorvastatin (LIPITOR) 40 MG tablet TAKE ONE TABLET BY MOUTH DAILY 90 tablet 0    metoprolol succinate (TOPROL XL) 50 MG extended release tablet TAKE ONE TABLET BY MOUTH DAILY 90 tablet 0    DULoxetine (CYMBALTA) 60 MG extended release capsule Take 1 capsule by mouth 2 times daily 60 capsule 4    busPIRone (BUSPAR) 10 MG tablet TAKE TWO TABLETS BY MOUTH TWICE A  tablet 5    furosemide (LASIX) 20 MG tablet Take 1 tablet by mouth daily 90 tablet 1    albuterol sulfate HFA (VENTOLIN HFA) 108 (90 Base) MCG/ACT inhaler Inhale 2 puffs into the lungs every 6 hours as needed for Wheezing or Shortness of Breath 1 Inhaler 6    SPIRIVA RESPIMAT 2.5 MCG/ACT AERS inhaler INHALE TWO PUFFS BY MOUTH DAILY 1 Inhaler 5    valsartan (DIOVAN) 80 MG tablet TAKE ONE TABLET BY MOUTH DAILY 90 tablet 1    traZODone (DESYREL) 50 MG tablet TAKE TWO TABLETS BY MOUTH ONCE NIGHTLY 180 tablet 2    aspirin 81 MG tablet Take 81 mg by mouth daily       nicotine (NICODERM CQ) 21 MG/24HR Place 1 patch onto the skin daily 42 patch 0    hydrocortisone (ALA-JACE) 1 % cream Apply topically to face or neck 2 times daily for 1 week 30 g 1    pantoprazole (PROTONIX) 40 MG tablet TAKE ONE TABLET BY MOUTH TWICE A  tablet 0    lurasidone (LATUDA) 20 MG TABS tablet Take 1 tablet by mouth daily 30 tablet 0    ondansetron (ZOFRAN) 4 MG tablet Take 1 tablet by mouth every 8 hours as needed for Nausea or Vomiting 40 tablet 2    potassium chloride (KLOR-CON M) 20 MEQ extended release tablet Take 1 tablet by mouth daily 30 tablet 0    sildenafil (REVATIO) 20 MG tablet Take 3 tablets by mouth daily as needed (ED) 30 tablet 5    CHANTIX 1 MG tablet TAKE ONE TABLET BY MOUTH TWICE A DAY 56 tablet 4            Allergies  Allergies   Allergen Reactions    Lexapro [Escitalopram Oxalate] Other (See Comments)     Crazy dream hallucinations      Morphine      Makes him angry    Prozac [Fluoxetine Hcl]      Irritable        Wellbutrin [Bupropion] Other (See Comments)     Crazy dream halluciantions      Social   Social History     Tobacco Use    Smoking status: Current Every Day Smoker     Packs/day: 0.50     Years: 33.00     Pack years: 16.50     Types: Cigarettes    Smokeless tobacco: Former User     Quit date: 11/1/2015   Substance Use Topics    Alcohol use: Yes     Comment: 1/2 [de-identified] tequila         Family History   Problem Relation Age of Onset    Arthritis Mother     Other Mother         pneumothorax several times    Asthma Father          Review of Systems  Constitutional: +  fevers,  sweats    Ears, nose, mouth, throat, and face: negative for nasal congestion and sore throat   Respiratory: negative for cough an+ d shortness of breath   Cardiovascular: negative for chest pain and dyspnea   Gastrointestinal: see hpi   Genitourinary:negative for dysuria and frequency   Integument/breast: negative for pruritus and rash   Hematologic/lymphatic: negative for bleeding and easy bruising   Musculoskeletal:negative for arthralgias and myalgias   Neurological: negative for dizziness and weakness         Physical Exam  Blood pressure 138/78, pulse 90, temperature 98.2 °F (36.8 °C), temperature source Oral, resp. rate 16, height 6' (1.829 m), weight 259 lb (117.5 kg), SpO2 94 %.     General appearance: alert, cooperative, no distress, appears stated age  Eyes: Anicteric  Head: Normocephalic, without obvious abnormality  Lungs: clear to auscultation bilaterally, Normal Effort  Heart: regular rate and rhythm, normal S1 and S2, no murmurs or rubs  Abdomen: soft, lower abdominal tenderness. Bowel sounds normal. No masses,  no organomegaly. Extremities: atraumatic, no cyanosis or edema  Skin: warm and dry, no jaundice  Neuro: Grossly intact, A&OX3  Musculoskeletal: 5/5  strength BUE      Data Review:    Recent Labs     09/18/21 2109 09/19/21  1403 09/20/21  0609   WBC 17.7* 12.5* 7.8   HGB 14.3 12.3* 12.8*   HCT 43.3 36.5* 37.4*   MCV 91.3 90.9 89.9    253 223     Recent Labs     09/18/21 2109 09/19/21  1403 09/20/21  0609    139 138   K 3.7 3.4* 3.0*   CL 99 103 104   CO2 23 20* 25   PHOS  --  1.8* 2.4*   BUN 31* 26* 17   CREATININE 1.2 0.8* 0.8*     Recent Labs     09/18/21 2109 09/19/21  1403 09/20/21  0609   * 737* 395*   * 182* 157*   BILITOT 1.2* 1.0 0.6   ALKPHOS 148* 111 101     Recent Labs     09/18/21 2109 09/19/21  1403   LIPASE 9.0* 7.0*   AMYLASE  --  63     No results for input(s): PROTIME, INR in the last 72 hours. No results for input(s): PTT in the last 72 hours. No results for input(s): OCCULTBLD in the last 72 hours. Imaging Studies:                               CT-scan of abdomen and pelvis w iv contrast 9/18/21:  Impression   1. Evidence of bowel malrotation again demonstrated.  The duodenal C-loop   never crosses the midline, with the majority of the small bowel within the   right side of the abdomen, and large bowel within the left side of the   abdomen. 2. Cecum is now positioned within the left upper abdomen, which is new since   the prior study of August 2020, and may be related to a mobile cecum, or   internal hernia without evidence of bowel obstruction. 3. Diffuse hepatic steatosis                        Assessment:     Active Problems:    Dehydration  Resolved Problems:    * No resolved hospital problems. *    Elevated liver enzymes -at presentation, AST was 969, , alk phos 148, total bili 1.2. Alk phos and bilirubin have normalized.   Transaminases have significantly improved. Patient denies syncopal episode. No hypotension here. Does use heroin but acute hepatitis panel was negative for hepatitis A, B, C.  CT with fatty liver. Gallbladder ultrasound ordered. Patient had fever at home and has possible pneumonia on imaging. Liver enzymes could be elevated to infectious/viral cause. Covid was negative at outside hospital but repeat pending. Lower abdominal pain -x3 to 4 days prior to admission. Pain improved today. Patient has history of congenital malrotation of the intestine with new finding of cecum in the left upper quadrant but without obstruction. Recommendations:   -Monitor liver enzymes  -Follow-up Covid test  - f/u RUQ US    Discussed with Dr. Nae Hayes PA-C  [facility]    I have personally performed a face to face diagnostic evaluation on this patient. I have interviewed and examined the patient and I agree with the findings and recommended plan of care. In summary, my findings and plan are the following:  Elevated transaminases but area improving rapidly. Lower ab pain being evaluated by surgery. Ab TTP in LLQ. Nondistended. Re LFT most likely related to underlying infectious process (pneumonia, viral infection)   Hep abc neg. Will repeat COVID test.    Obtain RUQUS. Cont supportive care.         Salty Barber MD  [address] [facility] [address]

## 2021-09-21 ENCOUNTER — TELEPHONE (OUTPATIENT)
Dept: FAMILY MEDICINE CLINIC | Age: 51
End: 2021-09-21

## 2021-09-21 ENCOUNTER — TELEPHONE (OUTPATIENT)
Dept: SURGERY | Age: 51
End: 2021-09-21

## 2021-09-21 VITALS
SYSTOLIC BLOOD PRESSURE: 139 MMHG | DIASTOLIC BLOOD PRESSURE: 91 MMHG | RESPIRATION RATE: 16 BRPM | WEIGHT: 259 LBS | OXYGEN SATURATION: 97 % | HEART RATE: 103 BPM | HEIGHT: 72 IN | TEMPERATURE: 97.7 F | BODY MASS INDEX: 35.08 KG/M2

## 2021-09-21 LAB
A/G RATIO: 1.1 (ref 1.1–2.2)
ALBUMIN SERPL-MCNC: 3 G/DL (ref 3.4–5)
ALP BLD-CCNC: 93 U/L (ref 40–129)
ALT SERPL-CCNC: 118 U/L (ref 10–40)
ANION GAP SERPL CALCULATED.3IONS-SCNC: 9 MMOL/L (ref 3–16)
AST SERPL-CCNC: 182 U/L (ref 15–37)
BILIRUB SERPL-MCNC: 0.4 MG/DL (ref 0–1)
BUN BLDV-MCNC: 11 MG/DL (ref 7–20)
CALCIUM IONIZED: 1.04 MMOL/L (ref 1.12–1.32)
CALCIUM SERPL-MCNC: 8.1 MG/DL (ref 8.3–10.6)
CHLORIDE BLD-SCNC: 109 MMOL/L (ref 99–110)
CO2: 26 MMOL/L (ref 21–32)
CREAT SERPL-MCNC: 0.7 MG/DL (ref 0.9–1.3)
GFR AFRICAN AMERICAN: >60
GFR NON-AFRICAN AMERICAN: >60
GLOBULIN: 2.7 G/DL
GLUCOSE BLD-MCNC: 106 MG/DL (ref 70–99)
GLUCOSE BLD-MCNC: 199 MG/DL (ref 70–99)
MAGNESIUM: 1.7 MG/DL (ref 1.8–2.4)
PERFORMED ON: ABNORMAL
PH VENOUS: 7.55 (ref 7.35–7.45)
PHOSPHORUS: 1.7 MG/DL (ref 2.5–4.9)
POTASSIUM REFLEX MAGNESIUM: 3 MMOL/L (ref 3.5–5.1)
PROCALCITONIN: 0.09 NG/ML (ref 0–0.15)
SODIUM BLD-SCNC: 144 MMOL/L (ref 136–145)
TOTAL PROTEIN: 5.7 G/DL (ref 6.4–8.2)

## 2021-09-21 PROCEDURE — 84100 ASSAY OF PHOSPHORUS: CPT

## 2021-09-21 PROCEDURE — 83735 ASSAY OF MAGNESIUM: CPT

## 2021-09-21 PROCEDURE — 96372 THER/PROPH/DIAG INJ SC/IM: CPT

## 2021-09-21 PROCEDURE — 6360000002 HC RX W HCPCS: Performed by: INTERNAL MEDICINE

## 2021-09-21 PROCEDURE — 94640 AIRWAY INHALATION TREATMENT: CPT

## 2021-09-21 PROCEDURE — 2580000003 HC RX 258: Performed by: INTERNAL MEDICINE

## 2021-09-21 PROCEDURE — G0378 HOSPITAL OBSERVATION PER HR: HCPCS

## 2021-09-21 PROCEDURE — APPSS15 APP SPLIT SHARED TIME 0-15 MINUTES: Performed by: NURSE PRACTITIONER

## 2021-09-21 PROCEDURE — 80053 COMPREHEN METABOLIC PANEL: CPT

## 2021-09-21 PROCEDURE — 94761 N-INVAS EAR/PLS OXIMETRY MLT: CPT

## 2021-09-21 PROCEDURE — 96367 TX/PROPH/DG ADDL SEQ IV INF: CPT

## 2021-09-21 PROCEDURE — 6370000000 HC RX 637 (ALT 250 FOR IP): Performed by: INTERNAL MEDICINE

## 2021-09-21 PROCEDURE — 96366 THER/PROPH/DIAG IV INF ADDON: CPT

## 2021-09-21 PROCEDURE — 82330 ASSAY OF CALCIUM: CPT

## 2021-09-21 PROCEDURE — 2500000003 HC RX 250 WO HCPCS: Performed by: INTERNAL MEDICINE

## 2021-09-21 PROCEDURE — APPNB30 APP NON BILLABLE TIME 0-30 MINS: Performed by: NURSE PRACTITIONER

## 2021-09-21 PROCEDURE — 96376 TX/PRO/DX INJ SAME DRUG ADON: CPT

## 2021-09-21 PROCEDURE — 99233 SBSQ HOSP IP/OBS HIGH 50: CPT | Performed by: SURGERY

## 2021-09-21 PROCEDURE — 84145 PROCALCITONIN (PCT): CPT

## 2021-09-21 RX ORDER — AMOXICILLIN AND CLAVULANATE POTASSIUM 500; 125 MG/1; MG/1
1 TABLET, FILM COATED ORAL 3 TIMES DAILY
Qty: 15 TABLET | Refills: 0 | Status: SHIPPED | OUTPATIENT
Start: 2021-09-21 | End: 2021-09-26

## 2021-09-21 RX ORDER — MAGNESIUM SULFATE 1 G/100ML
1000 INJECTION INTRAVENOUS ONCE
Status: COMPLETED | OUTPATIENT
Start: 2021-09-21 | End: 2021-09-21

## 2021-09-21 RX ORDER — POTASSIUM CHLORIDE 20 MEQ/1
40 TABLET, EXTENDED RELEASE ORAL ONCE
Status: COMPLETED | OUTPATIENT
Start: 2021-09-21 | End: 2021-09-21

## 2021-09-21 RX ADMIN — ENOXAPARIN SODIUM 40 MG: 40 INJECTION SUBCUTANEOUS at 11:03

## 2021-09-21 RX ADMIN — MAGNESIUM SULFATE HEPTAHYDRATE 1000 MG: 1 INJECTION, SOLUTION INTRAVENOUS at 12:21

## 2021-09-21 RX ADMIN — METRONIDAZOLE 500 MG: 500 INJECTION, SOLUTION INTRAVENOUS at 05:22

## 2021-09-21 RX ADMIN — CEFEPIME HYDROCHLORIDE 2000 MG: 2 INJECTION, POWDER, FOR SOLUTION INTRAVENOUS at 11:03

## 2021-09-21 RX ADMIN — METRONIDAZOLE 500 MG: 500 INJECTION, SOLUTION INTRAVENOUS at 13:44

## 2021-09-21 RX ADMIN — FUROSEMIDE 20 MG: 20 TABLET ORAL at 10:53

## 2021-09-21 RX ADMIN — METOPROLOL SUCCINATE 50 MG: 50 TABLET, EXTENDED RELEASE ORAL at 10:53

## 2021-09-21 RX ADMIN — DULOXETINE HYDROCHLORIDE 60 MG: 60 CAPSULE, DELAYED RELEASE ORAL at 10:53

## 2021-09-21 RX ADMIN — BUSPIRONE HYDROCHLORIDE 10 MG: 5 TABLET ORAL at 10:53

## 2021-09-21 RX ADMIN — CEFEPIME HYDROCHLORIDE 2000 MG: 2 INJECTION, POWDER, FOR SOLUTION INTRAVENOUS at 02:28

## 2021-09-21 RX ADMIN — PANTOPRAZOLE SODIUM 40 MG: 40 TABLET, DELAYED RELEASE ORAL at 05:19

## 2021-09-21 RX ADMIN — SODIUM CHLORIDE: 9 INJECTION, SOLUTION INTRAVENOUS at 01:16

## 2021-09-21 RX ADMIN — POTASSIUM CHLORIDE 40 MEQ: 20 TABLET, EXTENDED RELEASE ORAL at 10:53

## 2021-09-21 RX ADMIN — ASPIRIN 81 MG: 81 TABLET, CHEWABLE ORAL at 10:53

## 2021-09-21 RX ADMIN — LURASIDONE HYDROCHLORIDE 20 MG: 40 TABLET, FILM COATED ORAL at 11:19

## 2021-09-21 RX ADMIN — TIOTROPIUM BROMIDE INHALATION SPRAY 2 PUFF: 3.12 SPRAY, METERED RESPIRATORY (INHALATION) at 09:24

## 2021-09-21 ASSESSMENT — PAIN SCALES - GENERAL
PAINLEVEL_OUTOF10: 0
PAINLEVEL_OUTOF10: 0

## 2021-09-21 NOTE — TELEPHONE ENCOUNTER
Abby with [facility] pre op admissions calling to notify Dr patient was admitted 9/18/21 and is currently inpatient at [facility].

## 2021-09-21 NOTE — PLAN OF CARE
Problem: Falls - Risk of:  Goal: Will remain free from falls  Description: Will remain free from falls  9/21/2021 0143 by Vince Still RN  Outcome: Ongoing  9/20/2021 1507 by Santhosh Johnson RN  Outcome: Ongoing  Goal: Absence of physical injury  Description: Absence of physical injury  9/21/2021 0143 by Vince Still RN  Outcome: Ongoing  9/20/2021 1507 by Santhosh Johnson RN  Outcome: Ongoing     Problem: Cardiac:  Goal: Ability to maintain vital signs within normal range will improve  Description: Ability to maintain vital signs within normal range will improve  9/21/2021 0143 by Vince Still RN  Outcome: Ongoing  9/20/2021 1507 by Santhosh Johnson RN  Outcome: Ongoing     Problem: Serum Glucose Level - Abnormal:  Goal: Ability to maintain appropriate glucose levels has stabilized  Description: Ability to maintain appropriate glucose levels has stabilized  9/21/2021 0143 by Vince Still RN  Outcome: Ongoing  9/20/2021 1507 by Santhosh Johnson RN  Outcome: Ongoing     Problem: Activity:  Goal: Risk for activity intolerance will decrease  Description: Risk for activity intolerance will decrease  9/21/2021 0143 by Vince Still RN  Outcome: Ongoing  9/20/2021 1507 by Santhosh Johnson RN  Outcome: Ongoing     Problem:  Bowel/Gastric:  Goal: Bowel function will improve  Description: Bowel function will improve  9/21/2021 0143 by Vince Still RN  Outcome: Ongoing  9/20/2021 1507 by Santhosh Johnson RN  Outcome: Ongoing  Goal: Occurrences of nausea will decrease  Description: Occurrences of nausea will decrease  9/21/2021 0143 by Vince Still RN  Outcome: Ongoing  9/20/2021 1507 by Santhosh Johnson RN  Outcome: Ongoing     Problem: Physical Regulation:  Goal: Ability to maintain clinical measurements within normal limits will improve  Description: Ability to maintain clinical measurements within normal limits will improve  9/21/2021 0143 by Vince Still RN  Outcome: Ongoing  9/20/2021 1507 by Agnes Bonilla RN  Outcome: Ongoing     Problem: Sensory:  Goal: Pain level will decrease  Description: Pain level will decrease  9/21/2021 0143 by Garry Young RN  Outcome: Ongoing  9/20/2021 1507 by Agnes Bonilla RN  Outcome: Ongoing     Problem: Pain:  Goal: Pain level will decrease  Description: Pain level will decrease  9/21/2021 0143 by Garry Young RN  Outcome: Ongoing  9/20/2021 1507 by Agnes Bonilla RN  Outcome: Ongoing  Goal: Control of acute pain  Description: Control of acute pain  9/21/2021 0143 by Garry Young RN  Outcome: Ongoing  9/20/2021 1507 by Agnes Bonilla RN  Outcome: Ongoing  Goal: Control of chronic pain  Description: Control of chronic pain  9/21/2021 0143 by Garry Young RN  Outcome: Ongoing  9/20/2021 1507 by Agnes Bonilla RN  Outcome: Ongoing

## 2021-09-21 NOTE — TELEPHONE ENCOUNTER
Surgery cancel per Dr. Marquis Knox, patient is inpatient at [facility] being treated for alcohol and drug induced hepatitis. The patient has been notified of this information and all questions answered.

## 2021-09-21 NOTE — PLAN OF CARE
Problem: Falls - Risk of:  Goal: Will remain free from falls  Description: Will remain free from falls  9/21/2021 1628 by Maegan Nichols RN  Outcome: Completed  9/21/2021 1233 by Maegan Nichols RN  Outcome: Ongoing  Goal: Absence of physical injury  Description: Absence of physical injury  9/21/2021 1628 by Maegan Nichols RN  Outcome: Completed  9/21/2021 1233 by Maegan Nichols RN  Outcome: Ongoing     Problem: Cardiac:  Goal: Ability to maintain vital signs within normal range will improve  Description: Ability to maintain vital signs within normal range will improve  9/21/2021 1628 by Maegan Nichols RN  Outcome: Completed  9/21/2021 1233 by Maegan Nichols RN  Outcome: Ongoing     Problem: Serum Glucose Level - Abnormal:  Goal: Ability to maintain appropriate glucose levels has stabilized  Description: Ability to maintain appropriate glucose levels has stabilized  9/21/2021 1628 by Maegan Nichols RN  Outcome: Completed  9/21/2021 1233 by Maegan Nichols RN  Outcome: Ongoing     Problem: Activity:  Goal: Risk for activity intolerance will decrease  Description: Risk for activity intolerance will decrease  9/21/2021 1628 by Maegan Nichols RN  Outcome: Completed  9/21/2021 1233 by Maegan Nichols RN  Outcome: Ongoing     Problem:  Bowel/Gastric:  Goal: Bowel function will improve  Description: Bowel function will improve  9/21/2021 1628 by Maegan Nichols RN  Outcome: Completed  9/21/2021 1233 by Maegan Nichols RN  Outcome: Ongoing  Goal: Occurrences of nausea will decrease  Description: Occurrences of nausea will decrease  9/21/2021 1628 by Maegan Nichols RN  Outcome: Completed  9/21/2021 1233 by Maegan Nichols RN  Outcome: Ongoing     Problem: Physical Regulation:  Goal: Ability to maintain clinical measurements within normal limits will improve  Description: Ability to maintain clinical measurements within normal limits will improve  9/21/2021 1628 by Maegan Nichols RN  Outcome: Completed  9/21/2021 1233 by Carlos Landon RN  Outcome: Ongoing     Problem: Sensory:  Goal: Pain level will decrease  Description: Pain level will decrease  9/21/2021 1628 by Carlos Landon RN  Outcome: Completed  9/21/2021 1233 by Carlos Landon RN  Outcome: Ongoing     Problem: Pain:  Goal: Pain level will decrease  Description: Pain level will decrease  9/21/2021 1628 by Carlos Landon RN  Outcome: Completed  9/21/2021 1233 by Carlos Landon RN  Outcome: Ongoing  Goal: Control of acute pain  Description: Control of acute pain  9/21/2021 1628 by Carlos Landon RN  Outcome: Completed  9/21/2021 1233 by Carlos Landon RN  Outcome: Ongoing  Goal: Control of chronic pain  Description: Control of chronic pain  9/21/2021 1628 by Carlos Landon RN  Outcome: Completed  9/21/2021 1233 by Carlos Landon RN  Outcome: Ongoing

## 2021-09-21 NOTE — PROGRESS NOTES
[facility]        Progress Note    Patient Name: Owen Moseley  MRN: [id number]  YOB: 1970  Date of Evaluation: 2021    Chief Complaint: Abdominal pain    Subjective:  No acute events overnight  Pain improving, only mild in left lower quadrant with pressure  No nausea or vomiting, tolerating general diet  Passing flatus, no stool  Resting in be at this time      Vital Signs:  Patient Vitals for the past 24 hrs:   BP Temp Temp src Pulse Resp SpO2   21 0929 -- -- -- -- 16 95 %   21 0515 130/76 97.8 °F (36.6 °C) -- 95 16 92 %   21 0215 124/78 98.1 °F (36.7 °C) -- 93 16 93 %   21 2145 (!) 150/94 99.1 °F (37.3 °C) -- 102 16 97 %   21 1707 116/70 98.1 °F (36.7 °C) Oral 100 16 94 %   21 1215 134/84 97.6 °F (36.4 °C) Oral 98 16 93 %      TEMPERATURE HISTORY 24H: Temp (24hrs), Av.1 °F (36.7 °C), Min:97.6 °F (36.4 °C), Max:99.1 °F (37.3 °C)    BLOOD PRESSURE HISTORY: Systolic (73XZE), JEQ:971 , Min:95 , IPP:542    Diastolic (78KAE), WEB:05, Min:56, Max:94      Intake/Output:  I/O last 3 completed shifts: In: 400 [P.O.:400]  Out: -   No intake/output data recorded.   Drain/tube Output:       Physical Exam:  General: awake, alert, oriented to  person, place, time  Cardiovascular:  regular rate and rhythm and no murmur noted  Lungs: clear to auscultation  Abdomen: soft, non-distended, mild left lower quadrant tenderness only, bowel sounds present     Labs:  CBC:    Recent Labs     21  2109 21  1403 21  0609   WBC 17.7* 12.5* 7.8   HGB 14.3 12.3* 12.8*   HCT 43.3 36.5* 37.4*    253 223     BMP:    Recent Labs     21  1403 21  0609 21  0556    138 144   K 3.4* 3.0* 3.0*    104 109   CO2 20* 25 26   BUN 26* 17 11   CREATININE 0.8* 0.8* 0.7*   GLUCOSE 86 92 106*     Hepatic:    Recent Labs     21  1403 21  0609 21  0556   * 395* 182*   * 157* films:    US GALLBLADDER RUQ    Result Date: 9/20/2021  EXAMINATION: RIGHT UPPER QUADRANT ULTRASOUND 9/20/2021 1:00 pm COMPARISON: CT abdomen and pelvis, 09/18/2021 HISTORY: ORDERING SYSTEM PROVIDED HISTORY: abnormal LFTs TECHNOLOGIST PROVIDED HISTORY: Reason for exam:->abnormal LFTs Reason for Exam: f/u ct Acuity: Unknown Type of Exam: Subsequent/Follow-up FINDINGS: LIVER:  The liver is nearly diffusely hyperechoic. A hypoechoic focus in the medial segment of the left lobe measures 1.9 cm x 1.7 cm, correlating with hypodensity on CT, consistent with focal fatty sparing. There is no intrahepatic biliary ductal dilation. BILIARY SYSTEM:  No gallbladder stones are seen. Gallbladder wall is 4 mm. There is no pericholecystic fluid. No focal tenderness is noted over the gallbladder. Common bile duct is within normal limits measuring 5 mm. RIGHT KIDNEY: The right kidney is grossly unremarkable without evidence of hydronephrosis. It measures 13.6 x 6.2 x 7.3 cm, with cortical thickness of 17 mm. PANCREAS:  Visualized portions of the pancreas are unremarkable. OTHER: No evidence of right upper quadrant ascites. No cholelithiasis or significant dilation of the common duct. Hepatic steatosis. XR CHEST PORTABLE    Result Date: 9/19/2021  EXAMINATION: ONE XRAY VIEW OF THE CHEST 9/19/2021 1:34 pm COMPARISON: 12/24/2020 radiograph HISTORY: ORDERING SYSTEM PROVIDED HISTORY: pneumonia TECHNOLOGIST PROVIDED HISTORY: Reason for exam:->pneumonia Reason for Exam: pneumonia Acuity: Acute Type of Exam: Initial FINDINGS: The heart, mediastinum and pulmonary vascularity are normal.  The lungs are well expanded. Asymmetric ground-glass opacification in the right upper and right lower lung zone. No significant skeletal finding. Ground-glass airspace opacities in the right upper and lower lung zone suspected to represent developing pneumonitis.      Scheduled Meds:   magnesium sulfate  1,000 mg IntraVENous Once    potassium chloride  40 mEq Oral Once    furosemide  20 mg Oral Daily    lidocaine  1 patch TransDERmal Daily    aspirin  81 mg Oral Daily    atorvastatin  40 mg Oral Nightly    busPIRone  10 mg Oral BID    DULoxetine  60 mg Oral BID    metoprolol succinate  50 mg Oral Daily    nicotine  1 patch TransDERmal Daily    pantoprazole  40 mg Oral BID AC    tiotropium  2 puff Inhalation Daily    sodium chloride flush  5-40 mL IntraVENous 2 times per day    enoxaparin  40 mg SubCUTAneous Daily    lurasidone  20 mg Oral Daily    cefepime  2,000 mg IntraVENous Q8H    metroNIDAZOLE  500 mg IntraVENous Q8H     Continuous Infusions:   sodium chloride      sodium chloride 100 mL/hr at 09/21/21 0116     PRN Meds:. LORazepam, albuterol sulfate HFA, cyclobenzaprine, sodium chloride flush, sodium chloride, ondansetron **OR** ondansetron, polyethylene glycol, acetaminophen **OR** acetaminophen, potassium chloride **OR** potassium alternative oral replacement **OR** potassium chloride, magnesium sulfate      Assessment:  49 y.o. male admitted with    Left lower quadrant abdominal pain  Intestinal malrotation  Elevated LFT  History of IVDA       Plan:  1. Pain improving, tolerating diet, passing flatus; no plans for surgical intervention at this time  2. General diet as tolerated  3. Activity as tolerated, ambulate TID, up to chair for all meals  4. PRN analgesics and antiemetics--minimizing narcotics as tolerated  5. Management of medical comorbid etiologies per primary team and consulting services  6. Disposition: Per primary team    EDUCATION:  Educated patient on plan of care and disease process--all questions answered. Plans discussed with patient and nursing. Will review and discuss with Dr. Lissette Truong.       Signed:  RAH Knutson - CNP  9/21/2021 9:59 AM     Patient seen and examined  81-year-old male with a history of IV drug abuse and intestinal malrotation seen in follow-up for left lower quadrant pain and dehydration  CAT scan was reviewed with the radiologist yesterday. The cecum was was located in the right upper quadrant on previous imaging and is located in the left lower quadrant on current imaging. The appendix looks normal.  There is no evidence of a bowel obstruction or volvulus. The left lower quadrant abdominal pain has significantly improved. He is tolerating a general diet. Okay for discharge home from surgical perspective. The patient is scheduled for a robotic laparoscopic inguinal hernia repair per Dr. Miguel Angel Regalado. Will forward this note to 's so that he can potentially evaluate the cecal location during the time of the hernia repair.

## 2021-09-21 NOTE — PROGRESS NOTES
Gastroenterology Progress Note            Harvey Castro II is a 49 y.o. male patient. No diagnosis found. SUBJECTIVE:  Ab pain much improved. Still has some cough. Physical    VITALS:  /76   Pulse 95   Temp 97.8 °F (36.6 °C)   Resp 16   Ht 6' (1.829 m)   Wt 259 lb (117.5 kg)   SpO2 95%   BMI 35.13 kg/m²   TEMPERATURE:  Current - Temp: 97.8 °F (36.6 °C); Max - Temp  Av.1 °F (36.7 °C)  Min: 97.6 °F (36.4 °C)  Max: 99.1 °F (37.3 °C)    Abdomen soft, ND,  Less tender, no HSM, Bowel sounds normal   aaox 3 anicteric no jaundice. Data      Recent Labs     21  1403 21  0609   WBC 17.7* 12.5* 7.8   HGB 14.3 12.3* 12.8*   HCT 43.3 36.5* 37.4*   MCV 91.3 90.9 89.9    253 223     Recent Labs     21  1403 09/20/21  0609 21  0556    138 144   K 3.4* 3.0* 3.0*    104 109   CO2 20* 25 26   PHOS 1.8* 2.4* 1.7*   BUN 26* 17 11   CREATININE 0.8* 0.8* 0.7*     Recent Labs     21  1403 21  0609 21  0556   * 395* 182*   * 157* 118*   BILITOT 1.0 0.6 0.4   ALKPHOS 111 101 93     Recent Labs     21  1403   LIPASE 9.0* 7.0*   AMYLASE  --  63             ASSESSMENT   1. Abnormal LFT's. C/w underlying viral illness. Improving quickly. Hep a/b/c neg. 2. Ab pain- improved. No plan for surgical intervention. Pt has h/o IBS also        PLAN    1. F/u LFT with his Primary GI in 1-2 wks. To ensure cont to normalize. 2. Will sign off.       Taras Martinez MD  [address]

## 2021-09-21 NOTE — PROGRESS NOTES
Shift assessment complete pls see flowsheets, meds per order see eMAR. Pt alert&orientX4. Independent in room. The care plan and education has been reviewed and mutually agreed upon with the patient.        Electronically signed by María Curtis RN on 9/21/2021 at 1:39 AM

## 2021-09-21 NOTE — DISCHARGE SUMMARY
Hospital Medicine Discharge Summary    Patient: Wander Chandler II     Gender: male  : 1970   Age: 49 y.o. MRN: [id number]    Admitting Physician: Tito Sanchez MD  Discharge Physician: Shona Osgood, MD     Code Status: Full Code     Admit Date: 2021   Discharge Date:     21  Disposition:  Home/  Time spent arranging discharge: 35 minutes    Discharge Diagnoses: Active Hospital Problems    Diagnosis Date Noted    Intestinal malrotation [Q43.3] 2021   sepsis secondary to bacterial pna  Elevated lfts secondary to alcoholic hepatitis  Acute metabolic encephalopathy      Condition at Discharge:  Stable    Hospital Course:   Was admitted to hospital with lower abdominal pain CT showed possible intestinal malrotation. CT was reviewed by cardiology and again with the radiologist there were no acute findings on further review no further work-up per surgery. Patient having acute encephalopathy likely secondary to alcohol and drug use which resolved. Patient has sepsis secondary to bacterial pneumonia which improved with antibiotics discharged on Augmentin for further therapy. Patient did have elevated LFTs secondary to alcoholic hepatitis hepatitis panel was negative and ultrasound of the gallbladder was negative for cholelithiasis or bile duct dilation LFTs continue to trend down patient was cleared for discharge by GI    Discharge Exam:    /76   Pulse 95   Temp 97.8 °F (36.6 °C)   Resp 16   Ht 6' (1.829 m)   Wt 259 lb (117.5 kg)   SpO2 92%   BMI 35.13 kg/m²   General appearance:  Appears comfortable. AAOx3  HEENT: atraumatic, Pupils equal, muscous membranes moist, no masses appreciated  Cardiovascular: Regular rate and rhythm no murmurs appreciated  Respiratory: CTAB no wheezing  Gastrointestinal: Abdomen soft, non-tender, BS+  EXT: no edema  Neurology: no gross focal deficts  Psychiatry: Appropriate affect.  Not agitated  Skin: Warm, dry, no rashes appreciated    Discharge Medications:   Current Discharge Medication List        Current Discharge Medication List        Current Discharge Medication List      CONTINUE these medications which have NOT CHANGED    Details   cyclobenzaprine (FLEXERIL) 10 MG tablet TAKE ONE TABLET BY MOUTH THREE TIMES A DAY AS NEEDED FOR MUSCLE SPASMS  Qty: 90 tablet, Refills: 1      diphenhydrAMINE (BENADRYL) 25 MG tablet Take 1 tablet by mouth every 6 hours as needed for Itching Pt report takes for upset stomach in AM as needed  Qty: 30 tablet, Refills: 1      atorvastatin (LIPITOR) 40 MG tablet TAKE ONE TABLET BY MOUTH DAILY  Qty: 90 tablet, Refills: 0      metoprolol succinate (TOPROL XL) 50 MG extended release tablet TAKE ONE TABLET BY MOUTH DAILY  Qty: 90 tablet, Refills: 0    Associated Diagnoses: Essential hypertension      DULoxetine (CYMBALTA) 60 MG extended release capsule Take 1 capsule by mouth 2 times daily  Qty: 60 capsule, Refills: 4      busPIRone (BUSPAR) 10 MG tablet TAKE TWO TABLETS BY MOUTH TWICE A DAY  Qty: 120 tablet, Refills: 5      furosemide (LASIX) 20 MG tablet Take 1 tablet by mouth daily  Qty: 90 tablet, Refills: 1      albuterol sulfate HFA (VENTOLIN HFA) 108 (90 Base) MCG/ACT inhaler Inhale 2 puffs into the lungs every 6 hours as needed for Wheezing or Shortness of Breath  Qty: 1 Inhaler, Refills: 6      SPIRIVA RESPIMAT 2.5 MCG/ACT AERS inhaler INHALE TWO PUFFS BY MOUTH DAILY  Qty: 1 Inhaler, Refills: 5      valsartan (DIOVAN) 80 MG tablet TAKE ONE TABLET BY MOUTH DAILY  Qty: 90 tablet, Refills: 1      aspirin 81 MG tablet Take 81 mg by mouth daily       nicotine (NICODERM CQ) 21 MG/24HR Place 1 patch onto the skin daily  Qty: 42 patch, Refills: 0    Associated Diagnoses: Encounter for smoking cessation counseling      hydrocortisone (ALA-JACE) 1 % cream Apply topically to face or neck 2 times daily for 1 week  Qty: 30 g, Refills: 1    Associated Diagnoses:  Allergic contact dermatitis, unspecified trigger      pantoprazole (PROTONIX) 40 MG tablet TAKE ONE TABLET BY MOUTH TWICE A DAY  Qty: 180 tablet, Refills: 0    Associated Diagnoses: Gastroesophageal reflux disease without esophagitis; Ha's esophagus determined by endoscopy      lurasidone (LATUDA) 20 MG TABS tablet Take 1 tablet by mouth daily  Qty: 30 tablet, Refills: 0      ondansetron (ZOFRAN) 4 MG tablet Take 1 tablet by mouth every 8 hours as needed for Nausea or Vomiting  Qty: 40 tablet, Refills: 2      potassium chloride (KLOR-CON M) 20 MEQ extended release tablet Take 1 tablet by mouth daily  Qty: 30 tablet, Refills: 0      sildenafil (REVATIO) 20 MG tablet Take 3 tablets by mouth daily as needed (ED)  Qty: 30 tablet, Refills: 5    Comments: Patient is aware this will be cash pay. No PA is needed. CHANTIX 1 MG tablet TAKE ONE TABLET BY MOUTH TWICE A DAY  Qty: 56 tablet, Refills: 4           Current Discharge Medication List      STOP taking these medications       diclofenac sodium (VOLTAREN) 1 % GEL Comments:   Reason for Stopping:         traZODone (DESYREL) 50 MG tablet Comments:   Reason for Stopping:               Labs:  For convenience and continuity at follow-up the following most recent labs are provided:    Lab Results   Component Value Date    WBC 7.8 09/20/2021    HGB 12.8 09/20/2021    HCT 37.4 09/20/2021    MCV 89.9 09/20/2021     09/20/2021     09/21/2021    K 3.0 09/21/2021     09/21/2021    CO2 26 09/21/2021    BUN 11 09/21/2021    CREATININE 0.7 09/21/2021    CALCIUM 8.1 09/21/2021    PHOS 1.7 09/21/2021    ALKPHOS 93 09/21/2021     09/21/2021     09/21/2021    BILITOT 0.4 09/21/2021    BILIDIR 0.17 02/02/2011    LABALBU 3.0 09/21/2021    LDLCALC see below 09/21/2020    TRIG 326 09/21/2020     Lab Results   Component Value Date    INR 1.08 09/20/2021    INR 1.06 08/12/2019    INR 1.00 06/20/2019       Radiology:  US SCROTUM AND TESTICLES    Result Date: 9/19/2021  EXAMINATION: ULTRASOUND OF THE SCROTUM/TESTICLES WITH COLOR DOPPLER FLOW EVALUATION; DOPPLER EVALUATION OF THE PELVIS 9/18/2021 COMPARISON: CT scan abdomen pelvis dated September 18, 2021 HISTORY: ORDERING SYSTEM PROVIDED HISTORY: pain and swelling TECHNOLOGIST PROVIDED HISTORY: Reason for exam:->pain and swelling Reason for Exam: abdominal pain - see ct, per order testiclular pain and swelling, pt denies testicular pain and swelling, stating pain is abdominal and pelvic - r/o torsion Acuity: Acute; ORDERING SYSTEM PROVIDED HISTORY: testicular pain and swelling TECHNOLOGIST PROVIDED HISTORY: Reason for exam:->testicular pain and swelling FINDINGS: Measurements: Right testicle: 4.1 x 2.2 x 2.5 cm Left testicle: 3.7 x 2.1 x 2.1 cm Right: Grey scale: The right testicle demonstrates normal homogeneous echotexture without focal lesion. No evidence of testicular microlithiasis. Cremasteric reflex is noted on the right, with the testicle imaged in the right inguinal/groin region. The right testicle was not position within the groin or inguinal region on the CT scan. Doppler Evaluation:  There is normal arterial and venous Doppler flow within the testicle. Scrotal Sac:  No evidence of hydrocele. Epididymis:  No acute abnormality. Left: Grey scale: The left testicle demonstrates normal homogeneous echotexture without focal lesion. No evidence of testicular microlithiasis. Doppler Evaluation:  There is normal arterial and venous Doppler flow within the testicle. Scrotal Sac:  No evidence of hydrocele. Epididymis:  No acute abnormality. Unremarkable testicular ultrasound with normal Doppler flow. CT ABDOMEN PELVIS W IV CONTRAST Additional Contrast? None    Result Date: 9/18/2021  EXAMINATION: CT OF THE ABDOMEN AND PELVIS WITH CONTRAST 9/18/2021 3:57 pm TECHNIQUE: CT of the abdomen and pelvis was performed with the administration of intravenous contrast. Multiplanar reformatted images are provided for review.  Dose modulation, iterative reconstruction, and/or weight based adjustment of the mA/kV was utilized to reduce the radiation dose to as low as reasonably achievable. COMPARISON: CT scan dated August 1, 2020 HISTORY: ORDERING SYSTEM PROVIDED HISTORY: RLQ pain hx hernia TECHNOLOGIST PROVIDED HISTORY: Reason for exam:->RLQ pain hx hernia Additional Contrast?->None Decision Support Exception - unselect if not a suspected or confirmed emergency medical condition->Emergency Medical Condition (MA) Reason for Exam: abdo pain Acuity: Acute Type of Exam: Ongoing Additional signs and symptoms: RLQ abdo pain/groin pain, hx of hernia FINDINGS: Lower Chest: The lung bases are clear. Organs: Diffuse hepatic steatosis is present. A 1.8 cm cyst is again demonstrated within the medial segment of the left lobe of the liver. Focal fatty infiltration is present involving the medial segment adjacent to the fissure for the ligamentum teres. The gallbladder, spleen, and adrenal glands demonstrate no acute abnormality. A 7 mm focal fatty lesion is present within the left adrenal gland, consistent with a small lipoma or myelolipoma. Nodular thickening of the adrenal glands is present bilaterally consistent with adenomatous changes. Pancreas is partially fatty replaced. GI/Bowel: Stomach appears grossly normal.  The duodenum never crosses the midline, with the majority of small bowel within the right abdomen, or slightly left of midline. The large bowel is now displaced into the left abdomen. Cecum is present within the left upper abdomen. The appendix is normal.  SMA is again noted to be positioned posterior and slightly to the left of the SM V, similar compared to the prior study. Pelvis: Urinary bladder appears normal.  Prostate gland is normal. Peritoneum/Retroperitoneum: No free fluid or free air is present within or pelvis. No aneurysm formation is present. No pathological adenopathy is present.  Bones/Soft Tissues: No acute osseous abnormality is present. 1. Evidence of bowel malrotation again demonstrated. The duodenal C-loop never crosses the midline, with the majority of the small bowel within the right side of the abdomen, and large bowel within the left side of the abdomen. 2. Cecum is now positioned within the left upper abdomen, which is new since the prior study of August 2020, and may be related to a mobile cecum, or internal hernia without evidence of bowel obstruction. 3. Diffuse hepatic steatosis     US GALLBLADDER RUQ    Result Date: 9/20/2021  EXAMINATION: RIGHT UPPER QUADRANT ULTRASOUND 9/20/2021 1:00 pm COMPARISON: CT abdomen and pelvis, 09/18/2021 HISTORY: ORDERING SYSTEM PROVIDED HISTORY: abnormal LFTs TECHNOLOGIST PROVIDED HISTORY: Reason for exam:->abnormal LFTs Reason for Exam: f/u ct Acuity: Unknown Type of Exam: Subsequent/Follow-up FINDINGS: LIVER:  The liver is nearly diffusely hyperechoic. A hypoechoic focus in the medial segment of the left lobe measures 1.9 cm x 1.7 cm, correlating with hypodensity on CT, consistent with focal fatty sparing. There is no intrahepatic biliary ductal dilation. BILIARY SYSTEM:  No gallbladder stones are seen. Gallbladder wall is 4 mm. There is no pericholecystic fluid. No focal tenderness is noted over the gallbladder. Common bile duct is within normal limits measuring 5 mm. RIGHT KIDNEY: The right kidney is grossly unremarkable without evidence of hydronephrosis. It measures 13.6 x 6.2 x 7.3 cm, with cortical thickness of 17 mm. PANCREAS:  Visualized portions of the pancreas are unremarkable. OTHER: No evidence of right upper quadrant ascites. No cholelithiasis or significant dilation of the common duct. Hepatic steatosis.      XR CHEST PORTABLE    Result Date: 9/19/2021  EXAMINATION: ONE XRAY VIEW OF THE CHEST 9/19/2021 1:34 pm COMPARISON: 12/24/2020 radiograph HISTORY: ORDERING SYSTEM PROVIDED HISTORY: pneumonia TECHNOLOGIST PROVIDED HISTORY: Reason for exam:->pneumonia Reason for Exam: pneumonia Acuity: Acute Type of Exam: Initial FINDINGS: The heart, mediastinum and pulmonary vascularity are normal.  The lungs are well expanded. Asymmetric ground-glass opacification in the right upper and right lower lung zone. No significant skeletal finding. Ground-glass airspace opacities in the right upper and lower lung zone suspected to represent developing pneumonitis. US DUP ABD PEL RETRO SCROT COMPLETE    Result Date: 9/19/2021  EXAMINATION: ULTRASOUND OF THE SCROTUM/TESTICLES WITH COLOR DOPPLER FLOW EVALUATION; DOPPLER EVALUATION OF THE PELVIS 9/18/2021 COMPARISON: CT scan abdomen pelvis dated September 18, 2021 HISTORY: ORDERING SYSTEM PROVIDED HISTORY: pain and swelling TECHNOLOGIST PROVIDED HISTORY: Reason for exam:->pain and swelling Reason for Exam: abdominal pain - see ct, per order testiclular pain and swelling, pt denies testicular pain and swelling, stating pain is abdominal and pelvic - r/o torsion Acuity: Acute; ORDERING SYSTEM PROVIDED HISTORY: testicular pain and swelling TECHNOLOGIST PROVIDED HISTORY: Reason for exam:->testicular pain and swelling FINDINGS: Measurements: Right testicle: 4.1 x 2.2 x 2.5 cm Left testicle: 3.7 x 2.1 x 2.1 cm Right: Grey scale: The right testicle demonstrates normal homogeneous echotexture without focal lesion. No evidence of testicular microlithiasis. Cremasteric reflex is noted on the right, with the testicle imaged in the right inguinal/groin region. The right testicle was not position within the groin or inguinal region on the CT scan. Doppler Evaluation:  There is normal arterial and venous Doppler flow within the testicle. Scrotal Sac:  No evidence of hydrocele. Epididymis:  No acute abnormality. Left: Grey scale: The left testicle demonstrates normal homogeneous echotexture without focal lesion. No evidence of testicular microlithiasis. Doppler Evaluation:  There is normal arterial and venous Doppler flow within the testicle.  Scrotal

## 2021-09-21 NOTE — PROGRESS NOTES
Assessment completed. Patient alert and oriented and able to make all his needs known. Denies any pain. Medications administered as ordered. POC and education reviewed and mutually agreed upon. Ambulated to bathroom and and back to chair. Had a large BM. C/o some pain at this time but tolerable.

## 2021-09-21 NOTE — PLAN OF CARE
Problem: Falls - Risk of:  Goal: Will remain free from falls  Description: Will remain free from falls  9/21/2021 1233 by Susan Lopez RN  Outcome: Ongoing  9/21/2021 0143 by Wally Degroot RN  Outcome: Ongoing  Goal: Absence of physical injury  Description: Absence of physical injury  9/21/2021 1233 by Susan Lopez RN  Outcome: Ongoing  9/21/2021 0143 by Wally Degroot RN  Outcome: Ongoing     Problem: Cardiac:  Goal: Ability to maintain vital signs within normal range will improve  Description: Ability to maintain vital signs within normal range will improve  9/21/2021 1233 by Susan Lopez RN  Outcome: Ongoing  9/21/2021 0143 by Wally Degroot RN  Outcome: Ongoing     Problem: Serum Glucose Level - Abnormal:  Goal: Ability to maintain appropriate glucose levels has stabilized  Description: Ability to maintain appropriate glucose levels has stabilized  9/21/2021 1233 by Susan Lopez RN  Outcome: Ongoing  9/21/2021 0143 by Wally Degroot RN  Outcome: Ongoing     Problem: Activity:  Goal: Risk for activity intolerance will decrease  Description: Risk for activity intolerance will decrease  9/21/2021 1233 by Susan Lopez RN  Outcome: Ongoing  9/21/2021 0143 by Wally Degroot RN  Outcome: Ongoing     Problem:  Bowel/Gastric:  Goal: Bowel function will improve  Description: Bowel function will improve  9/21/2021 1233 by Susan Lopez RN  Outcome: Ongoing  9/21/2021 0143 by Wally Degroot RN  Outcome: Ongoing  Goal: Occurrences of nausea will decrease  Description: Occurrences of nausea will decrease  9/21/2021 1233 by Susan Lopez RN  Outcome: Ongoing  9/21/2021 0143 by Wally Degroot RN  Outcome: Ongoing     Problem: Physical Regulation:  Goal: Ability to maintain clinical measurements within normal limits will improve  Description: Ability to maintain clinical measurements within normal limits will improve  9/21/2021 1233 by Susan Lopez RN  Outcome: Ongoing  9/21/2021 0143 by Wally Degroot

## 2021-09-21 NOTE — PROGRESS NOTES
Assessment completed. Pt alert and oriented and able to make all his needs known. Denied any pain. Medications administered as ordered. POC and education reviewed and mutually agreed upon. Pt ambulated to bathroom and then to chair and c/o some pain/discomfort to abd. All needs met. Call light in reach. Will continue to monitor. 1600: Discharge instructions reviewed with patient and family member. 1625: Patient discharged, ambulated to elevator with mother and all personal belongings.

## 2021-09-22 ENCOUNTER — TELEPHONE (OUTPATIENT)
Dept: FAMILY MEDICINE CLINIC | Age: 51
End: 2021-09-22

## 2021-09-22 LAB
BLOOD CULTURE, ROUTINE: NORMAL
CULTURE, BLOOD 2: NORMAL
CULTURE, RESPIRATORY: NORMAL
REPORT: NORMAL

## 2021-09-23 LAB — BLOOD CULTURE, ROUTINE: NORMAL

## 2021-09-24 LAB
CULTURE, BLOOD 2: ABNORMAL
CULTURE, BLOOD 2: ABNORMAL
ORGANISM: ABNORMAL

## 2021-09-28 NOTE — TELEPHONE ENCOUNTER
----- Message from Anusha Deal sent at 9/28/2021  2:07 PM EDT -----  Subject: Medication Problem    QUESTIONS  Name of Medication? nicotine (NICODERM CQ) 21 MG/24HR  Patient-reported dosage and instructions? 21 MG  What question or problem do you have with the medication? Pt needs 7 MG   instead of the higher dose as he's already stopped smoking for a while and   was told this dosage might make him sick if he uses it  Preferred Pharmacy? [id number] [facility], [address] [place]  Pharmacy phone number (if available)? [phone]  Additional Information for Provider?   ---------------------------------------------------------------------------  --------------  CALL BACK INFO  What is the best way for the office to contact you? OK to leave message on   voicemail  Preferred Call Back Phone Number? [phone]  ---------------------------------------------------------------------------  --------------  SCRIPT ANSWERS  Relationship to Patient?  Self

## 2021-09-29 ENCOUNTER — VIRTUAL VISIT (OUTPATIENT)
Dept: PSYCHOLOGY | Age: 51
End: 2021-09-29
Payer: COMMERCIAL

## 2021-09-29 DIAGNOSIS — F11.10 HEROIN ABUSE (HCC): ICD-10-CM

## 2021-09-29 DIAGNOSIS — F32.A DEPRESSION, UNSPECIFIED DEPRESSION TYPE: Primary | ICD-10-CM

## 2021-09-29 PROCEDURE — 90792 PSYCH DIAG EVAL W/MED SRVCS: CPT | Performed by: PSYCHOLOGIST

## 2021-09-29 NOTE — Clinical Note
Hello. He will be seeing you on Friday and is interested in changing medications for better depression management. He has been using heroin again to help manage his pain and depression but does not want to be doing this. He will continue to follow-up with me. We also discussed GeneSight testing given his reported sensitivity to medications in the past.  Thanks.

## 2021-09-29 NOTE — PROGRESS NOTES
Behavioral Health Consultation  [facility], Robert  Psychologist  9/29/2021  11:32 AM EDT    Time spent with Patient: 22 minutes  This is patient's first [facility] appointment. Pt was last seen once in 2019. Reason for Consult:    Chief Complaint   Patient presents with    Depression     Referring Provider: Rich Skelton MD      Pt provided informed consent for the behavioral health program. Discussed with patient model of service to include the limits of confidentiality (i.e. abuse reporting, suicide intervention, etc.) and short-term intervention focused approach. Pt indicated understanding. Feedback given to PCP. TELEHEALTH VISIT -- Audio/Visual (During MJRXZ-26 public health emergency)  }  Pursuant to the emergency declaration under the [address] [place] [address], [facility] waiver authority and the [employer] Act, this Virtual Visit was conducted, with patient's consent, to reduce the patient's risk of exposure to COVID-19 and provide continuity of care for an established patient. Services were provided through a video synchronous discussion virtually to substitute for in-person clinic visit. Pt gave verbal informed consent to participate in telehealth services. Conducted a risk-benefit analysis and determined that the patient's presenting problems are consistent with the use of telepsychology. Determined that the patient has sufficient knowledge and skills in the use of technology enabling them to adequately benefit from telepsychology. It was determined that this patient was able to be properly treated without an in-person session. Patient verified that they were currently located at the [facility] address that was provided during registration.       Verified the following information:  Patient's identification: Yes  Patient location: [address] [place] [address]  Patient's call back number: [phone]  Patient's emergency contact's name and number, as well as permission to contact them if needed: Extended Emergency Contact Information  Primary Emergency Contact: [address] of [address] Phone: [phone]  Mobile Phone: [phone]  Relation: Parent    Provider location: Greg Hernandez[address] [place] [address]:  Pt reports since last seen 2019. Father passed Nov 10 2020. Wants to sit home and be alone. Used to be a social person. Depression started when caring for father. Has ongoing pain issues. Starting to use heroin again and doesn't want to do that. Using daily but uses enough to just get out of bed, not heavy use like before. Doesn't think he is addicted yet. Does it to give himself brief pain management. Spends his days avoiding people.        O:  MSE:    Attitude: cooperative and friendly  Consciousness: alert  Orientation: oriented to person, place, time, general circumstance  Memory: recent and remote memory intact  Attention/Concentration: intact during session  Speech: normal rate and volume, well-articulated  Mood: \"depressed\"  Affect: euthymic, congruent  Perception: within normal limits  Thought Content: within normal limits  Thought Process: logical, coherent and goal-directed  Insight: good  Judgment: intact  Ability to understand instructions: Yes  Ability to respond meaningfully: Yes  Morbid Ideation: no   Suicide Assessment: no suicidal ideation, plan, or intent  Homicidal Ideation: no    History:    Medications:   Current Outpatient Medications   Medication Sig Dispense Refill    nicotine (NICODERM CQ) 21 MG/24HR Place 1 patch onto the skin daily 42 patch 0    cyclobenzaprine (FLEXERIL) 10 MG tablet TAKE ONE TABLET BY MOUTH THREE TIMES A DAY AS NEEDED FOR MUSCLE SPASMS 90 tablet 1    diphenhydrAMINE (BENADRYL) 25 MG tablet Take 1 tablet by mouth every 6 hours as needed for Itching Pt report takes for upset stomach in AM as needed 30 tablet 1    atorvastatin (LIPITOR) 40 MG tablet TAKE ONE TABLET  Substances: Always, THC   Substance and Sexual Activity    Alcohol use: Yes     Comment: 1/2 fifth tequila     Drug use: Yes     Types: Marijuana, Other-see comments, Opiates      Comment: pt states not recently     Sexual activity: Yes   Other Topics Concern    Not on file   Social History Narrative    Not on file     Social Determinants of Health     Financial Resource Strain: High Risk    Difficulty of Paying Living Expenses: Very hard   Food Insecurity: Food Insecurity Present    Worried About Running Out of Food in the Last Year: Often true    Angel of Food in the Last Year: Often true   Transportation Needs: Unmet Transportation Needs    Lack of Transportation (Medical): Yes    Lack of Transportation (Non-Medical): Yes   Physical Activity:     Days of Exercise per Week:     Minutes of Exercise per Session:    Stress:     Feeling of Stress :    Social Connections:     Frequency of Communication with Friends and Family:     Frequency of Social Gatherings with Friends and Family:     Attends [employer] Services:     Active Member of Clubs or Organizations:     Attends Club or Organization Meetings:     Marital Status:    Intimate Partner Violence:     Fear of Current or Ex-Partner:     Emotionally Abused:     Physically Abused:     Sexually Abused:      TOBACCO:   reports that he has been smoking cigarettes. He has a 16.50 pack-year smoking history. He quit smokeless tobacco use about 5 years ago. ETOH:   reports current alcohol use. Family History:   Family History   Problem Relation Age of Onset    Arthritis Mother     Other Mother         pneumothorax several times    Asthma Father        A:  Mr. Patti Gould has continued to struggle with depression. He was last seen in 2019 and since the first visit patient lost his father who he was caring for. His depression has worsened and he has begun engaging in daily heroin use although reports usage is not heavy.   He has ongoing chronic pain issues for which he reports using heroin. He is interested in more effective depression management. He was active and engaged and responded positively to behavioral interventions. Diagnosis:    1. Depression, unspecified depression type    2. Heroin abuse ([facility] [address]          Plan:  Pt interventions:  Established rapport, Discussed [facility] model of care vs specialty mental health, Conducted functional assessment, [place]-setting to identify pt's primary goals for [facility] visit / overall health, Supportive techniques, Discussed potential treatments for  depression, Provided education on the use of medication to treat  depression, Discussed various factors related to the development and maintenance of  depression (including biological, cognitive, behavioral, and environmental factors), ACT interventions and treatment planning    Pt Behavioral Change Plan:   Pt set goals to 1) follow-up with PCP re: Anastasia Arvizu testing and medications 2)  Return in about 2 weeks (around 10/13/2021).

## 2021-10-07 ENCOUNTER — OFFICE VISIT (OUTPATIENT)
Dept: FAMILY MEDICINE CLINIC | Age: 51
End: 2021-10-07
Payer: COMMERCIAL

## 2021-10-07 VITALS
SYSTOLIC BLOOD PRESSURE: 130 MMHG | DIASTOLIC BLOOD PRESSURE: 84 MMHG | OXYGEN SATURATION: 100 % | HEIGHT: 74 IN | HEART RATE: 106 BPM | TEMPERATURE: 98.6 F | WEIGHT: 260 LBS | BODY MASS INDEX: 33.37 KG/M2

## 2021-10-07 DIAGNOSIS — E83.42 HYPOMAGNESEMIA: ICD-10-CM

## 2021-10-07 DIAGNOSIS — D64.9 ANEMIA, UNSPECIFIED TYPE: ICD-10-CM

## 2021-10-07 DIAGNOSIS — R74.8 ELEVATED LIVER ENZYMES: ICD-10-CM

## 2021-10-07 DIAGNOSIS — Q43.3 INTESTINAL MALROTATION: ICD-10-CM

## 2021-10-07 DIAGNOSIS — E87.6 HYPOKALEMIA: Primary | ICD-10-CM

## 2021-10-07 PROCEDURE — 99213 OFFICE O/P EST LOW 20 MIN: CPT | Performed by: NURSE PRACTITIONER

## 2021-10-07 NOTE — PROGRESS NOTES
10/7/2021  Rajeev Valentin II (: 1970)  49 y.o.    ASSESSMENT and PLAN:  Alcira Rizo was seen today for follow-up from hospital.    Diagnoses and all orders for this visit:    Hypokalemia  -     COMPREHENSIVE METABOLIC PANEL; Future  -Recheck today    Elevated liver enzymes  -     COMPREHENSIVE METABOLIC PANEL; Future  -Monitor liver enzymes. Anemia, unspecified type  -     CBC; Future  -Recheck today. Hypomagnesemia  -     MAGNESIUM; Future  -Recheck today. Intestinal malrotation  -Stable. Needs to f/u with GI    Smoking Cessation  -On nicotine patch, weaning on cigarettes. -Needs to reschedule hernia surgery. Return in about 1 month (around 2021), or if symptoms worsen or fail to improve. HPI  Was recently in the hospital for intestinal malrotation, acute encephalopathy, and bacterial pneumonia. Sent home with antibiotics. Using Nicoderm 7 mg/24hr, had one cigarette yesterday otherwise hasn't been smoking. Patient states overall is feeling well, but very tired. Denies fevers, body aches, chills, palpitations, sob, chest pain. Still having some anxiety episodes, Buspar prn is helping. Denies any urinary symptoms. Mild diarrhea <3 times a day, hx of ibs. Appetite has been good. Needs to follow up with primary GI, general surgery, and cardio. Will need cardiac clearance prior to surgery. Review of Systems   Constitutional: Positive for fatigue. Negative for activity change, appetite change, chills, fever and unexpected weight change. HENT: Negative. Respiratory: Negative for cough, chest tightness, shortness of breath and wheezing. Cardiovascular: Negative for chest pain, palpitations and leg swelling. Gastrointestinal: Negative for abdominal distention, abdominal pain, constipation, diarrhea, nausea and vomiting. Genitourinary: Negative. Musculoskeletal: Negative. Skin: Negative.     Neurological: Negative for dizziness, weakness, light-headedness, numbness and headaches. Hematological: Negative. Psychiatric/Behavioral: Negative. Allergies, past medical history, family history, and social history reviewed and unchanged from previous encounter.      Current Outpatient Medications   Medication Sig Dispense Refill    nicotine (NICODERM CQ) 7 MG/24HR Place 1 patch onto the skin daily for 14 days 14 patch 0    cyclobenzaprine (FLEXERIL) 10 MG tablet TAKE ONE TABLET BY MOUTH THREE TIMES A DAY AS NEEDED FOR MUSCLE SPASMS 90 tablet 1    diphenhydrAMINE (BENADRYL) 25 MG tablet Take 1 tablet by mouth every 6 hours as needed for Itching Pt report takes for upset stomach in AM as needed 30 tablet 1    atorvastatin (LIPITOR) 40 MG tablet TAKE ONE TABLET BY MOUTH DAILY 90 tablet 0    pantoprazole (PROTONIX) 40 MG tablet TAKE ONE TABLET BY MOUTH TWICE A  tablet 0    metoprolol succinate (TOPROL XL) 50 MG extended release tablet TAKE ONE TABLET BY MOUTH DAILY 90 tablet 0    lurasidone (LATUDA) 20 MG TABS tablet Take 1 tablet by mouth daily 30 tablet 0    DULoxetine (CYMBALTA) 60 MG extended release capsule Take 1 capsule by mouth 2 times daily 60 capsule 4    busPIRone (BUSPAR) 10 MG tablet TAKE TWO TABLETS BY MOUTH TWICE A  tablet 5    furosemide (LASIX) 20 MG tablet Take 1 tablet by mouth daily 90 tablet 1    ondansetron (ZOFRAN) 4 MG tablet Take 1 tablet by mouth every 8 hours as needed for Nausea or Vomiting 40 tablet 2    potassium chloride (KLOR-CON M) 20 MEQ extended release tablet Take 1 tablet by mouth daily 30 tablet 0    albuterol sulfate HFA (VENTOLIN HFA) 108 (90 Base) MCG/ACT inhaler Inhale 2 puffs into the lungs every 6 hours as needed for Wheezing or Shortness of Breath 1 Inhaler 6    sildenafil (REVATIO) 20 MG tablet Take 3 tablets by mouth daily as needed (ED) 30 tablet 5    SPIRIVA RESPIMAT 2.5 MCG/ACT AERS inhaler INHALE TWO PUFFS BY MOUTH DAILY 1 Inhaler 5    valsartan (DIOVAN) 80 MG tablet TAKE ONE TABLET BY MOUTH DAILY 90 tablet 1    CHANTIX 1 MG tablet TAKE ONE TABLET BY MOUTH TWICE A DAY 56 tablet 4    aspirin 81 MG tablet Take 81 mg by mouth daily        No current facility-administered medications for this visit. Vitals:    10/07/21 1302   BP: 130/84   Site: Right Upper Arm   Position: Sitting   Cuff Size: Large Adult   Pulse: 106   Temp: 98.6 °F (37 °C)   TempSrc: Oral   SpO2: 100%   Weight: 260 lb (117.9 kg)   Height: 6' 1.5\" (1.867 m)     Estimated body mass index is 33.84 kg/m² as calculated from the following:    Height as of this encounter: 6' 1.5\" (1.867 m). Weight as of this encounter: 260 lb (117.9 kg). Physical Exam  Vitals reviewed. Constitutional:       General: He is not in acute distress. Appearance: Normal appearance. He is normal weight. He is not ill-appearing or toxic-appearing. HENT:      Head: Normocephalic and atraumatic. Nose: Nose normal.   Eyes:      Conjunctiva/sclera: Conjunctivae normal.   Cardiovascular:      Rate and Rhythm: Normal rate and regular rhythm. Pulses: Normal pulses. Heart sounds: Normal heart sounds. Pulmonary:      Effort: Pulmonary effort is normal. No respiratory distress. Breath sounds: Normal breath sounds. No wheezing or rhonchi. Abdominal:      General: Abdomen is flat. Bowel sounds are normal.      Palpations: Abdomen is soft. Tenderness: There is no abdominal tenderness. Musculoskeletal:         General: Normal range of motion. Cervical back: Normal range of motion and neck supple. Skin:     General: Skin is warm and dry. Capillary Refill: Capillary refill takes less than 2 seconds. Neurological:      General: No focal deficit present. Mental Status: He is alert and oriented to person, place, and time. Mental status is at baseline. Psychiatric:         Mood and Affect: Mood normal.         Behavior: Behavior normal.         Thought Content:  Thought content normal.         Judgment: Judgment normal.

## 2021-10-08 DIAGNOSIS — R79.0 LOW MAGNESIUM LEVEL: ICD-10-CM

## 2021-10-08 DIAGNOSIS — D64.9 ANEMIA, UNSPECIFIED TYPE: Primary | ICD-10-CM

## 2021-10-11 ENCOUNTER — CARE COORDINATION (OUTPATIENT)
Dept: CARE COORDINATION | Age: 51
End: 2021-10-11

## 2021-10-11 NOTE — CARE COORDINATION
ACM attempted to outreach to patient but unable to leave message as voicemail box is full. ACM will attempt at a later date.

## 2021-10-17 ASSESSMENT — ENCOUNTER SYMPTOMS
NAUSEA: 0
ABDOMINAL PAIN: 0
COUGH: 0
WHEEZING: 0
VOMITING: 0
CONSTIPATION: 0
ABDOMINAL DISTENTION: 0
DIARRHEA: 0
SHORTNESS OF BREATH: 0
CHEST TIGHTNESS: 0

## 2021-10-19 ENCOUNTER — CARE COORDINATION (OUTPATIENT)
Dept: CARE COORDINATION | Age: 51
End: 2021-10-19

## 2021-10-19 SDOH — HEALTH STABILITY: PHYSICAL HEALTH: ON AVERAGE, HOW MANY DAYS PER WEEK DO YOU ENGAGE IN MODERATE TO STRENUOUS EXERCISE (LIKE A BRISK WALK)?: 0 DAYS

## 2021-10-19 SDOH — ECONOMIC STABILITY: HOUSING INSECURITY
IN THE LAST 12 MONTHS, WAS THERE A TIME WHEN YOU DID NOT HAVE A STEADY PLACE TO SLEEP OR SLEPT IN A SHELTER (INCLUDING NOW)?: NO

## 2021-10-19 SDOH — HEALTH STABILITY: PHYSICAL HEALTH: ON AVERAGE, HOW MANY MINUTES DO YOU ENGAGE IN EXERCISE AT THIS LEVEL?: 0 MIN

## 2021-10-19 SDOH — ECONOMIC STABILITY: HOUSING INSECURITY: IN THE LAST 12 MONTHS, HOW MANY PLACES HAVE YOU LIVED?: 1

## 2021-10-19 SDOH — ECONOMIC STABILITY: INCOME INSECURITY: IN THE LAST 12 MONTHS, WAS THERE A TIME WHEN YOU WERE NOT ABLE TO PAY THE MORTGAGE OR RENT ON TIME?: NO

## 2021-10-19 ASSESSMENT — SOCIAL DETERMINANTS OF HEALTH (SDOH)
HOW OFTEN DO YOU ATTEND CHURCH OR RELIGIOUS SERVICES?: NEVER
HOW OFTEN DO YOU GET TOGETHER WITH FRIENDS OR RELATIVES?: THREE TIMES A WEEK
DO YOU BELONG TO ANY CLUBS OR ORGANIZATIONS SUCH AS CHURCH GROUPS UNIONS, FRATERNAL OR ATHLETIC GROUPS, OR SCHOOL GROUPS?: NO
HOW OFTEN DO YOU ATTENT MEETINGS OF THE CLUB OR ORGANIZATION YOU BELONG TO?: NEVER
IN A TYPICAL WEEK, HOW MANY TIMES DO YOU TALK ON THE PHONE WITH FAMILY, FRIENDS, OR NEIGHBORS?: THREE TIMES A WEEK

## 2021-10-19 ASSESSMENT — LIFESTYLE VARIABLES: HOW OFTEN DO YOU HAVE A DRINK CONTAINING ALCOHOL: NEVER

## 2021-10-19 NOTE — CARE COORDINATION
(LIPITOR) 40 MG tablet TAKE ONE TABLET BY MOUTH DAILY 9/7/21   Emily Demarco MD   pantoprazole (PROTONIX) 40 MG tablet TAKE ONE TABLET BY MOUTH TWICE A DAY 9/7/21   Rosaura Edward MD   metoprolol succinate (TOPROL XL) 50 MG extended release tablet TAKE ONE TABLET BY MOUTH DAILY 9/7/21   Emily Demarco MD   lurasidone (LATUDA) 20 MG TABS tablet Take 1 tablet by mouth daily 8/20/21   Emily Demarco MD   DULoxetine (CYMBALTA) 60 MG extended release capsule Take 1 capsule by mouth 2 times daily 5/27/21   AVE Mehta   busPIRone (BUSPAR) 10 MG tablet TAKE TWO TABLETS BY MOUTH TWICE A DAY 5/27/21   AVE Mehta   furosemide (LASIX) 20 MG tablet Take 1 tablet by mouth daily 5/27/21   AVE Mehta   ondansetron (ZOFRAN) 4 MG tablet Take 1 tablet by mouth every 8 hours as needed for Nausea or Vomiting 5/27/21   AVE Mehta   potassium chloride (KLOR-CON M) 20 MEQ extended release tablet Take 1 tablet by mouth daily 5/27/21   AVE Mehta   albuterol sulfate HFA (VENTOLIN HFA) 108 (90 Base) MCG/ACT inhaler Inhale 2 puffs into the lungs every 6 hours as needed for Wheezing or Shortness of Breath 5/27/21   AVE Mehta   sildenafil (REVATIO) 20 MG tablet Take 3 tablets by mouth daily as needed (ED) 5/27/21   AVE Mehta   SPIRIVA RESPIMAT 2.5 MCG/ACT AERS inhaler INHALE TWO PUFFS BY MOUTH DAILY 5/26/21   Austin Isaac MD   valsartan (DIOVAN) 80 MG tablet TAKE ONE TABLET BY MOUTH DAILY 4/27/21   Emily Demarco MD   CHANTIX 1 MG tablet TAKE ONE TABLET BY MOUTH TWICE A DAY 3/30/21   RAH Soni CNP   aspirin 81 MG tablet Take 81 mg by mouth daily     Historical Provider, MD       Future Appointments   Date Time Provider Alejandra Grubbs   10/25/2021  3:00 PM Marcelo Santorsola العلي [place], PSYD [facility]     ,   COPD Assessment    Does the patient understand envrionmental exposure?: Yes  Is the patient able to verbalize Rescue vs. Long Acting medications?: Yes  Does the patient have a nebulizer?: Yes  Does the patient use a space with inhaled medications?: No     No patient-reported symptoms         Symptoms:         and   General Assessment    Do you have any symptoms that are causing concern?: No

## 2021-10-22 DIAGNOSIS — K22.70 BARRETT'S ESOPHAGUS DETERMINED BY ENDOSCOPY: ICD-10-CM

## 2021-10-22 DIAGNOSIS — K21.9 GASTROESOPHAGEAL REFLUX DISEASE WITHOUT ESOPHAGITIS: ICD-10-CM

## 2021-10-25 NOTE — TELEPHONE ENCOUNTER
Refill Request     Last Seen: Last Seen Department: 10/7/2021  Last Seen by PCP: 8/20/2021    Last Written: 9/7/2021    Next Appointment:   No future appointments. Message to [facility] to schedule appointment.          Requested Prescriptions     Pending Prescriptions Disp Refills    pantoprazole (PROTONIX) 40 MG tablet [Pharmacy Med Name: PANTOPRAZOLE SOD DR 40 MG TAB] 60 tablet      Sig: TAKE ONE TABLET BY MOUTH TWICE A DAY

## 2021-10-26 RX ORDER — PANTOPRAZOLE SODIUM 40 MG/1
TABLET, DELAYED RELEASE ORAL
Qty: 60 TABLET | Refills: 5 | Status: SHIPPED | OUTPATIENT
Start: 2021-10-26 | End: 2022-06-15

## 2021-11-01 ENCOUNTER — HOSPITAL ENCOUNTER (OUTPATIENT)
Dept: GENERAL RADIOLOGY | Age: 51
Discharge: HOME | End: 2021-11-01
Payer: COMMERCIAL

## 2021-11-01 ENCOUNTER — TELEPHONE (OUTPATIENT)
Dept: FAMILY MEDICINE CLINIC | Age: 51
End: 2021-11-01

## 2021-11-01 ENCOUNTER — OFFICE VISIT (OUTPATIENT)
Dept: FAMILY MEDICINE CLINIC | Age: 51
End: 2021-11-01
Payer: COMMERCIAL

## 2021-11-01 ENCOUNTER — HOSPITAL ENCOUNTER (OUTPATIENT)
Age: 51
Discharge: HOME | End: 2021-11-01
Payer: COMMERCIAL

## 2021-11-01 VITALS
DIASTOLIC BLOOD PRESSURE: 102 MMHG | HEART RATE: 96 BPM | SYSTOLIC BLOOD PRESSURE: 140 MMHG | HEIGHT: 71 IN | BODY MASS INDEX: 34.72 KG/M2 | TEMPERATURE: 98.6 F | OXYGEN SATURATION: 94 % | WEIGHT: 248 LBS

## 2021-11-01 DIAGNOSIS — R06.02 SOB (SHORTNESS OF BREATH): ICD-10-CM

## 2021-11-01 DIAGNOSIS — R05.9 COUGH: ICD-10-CM

## 2021-11-01 DIAGNOSIS — R06.02 SOB (SHORTNESS OF BREATH): Primary | ICD-10-CM

## 2021-11-01 LAB
ANION GAP SERPL CALCULATED.3IONS-SCNC: 16 MMOL/L (ref 3–16)
BASOPHILS ABSOLUTE: 0.1 K/UL (ref 0–0.2)
BASOPHILS RELATIVE PERCENT: 0.6 %
BUN BLDV-MCNC: 9 MG/DL (ref 7–20)
CALCIUM SERPL-MCNC: 9.5 MG/DL (ref 8.3–10.6)
CHLORIDE BLD-SCNC: 104 MMOL/L (ref 99–110)
CO2: 20 MMOL/L (ref 21–32)
CREAT SERPL-MCNC: 0.8 MG/DL (ref 0.9–1.3)
EOSINOPHILS ABSOLUTE: 0.3 K/UL (ref 0–0.6)
EOSINOPHILS RELATIVE PERCENT: 3.2 %
GFR AFRICAN AMERICAN: >60
GFR NON-AFRICAN AMERICAN: >60
GLUCOSE BLD-MCNC: 102 MG/DL (ref 70–99)
HCT VFR BLD CALC: 43.9 % (ref 40.5–52.5)
HEMOGLOBIN: 14.3 G/DL (ref 13.5–17.5)
LYMPHOCYTES ABSOLUTE: 1.8 K/UL (ref 1–5.1)
LYMPHOCYTES RELATIVE PERCENT: 23 %
MCH RBC QN AUTO: 29.7 PG (ref 26–34)
MCHC RBC AUTO-ENTMCNC: 32.7 G/DL (ref 31–36)
MCV RBC AUTO: 90.9 FL (ref 80–100)
MONOCYTES ABSOLUTE: 0.5 K/UL (ref 0–1.3)
MONOCYTES RELATIVE PERCENT: 6 %
NEUTROPHILS ABSOLUTE: 5.3 K/UL (ref 1.7–7.7)
NEUTROPHILS RELATIVE PERCENT: 67.2 %
PDW BLD-RTO: 14.3 % (ref 12.4–15.4)
PLATELET # BLD: 283 K/UL (ref 135–450)
PMV BLD AUTO: 8.6 FL (ref 5–10.5)
POTASSIUM SERPL-SCNC: 4.1 MMOL/L (ref 3.5–5.1)
PRO-BNP: 78 PG/ML (ref 0–124)
RBC # BLD: 4.83 M/UL (ref 4.2–5.9)
SODIUM BLD-SCNC: 140 MMOL/L (ref 136–145)
WBC # BLD: 7.9 K/UL (ref 4–11)

## 2021-11-01 PROCEDURE — 71046 X-RAY EXAM CHEST 2 VIEWS: CPT

## 2021-11-01 PROCEDURE — 99213 OFFICE O/P EST LOW 20 MIN: CPT | Performed by: NURSE PRACTITIONER

## 2021-11-01 RX ORDER — BENZONATATE 200 MG/1
200 CAPSULE ORAL 3 TIMES DAILY PRN
Qty: 30 CAPSULE | Refills: 0 | Status: SHIPPED | OUTPATIENT
Start: 2021-11-01 | End: 2021-11-11

## 2021-11-01 RX ORDER — GUAIFENESIN 600 MG/1
600 TABLET, EXTENDED RELEASE ORAL 2 TIMES DAILY
Qty: 30 TABLET | Refills: 0 | Status: SHIPPED | OUTPATIENT
Start: 2021-11-01 | End: 2021-11-16

## 2021-11-01 NOTE — PROGRESS NOTES
2021  Cole Valentin II (: 1970)  49 y.o.    ASSESSMENT and PLAN:  Tiana Trujillo was seen today for discuss medications. Diagnoses and all orders for this visit:    SOB (shortness of breath)  -     BASIC METABOLIC PANEL; Future  -     CBC WITH AUTO DIFFERENTIAL; Future  -     BRAIN NATRIURETIC PEPTIDE (BNP); Future  -     XR CHEST STANDARD (2 VW); Future  -Rule out pneumonia. -If negative, treat symptomatically. Rest, stay hydrated. -Side effects of medications reviewed, alarm signs and symptoms reviewed, present to ER or call office if experiencing fevers unrelieved with tylenol, increased sob, cp, or worsening symptoms. Cough  -     BASIC METABOLIC PANEL; Future  -     CBC WITH AUTO DIFFERENTIAL; Future  -     BRAIN NATRIURETIC PEPTIDE (BNP); Future  -     XR CHEST STANDARD (2 VW);   -Rule out pneumonia. -If negative, treat symptomatically. Rest, stay hydrated. -Side effects of medications reviewed, alarm signs and symptoms reviewed, present to ER or call office if experiencing fevers unrelieved with tylenol, increased sob, cp, or worsening symptoms. Return in about 3 months (around 2022), or if symptoms worsen or fail to improve. HPI    Presenting to for fatigue, and feels like he may have pneumonia starting on Friday. Symptoms include cough, chest tightness, sob on exertion,   Denies fevers, n/v/d, lightheadedness/dizziness. Uses inhaler intermittently. Denies sick exposures. Was vaccinated for covid. Has tried benadryl without relief. Only smokes one cigarette a day, using nicotine patches. Has been taking lasix daily. Appetite good, sleeping well. Review of Systems   Constitutional: Negative for activity change, appetite change, chills, diaphoresis, fatigue, fever and unexpected weight change. HENT: Positive for congestion. Negative for ear discharge, ear pain and postnasal drip. Respiratory: Positive for cough, chest tightness and shortness of breath.  Negative for wheezing. Cardiovascular: Negative for chest pain, palpitations and leg swelling. Gastrointestinal: Negative for abdominal distention, abdominal pain, constipation, diarrhea, nausea and vomiting. Genitourinary: Negative. Musculoskeletal: Negative. Skin: Negative. Neurological: Negative for dizziness, weakness, light-headedness, numbness and headaches. Hematological: Negative. Psychiatric/Behavioral: Negative. Allergies, past medical history, family history, and social history reviewed and unchanged from previous encounter.      Current Outpatient Medications   Medication Sig Dispense Refill    pantoprazole (PROTONIX) 40 MG tablet TAKE ONE TABLET BY MOUTH TWICE A DAY 60 tablet 5    cyclobenzaprine (FLEXERIL) 10 MG tablet TAKE ONE TABLET BY MOUTH THREE TIMES A DAY AS NEEDED FOR MUSCLE SPASMS 90 tablet 1    diphenhydrAMINE (BENADRYL) 25 MG tablet Take 1 tablet by mouth every 6 hours as needed for Itching Pt report takes for upset stomach in AM as needed 30 tablet 1    atorvastatin (LIPITOR) 40 MG tablet TAKE ONE TABLET BY MOUTH DAILY 90 tablet 0    metoprolol succinate (TOPROL XL) 50 MG extended release tablet TAKE ONE TABLET BY MOUTH DAILY 90 tablet 0    lurasidone (LATUDA) 20 MG TABS tablet Take 1 tablet by mouth daily 30 tablet 0    DULoxetine (CYMBALTA) 60 MG extended release capsule Take 1 capsule by mouth 2 times daily 60 capsule 4    busPIRone (BUSPAR) 10 MG tablet TAKE TWO TABLETS BY MOUTH TWICE A  tablet 5    furosemide (LASIX) 20 MG tablet Take 1 tablet by mouth daily 90 tablet 1    ondansetron (ZOFRAN) 4 MG tablet Take 1 tablet by mouth every 8 hours as needed for Nausea or Vomiting 40 tablet 2    potassium chloride (KLOR-CON M) 20 MEQ extended release tablet Take 1 tablet by mouth daily 30 tablet 0    albuterol sulfate HFA (VENTOLIN HFA) 108 (90 Base) MCG/ACT inhaler Inhale 2 puffs into the lungs every 6 hours as needed for Wheezing or Shortness of Breath 1 Inhaler 6    sildenafil (REVATIO) 20 MG tablet Take 3 tablets by mouth daily as needed (ED) 30 tablet 5    SPIRIVA RESPIMAT 2.5 MCG/ACT AERS inhaler INHALE TWO PUFFS BY MOUTH DAILY 1 Inhaler 5    valsartan (DIOVAN) 80 MG tablet TAKE ONE TABLET BY MOUTH DAILY 90 tablet 1    CHANTIX 1 MG tablet TAKE ONE TABLET BY MOUTH TWICE A DAY 56 tablet 4    aspirin 81 MG tablet Take 81 mg by mouth daily       nicotine (NICODERM CQ) 7 MG/24HR Place 1 patch onto the skin daily for 14 days 14 patch 0     No current facility-administered medications for this visit. Vitals:    11/01/21 1308   BP: (!) 140/102   Site: Right Upper Arm   Position: Sitting   Cuff Size: Large Adult   Pulse: 96   Temp: 98.6 °F (37 °C)   TempSrc: Oral   SpO2: 94%   Weight: 248 lb (112.5 kg)   Height: 5' 11\" (1.803 m)     Estimated body mass index is 34.59 kg/m² as calculated from the following:    Height as of this encounter: 5' 11\" (1.803 m). Weight as of this encounter: 248 lb (112.5 kg). Physical Exam  Vitals reviewed. Constitutional:       Appearance: Normal appearance. He is normal weight. HENT:      Head: Normocephalic and atraumatic. Nose: Nose normal.      Mouth/Throat:      Mouth: Mucous membranes are moist.   Eyes:      Extraocular Movements: Extraocular movements intact. Conjunctiva/sclera: Conjunctivae normal.      Pupils: Pupils are equal, round, and reactive to light. Cardiovascular:      Rate and Rhythm: Normal rate and regular rhythm. Pulses: Normal pulses. Heart sounds: Normal heart sounds. Pulmonary:      Effort: Pulmonary effort is normal. No respiratory distress. Breath sounds: Normal breath sounds. No wheezing or rhonchi. Chest:      Chest wall: No tenderness. Abdominal:      General: Abdomen is flat. Bowel sounds are normal.      Palpations: Abdomen is soft. Tenderness: There is no abdominal tenderness. Musculoskeletal:         General: Normal range of motion.       Cervical

## 2021-11-01 NOTE — TELEPHONE ENCOUNTER
[facility] with [facility] reports cxr is complete and resulted

## 2021-11-06 ASSESSMENT — ENCOUNTER SYMPTOMS
COUGH: 1
DIARRHEA: 0
WHEEZING: 0
CONSTIPATION: 0
CHEST TIGHTNESS: 1
SHORTNESS OF BREATH: 1
ABDOMINAL PAIN: 0
ABDOMINAL DISTENTION: 0
NAUSEA: 0
VOMITING: 0

## 2021-11-17 NOTE — TELEPHONE ENCOUNTER
Pt GI doc said he was not going to do his pre op unless he went to see a cardiologist pt said that his surgery is in limbo for the 4th time now  Per Clara Stewart, said pt didn't need to see a cardiologist however the GI doc stated that he did need to see a cardiologist he has still not heard from anyone and that pt stated he has been so depressed that he literally has not done nothing  Pt stated that he can not work with a hernia and pt has explained this to the surgeon and it has now been a week or almost two please advise

## 2021-11-17 NOTE — TELEPHONE ENCOUNTER
UMESH-  I have called Cardiologist referral that is in pt chart per pt ok to call and help get this scheduled. Spoke with Armida Suresh at the office and she stated that he has a lot of no shows last one was in September. Pt was a no show. Armida Suresh has looked at the schedule and has scheduled pt for 12/10 at 2pm with dr Antonio Kenyon at [facility] at  [address] if pt does not show dont know when the nextt soonest appt with them will be or if he will be able to be seen  Called pt back and advised of the appt day and time and pt thanked me and has all the information down to go see the cardiologist  Asked pt if there was anything else that could be done and pt stated he thinks this is good for now!

## 2021-11-18 RX ORDER — VALSARTAN 80 MG/1
TABLET ORAL
Qty: 30 TABLET | Refills: 2 | Status: SHIPPED | OUTPATIENT
Start: 2021-11-18 | End: 2022-03-08 | Stop reason: SDUPTHER

## 2021-11-19 NOTE — TELEPHONE ENCOUNTER
Patient states the previous notes were to inform Dr Adeel Rendon what was going on with the preop and referrals; he has an appointment with the cardiologist and is also scheduled in February here for his 3 month follow up. He states if he needs further referrals he will schedule to discuss.

## 2021-11-22 NOTE — TELEPHONE ENCOUNTER
Refill Request     Last Seen: Last Seen Department: 11/1/2021  Last Seen by PCP: 8/20/2021    Last Written: 9/10/2021 #90 x 1    Next Appointment:   Future Appointments   Date Time Provider Alejandra Chowdaryi   12/10/2021  2:15 PM Jessica Menezes MD [facility]   2/8/2022  3:30 PM Loren Ahumada, MD [facility] [facility] [place]   He is also asking for something to stop smoking he was down to 1 cig a day until Chantix was taken off the market and now he is up to 5 cigs a day.  He cannot take Wellbutrin as it caused hallucinations     Future appointment scheduled      Requested Prescriptions     Pending Prescriptions Disp Refills    cyclobenzaprine (FLEXERIL) 10 MG tablet 90 tablet 1     Sig: TAKE ONE TABLET BY MOUTH THREE TIMES A DAY AS NEEDED FOR MUSCLE SPASMS

## 2021-11-23 RX ORDER — CYCLOBENZAPRINE HCL 10 MG
TABLET ORAL
Qty: 90 TABLET | Refills: 1 | Status: SHIPPED | OUTPATIENT
Start: 2021-11-23 | End: 2022-01-24

## 2021-11-26 ENCOUNTER — CARE COORDINATION (OUTPATIENT)
Dept: CARE COORDINATION | Age: 51
End: 2021-11-26

## 2021-12-10 ENCOUNTER — OFFICE VISIT (OUTPATIENT)
Dept: CARDIOLOGY CLINIC | Age: 51
End: 2021-12-10
Payer: COMMERCIAL

## 2021-12-10 VITALS
HEART RATE: 95 BPM | WEIGHT: 249.8 LBS | DIASTOLIC BLOOD PRESSURE: 88 MMHG | OXYGEN SATURATION: 96 % | SYSTOLIC BLOOD PRESSURE: 132 MMHG | HEIGHT: 71 IN | BODY MASS INDEX: 34.97 KG/M2

## 2021-12-10 DIAGNOSIS — Z01.810 PREOP CARDIOVASCULAR EXAM: ICD-10-CM

## 2021-12-10 DIAGNOSIS — I25.110 CORONARY ARTERY DISEASE INVOLVING NATIVE CORONARY ARTERY OF NATIVE HEART WITH UNSTABLE ANGINA PECTORIS (HCC): Primary | ICD-10-CM

## 2021-12-10 DIAGNOSIS — E78.2 MIXED HYPERLIPIDEMIA: ICD-10-CM

## 2021-12-10 DIAGNOSIS — R07.2 PRECORDIAL PAIN: ICD-10-CM

## 2021-12-10 PROCEDURE — G8427 DOCREV CUR MEDS BY ELIG CLIN: HCPCS | Performed by: INTERNAL MEDICINE

## 2021-12-10 PROCEDURE — G8417 CALC BMI ABV UP PARAM F/U: HCPCS | Performed by: INTERNAL MEDICINE

## 2021-12-10 PROCEDURE — G8484 FLU IMMUNIZE NO ADMIN: HCPCS | Performed by: INTERNAL MEDICINE

## 2021-12-10 PROCEDURE — 99244 OFF/OP CNSLTJ NEW/EST MOD 40: CPT | Performed by: INTERNAL MEDICINE

## 2021-12-10 RX ORDER — AMLODIPINE BESYLATE 5 MG/1
5 TABLET ORAL DAILY
Qty: 90 TABLET | Refills: 3 | Status: SHIPPED | OUTPATIENT
Start: 2021-12-10 | End: 2022-02-08 | Stop reason: ALTCHOICE

## 2021-12-10 RX ORDER — ATORVASTATIN CALCIUM 80 MG/1
TABLET, FILM COATED ORAL
Qty: 90 TABLET | Refills: 3 | Status: SHIPPED | OUTPATIENT
Start: 2021-12-10

## 2021-12-10 NOTE — LETTER
Kathleen Suarez  1970    [facility]   Cardiac Consultation    Referring Provider:  Santa Garcia MD     Chief Complaint   Patient presents with    New Patient    Cardiac Clearance      Patrica Rick II   1970    History of Present Illness:    Kathleen Suarez is a 49 y.o. male who is here today as a NEW patient consult for cardiology, last seen 10/2017, for a past medical history of coronary artery disease, hypertension, hyperlipidemia, and low normal EF. He underwent a left heart cath in 2017 which showed non obstructive CAD. He is here today for preoperative cardiac risk assessment for hernia repair surgery. He states he has chest/back pain which has been uncharged for years. Occurs with exertion and feels like a pulled muscle. Radiates to back. No assoc SOB. His blood pressure at home is high at times. He is tolerating his medications and taking them as prescribed. Patient currently denies any weight gain, edema, palpitations, chest pain, shortness of breath, dizziness, and syncope. Past Medical History:   has a past medical history of ADHD (attention deficit hyperactivity disorder), Alcoholic hepatitis, Arthritis, Ha esophagus, CAD (coronary artery disease), Chronic back pain, COPD (chronic obstructive pulmonary disease) ([address] Depression, ED (erectile dysfunction), Elevated LFTs, GERD (gastroesophageal reflux disease), History of intravenous drug abuse ([address] Hyperlipidemia, Hypertension, and Pleurisy. Surgical History:   has a past surgical history that includes Lung surgery; Spine surgery (1995); Arm Surgery (Left, 12/10/2015); back surgery; and Colonoscopy. Social History:   reports that he has been smoking cigarettes. He has a 16.50 pack-year smoking history. He quit smokeless tobacco use about 6 years ago. He reports current alcohol use. He reports current drug use. Drugs: Marijuana [place]), Other-see comments, and Opiates .      Family History:  family history includes Arthritis in his mother; Asthma in his father; Other in his mother. Home Medications:  Prior to Admission medications    Medication Sig Start Date End Date Taking?  Authorizing Provider   cyclobenzaprine (FLEXERIL) 10 MG tablet TAKE ONE TABLET BY MOUTH THREE TIMES A DAY AS NEEDED FOR MUSCLE SPASMS 11/23/21  Yes Salty Malave MD   valsartan (DIOVAN) 80 MG tablet TAKE ONE TABLET BY MOUTH DAILY 11/18/21  Yes RAH Abreu CNP   pantoprazole (PROTONIX) 40 MG tablet TAKE ONE TABLET BY MOUTH TWICE A DAY 10/26/21  Yes Salty Malave MD   nicotine (NICODERM CQ) 7 MG/24HR Place 1 patch onto the skin daily for 14 days 9/29/21 12/10/21 Yes RAH Milian CNP   diphenhydrAMINE (BENADRYL) 25 MG tablet Take 1 tablet by mouth every 6 hours as needed for Itching Pt report takes for upset stomach in AM as needed 9/10/21  Yes RAH Abreu CNP   atorvastatin (LIPITOR) 40 MG tablet TAKE ONE TABLET BY MOUTH DAILY 9/7/21  Yes Salty Malave MD   metoprolol succinate (TOPROL XL) 50 MG extended release tablet TAKE ONE TABLET BY MOUTH DAILY 9/7/21  Yes Salty Malave MD   lurasidone (LATUDA) 20 MG TABS tablet Take 1 tablet by mouth daily 8/20/21  Yes Salty Malave MD   DULoxetine (CYMBALTA) 60 MG extended release capsule Take 1 capsule by mouth 2 times daily 5/27/21  Yes AVE Cook   busPIRone (BUSPAR) 10 MG tablet TAKE TWO TABLETS BY MOUTH TWICE A DAY 5/27/21  Yes AVE Cook   furosemide (LASIX) 20 MG tablet Take 1 tablet by mouth daily 5/27/21  Yes AVE Cook   ondansetron (ZOFRAN) 4 MG tablet Take 1 tablet by mouth every 8 hours as needed for Nausea or Vomiting 5/27/21  Yes AVE Cook   potassium chloride (KLOR-CON M) 20 MEQ extended release tablet Take 1 tablet by mouth daily 5/27/21  Yes AVE Cook   albuterol sulfate HFA (VENTOLIN HFA) 108 (90 Base) MCG/ACT inhaler Inhale 2 puffs into the lungs every 6 hours as needed for Wheezing or Shortness of Breath 5/27/21  Yes AVE Valera   sildenafil (REVATIO) 20 MG tablet Take 3 tablets by mouth daily as needed (ED) 5/27/21  Yes AVE Val[address]   SPIRIVA RESPIMAT 2.5 MCG/ACT AERS inhaler INHALE TWO PUFFS BY MOUTH DAILY 5/26/21  Yes Eli Hinton MD   CHANTIX 1 MG tablet TAKE ONE TABLET BY MOUTH TWICE A DAY 3/30/21  Yes RAH Stahl - CNP   aspirin 81 MG tablet Take 81 mg by mouth daily    Yes Historical Provider, MD        Allergies:  Lexapro [escitalopram oxalate], Morphine, Prozac [fluoxetine hcl], and Wellbutrin [bupropion]     Review of Systems:   · Constitutional: there has been no unanticipated weight loss. There's been no change in energy level, sleep pattern, or activity level. · Eyes: No visual changes or diplopia. No scleral icterus. · ENT: No Headaches, hearing loss or vertigo. No mouth sores or sore throat. · Cardiovascular: Reviewed in HPI  · Respiratory: No cough or wheezing, no sputum production. No hematemesis. · Gastrointestinal: No abdominal pain, appetite loss, blood in stools. No change in bowel or bladder habits. · Genitourinary: No dysuria, trouble voiding, or hematuria. · Musculoskeletal:  No gait disturbance, weakness or joint complaints. · Integumentary: No rash or pruritis. · Neurological: No headache, diplopia, change in muscle strength, numbness or tingling. No change in gait, balance, coordination, mood, affect, memory, mentation, behavior. · Psychiatric: No anxiety, no depression. · Endocrine: No malaise, fatigue or temperature intolerance. No excessive thirst, fluid intake, or urination. No tremor. · Hematologic/Lymphatic: No abnormal bruising or bleeding, blood clots or swollen lymph nodes. · Allergic/Immunologic: No nasal congestion or hives.     Physical Examination:    Vitals:    12/10/21 1414   BP: 132/88   Pulse: 95   SpO2: 96%        Constitutional and General Appearance: NAD   Respiratory:  · Normal excursion and expansion without use of accessory muscles  · Resp Auscultation: Normal breath sounds without dullness  Cardiovascular:  · The apical impulses not displaced  · Heart tones are crisp and normal  · Cervical veins are not engorged  · The carotid upstroke is normal in amplitude and contour without delay or bruit  · Normal S1S2, No S3, No Murmur  · Peripheral pulses are symmetrical and full  · There is no clubbing, cyanosis of the extremities. · No edema  · Femoral Arteries: 2+ and equal  · Pedal Pulses: 2+ and equal   Abdomen:  · No masses or tenderness  · Liver/Spleen: No Abnormalities Noted  Neurological/Psychiatric:  · Alert and oriented in all spheres  · Moves all extremities well  · Exhibits normal gait balance and coordination  · No abnormalities of mood, affect, memory, mentation, or behavior are noted    Stress test 4/12/2016  IMPRESSION:   No areas of persistent decreased activity seen to suggest reversible ischemia or infarct. Mild left ventricular dysfunction as manifest by poor ejection fraction. Given the lack of any  definitive ischemic/infarct changes this could be related to underlying valvular disease or sequela of some form of cardiomyopathy/myocarditis. Echocardiogram 7/20/2017   Conclusions   Summary   Normal left ventricle size, wall thickness and low normal systolic function   with an estimated ejection fraction of 50%. Inferoseptal and basal inferior   wall hypokinesia. Normal left ventricular diastolic filling pressure. No significant valvular heart disease      Left heart cath 8/9/2017  Procedures: LHC, LVG, CA     Mild nonobstructive CAD  Normal LVEF    Echocardiogram 6/29/2021  Summary   Left ventricular systolic function is low normal with ejection fraction   estimated at 50 %. Mild inferoseptal and anteroseptal wall hypokinesis. Left   ventricle size is normal.Mild concentric left ventricular hypertrophy. Normal left ventricular diastolic filling pressure.    No significant valvular heart disease. Assessment:   Chest pain - atypical  Preoperative cardiac risk assessment for hernia repair surgery - needs further ytesting  Mild nonobstructive coronary artery disease by cath 9/2017  Hypertension - suboptimal   History of low normal EF   Echo 6/2021 w/ wall motion abnormalities   Hyperlipidemia - suboptimal   Smoking   Back pain     Plan:  Start amlodipine (Norvasc) 5 mg daily   Increase Lipitor to 80 mg daily   Fasting lipids in 2 months after increasing Lipitor   Smoking cessation encourgaed   Recommend a stress test prior to clearance for surgery- Trailerpopiscan Myoview  Cardiac medications reviewed including indications and pertinent side effects    Check blood pressure and heart rate at home a few times per week- keep a log with dates and times and bring to office visit   Regular exercise and following a healthy diet encouraged   Follow up with me in 3 months     Scribe's attestation: This note was scribed in the presence of Dr. Cydney Wagner M.D. By Anne Silva RN    The scribes documentation has been prepared under my direction and personally reviewed by me in its entirety. I confirm that the note above accurately reflects all work, treatment, procedures, and medical decision making performed by me. Dr. Cydney Wagner MD    Thank you for allowing me to participate in the care of this individual.      Marlen Gilmore M.D., [facility]

## 2021-12-10 NOTE — PATIENT INSTRUCTIONS
Plan:  Start amlodipine (Norvasc) 5 mg daily   Increase Lipitor to 80 mg daily   Fasting lipids in 2 months after increasing Lipitor   Smoking cessation encourgaed   Recommend a stress test prior to clearance for surgery- Lexiscan Myoview  Cardiac medications reviewed including indications and pertinent side effects    Check blood pressure and heart rate at home a few times per week- keep a log with dates and times and bring to office visit   Regular exercise and following a healthy diet encouraged   Follow up with me in 3 months   Your provider has ordered testing for further evaluation. An order/prescription has been included in your paper work.  To schedule outpatient testing, contact Central Scheduling by calling [address] [place] [address] ([phone]).

## 2021-12-10 NOTE — PROGRESS NOTES
[address]   Cardiac Consultation    Referring Provider:  Maria Eugenia Damian MD     Chief Complaint   Patient presents with    New Patient    Cardiac Clearance      Dorian Gaines II   1970    History of Present Illness:    Zebedee Jeans is a 49 y.o. male who is here today as a NEW patient consult for cardiology, last seen 10/2017, for a past medical history of coronary artery disease, hypertension, hyperlipidemia, and low normal EF. He underwent a left heart cath in 2017 which showed non obstructive CAD. He is here today for preoperative cardiac risk assessment for hernia repair surgery. He states he has chest/back pain which has been uncharged for years. Occurs with exertion and feels like a pulled muscle. Radiates to back. No assoc SOB. His blood pressure at home is high at times. He is tolerating his medications and taking them as prescribed. Patient currently denies any weight gain, edema, palpitations, chest pain, shortness of breath, dizziness, and syncope. Past Medical History:   has a past medical history of ADHD (attention deficit hyperactivity disorder), Alcoholic hepatitis, Arthritis, Ha esophagus, CAD (coronary artery disease), Chronic back pain, COPD (chronic obstructive pulmonary disease) ([address] Depression, ED (erectile dysfunction), Elevated LFTs, GERD (gastroesophageal reflux disease), History of intravenous drug abuse ([address] [facility] [address] Hyperlipidemia, Hypertension, and Pleurisy. Surgical History:   has a past surgical history that includes Lung surgery; Spine surgery (1995); Arm Surgery (Left, 12/10/2015); back surgery; and Colonoscopy. Social History:   reports that he has been smoking cigarettes. He has a 16.50 pack-year smoking history. He quit smokeless tobacco use about 6 years ago. He reports current alcohol use. He reports current drug use. Drugs: Marijuana [place]), Other-see comments, and Opiates .      Family History:  family history includes Arthritis in his mother; Asthma in his father; Other in his mother. Home Medications:  Prior to Admission medications    Medication Sig Start Date End Date Taking?  Authorizing Provider   cyclobenzaprine (FLEXERIL) 10 MG tablet TAKE ONE TABLET BY MOUTH THREE TIMES A DAY AS NEEDED FOR MUSCLE SPASMS 11/23/21  Yes Parmjit Mcdaniel MD   valsartan (DIOVAN) 80 MG tablet TAKE ONE TABLET BY MOUTH DAILY 11/18/21  Yes RAH Roman CNP   pantoprazole (PROTONIX) 40 MG tablet TAKE ONE TABLET BY MOUTH TWICE A DAY 10/26/21  Yes Parmjit Mcdaniel MD   nicotine (NICODERM CQ) 7 MG/24HR Place 1 patch onto the skin daily for 14 days 9/29/21 12/10/21 Yes RAH Alexis CNP   diphenhydrAMINE (BENADRYL) 25 MG tablet Take 1 tablet by mouth every 6 hours as needed for Itching Pt report takes for upset stomach in AM as needed 9/10/21  Yes RAH Roman CNP   atorvastatin (LIPITOR) 40 MG tablet TAKE ONE TABLET BY MOUTH DAILY 9/7/21  Yes Parmjit Mcdaniel MD   metoprolol succinate (TOPROL XL) 50 MG extended release tablet TAKE ONE TABLET BY MOUTH DAILY 9/7/21  Yes Parmjit Mcdaniel MD   lurasidone (LATUDA) 20 MG TABS tablet Take 1 tablet by mouth daily 8/20/21  Yes Parmjit Mcdaniel MD   DULoxetine (CYMBALTA) 60 MG extended release capsule Take 1 capsule by mouth 2 times daily 5/27/21  Yes AVE [address]   busPIRone (BUSPAR) 10 MG tablet TAKE TWO TABLETS BY MOUTH TWICE A DAY 5/27/21  Yes AVE [address]   furosemide (LASIX) 20 MG tablet Take 1 tablet by mouth daily 5/27/21  Yes AVE [address]   ondansetron (ZOFRAN) 4 MG tablet Take 1 tablet by mouth every 8 hours as needed for Nausea or Vomiting 5/27/21  Yes AVE [address]   potassium chloride (KLOR-CON M) 20 MEQ extended release tablet Take 1 tablet by mouth daily 5/27/21  Yes AVE [address]   albuterol sulfate HFA (VENTOLIN HFA) 108 (90 Base) MCG/ACT inhaler Inhale 2 puffs into the lungs every 6 hours as needed for Wheezing or Shortness of Breath 5/27/21  Yes AVE [address]   sildenafil (REVATIO) 20 MG tablet Take 3 tablets by mouth daily as needed (ED) 5/27/21  Yes A[address]   SPIRIVA RESPIMAT 2.5 MCG/ACT AERS inhaler INHALE TWO PUFFS BY MOUTH DAILY 5/26/21  Yes Ella Azar MD   CHANTIX 1 MG tablet TAKE ONE TABLET BY MOUTH TWICE A DAY 3/30/21  Yes RAH Tafoya CNP   aspirin 81 MG tablet Take 81 mg by mouth daily    Yes Historical Provider, MD        Allergies:  Lexapro [escitalopram oxalate], Morphine, Prozac [fluoxetine hcl], and Wellbutrin [bupropion]     Review of Systems:   · Constitutional: there has been no unanticipated weight loss. There's been no change in energy level, sleep pattern, or activity level. · Eyes: No visual changes or diplopia. No scleral icterus. · ENT: No Headaches, hearing loss or vertigo. No mouth sores or sore throat. · Cardiovascular: Reviewed in HPI  · Respiratory: No cough or wheezing, no sputum production. No hematemesis. · Gastrointestinal: No abdominal pain, appetite loss, blood in stools. No change in bowel or bladder habits. · Genitourinary: No dysuria, trouble voiding, or hematuria. · Musculoskeletal:  No gait disturbance, weakness or joint complaints. · Integumentary: No rash or pruritis. · Neurological: No headache, diplopia, change in muscle strength, numbness or tingling. No change in gait, balance, coordination, mood, affect, memory, mentation, behavior. · Psychiatric: No anxiety, no depression. · Endocrine: No malaise, fatigue or temperature intolerance. No excessive thirst, fluid intake, or urination. No tremor. · Hematologic/Lymphatic: No abnormal bruising or bleeding, blood clots or swollen lymph nodes. · Allergic/Immunologic: No nasal congestion or hives.     Physical Examination:    Vitals:    12/10/21 1414   BP: 132/88   Pulse: 95   SpO2: 96%        Constitutional and General Appearance: NAD   Respiratory:  · Normal excursion and expansion without use of accessory muscles  · Resp Auscultation: Normal breath sounds without dullness  Cardiovascular:  · The apical impulses not displaced  · Heart tones are crisp and normal  · Cervical veins are not engorged  · The carotid upstroke is normal in amplitude and contour without delay or bruit  · Normal S1S2, No S3, No Murmur  · Peripheral pulses are symmetrical and full  · There is no clubbing, cyanosis of the extremities. · No edema  · Femoral Arteries: 2+ and equal  · Pedal Pulses: 2+ and equal   Abdomen:  · No masses or tenderness  · Liver/Spleen: No Abnormalities Noted  Neurological/Psychiatric:  · Alert and oriented in all spheres  · Moves all extremities well  · Exhibits normal gait balance and coordination  · No abnormalities of mood, affect, memory, mentation, or behavior are noted    Stress test 4/12/2016  IMPRESSION:   No areas of persistent decreased activity seen to suggest reversible ischemia or infarct. Mild left ventricular dysfunction as manifest by poor ejection fraction. Given the lack of any  definitive ischemic/infarct changes this could be related to underlying valvular disease or sequela of some form of cardiomyopathy/myocarditis. Echocardiogram 7/20/2017   Conclusions   Summary   Normal left ventricle size, wall thickness and low normal systolic function   with an estimated ejection fraction of 50%. Inferoseptal and basal inferior   wall hypokinesia. Normal left ventricular diastolic filling pressure. No significant valvular heart disease      Left heart cath 8/9/2017  Procedures: LHC, LVG, CA     Mild nonobstructive CAD  Normal LVEF    Echocardiogram 6/29/2021  Summary   Left ventricular systolic function is low normal with ejection fraction   estimated at 50 %. Mild inferoseptal and anteroseptal wall hypokinesis. Left   ventricle size is normal.Mild concentric left ventricular hypertrophy. Normal left ventricular diastolic filling pressure.    No significant valvular heart disease. Assessment:   Chest pain - atypical  Preoperative cardiac risk assessment for hernia repair surgery - needs further ytesting  Mild nonobstructive coronary artery disease by cath 9/2017  Hypertension - suboptimal   History of low normal EF   Echo 6/2021 w/ wall motion abnormalities   Hyperlipidemia - suboptimal   Smoking   Back pain     Plan:  Start amlodipine (Norvasc) 5 mg daily   Increase Lipitor to 80 mg daily   Fasting lipids in 2 months after increasing Lipitor   Smoking cessation encourgaed   Recommend a stress test prior to clearance for surgery- Lexiscan Myoview  Cardiac medications reviewed including indications and pertinent side effects    Check blood pressure and heart rate at home a few times per week- keep a log with dates and times and bring to office visit   Regular exercise and following a healthy diet encouraged   Follow up with me in 3 months     Scribe's attestation: This note was scribed in the presence of Dr. Cydney Wagner M.D. By Anne Silva RN    The scribes documentation has been prepared under my direction and personally reviewed by me in its entirety. I confirm that the note above accurately reflects all work, treatment, procedures, and medical decision making performed by me. Dr. Cydney Wagner MD    Thank you for allowing me to participate in the care of this individual.      Marlen Gilmore M.D., [facility]

## 2021-12-14 ENCOUNTER — CARE COORDINATION (OUTPATIENT)
Dept: CARE COORDINATION | Age: 51
End: 2021-12-14

## 2021-12-21 ENCOUNTER — TELEPHONE (OUTPATIENT)
Dept: FAMILY MEDICINE CLINIC | Age: 51
End: 2021-12-21

## 2021-12-21 ENCOUNTER — CARE COORDINATION (OUTPATIENT)
Dept: CARE COORDINATION | Age: 51
End: 2021-12-21

## 2021-12-21 NOTE — CARE COORDINATION
Ambulatory Care Coordination Note  12/21/2021  CM Risk Score: 9  Charlson 10 Year Mortality Risk Score: 10%     ACC: Colette Hernandez, RN    Summary Note: AC returned patient call to complete care coordination follow up call. Patient seemed to be in a distraught mood when ACM called. Patient said he was having a hard time because he could not get scheduled for Hernia Repair. Patient said he was having a difficult time understanding what appointments needed to be made. [facility] did a complete chart review while on the phone with the patient. Patient said he needed to schedule an appt for a stress test to get cardiac clearance before GI would clear him for surgery with General surgery. [facility] called to make Stress test appt for patient and relayed appointment information back to patient. [facility] tried to outreach to [address] [place] [address] but left message with MA for Dr. Josie Joseph regarding per patient needed to be cleared by GI before proceeding with Hernia repair. Plan:    F/U call 2 weeks  F/U GI rec        Care Coordination Interventions    Program Enrollment: Complex Care  Referral from Primary Care Provider: Yes  Suggested Interventions and Community Resources  Specialty Services Referral: Completed (Comment: General Surgery)  Other Services or Interventions: Patient needs help with scheduling appts for Stress test and F/U GI for Hernia repair 12/21/21 CJT;         Goals Addressed                 This Visit's Progress     Wellness Goal   No change     Patient Self-Management Goal for Health Maintenance  Goal: I will chose a goal related to tobacco cessation:  I will think about my triggers for smoking, I will think about reasons why I should quit smoking, I will learn more about the harmful effects of tobacco, and I will start an exercise program to relieve stress and avoid weight gain associated with smoking cessation.   Barriers: stress  Plan for overcoming my barriers: N/A  Confidence: 8/10  Anticipated Goal Completion Date: 8/10/21    1 cigarette a day            Prior to Admission medications    Medication Sig Start Date End Date Taking?  Authorizing Provider   amLODIPine (NORVASC) 5 MG tablet Take 1 tablet by mouth daily 12/10/21   Claudio Nazario MD   atorvastatin (LIPITOR) 80 MG tablet TAKE ONE TABLET BY MOUTH DAILY 12/10/21   Claudio Nazario MD   cyclobenzaprine (FLEXERIL) 10 MG tablet TAKE ONE TABLET BY MOUTH THREE TIMES A DAY AS NEEDED FOR MUSCLE SPASMS 11/23/21   Ramesh Mills MD   valsartan (DIOVAN) 80 MG tablet TAKE ONE TABLET BY MOUTH DAILY 11/18/21   RAH Jenkins CNP   pantoprazole (PROTONIX) 40 MG tablet TAKE ONE TABLET BY MOUTH TWICE A DAY 10/26/21   Ramesh Mills MD   nicotine (NICODERM CQ) 7 MG/24HR Place 1 patch onto the skin daily for 14 days 9/29/21 12/10/21  RAH Jenkins CNP   diphenhydrAMINE (BENADRYL) 25 MG tablet Take 1 tablet by mouth every 6 hours as needed for Itching Pt report takes for upset stomach in AM as needed 9/10/21   RAH Jenkins CNP   metoprolol succinate (TOPROL XL) 50 MG extended release tablet TAKE ONE TABLET BY MOUTH DAILY 9/7/21   Ramesh Mills MD   lurasidone (LATUDA) 20 MG TABS tablet Take 1 tablet by mouth daily 8/20/21   Ramesh Mills MD   DULoxetine (CYMBALTA) 60 MG extended release capsule Take 1 capsule by mouth 2 times daily 5/27/21   AVE Hernandez   busPIRone (BUSPAR) 10 MG tablet TAKE TWO TABLETS BY MOUTH TWICE A DAY 5/27/21   AVE Hernandez   furosemide (LASIX) 20 MG tablet Take 1 tablet by mouth daily 5/27/21   AVE Hernandez   ondansetron (ZOFRAN) 4 MG tablet Take 1 tablet by mouth every 8 hours as needed for Nausea or Vomiting 5/27/21   AVE Hernandez   potassium chloride (KLOR-CON M) 20 MEQ extended release tablet Take 1 tablet by mouth daily 5/27/21   AVE Hernandez   albuterol sulfate HFA (VENTOLIN HFA) 108 (90 Base) MCG/ACT inhaler Inhale 2 puffs into the lungs every 6 hours as needed for Wheezing or Shortness of Breath 5/27/21   AVE Rasmussen   sildenafil (REVATIO) 20 MG tablet Take 3 tablets by mouth daily as needed (ED) 5/27/21   AVE Rasmussen   SPIRIVA RESPIMAT 2.5 MCG/ACT AERS inhaler INHALE TWO PUFFS BY MOUTH DAILY 5/26/21   Thaddeus Machado MD   CHANTIX 1 MG tablet TAKE ONE TABLET BY MOUTH TWICE A DAY 3/30/21   George Hebert APRN - CNP   aspirin 81 MG tablet Take 81 mg by mouth daily     Historical Provider, MD       Future Appointments   Date Time Provider Alejandra Grubbs   12/28/2021  9:30 AM [facility] RM 1 [facility] NUC MED [facility]   12/28/2021 10:00 AM [address] [place] [address],  [address] STRESS Sissy Kin   2/8/2022  3:30 PM Jenny Villegas MD [facility] [place] - DYD   3/11/2022 12:00 PM Epifania Dancer, MD [facility]     ,   Congestive Heart Failure Assessment           Symptoms:        ,   COPD Assessment    Does the patient understand envrionmental exposure?: Yes  Is the patient able to verbalize Rescue vs. Long Acting medications?: Yes  Does the patient have a nebulizer?: Yes  Does the patient use a space with inhaled medications?: No     No patient-reported symptoms         Symptoms:         and   General Assessment    Do you have any symptoms that are causing concern?: No

## 2021-12-21 NOTE — TELEPHONE ENCOUNTER
----- Message from Everett Bosch sent at 12/21/2021  1:22 PM EST -----  Subject: Appointment Request    Reason for Call: Semi-Routine Skin Problem    QUESTIONS  Type of Appointment? Established Patient  Reason for appointment request? Available appointments did not meet   patient need  Additional Information for Provider? pt has a skin problem, thought it was   a pimple, may have infection   ---------------------------------------------------------------------------  --------------  CALL BACK INFO  What is the best way for the office to contact you? OK to leave message on   voicemail  Preferred Call Back Phone Number? [phone]  ---------------------------------------------------------------------------  --------------  SCRIPT ANSWERS  Relationship to Patient? Self  Are you having swelling in your throat or face? No  Are you having difficulty breathing? No  Have the symptoms worsened or spread in the last day? No  Are you having fevers (100.4), chills or sweats? No  Have you recently (14 days) seen a provider for this issue? No  Have you been diagnosed with, awaiting test results for, or told that you   are suspected of having COVID-19 (Coronavirus)? (If patient has tested   negative or was tested as a requirement for work, school, or travel and   not based on symptoms, answer no)? No  Within the past two weeks have you developed any of the following symptoms   (answer no if symptoms have been present longer than 2 weeks or began   more than 2 weeks ago)? Fever or Chills, Cough, Shortness of breath or   difficulty breathing, Loss of taste or smell, Sore throat, Nasal   congestion, Sneezing or runny nose, Fatigue or generalized body aches   (answer no if pain is specific to a body part e.g. back pain), Diarrhea,   Headache? No  Have you had close contact with someone with COVID-19 in the last 14 days? No  (Service Expert  click yes below to proceed with [employer] As Usual   Scheduling)?  Yes

## 2021-12-22 ENCOUNTER — OFFICE VISIT (OUTPATIENT)
Dept: FAMILY MEDICINE CLINIC | Age: 51
End: 2021-12-22
Payer: COMMERCIAL

## 2021-12-22 VITALS
DIASTOLIC BLOOD PRESSURE: 78 MMHG | BODY MASS INDEX: 34.17 KG/M2 | OXYGEN SATURATION: 96 % | SYSTOLIC BLOOD PRESSURE: 116 MMHG | WEIGHT: 245 LBS | HEART RATE: 92 BPM

## 2021-12-22 DIAGNOSIS — L02.91 ABSCESS: Primary | ICD-10-CM

## 2021-12-22 PROCEDURE — 99213 OFFICE O/P EST LOW 20 MIN: CPT | Performed by: NURSE PRACTITIONER

## 2021-12-22 PROCEDURE — G8484 FLU IMMUNIZE NO ADMIN: HCPCS | Performed by: NURSE PRACTITIONER

## 2021-12-22 PROCEDURE — G8417 CALC BMI ABV UP PARAM F/U: HCPCS | Performed by: NURSE PRACTITIONER

## 2021-12-22 PROCEDURE — 4004F PT TOBACCO SCREEN RCVD TLK: CPT | Performed by: NURSE PRACTITIONER

## 2021-12-22 PROCEDURE — 3017F COLORECTAL CA SCREEN DOC REV: CPT | Performed by: NURSE PRACTITIONER

## 2021-12-22 PROCEDURE — G8427 DOCREV CUR MEDS BY ELIG CLIN: HCPCS | Performed by: NURSE PRACTITIONER

## 2021-12-22 RX ORDER — CEPHALEXIN 500 MG/1
500 CAPSULE ORAL 3 TIMES DAILY
Qty: 21 CAPSULE | Refills: 0 | Status: SHIPPED | OUTPATIENT
Start: 2021-12-22 | End: 2021-12-29

## 2021-12-22 RX ORDER — POTASSIUM CHLORIDE 1500 MG/1
TABLET, EXTENDED RELEASE ORAL
Qty: 90 TABLET | Refills: 1 | Status: SHIPPED | OUTPATIENT
Start: 2021-12-22

## 2021-12-22 RX ORDER — SULFAMETHOXAZOLE AND TRIMETHOPRIM 800; 160 MG/1; MG/1
1 TABLET ORAL 2 TIMES DAILY
Qty: 14 TABLET | Refills: 0 | Status: SHIPPED | OUTPATIENT
Start: 2021-12-22 | End: 2021-12-29

## 2021-12-22 RX ORDER — CHLORHEXIDINE GLUCONATE 213 G/1000ML
SOLUTION TOPICAL
Qty: 1 EACH | Refills: 0 | Status: SHIPPED | OUTPATIENT
Start: 2021-12-22 | End: 2022-01-31 | Stop reason: SDUPTHER

## 2021-12-22 NOTE — PROGRESS NOTES
2021     Lena Valentin II (:  1970) is a 49 y.o. male, here for evaluation of the following medical concerns:    HPI  Pt c/o sores to the back of his neck. States that they are now open from \"picking at them\". States that he has a h/o abscesses. Review of Systems   Constitutional: Negative. Skin: Positive for wound. Prior to Visit Medications    Medication Sig Taking? Authorizing Provider   KLOR-CON M20 20 MEQ extended release tablet TAKE ONE TABLET BY MOUTH DAILY Yes RAH Tomas CNP   chlorhexidine (HIBICLENS) 4 % external liquid Apply topically daily as needed.  Yes RAH Joya CNP   sulfamethoxazole-trimethoprim (BACTRIM DS;SEPTRA DS) 800-160 MG per tablet Take 1 tablet by mouth 2 times daily for 7 days Yes RAH Joya CNP   cephALEXin (KEFLEX) 500 MG capsule Take 1 capsule by mouth 3 times daily for 7 days Yes RAH Joya CNP   atorvastatin (LIPITOR) 80 MG tablet TAKE ONE TABLET BY MOUTH DAILY Yes Santa Cabrales MD   cyclobenzaprine (FLEXERIL) 10 MG tablet TAKE ONE TABLET BY MOUTH THREE TIMES A DAY AS NEEDED FOR MUSCLE SPASMS Yes Annita Archuleta MD   valsartan (DIOVAN) 80 MG tablet TAKE ONE TABLET BY MOUTH DAILY Yes RAH Martin CNP   pantoprazole (PROTONIX) 40 MG tablet TAKE ONE TABLET BY MOUTH TWICE A DAY Yes Annita Archuleta MD   nicotine (NICODERM CQ) 7 MG/24HR Place 1 patch onto the skin daily for 14 days Yes RAH Martin CNP   diphenhydrAMINE (BENADRYL) 25 MG tablet Take 1 tablet by mouth every 6 hours as needed for Itching Pt report takes for upset stomach in AM as needed Yes RAH Martin CNP   metoprolol succinate (TOPROL XL) 50 MG extended release tablet TAKE ONE TABLET BY MOUTH DAILY Yes Annita Archuleta MD   DULoxetine (CYMBALTA) 60 MG extended release capsule Take 1 capsule by mouth 2 times daily Yes AVE [address]   busPIRone (BUSPAR) 10 MG tablet TAKE TWO TABLETS BY MOUTH TWICE A DAY Yes AVE [address]   furosemide (LASIX) 20 MG tablet Take 1 tablet by mouth daily Yes AVE [place]   ondansetron (ZOFRAN) 4 MG tablet Take 1 tablet by mouth every 8 hours as needed for Nausea or Vomiting Yes AVE Robles   albuterol sulfate HFA (VENTOLIN HFA) 108 (90 Base) MCG/ACT inhaler Inhale 2 puffs into the lungs every 6 hours as needed for Wheezing or Shortness of Breath Yes AVE Robles   sildenafil (REVATIO) 20 MG tablet Take 3 tablets by mouth daily as needed (ED) Yes AVE Robles   SPIRIVA RESPIMAT 2.5 MCG/ACT AERS inhaler INHALE TWO PUFFS BY MOUTH DAILY Yes Kelsey Marquis MD   aspirin 81 MG tablet Take 81 mg by mouth daily  Yes Robert Raygoza MD   amLODIPine (NORVASC) 5 MG tablet Take 1 tablet by mouth daily  Patient not taking: Reported on 12/22/2021  Partha Fernandez MD   lurasidone (LATUDA) 20 MG TABS tablet Take 1 tablet by mouth daily  Patient not taking: Reported on 12/22/2021  Curry Menezes MD   CHANTIX 1 MG tablet TAKE ONE TABLET BY MOUTH TWICE A DAY  Patient not taking: Reported on 12/22/2021  Daksha Kendrick, APRN - CNP        Social History     Tobacco Use    Smoking status: Current Every Day Smoker     Packs/day: 0.50     Years: 33.00     Pack years: 16.50     Types: Cigarettes    Smokeless tobacco: Former User     Quit date: 11/1/2015   Substance Use Topics    Alcohol use: Yes     Comment: 1/2 fifth [facility]         Vitals:    12/22/21 1631   BP: 116/78   Site: Right Lower Arm   Position: Sitting   Cuff Size: Medium Adult   Pulse: 92   SpO2: 96%   Weight: 245 lb (111.1 kg)     Estimated body mass index is 34.17 kg/m² as calculated from the following:    Height as of 12/10/21: 5' 11\" (1.803 m). Weight as of this encounter: 245 lb (111.1 kg). Physical Exam  Vitals reviewed. Constitutional:       Appearance: He is not ill-appearing. HENT:      Head: Normocephalic.    Pulmonary:      Effort: Pulmonary effort is normal.   Skin: Findings: Abscess and wound present. Comments: Pt has 3 small open abscesses to the back of his neck. No drainage or surrounding redness. Neurological:      Mental Status: He is oriented to person, place, and time. ASSESSMENT/PLAN:  1. Abscess  Advised pt to take antibiotics and use Hibiclens as directed. F/u with PCP if symptoms do not improve. - chlorhexidine (HIBICLENS) 4 % external liquid; Apply topically daily as needed. Dispense: 1 each; Refill: 0  - sulfamethoxazole-trimethoprim (BACTRIM DS;SEPTRA DS) 800-160 MG per tablet; Take 1 tablet by mouth 2 times daily for 7 days  Dispense: 14 tablet; Refill: 0  - cephALEXin (KEFLEX) 500 MG capsule; Take 1 capsule by mouth 3 times daily for 7 days  Dispense: 21 capsule; Refill: 0      Return if symptoms worsen or fail to improve. An electronic signature was used to authenticate this note.     --RAH Quintana - CNP on 12/22/2021 at 4:47 PM

## 2021-12-22 NOTE — PATIENT INSTRUCTIONS
Patient Education        Skin Abscess: Care Instructions  Your Care Instructions     A skin abscess is a bacterial infection that forms a pocket of pus. A boil is a kind of skin abscess. The doctor may have cut an opening in the abscess so that the pus can drain out. You may have gauze in the cut so that the abscess will stay open and keep draining. You may need antibiotics. You will need to follow up with your doctor to make sure the infection has gone away. The doctor has checked you carefully, but problems can develop later. If you notice any problems or new symptoms, get medical treatment right away. Follow-up care is a key part of your treatment and safety. Be sure to make and go to all appointments, and call your doctor if you are having problems. It's also a good idea to know your test results and keep a list of the medicines you take. How can you care for yourself at home? · Apply warm and dry compresses, a heating pad set on low, or a hot water bottle 3 or 4 times a day for pain. Keep a cloth between the heat source and your skin. · If your doctor prescribed antibiotics, take them as directed. Do not stop taking them just because you feel better. You need to take the full course of antibiotics. · Take pain medicines exactly as directed. ? If the doctor gave you a prescription medicine for pain, take it as prescribed. ? If you are not taking a prescription pain medicine, ask your doctor if you can take an over-the-counter medicine. · Keep your bandage clean and dry. Change the bandage whenever it gets wet or dirty, or at least one time a day. · If the abscess was packed with gauze:  ? Keep follow-up appointments to have the gauze changed or removed. If the doctor instructed you to remove the gauze, follow the instructions you were given for how to remove it. ? After the gauze is removed, soak the area in warm water for 15 to 20 minutes 2 times a day, until the wound closes.   When should you call for help? Call your doctor now or seek immediate medical care if:    · You have signs of worsening infection, such as:  ? Increased pain, swelling, warmth, or redness. ? Red streaks leading from the infected skin. ? Pus draining from the wound. ? A fever. Watch closely for changes in your health, and be sure to contact your doctor if:    · You do not get better as expected. Where can you learn more? Go to https://Cont[employer] Analysis[employer]TotSpoteb.Glu Mobile. org and sign in to your [employer]abbotek[employer] account. Enter X222 in the Criteo box to learn more about \"Skin Abscess: Care Instructions. \"     If you do not have an account, please click on the \"Sign Up Now\" link. Current as of: March 3, 2021               Content Version: 13.1  © 8009-5979 Healthwise, Incorporated. Care instructions adapted under license by [facility] ([facility]). If you have questions about a medical condition or this instruction, always ask your healthcare professional. [place] [address] any warranty or liability for your use of this information.

## 2021-12-27 DIAGNOSIS — I10 ESSENTIAL HYPERTENSION: ICD-10-CM

## 2021-12-30 RX ORDER — METOPROLOL SUCCINATE 50 MG/1
TABLET, EXTENDED RELEASE ORAL
Qty: 90 TABLET | Refills: 0 | Status: SHIPPED | OUTPATIENT
Start: 2021-12-30 | End: 2022-04-19

## 2022-01-07 ENCOUNTER — CARE COORDINATION (OUTPATIENT)
Dept: CARE COORDINATION | Age: 52
End: 2022-01-07

## 2022-01-07 NOTE — CARE COORDINATION
Ambulatory Care Coordination Note  1/7/2022  CM Risk Score: 5  Charlson 10 Year Mortality Risk Score: 10%     ACC: Hilaria Johnson RN    Summary Note: Patient called and left voicemail on [facility] phone to return call. ACM returned patient call. Patient said he has been sleeping a lot and does not understand why he is forgetful. Patient expressed concern that he had MRSA and was confused on treatment plan. Patient said wounds are looking better and do not seem to be infected. ACM reviewed treatment plan with patient who verbalized understanding. ACM said as long as wounds were covered he could go out into the public. ACM did ask about missed stress test, patient said he had forgotten about that. ACM said with increase in Covid elective procedures have stopped for right now. ACM said when elective procedures have started again stress test can get rescheduled. [facility] did advise patient to call the office to schedule a follow up with Dr. Elizabeth Vernon as patient said he has had depression but no thoughts of wanting to harm himself and denies SI. Patient verbalized understanding at this time. Plan:    F/U Call 1 month  Reschedule stress test once elective procedures are re-started. Care Coordination Interventions    Program Enrollment: Complex Care  Referral from Primary Care Provider: Yes  Suggested Interventions and Community Resources  Specialty Services Referral: Completed (Comment: General Surgery)  Other Services or Interventions: Patient needs help with scheduling appts for Stress test and F/U GI for Hernia repair 12/21/21 CJT;         Goals Addressed                 This Visit's Progress     Conditions and Symptoms        I will schedule office visits, as directed by my provider. I will keep my appointment or reschedule if I have to cancel. I will notify my provider of any barriers to my plan of care.     Barriers: lack of motivation and overwhelmed by complexity of regimen  Plan for overcoming my barriers: N/A  Confidence: 8/10  Anticipated Goal Completion Date: 8/10/21    Patient missed and had a no show appt for stress test. 1/7/22 CJT         Wellness Goal   No change     Patient Self-Management Goal for Health Maintenance  Goal: I will chose a goal related to tobacco cessation:  I will think about my triggers for smoking, I will think about reasons why I should quit smoking, I will learn more about the harmful effects of tobacco, and I will start an exercise program to relieve stress and avoid weight gain associated with smoking cessation. Barriers: stress  Plan for overcoming my barriers: N/A  Confidence: 8/10  Anticipated Goal Completion Date: 8/10/21    1 cigarette a day            Prior to Admission medications    Medication Sig Start Date End Date Taking?  Authorizing Provider   metoprolol succinate (TOPROL XL) 50 MG extended release tablet Take one tab by mouth daily 12/30/21   RAH David CNP   KLOR-CON M20 20 MEQ extended release tablet TAKE ONE TABLET BY MOUTH DAILY 12/22/21   RAH Alfonso CNP   amLODIPine (NORVASC) 5 MG tablet Take 1 tablet by mouth daily  Patient not taking: Reported on 12/22/2021 12/10/21   Adán Berry MD   atorvastatin (LIPITOR) 80 MG tablet TAKE ONE TABLET BY MOUTH DAILY 12/10/21   Adán Berry MD   cyclobenzaprine (FLEXERIL) 10 MG tablet TAKE ONE TABLET BY MOUTH THREE TIMES A DAY AS NEEDED FOR MUSCLE SPASMS 11/23/21   Fabian Young MD   valsartan (DIOVAN) 80 MG tablet TAKE ONE TABLET BY MOUTH DAILY 11/18/21   RAH David CNP   pantoprazole (PROTONIX) 40 MG tablet TAKE ONE TABLET BY MOUTH TWICE A DAY 10/26/21   Fabian Young MD   nicotine (NICODERM CQ) 7 MG/24HR Place 1 patch onto the skin daily for 14 days 9/29/21 12/22/21  RAH David CNP   diphenhydrAMINE (BENADRYL) 25 MG tablet Take 1 tablet by mouth every 6 hours as needed for Itching Pt report takes for upset stomach in AM as needed 9/10/21 Boni Resendiz APRN - CNP   lurasidone (LATUDA) 20 MG TABS tablet Take 1 tablet by mouth daily  Patient not taking: Reported on 12/22/2021 8/20/21   Cassi Andrade MD   DULoxetine (CYMBALTA) 60 MG extended release capsule Take 1 capsule by mouth 2 times daily 5/27/21   Mendel Coho, PA   busPIRone (BUSPAR) 10 MG tablet TAKE TWO TABLETS BY MOUTH TWICE A DAY 5/27/21   Mendel Coho, PA   furosemide (LASIX) 20 MG tablet Take 1 tablet by mouth daily 5/27/21   Mendel Coho, PA   ondansetron (ZOFRAN) 4 MG tablet Take 1 tablet by mouth every 8 hours as needed for Nausea or Vomiting 5/27/21   Mendel Coho, PA   albuterol sulfate HFA (VENTOLIN HFA) 108 (90 Base) MCG/ACT inhaler Inhale 2 puffs into the lungs every 6 hours as needed for Wheezing or Shortness of Breath 5/27/21   Mendel Coho, PA   sildenafil (REVATIO) 20 MG tablet Take 3 tablets by mouth daily as needed (ED) 5/27/21   Mendel Coho, PA   SPIRIVA RESPIMAT 2.5 MCG/ACT AERS inhaler INHALE TWO PUFFS BY MOUTH DAILY 5/26/21   Samina Reardon MD   CHANTIX 1 MG tablet TAKE ONE TABLET BY MOUTH TWICE A DAY  Patient not taking: Reported on 12/22/2021 3/30/21   Zahira Nicole APRN - CNP   aspirin 81 MG tablet Take 81 mg by mouth daily     Historical Provider, MD       Future Appointments   Date Time Provider Alejandra Grubbs   2/8/2022  3:30 PM Cassi Andrade MD [facility] [place] - [place]   3/11/2022 12:00 PM Cris Garcia MD [facility]     ,   COPD Assessment    Does the patient understand envrionmental exposure?: Yes  Is the patient able to verbalize Rescue vs. Long Acting medications?: Yes  Does the patient have a nebulizer?: Yes  Does the patient use a space with inhaled medications?: No     No patient-reported symptoms         Symptoms:         and   General Assessment    Do you have any symptoms that are causing concern?: Yes  Progression since Onset: Intermittent - Waxing/Waning  Reported Symptoms: Other (Comment: Depression)

## 2022-01-09 PROBLEM — Z01.810 PREOP CARDIOVASCULAR EXAM: Status: RESOLVED | Noted: 2021-12-10 | Resolved: 2022-01-09

## 2022-01-11 ENCOUNTER — CARE COORDINATION (OUTPATIENT)
Dept: CARE COORDINATION | Age: 52
End: 2022-01-11

## 2022-01-20 ENCOUNTER — TELEPHONE (OUTPATIENT)
Dept: FAMILY MEDICINE CLINIC | Age: 52
End: 2022-01-20

## 2022-01-20 RX ORDER — CYCLOBENZAPRINE HCL 10 MG
TABLET ORAL
Qty: 90 TABLET | Refills: 1 | Status: CANCELLED | OUTPATIENT
Start: 2022-01-20

## 2022-01-20 NOTE — TELEPHONE ENCOUNTER
----- Message from Angi Hunter sent at 1/20/2022  1:52 PM EST -----  Subject: Refill Request    QUESTIONS  Name of Medication? cyclobenzaprine (FLEXERIL) 10 MG tablet  Patient-reported dosage and instructions? as prescribed   How many days do you have left? 0  Preferred Pharmacy? [facility] phone number (if available)? [phone]  ---------------------------------------------------------------------------  --------------  Davion DONOHUE  What is the best way for the office to contact you? OK to leave message on   voicemail  Preferred Call Back Phone Number?  [phone]

## 2022-01-20 NOTE — TELEPHONE ENCOUNTER
Refill Request     Last Seen: Last Seen Department: 12/22/2021  Last Seen by PCP: 8/20/2021    Last Written: 11/23/2021    Next Appointment:   Future Appointments   Date Time Provider Alejandra Humera   2/8/2022  3:30 PM Lulu Dillon MD [facility] [place]   3/11/2022 12:00 PM Edith Kemp MD [facility]             Requested Prescriptions     Pending Prescriptions Disp Refills    cyclobenzaprine (FLEXERIL) 10 MG tablet 90 tablet 1     Sig: TAKE ONE TABLET BY MOUTH THREE TIMES A DAY AS NEEDED FOR MUSCLE SPASMS

## 2022-01-21 ENCOUNTER — OFFICE VISIT (OUTPATIENT)
Dept: FAMILY MEDICINE CLINIC | Age: 52
End: 2022-01-21
Payer: COMMERCIAL

## 2022-01-21 VITALS
BODY MASS INDEX: 34.27 KG/M2 | TEMPERATURE: 98.6 F | OXYGEN SATURATION: 95 % | DIASTOLIC BLOOD PRESSURE: 106 MMHG | HEART RATE: 115 BPM | SYSTOLIC BLOOD PRESSURE: 164 MMHG | HEIGHT: 72 IN | WEIGHT: 253 LBS

## 2022-01-21 DIAGNOSIS — L02.91 ABSCESS: Primary | ICD-10-CM

## 2022-01-21 PROBLEM — F33.2 SEVERE EPISODE OF RECURRENT MAJOR DEPRESSIVE DISORDER, WITHOUT PSYCHOTIC FEATURES (HCC): Status: ACTIVE | Noted: 2022-01-21

## 2022-01-21 PROCEDURE — G8417 CALC BMI ABV UP PARAM F/U: HCPCS | Performed by: NURSE PRACTITIONER

## 2022-01-21 PROCEDURE — G8484 FLU IMMUNIZE NO ADMIN: HCPCS | Performed by: NURSE PRACTITIONER

## 2022-01-21 PROCEDURE — 99213 OFFICE O/P EST LOW 20 MIN: CPT | Performed by: NURSE PRACTITIONER

## 2022-01-21 PROCEDURE — G8427 DOCREV CUR MEDS BY ELIG CLIN: HCPCS | Performed by: NURSE PRACTITIONER

## 2022-01-21 PROCEDURE — 3017F COLORECTAL CA SCREEN DOC REV: CPT | Performed by: NURSE PRACTITIONER

## 2022-01-21 PROCEDURE — 4004F PT TOBACCO SCREEN RCVD TLK: CPT | Performed by: NURSE PRACTITIONER

## 2022-01-21 RX ORDER — FUROSEMIDE 20 MG/1
20 TABLET ORAL DAILY
Qty: 90 TABLET | Refills: 1 | Status: CANCELLED | OUTPATIENT
Start: 2022-01-21

## 2022-01-21 RX ORDER — CYCLOBENZAPRINE HCL 10 MG
TABLET ORAL
Qty: 90 TABLET | Refills: 1 | Status: CANCELLED | OUTPATIENT
Start: 2022-01-21

## 2022-01-21 RX ORDER — SULFAMETHOXAZOLE AND TRIMETHOPRIM 800; 160 MG/1; MG/1
1 TABLET ORAL 2 TIMES DAILY
Qty: 14 TABLET | Refills: 0 | Status: SHIPPED | OUTPATIENT
Start: 2022-01-21 | End: 2022-01-28

## 2022-01-21 ASSESSMENT — PATIENT HEALTH QUESTIONNAIRE - PHQ9
9. THOUGHTS THAT YOU WOULD BE BETTER OFF DEAD, OR OF HURTING YOURSELF: 0
10. IF YOU CHECKED OFF ANY PROBLEMS, HOW DIFFICULT HAVE THESE PROBLEMS MADE IT FOR YOU TO DO YOUR WORK, TAKE CARE OF THINGS AT HOME, OR GET ALONG WITH OTHER PEOPLE: 2
8. MOVING OR SPEAKING SO SLOWLY THAT OTHER PEOPLE COULD HAVE NOTICED. OR THE OPPOSITE, BEING SO FIGETY OR RESTLESS THAT YOU HAVE BEEN MOVING AROUND A LOT MORE THAN USUAL: 3
7. TROUBLE CONCENTRATING ON THINGS, SUCH AS READING THE NEWSPAPER OR WATCHING TELEVISION: 3
4. FEELING TIRED OR HAVING LITTLE ENERGY: 3
SUM OF ALL RESPONSES TO PHQ QUESTIONS 1-9: 17
SUM OF ALL RESPONSES TO PHQ9 QUESTIONS 1 & 2: 6
2. FEELING DOWN, DEPRESSED OR HOPELESS: 3
6. FEELING BAD ABOUT YOURSELF - OR THAT YOU ARE A FAILURE OR HAVE LET YOURSELF OR YOUR FAMILY DOWN: 0
SUM OF ALL RESPONSES TO PHQ QUESTIONS 1-9: 17
5. POOR APPETITE OR OVEREATING: 1
3. TROUBLE FALLING OR STAYING ASLEEP: 1
SUM OF ALL RESPONSES TO PHQ QUESTIONS 1-9: 17
1. LITTLE INTEREST OR PLEASURE IN DOING THINGS: 3
SUM OF ALL RESPONSES TO PHQ QUESTIONS 1-9: 17

## 2022-01-21 NOTE — PROGRESS NOTES
2022  Milvia Santos II (: 1970)  47 y.o.    ASSESSMENT and PLAN:  Vasiliy was seen today for other. Diagnoses and all orders for this visit:    Abscess  -     sulfamethoxazole-trimethoprim (BACTRIM DS;SEPTRA DS) 800-160 MG per tablet; Take 1 tablet by mouth 2 times daily for 7 days   -Recommend getting Hibiclens from pharmacy . -Alarm symptoms discussed, side effects of medications explained.   -No systemic systems noted.   -Would consider wound care if no improving.  -recommended hand hygiene. Return in about 2 weeks (around 2022), or if symptoms worsen or fail to improve. HPI    Presenting for follow up on abscess. Was given Keflex, and Bactrim on , and was told to use Hibiclens, but never got Hibiclens. Finished both antibiotics with improvement. Denies fevers, body aches, chills. Denies purulent discharge. Associated symptoms fatigue. Abscess areas on left side of face and posterior neck. No drainage. Minimal erythema. Review of Systems   Constitutional: Negative for activity change, appetite change, chills, diaphoresis, fatigue, fever and unexpected weight change. HENT: Negative. Respiratory: Negative for cough, chest tightness, shortness of breath and wheezing. Cardiovascular: Negative for chest pain, palpitations and leg swelling. Gastrointestinal: Negative for abdominal distention, abdominal pain, constipation, diarrhea, nausea and vomiting. Genitourinary: Negative. Musculoskeletal: Negative. Skin: Positive for wound. Left side of face/neck   Neurological: Negative for dizziness, syncope, weakness, light-headedness, numbness and headaches. Hematological: Negative. Psychiatric/Behavioral: Negative. Allergies, past medical history, family history, and social history reviewed and unchanged from previous encounter.      Current Outpatient Medications   Medication Sig Dispense Refill    metoprolol succinate (TOPROL XL) 50 MG extended release tablet Take one tab by mouth daily 90 tablet 0    KLOR-CON M20 20 MEQ extended release tablet TAKE ONE TABLET BY MOUTH DAILY 90 tablet 1    amLODIPine (NORVASC) 5 MG tablet Take 1 tablet by mouth daily 90 tablet 3    atorvastatin (LIPITOR) 80 MG tablet TAKE ONE TABLET BY MOUTH DAILY 90 tablet 3    cyclobenzaprine (FLEXERIL) 10 MG tablet TAKE ONE TABLET BY MOUTH THREE TIMES A DAY AS NEEDED FOR MUSCLE SPASMS 90 tablet 1    valsartan (DIOVAN) 80 MG tablet TAKE ONE TABLET BY MOUTH DAILY 30 tablet 2    pantoprazole (PROTONIX) 40 MG tablet TAKE ONE TABLET BY MOUTH TWICE A DAY 60 tablet 5    diphenhydrAMINE (BENADRYL) 25 MG tablet Take 1 tablet by mouth every 6 hours as needed for Itching Pt report takes for upset stomach in AM as needed 30 tablet 1    lurasidone (LATUDA) 20 MG TABS tablet Take 1 tablet by mouth daily 30 tablet 0    DULoxetine (CYMBALTA) 60 MG extended release capsule Take 1 capsule by mouth 2 times daily 60 capsule 4    busPIRone (BUSPAR) 10 MG tablet TAKE TWO TABLETS BY MOUTH TWICE A  tablet 5    furosemide (LASIX) 20 MG tablet Take 1 tablet by mouth daily 90 tablet 1    ondansetron (ZOFRAN) 4 MG tablet Take 1 tablet by mouth every 8 hours as needed for Nausea or Vomiting 40 tablet 2    albuterol sulfate HFA (VENTOLIN HFA) 108 (90 Base) MCG/ACT inhaler Inhale 2 puffs into the lungs every 6 hours as needed for Wheezing or Shortness of Breath 1 Inhaler 6    sildenafil (REVATIO) 20 MG tablet Take 3 tablets by mouth daily as needed (ED) 30 tablet 5    SPIRIVA RESPIMAT 2.5 MCG/ACT AERS inhaler INHALE TWO PUFFS BY MOUTH DAILY 1 Inhaler 5    CHANTIX 1 MG tablet TAKE ONE TABLET BY MOUTH TWICE A DAY 56 tablet 4    aspirin 81 MG tablet Take 81 mg by mouth daily       nicotine (NICODERM CQ) 7 MG/24HR Place 1 patch onto the skin daily for 14 days 14 patch 0     No current facility-administered medications for this visit.        Vitals:    01/21/22 1142   BP: (!) 164/106   Site: Right Upper Arm   Position: Sitting   Cuff Size: Large Adult   Pulse: 115   Temp: 98.6 °F (37 °C)   TempSrc: Oral   SpO2: 95%   Weight: 253 lb (114.8 kg)   Height: 6' (1.829 m)     Estimated body mass index is 34.31 kg/m² as calculated from the following:    Height as of this encounter: 6' (1.829 m). Weight as of this encounter: 253 lb (114.8 kg). Physical Exam  HENT:      Head:     Skin:     Findings: Abscess present. No bruising, ecchymosis, erythema, signs of injury, laceration, lesion, petechiae, rash or wound.

## 2022-01-22 NOTE — TELEPHONE ENCOUNTER
Refill Request     Last Seen: Last Seen Department: 1/21/2022  Last Seen by PCP: 8/20/2021    Last Written: 11/23/2021    Next Appointment:   Future Appointments   Date Time Provider Alejandra Humera   2/8/2022  3:30 PM Ventura Johnson MD [facility] [place]   3/11/2022 12:00 PM Thomas Sommers MD [facility]             Requested Prescriptions     Pending Prescriptions Disp Refills    cyclobenzaprine (FLEXERIL) 10 MG tablet [Pharmacy Med Name: CYCLOBENZAPRINE 10 MG TABLET] 90 tablet 1     Sig: TAKE ONE TABLET BY MOUTH THREE TIMES A DAY AS NEEDED FOR MUSCLES SPASMS

## 2022-01-24 RX ORDER — CYCLOBENZAPRINE HCL 10 MG
TABLET ORAL
Qty: 90 TABLET | Refills: 1 | Status: SHIPPED | OUTPATIENT
Start: 2022-01-24 | End: 2022-03-28 | Stop reason: SDUPTHER

## 2022-01-27 ENCOUNTER — TELEPHONE (OUTPATIENT)
Dept: FAMILY MEDICINE CLINIC | Age: 52
End: 2022-01-27

## 2022-01-27 NOTE — TELEPHONE ENCOUNTER
----- Message from Sara Shine sent at 1/27/2022  2:26 PM EST -----  Subject: Message to Provider    QUESTIONS  Information for Provider? PT was told he was going to have two antibiotics   sent in only one was sent and the other was not at the pharmacy. PT would   like an update :)   ---------------------------------------------------------------------------  --------------  CALL BACK INFO  What is the best way for the office to contact you? Do not leave any   message, patient will call back for answer  Preferred Call Back Phone Number? [phone]  ---------------------------------------------------------------------------  --------------  SCRIPT ANSWERS  Relationship to Patient?  Self

## 2022-01-28 RX ORDER — AMOXICILLIN AND CLAVULANATE POTASSIUM 875; 125 MG/1; MG/1
1 TABLET, FILM COATED ORAL 2 TIMES DAILY
Qty: 14 TABLET | Refills: 0 | Status: SHIPPED | OUTPATIENT
Start: 2022-01-28 | End: 2022-02-04

## 2022-01-28 NOTE — TELEPHONE ENCOUNTER
Sent in Augmentin for dual treatment. Spoke with patient. He is aware. States infection is improving. Side effects and use reviewed with patient. Instructed pt to call if no improvement or worsening symptoms.  Would consider wound care referral

## 2022-01-29 ENCOUNTER — TELEPHONE (OUTPATIENT)
Dept: FAMILY MEDICINE CLINIC | Age: 52
End: 2022-01-29

## 2022-01-29 RX ORDER — CHLORHEXIDINE GLUCONATE 213 G/1000ML
SOLUTION TOPICAL
Status: CANCELLED | OUTPATIENT
Start: 2022-01-29 | End: 2022-02-12

## 2022-01-30 ASSESSMENT — ENCOUNTER SYMPTOMS
VOMITING: 0
WHEEZING: 0
NAUSEA: 0
CONSTIPATION: 0
CHEST TIGHTNESS: 0
DIARRHEA: 0
ABDOMINAL DISTENTION: 0
ABDOMINAL PAIN: 0
SHORTNESS OF BREATH: 0
COUGH: 0

## 2022-01-31 DIAGNOSIS — L02.91 ABSCESS: ICD-10-CM

## 2022-01-31 RX ORDER — CHLORHEXIDINE GLUCONATE 213 G/1000ML
SOLUTION TOPICAL
Qty: 1 EACH | Refills: 0 | Status: SHIPPED | OUTPATIENT
Start: 2022-01-31 | End: 2022-02-14

## 2022-01-31 NOTE — PROGRESS NOTES
Sent in script and PA for Hibiclens. If this isn't covered, patient can just use dial antibacterial body wash or soap.

## 2022-02-08 ENCOUNTER — VIRTUAL VISIT (OUTPATIENT)
Dept: FAMILY MEDICINE CLINIC | Age: 52
End: 2022-02-08
Payer: COMMERCIAL

## 2022-02-08 DIAGNOSIS — J43.9 PULMONARY EMPHYSEMA, UNSPECIFIED EMPHYSEMA TYPE (HCC): ICD-10-CM

## 2022-02-08 DIAGNOSIS — F33.2 SEVERE EPISODE OF RECURRENT MAJOR DEPRESSIVE DISORDER, WITHOUT PSYCHOTIC FEATURES (HCC): ICD-10-CM

## 2022-02-08 DIAGNOSIS — I10 PRIMARY HYPERTENSION: Primary | ICD-10-CM

## 2022-02-08 DIAGNOSIS — F51.01 PRIMARY INSOMNIA: ICD-10-CM

## 2022-02-08 DIAGNOSIS — L08.9 SKIN INFECTION: ICD-10-CM

## 2022-02-08 PROCEDURE — G8427 DOCREV CUR MEDS BY ELIG CLIN: HCPCS | Performed by: INTERNAL MEDICINE

## 2022-02-08 PROCEDURE — 3023F SPIROM DOC REV: CPT | Performed by: INTERNAL MEDICINE

## 2022-02-08 PROCEDURE — G8484 FLU IMMUNIZE NO ADMIN: HCPCS | Performed by: INTERNAL MEDICINE

## 2022-02-08 PROCEDURE — 3017F COLORECTAL CA SCREEN DOC REV: CPT | Performed by: INTERNAL MEDICINE

## 2022-02-08 PROCEDURE — 99214 OFFICE O/P EST MOD 30 MIN: CPT | Performed by: INTERNAL MEDICINE

## 2022-02-08 PROCEDURE — G8417 CALC BMI ABV UP PARAM F/U: HCPCS | Performed by: INTERNAL MEDICINE

## 2022-02-08 PROCEDURE — 4004F PT TOBACCO SCREEN RCVD TLK: CPT | Performed by: INTERNAL MEDICINE

## 2022-02-08 RX ORDER — MUPIROCIN CALCIUM 20 MG/G
CREAM TOPICAL
Qty: 30 G | Refills: 0 | Status: SHIPPED | OUTPATIENT
Start: 2022-02-08 | End: 2022-03-29

## 2022-02-08 RX ORDER — SULFAMETHOXAZOLE AND TRIMETHOPRIM 800; 160 MG/1; MG/1
1 TABLET ORAL 2 TIMES DAILY
Qty: 20 TABLET | Refills: 0 | Status: SHIPPED | OUTPATIENT
Start: 2022-02-08 | End: 2022-02-18

## 2022-02-08 NOTE — PROGRESS NOTES
Homer Paul II (:  1970) is a Established patient, here for evaluation of the following:    Assessment & Plan   Below is the assessment and plan developed based on review of pertinent history, physical exam, labs, studies, and medications. David Thomas was seen today for other, discuss medications and discuss medications. Diagnoses and all orders for this visit:    Primary hypertension    Severe episode of recurrent major depressive disorder, without psychotic features ([address]    Pulmonary emphysema, unspecified emphysema type ([address]    Primary insomnia    Skin infection    Other orders  -     sulfamethoxazole-trimethoprim (BACTRIM DS;SEPTRA DS) 800-160 MG per tablet; Take 1 tablet by mouth 2 times daily for 10 days  -     mupirocin (BACTROBAN) 2 % cream; Apply 3 times daily. recurrent sking infection, others stable. Trying to establish with psychiatry. No follow-ups on file. Subjective   HPI   Feet swelling, has MRSA on face and arms, finished antibiotics augmentin and bactrim. Recurred after a week and started another course.    Review of Systems       Objective   Patient-Reported Vitals  Patient-Reported Weight: 245  Patient-Reported Height: 6 1       Physical Exam  [INSTRUCTIONS:  \"[x]\" Indicates a positive item  \"[]\" Indicates a negative item  -- DELETE ALL ITEMS NOT EXAMINED]    Constitutional: [x] Appears well-developed and well-nourished [x] No apparent distress      [] Abnormal -     Mental status: [x] Alert and awake  [x] Oriented to person/place/time [x] Able to follow commands    [] Abnormal -     Eyes:   EOM    [x]  Normal    [] Abnormal -   Sclera  [x]  Normal    [] Abnormal -          Discharge [x]  None visible   [] Abnormal -     HENT: [x] Normocephalic, atraumatic  [] Abnormal -   [x] Mouth/Throat: Mucous membranes are moist    External Ears [x] Normal  [] Abnormal -    Neck: [x] No visualized mass [] Abnormal -     Pulmonary/Chest: [x] Respiratory effort normal   [x] No visualized signs of difficulty breathing or respiratory distress        [] Abnormal -      Musculoskeletal:   [x] Normal gait with no signs of ataxia         [x] Normal range of motion of neck        [] Abnormal -     Neurological:        [x] No Facial Asymmetry (Cranial nerve 7 motor function) (limited exam due to video visit)          [x] No gaze palsy        [] Abnormal -          Skin:        [x] No significant exanthematous lesions or discoloration noted on facial skin         [] Abnormal -            Psychiatric:       [x] Normal Affect [] Abnormal -        [x] No Hallucinations    Other pertinent observable physical exam findings:-                 Refugio Valentin II, was evaluated through a synchronous (real-time) audio-video encounter. The patient (or guardian if applicable) is aware that this is a billable service, which includes applicable co-pays. This Virtual Visit was conducted with patient's (and/or legal guardian's) consent. The visit was conducted pursuant to the emergency declaration under the [address] [place] [address] authority and the [employer] General Act. Patient identification was verified, and a caregiver was present when appropriate. The patient was located at home in a state where the provider was licensed to provide care.        --Jensen Acevedo MD

## 2022-02-09 ENCOUNTER — TELEPHONE (OUTPATIENT)
Dept: FAMILY MEDICINE CLINIC | Age: 52
End: 2022-02-09

## 2022-02-11 ENCOUNTER — CARE COORDINATION (OUTPATIENT)
Dept: CARE COORDINATION | Age: 52
End: 2022-02-11

## 2022-02-11 NOTE — CARE COORDINATION
Ambulatory Care Coordination Note  2/11/2022  CM Risk Score: 5  Charlson 10 Year Mortality Risk Score: 10%     ACC: Mariluz Crespo, RN    Summary Note: ACM completed follow up call with patient. Patient said he still needed to get Stress test rescheduled after 10am but before 3pm. ACM asked if patient would like to have number to schedule testing. Patient said he gets confused and would prefer ACM to schedule. ACM called [facility]  to get in contact with Central scheduling but no associate answered phone. ACM will call later today to get this scheduled for patient. Plan:    Reschedule stress test        Care Coordination Interventions    Program Enrollment: Complex Care  Referral from Primary Care Provider: Yes  Suggested Interventions and Community Resources  Specialty Services Referral: Completed (Comment: General Surgery)  Other Services or Interventions: Patient needs help with scheduling appts for Stress test and F/U GI for Hernia repair 12/21/21 CJT;         Goals Addressed                 This Visit's Progress     Conditions and Symptoms   Worsening     I will schedule office visits, as directed by my provider. I will keep my appointment or reschedule if I have to cancel. I will notify my provider of any barriers to my plan of care.     Barriers: lack of motivation and overwhelmed by complexity of regimen  Plan for overcoming my barriers: N/A  Confidence: 8/10  Anticipated Goal Completion Date: 8/10/21    Patient missed and had a no show appt for stress test. 1/7/22 CJT    Patient rescheduled stress test for          Wellness Goal   No change     Patient Self-Management Goal for Health Maintenance  Goal: I will chose a goal related to tobacco cessation:  I will think about my triggers for smoking, I will think about reasons why I should quit smoking, I will learn more about the harmful effects of tobacco, and I will start an exercise program to relieve stress and avoid weight gain associated with smoking cessation. Barriers: stress  Plan for overcoming my barriers: N/A  Confidence: 8/10  Anticipated Goal Completion Date: 8/10/21    1 cigarette a day            Prior to Admission medications    Medication Sig Start Date End Date Taking? Authorizing Provider   sulfamethoxazole-trimethoprim (BACTRIM DS;SEPTRA DS) 800-160 MG per tablet Take 1 tablet by mouth 2 times daily for 10 days 2/8/22 2/18/22  Jayla Davis MD   mupirocin (BACTROBAN) 2 % cream Apply 3 times daily. 2/8/22 3/10/22  Jayla Davis MD   chlorhexidine (HIBICLENS) 4 % external liquid Apply topically daily as needed.  1/31/22 2/14/22  RAH Dhillon CNP   cyclobenzaprine (FLEXERIL) 10 MG tablet TAKE ONE TABLET BY MOUTH THREE TIMES A DAY AS NEEDED FOR MUSCLES SPASMS 1/24/22   RAH Romo   metoprolol succinate (TOPROL XL) 50 MG extended release tablet Take one tab by mouth daily 12/30/21   RAH Gardner CNP   KLOR-CON M20 20 MEQ extended release tablet TAKE ONE TABLET BY MOUTH DAILY 12/22/21   RAH Connors CNP   atorvastatin (LIPITOR) 80 MG tablet TAKE ONE TABLET BY MOUTH DAILY 12/10/21   Raul Angel MD   valsartan (DIOVAN) 80 MG tablet TAKE ONE TABLET BY MOUTH DAILY 11/18/21   RAH Gardner CNP   pantoprazole (PROTONIX) 40 MG tablet TAKE ONE TABLET BY MOUTH TWICE A DAY 10/26/21   Jayla Davis MD   nicotine (NICODERM CQ) 7 MG/24HR Place 1 patch onto the skin daily for 14 days 9/29/21 12/22/21  RAH Gardner CNP   diphenhydrAMINE (BENADRYL) 25 MG tablet Take 1 tablet by mouth every 6 hours as needed for Itching Pt report takes for upset stomach in AM as needed 9/10/21   RAH Gardner CNP   lurasidone (LATUDA) 20 MG TABS tablet Take 1 tablet by mouth daily 8/20/21   Jayla Davis MD   DULoxetine (CYMBALTA) 60 MG extended release capsule Take 1 capsule by mouth 2 times daily 5/27/21   AVE Luciano   busPIRone (BUSPAR) 10 MG tablet TAKE TWO TABLETS BY MOUTH TWICE A DAY 5/27/21 Binnie Morning, PA   furosemide (LASIX) 20 MG tablet Take 1 tablet by mouth daily 5/27/21 Binnie Morning, PA   ondansetron [facility]) 4 MG tablet Take 1 tablet by mouth every 8 hours as needed for Nausea or Vomiting 5/27/21 Binnie Morning, PA   albuterol sulfate HFA (VENTOLIN HFA) 108 (90 Base) MCG/ACT inhaler Inhale 2 puffs into the lungs every 6 hours as needed for Wheezing or Shortness of Breath 5/27/21 Binnie Morning, PA   sildenafil (REVATIO) 20 MG tablet Take 3 tablets by mouth daily as needed (ED) 5/27/21   Binnie Morning, PA   SPIRIVA RESPIMAT 2.5 MCG/ACT AERS inhaler INHALE TWO PUFFS BY MOUTH DAILY 5/26/21   Dominique Farias MD   CHANTIX 1 MG tablet TAKE ONE TABLET BY MOUTH TWICE A DAY 3/30/21   [facility], APRN - [facility]   aspirin 81 MG tablet Take 81 mg by mouth daily     Historical Provider, MD       Future Appointments   Date Time Provider Alejandra Grubbs   3/1/2022 10:30 AM Stephanie Vogt MD [facility] [place] [facility]   3/11/2022 12:00 PM Misti Reddy MD [facility]     ,   COPD Assessment    Does the patient understand envrionmental exposure?: Yes  Is the patient able to verbalize Rescue vs. Long Acting medications?: Yes  Does the patient have a nebulizer?: Yes  Does the patient use a space with inhaled medications?: No     No patient-reported symptoms         Symptoms:         and   General Assessment    Do you have any symptoms that are causing concern?: No

## 2022-02-14 ENCOUNTER — TELEPHONE (OUTPATIENT)
Dept: FAMILY MEDICINE CLINIC | Age: 52
End: 2022-02-14

## 2022-02-14 ENCOUNTER — TELEPHONE (OUTPATIENT)
Dept: ADMINISTRATIVE | Age: 52
End: 2022-02-14

## 2022-02-14 NOTE — TELEPHONE ENCOUNTER
Clair Ruffin from [address] [place] [address] , the Muprosin Cream 2 % needs to be changed to the Muprosin ointment 2% for insurance purposses please.

## 2022-02-18 ENCOUNTER — CARE COORDINATION (OUTPATIENT)
Dept: CARE COORDINATION | Age: 52
End: 2022-02-18

## 2022-02-18 NOTE — CARE COORDINATION
Ambulatory Care Coordination Note  2/18/2022  CM Risk Score: 5  Charlson 10 Year Mortality Risk Score: 10%     ACC: Skyla Johnson RN    Summary Note: ACM completed follow up call with the patient who said he had to start using Heroin d/t uncontrolled back pain. ACM asked if it would be a good time to re-schedule stress test. Patient said no not at this time as he recently started to use Heroin again d/t back pain. ACM asked if he had touched base with PCP. Patient said nobody will prescribe pain medication to someone who is using. ACM had asked patient if he had been active with meetings and patient declined saying he is so dope sick and has no gas money. ACM said she would outreach to team to see if there are any phone numbers that would be able to offer patient support at this time. ACM did ask if patient felt suicidal or wanting to harm others and patient declined at this time. Patient was alert during phone conversation and had no s/sx of distress noted. ACM encouraged patient if he felt he was going to harm himself or others to go to ER for eval. Patient verbalized understanding. Plan:    Provide virtual resource for support for Heroin sobreity    Care Coordination Interventions    Program Enrollment: Complex Care  Referral from Primary Care Provider: Yes  Suggested Interventions and Community Resources  Specialty Services Referral: Completed (Comment: General Surgery)  Other Services or Interventions: Patient needs help with scheduling appts for Stress test and F/U GI for Hernia repair 12/21/21 CJT;         Goals Addressed    None         Prior to Admission medications    Medication Sig Start Date End Date Taking? Authorizing Provider   mupirocin (BACTROBAN) 2 % ointment Apply topically 3 times daily.  2/15/22 2/22/22  Luci Horta MD   sulfamethoxazole-trimethoprim (BACTRIM DS;SEPTRA DS) 800-160 MG per tablet Take 1 tablet by mouth 2 times daily for 10 days 2/8/22 2/18/22  Luci Horta MD mupirocin (BACTROBAN) 2 % cream Apply 3 times daily.  2/8/22 3/10/22  Lissette Curry MD   cyclobenzaprine (FLEXERIL) 10 MG tablet TAKE ONE TABLET BY MOUTH THREE TIMES A DAY AS NEEDED FOR MUSCLES SPASMS 1/24/22   RAH Carrillo   metoprolol succinate (TOPROL XL) 50 MG extended release tablet Take one tab by mouth daily 12/30/21   RAH Garg CNP   KLOR-CON M20 20 MEQ extended release tablet TAKE ONE TABLET BY MOUTH DAILY 12/22/21   Kathryn JanuaryRAH CNP   atorvastatin (LIPITOR) 80 MG tablet TAKE ONE TABLET BY MOUTH DAILY 12/10/21   Joni Slade MD   valsartan (DIOVAN) 80 MG tablet TAKE ONE TABLET BY MOUTH DAILY 11/18/21   RAH Garg CNP   pantoprazole (PROTONIX) 40 MG tablet TAKE ONE TABLET BY MOUTH TWICE A DAY 10/26/21   Lissette Curry MD   nicotine (NICODERM CQ) 7 MG/24HR Place 1 patch onto the skin daily for 14 days 9/29/21 12/22/21  RAH Garg CNP   diphenhydrAMINE (BENADRYL) 25 MG tablet Take 1 tablet by mouth every 6 hours as needed for Itching Pt report takes for upset stomach in AM as needed 9/10/21   RAH Garg CNP   lurasidone (LATUDA) 20 MG TABS tablet Take 1 tablet by mouth daily 8/20/21   Lissette Curry MD   DULoxetine (CYMBALTA) 60 MG extended release capsule Take 1 capsule by mouth 2 times daily 5/27/21   AVE Serrano   busPIRone (BUSPAR) 10 MG tablet TAKE TWO TABLETS BY MOUTH TWICE A DAY 5/27/21   AVE Serrano   furosemide (LASIX) 20 MG tablet Take 1 tablet by mouth daily 5/27/21   AVE Serrano   ondansetron (ZOFRAN) 4 MG tablet Take 1 tablet by mouth every 8 hours as needed for Nausea or Vomiting 5/27/21   AVE Serrano   albuterol sulfate HFA (VENTOLIN HFA) 108 (90 Base) MCG/ACT inhaler Inhale 2 puffs into the lungs every 6 hours as needed for Wheezing or Shortness of Breath 5/27/21   AVE Serrano   sildenafil (REVATIO) 20 MG tablet Take 3 tablets by mouth daily as needed (ED) 5/27/21   AVE Padron   SPIRIVA RESPIMAT 2.5 MCG/ACT AERS inhaler INHALE TWO PUFFS BY MOUTH DAILY 5/26/21   Rich Elise MD   CHANTIX 1 MG tablet TAKE ONE TABLET BY MOUTH TWICE A DAY 3/30/21   Belinda Ortiz APRN - CNP   aspirin 81 MG tablet Take 81 mg by mouth daily     Historical Provider, MD       Future Appointments   Date Time Provider Alejandra Grubbs   3/1/2022 10:30 AM Fabian Richmond MD [facility] [place] [facility]   3/11/2022 12:00 PM Matias Alaniz MD [facility]     ,   COPD Assessment    Does the patient understand envrionmental exposure?: Yes  Is the patient able to verbalize Rescue vs. Long Acting medications?: Yes  Does the patient have a nebulizer?: Yes  Does the patient use a space with inhaled medications?: No     No patient-reported symptoms         Symptoms:         and   General Assessment    Do you have any symptoms that are causing concern?: No

## 2022-02-18 NOTE — CARE COORDINATION
ACM called and provided patient with information on [facility] and phone number [phone]. Patient thanked [facility] for phone number. [facility] will follow up with more support phone numbers for patient on Monday.

## 2022-02-21 NOTE — CARE COORDINATION
ACM called patient to relay two more resources for sobriety support [employer] [facility] [phone] and [address] with [address] [place] [address] chapter [phone]. ACM unable to leave message at this time and will follow up at a later date.

## 2022-03-08 NOTE — TELEPHONE ENCOUNTER
Last office visit 2/8/2022     Last written 11- x 2 refill     Next office visit scheduled  Not scheduled    Requested Prescriptions     Pending Prescriptions Disp Refills    valsartan (DIOVAN) 80 MG tablet 90 tablet 1     Sig: Take One Tablet By Mouth Daily

## 2022-03-09 RX ORDER — VALSARTAN 80 MG/1
TABLET ORAL
Qty: 90 TABLET | Refills: 1 | Status: SHIPPED | OUTPATIENT
Start: 2022-03-09 | End: 2022-08-15

## 2022-03-14 ENCOUNTER — CARE COORDINATION (OUTPATIENT)
Dept: CARE COORDINATION | Age: 52
End: 2022-03-14

## 2022-03-28 RX ORDER — CYCLOBENZAPRINE HCL 10 MG
TABLET ORAL
Qty: 90 TABLET | Refills: 1 | Status: SHIPPED | OUTPATIENT
Start: 2022-03-28 | End: 2022-07-11

## 2022-03-28 NOTE — TELEPHONE ENCOUNTER
Refill Request     Last Seen: Last Seen Department: 2/8/2022  Last Seen by PCP: 2/8/2022    Last Written: 1/24/22    Next Appointment:   No future appointments.       Requested Prescriptions     Pending Prescriptions Disp Refills    cyclobenzaprine (FLEXERIL) 10 MG tablet 90 tablet 1     Sig: TAKE ONE TABLET BY MOUTH THREE TIMES A DAY AS NEEDED FOR MUSCLES SPASMS

## 2022-04-07 DIAGNOSIS — Z71.6 ENCOUNTER FOR SMOKING CESSATION COUNSELING: ICD-10-CM

## 2022-04-07 RX ORDER — NICOTINE 21 MG/24HR
PATCH, TRANSDERMAL 24 HOURS TRANSDERMAL
Qty: 28 PATCH | Refills: 1 | OUTPATIENT
Start: 2022-04-07

## 2022-04-07 NOTE — TELEPHONE ENCOUNTER
Pt states Roseanna Resendiz was going to refill this, Pt also says Fito Blackmon has a generic \"Chantix\" that he is willing to try as well.

## 2022-04-07 NOTE — TELEPHONE ENCOUNTER
.  Refill Request     Last Seen: Last Seen Department: 2/8/2022  Last Seen by PCP: 1/21/2022    Last Written: 9-29-21 14 with 0     Next Appointment:   No future appointments.       Requested Prescriptions     Pending Prescriptions Disp Refills    nicotine (NICODERM CQ) 21 MG/24HR [Pharmacy Med Name: NICOTINE 21 MG/24HR PATCH] 28 patch 1     Sig: APPLY ONE PATCH TO SKIN DAILY

## 2022-04-13 ENCOUNTER — OFFICE VISIT (OUTPATIENT)
Dept: FAMILY MEDICINE CLINIC | Age: 52
End: 2022-04-13
Payer: COMMERCIAL

## 2022-04-13 ENCOUNTER — TELEPHONE (OUTPATIENT)
Dept: FAMILY MEDICINE CLINIC | Age: 52
End: 2022-04-13

## 2022-04-13 VITALS
BODY MASS INDEX: 31.33 KG/M2 | WEIGHT: 231 LBS | HEART RATE: 119 BPM | OXYGEN SATURATION: 97 % | DIASTOLIC BLOOD PRESSURE: 74 MMHG | SYSTOLIC BLOOD PRESSURE: 124 MMHG

## 2022-04-13 DIAGNOSIS — F19.90 ILLICIT DRUG USE: ICD-10-CM

## 2022-04-13 DIAGNOSIS — R52 PAIN MANAGEMENT: Primary | ICD-10-CM

## 2022-04-13 DIAGNOSIS — R19.7 DIARRHEA, UNSPECIFIED TYPE: ICD-10-CM

## 2022-04-13 PROCEDURE — 3017F COLORECTAL CA SCREEN DOC REV: CPT | Performed by: NURSE PRACTITIONER

## 2022-04-13 PROCEDURE — 99213 OFFICE O/P EST LOW 20 MIN: CPT | Performed by: NURSE PRACTITIONER

## 2022-04-13 PROCEDURE — 4004F PT TOBACCO SCREEN RCVD TLK: CPT | Performed by: NURSE PRACTITIONER

## 2022-04-13 PROCEDURE — G8427 DOCREV CUR MEDS BY ELIG CLIN: HCPCS | Performed by: NURSE PRACTITIONER

## 2022-04-13 PROCEDURE — G8417 CALC BMI ABV UP PARAM F/U: HCPCS | Performed by: NURSE PRACTITIONER

## 2022-04-13 ASSESSMENT — ENCOUNTER SYMPTOMS
COUGH: 0
ABDOMINAL DISTENTION: 0
DIARRHEA: 1
ABDOMINAL PAIN: 0
CONSTIPATION: 0
NAUSEA: 0
CHEST TIGHTNESS: 0
WHEEZING: 0
VOMITING: 0
SHORTNESS OF BREATH: 0

## 2022-04-13 NOTE — TELEPHONE ENCOUNTER
Recommend nicotine patches first due to less side effects. How much is patient currently smoking per day.

## 2022-04-13 NOTE — TELEPHONE ENCOUNTER
----- Message from Brina Wilcox sent at 4/13/2022  2:10 PM EDT -----  Subject: Message to Provider    QUESTIONS  Information for Provider? Pt said that he brought it up at the appt but   they never talked about it. Says that he is trying to stop smoking and   that he found out that chantix has a generic version that wasn't recalled. He would like to be prescribed that or get the nicoderm refilled if we   can't. Can you please assist?  ---------------------------------------------------------------------------  --------------  CALL BACK INFO  What is the best way for the office to contact you? OK to leave message on   voicemail  Preferred Call Back Phone Number? [phone]  ---------------------------------------------------------------------------  --------------  SCRIPT ANSWERS  Relationship to Patient?  Self

## 2022-04-13 NOTE — PROGRESS NOTES
2022  Bishop Valentin II (: 1970)  47 y.o.    ASSESSMENT and PLAN:  Diagnoses and all orders for this visit:    Pain management  -     AFL (Epic) Jessica Mera MD, Pain Management, [facility]  -Once off Heroin, recommend establishing with Pain Management to ensure adequate pain control on a daily basis. -Once stable recommend proceeding with General Surgery for hernia repair. Diarrhea, unspecified type  -     Comprehensive Metabolic Panel; Future  -     CBC with Auto Differential; Future  -Declines stool studies. -Declines abdominal pain  -Check labs. -No fever or s/sx of infection.   -Difficult to assess due to recent drug use. -Alarm symptoms discussed, and when to go to ER    Illicit drug use  -Recommend inpatient treatment as safest option to stop using heroin.   -Educated on risks of not stopping, and increase risk for death while using. Pt and mother verbalize understanding.   -Gave treatment center recommendations locally.   -Alarm symptoms discussed, and when to go to ER    Return in about 4 weeks (around 2022), or if symptoms worsen or fail to improve. HPI   Presenting for multiple complaints, pt's mother present for visit. Patient reports he had taken heroin this am due to uncontrolled pain. Visibly impaired during exam. States his pain has been difficult to deal with even though he has been clean for months, due to co-morbids has limited options for treatment. Not currently established with pain management. Is not injecting, states he snorts drug. Had episode of LOC while in bathroom and mother found him. Was easily awoken, did not go to ER. No episodes since. Has Narcan at home if needed. Denies si/hi. Is willing to quit, is worried about pain control. Has been trying to get hernia surgery which should help with pain, but has been postponed. Having BM abnormalities. Declines stool testing. Denies blood or mucous in stool. Pt wants to stop using drugs.     Review of Systems   Constitutional: Negative for activity change, appetite change, chills, fatigue, fever and unexpected weight change. HENT: Negative. Respiratory: Negative for cough, chest tightness, shortness of breath and wheezing. Cardiovascular: Negative for chest pain, palpitations and leg swelling. Gastrointestinal: Positive for diarrhea. Negative for abdominal distention, abdominal pain, constipation, nausea and vomiting. Genitourinary: Negative. Musculoskeletal: Negative. Skin: Negative. Neurological: Negative for dizziness, weakness, light-headedness, numbness and headaches. Hematological: Negative. Psychiatric/Behavioral: Negative for suicidal ideas. The patient is nervous/anxious. Allergies, past medical history, family history, and social history reviewed and unchanged from previous encounter.      Current Outpatient Medications   Medication Sig Dispense Refill    mupirocin (BACTROBAN) 2 % ointment APPLY 3 TIMES DAILY TO AFFECTED AREA(S) 22 g 0    cyclobenzaprine (FLEXERIL) 10 MG tablet TAKE ONE TABLET BY MOUTH THREE TIMES A DAY AS NEEDED FOR MUSCLES SPASMS 90 tablet 1    valsartan (DIOVAN) 80 MG tablet Take One Tablet By Mouth Daily 90 tablet 1    metoprolol succinate (TOPROL XL) 50 MG extended release tablet Take one tab by mouth daily 90 tablet 0    KLOR-CON M20 20 MEQ extended release tablet TAKE ONE TABLET BY MOUTH DAILY 90 tablet 1    atorvastatin (LIPITOR) 80 MG tablet TAKE ONE TABLET BY MOUTH DAILY 90 tablet 3    pantoprazole (PROTONIX) 40 MG tablet TAKE ONE TABLET BY MOUTH TWICE A DAY 60 tablet 5    diphenhydrAMINE (BENADRYL) 25 MG tablet Take 1 tablet by mouth every 6 hours as needed for Itching Pt report takes for upset stomach in AM as needed 30 tablet 1    lurasidone (LATUDA) 20 MG TABS tablet Take 1 tablet by mouth daily 30 tablet 0    DULoxetine (CYMBALTA) 60 MG extended release capsule Take 1 capsule by mouth 2 times daily 60 capsule 4    busPIRone (BUSPAR) 10 MG tablet TAKE TWO TABLETS BY MOUTH TWICE A  tablet 5    furosemide (LASIX) 20 MG tablet Take 1 tablet by mouth daily 90 tablet 1    ondansetron (ZOFRAN) 4 MG tablet Take 1 tablet by mouth every 8 hours as needed for Nausea or Vomiting 40 tablet 2    albuterol sulfate HFA (VENTOLIN HFA) 108 (90 Base) MCG/ACT inhaler Inhale 2 puffs into the lungs every 6 hours as needed for Wheezing or Shortness of Breath 1 Inhaler 6    sildenafil (REVATIO) 20 MG tablet Take 3 tablets by mouth daily as needed (ED) 30 tablet 5    SPIRIVA RESPIMAT 2.5 MCG/ACT AERS inhaler INHALE TWO PUFFS BY MOUTH DAILY 1 Inhaler 5    CHANTIX 1 MG tablet TAKE ONE TABLET BY MOUTH TWICE A DAY 56 tablet 4    aspirin 81 MG tablet Take 81 mg by mouth daily       nicotine (NICODERM CQ) 7 MG/24HR Place 1 patch onto the skin daily for 14 days 14 patch 0     No current facility-administered medications for this visit. Vitals:    04/13/22 1308   BP: 124/74   Site: Right Upper Arm   Position: Sitting   Cuff Size: Large Adult   Pulse: 119   SpO2: 97%   Weight: 231 lb (104.8 kg)     Estimated body mass index is 31.33 kg/m² as calculated from the following:    Height as of 1/21/22: 6' (1.829 m). Weight as of this encounter: 231 lb (104.8 kg). *Did not perform full assessment due to recent drug use today, not acting like himself will avoid for safety. Physical Exam  Constitutional:       Appearance: He is obese. He is ill-appearing. HENT:      Head: Normocephalic and atraumatic. Pulmonary:      Effort: Pulmonary effort is normal.   Musculoskeletal:         General: No swelling. Right lower leg: No edema. Left lower leg: No edema. Skin:     Capillary Refill: Capillary refill takes less than 2 seconds. Findings: No lesion. Comments: Recent skin infection healed. Neurological:      General: No focal deficit present. Mental Status: He is alert and oriented to person, place, and time.  Mental status is at baseline.    Psychiatric:      Comments: Restless, anxious, constant movement during exam.

## 2022-04-18 ENCOUNTER — CARE COORDINATION (OUTPATIENT)
Dept: CARE COORDINATION | Age: 52
End: 2022-04-18

## 2022-04-18 NOTE — CARE COORDINATION
ACM attempted outreach but unable to reach patient and not able to leave message /t mailbox being full. ACM will try at a later date.

## 2022-04-19 DIAGNOSIS — I10 ESSENTIAL HYPERTENSION: ICD-10-CM

## 2022-04-19 RX ORDER — DULOXETIN HYDROCHLORIDE 60 MG/1
CAPSULE, DELAYED RELEASE ORAL
Qty: 120 CAPSULE | Refills: 1 | Status: SHIPPED | OUTPATIENT
Start: 2022-04-19 | End: 2022-11-01 | Stop reason: SDUPTHER

## 2022-04-19 RX ORDER — METOPROLOL SUCCINATE 50 MG/1
TABLET, EXTENDED RELEASE ORAL
Qty: 90 TABLET | Refills: 0 | Status: SHIPPED | OUTPATIENT
Start: 2022-04-19

## 2022-04-19 NOTE — TELEPHONE ENCOUNTER
Refill Request     Last Seen: Last Seen Department: 4/13/2022  Last Seen by PCP: 4/13/2022    Last Written: 12/30/2021 90 with 1     Next Appointment:   No future appointments.           Requested Prescriptions     Pending Prescriptions Disp Refills    metoprolol succinate (TOPROL XL) 50 MG extended release tablet [Pharmacy Med Name: METOPROLOL SUCC ER 50 MG TAB] 90 tablet 0     Sig: TAKE ONE TABLET BY MOUTH DAILY

## 2022-04-19 NOTE — TELEPHONE ENCOUNTER
Refill Request     Last Seen: Last Seen Department: 4/13/2022  Last Seen by PCP: 6/10/2021    Last Written: 5/27/2021 60 with 4     Next Appointment:   No future appointments.           Requested Prescriptions     Pending Prescriptions Disp Refills    DULoxetine (CYMBALTA) 60 MG extended release capsule [Pharmacy Med Name: DULOXETINE HCL DR 60 MG CAPSULE] 120 capsule 1     Sig: TAKE ONE CAPSULE BY MOUTH TWICE A DAY

## 2022-04-21 DIAGNOSIS — F17.200 TOBACCO USE DISORDER: Primary | ICD-10-CM

## 2022-04-21 RX ORDER — NICOTINE 21 MG/24HR
1 PATCH, TRANSDERMAL 24 HOURS TRANSDERMAL DAILY
Qty: 28 PATCH | Refills: 0 | Status: SHIPPED | OUTPATIENT
Start: 2022-04-21 | End: 2022-07-11

## 2022-04-21 NOTE — TELEPHONE ENCOUNTER
Please inform patient that I will send in a prescription for patches to his pharmacy. He needs to set a quit date and throw away his cigarettes and everything associated with them. That is the first day he will start the patches. I recommend googling how to use nicotine patches to quit smoking and choose the [employer] website. It has great tips and directions. He will need an appointment for follow up in 2-4 weeks to monitor for side effects and discuss weaning down. Wishing him great success!

## 2022-04-26 ENCOUNTER — CARE COORDINATION (OUTPATIENT)
Dept: CARE COORDINATION | Age: 52
End: 2022-04-26

## 2022-04-26 NOTE — CARE COORDINATION
ACM attempted outreach to patient but unable to leave voicemail d/t mailbox being full. ACM will attempt one final outreach at a later date.

## 2022-05-10 ENCOUNTER — CARE COORDINATION (OUTPATIENT)
Dept: CARE COORDINATION | Age: 52
End: 2022-05-10

## 2022-05-10 NOTE — CARE COORDINATION
ACM attempted final outreach attempt. ACM unable to leave voicemail for a return call. ACM will resolve episode as final outreach attempt had been made.

## 2022-05-20 RX ORDER — ALBUTEROL SULFATE 90 UG/1
2 AEROSOL, METERED RESPIRATORY (INHALATION) EVERY 6 HOURS PRN
Qty: 1 EACH | Refills: 5 | Status: SHIPPED | OUTPATIENT
Start: 2022-05-20 | End: 2022-07-11

## 2022-05-20 NOTE — TELEPHONE ENCOUNTER
Refill Request     CONFIRM preferrred pharmacy with the patient. If Mail Order Rx - Pend for 90 day refill. Last Seen: Last Seen Department: 4/13/2022  Last Seen by PCP: 2/8/2022    Last Written: 5/27/2021 1 with 6     Next Appointment:   No future appointments.           Requested Prescriptions     Pending Prescriptions Disp Refills    albuterol sulfate HFA (VENTOLIN HFA) 108 (90 Base) MCG/ACT inhaler       Sig: Inhale 2 puffs into the lungs every 6 hours as needed for Wheezing or Shortness of Breath

## 2022-05-31 NOTE — TELEPHONE ENCOUNTER
Refill request for Bactroban medication.      Name of Chao Solarus      Last visit - 4/13/2022     Pending visit - None    Last refill -3/29/2022  0 refills

## 2022-06-15 DIAGNOSIS — K21.9 GASTROESOPHAGEAL REFLUX DISEASE WITHOUT ESOPHAGITIS: ICD-10-CM

## 2022-06-15 DIAGNOSIS — K22.70 BARRETT'S ESOPHAGUS DETERMINED BY ENDOSCOPY: ICD-10-CM

## 2022-06-15 RX ORDER — PANTOPRAZOLE SODIUM 40 MG/1
TABLET, DELAYED RELEASE ORAL
Qty: 60 TABLET | Refills: 5 | Status: SHIPPED | OUTPATIENT
Start: 2022-06-15

## 2022-06-15 NOTE — TELEPHONE ENCOUNTER
Refill Request     CONFIRM preferrred pharmacy with the patient. If Mail Order Rx - Pend for 90 day refill. Last Seen: Last Seen Department: 4/13/2022  Last Seen by PCP: 2/8/2022    Last Written: 10/26/2021 60 Tablet 5 Refills    Next Appointment:   No future appointments. Message to [facility] to schedule appointment. Return in about 4 weeks (around 5/11/2022), or if symptoms worsen or fail to improve.           Requested Prescriptions     Pending Prescriptions Disp Refills    pantoprazole (PROTONIX) 40 MG tablet [Pharmacy Med Name: PANTOPRAZOLE SOD DR 40 MG TAB] 60 tablet 5     Sig: TAKE ONE TABLET BY MOUTH TWICE A DAY

## 2022-07-11 DIAGNOSIS — F17.200 TOBACCO USE DISORDER: ICD-10-CM

## 2022-07-11 RX ORDER — ALBUTEROL SULFATE 90 UG/1
AEROSOL, METERED RESPIRATORY (INHALATION)
Qty: 18 G | Refills: 3 | Status: SHIPPED | OUTPATIENT
Start: 2022-07-11

## 2022-07-11 RX ORDER — TIOTROPIUM BROMIDE INHALATION SPRAY 3.12 UG/1
SPRAY, METERED RESPIRATORY (INHALATION)
Qty: 4 G | OUTPATIENT
Start: 2022-07-11

## 2022-07-11 RX ORDER — NICOTINE 21 MG/24HR
PATCH, TRANSDERMAL 24 HOURS TRANSDERMAL
Qty: 28 PATCH | Refills: 0 | Status: SHIPPED | OUTPATIENT
Start: 2022-07-11 | End: 2022-10-07 | Stop reason: SDUPTHER

## 2022-07-11 RX ORDER — CYCLOBENZAPRINE HCL 10 MG
TABLET ORAL
Qty: 90 TABLET | Refills: 1 | Status: SHIPPED | OUTPATIENT
Start: 2022-07-11 | End: 2022-10-07 | Stop reason: SDUPTHER

## 2022-07-11 NOTE — TELEPHONE ENCOUNTER
.  Refill Request     CONFIRM preferrred pharmacy with the patient. If Mail Order Rx - Pend for 90 day refill. Last Seen: Last Seen Department: 4/13/2022  Last Seen by PCP: Visit date not found    Last Written: 3-28-22 90 with 1     Next Appointment:   No future appointments.       Requested Prescriptions     Pending Prescriptions Disp Refills    cyclobenzaprine (FLEXERIL) 10 MG tablet [Pharmacy Med Name: CYCLOBENZAPRINE 10 MG TABLET] 90 tablet 1     Sig: TAKE ONE TABLET BY MOUTH THREE TIMES A DAY AS NEEDED FOR MUSCLE SPASMS

## 2022-07-11 NOTE — TELEPHONE ENCOUNTER
.  Refill Request     CONFIRM preferrred pharmacy with the patient. If Mail Order Rx - Pend for 90 day refill. Last Seen: Last Seen Department: 4/13/2022  Last Seen by PCP: 6/10/2021    Last Written: 5-20-22 1 with 5     Next Appointment:   No future appointments.       Requested Prescriptions     Pending Prescriptions Disp Refills    albuterol sulfate HFA (PROVENTIL;VENTOLIN;PROAIR) 108 (90 Base) MCG/ACT inhaler [Pharmacy Med Name: ALBUTEROL HFA 90 MCG INHALER] 18 g 3     Sig: INHALE TWO PUFFS BY MOUTH EVERY 6 HOURS AS NEEDED FOR WHEEZING OR SHORTNESS OF BREATH

## 2022-08-15 RX ORDER — VALSARTAN 80 MG/1
TABLET ORAL
Qty: 90 TABLET | Refills: 1 | Status: SHIPPED | OUTPATIENT
Start: 2022-08-15

## 2022-08-15 NOTE — TELEPHONE ENCOUNTER
Attempted to call pt to schedule an evelina. No answer, vm full. Sent pt a my chart message. Pt overdue for a f/u with Roseanna.

## 2022-08-15 NOTE — TELEPHONE ENCOUNTER
Refill Request     CONFIRM preferrred pharmacy with the patient. If Mail Order Rx - Pend for 90 day refill. Last Seen: Last Seen Department: 4/13/2022  Last Seen by PCP: Visit date not found    Last Written: 03/09/2022 90 tablet 1 refill    Next Appointment:   No future appointments. Message to [employer] to schedule appointment. Return in about 4 weeks (around 5/11/2022), or if symptoms worsen or fail toimprove.         Requested Prescriptions     Pending Prescriptions Disp Refills    valsartan (DIOVAN) 80 MG tablet [Pharmacy Med Name: VALSARTAN 80 MG TABLET] 90 tablet 1     Sig: TAKE ONE TABLET BY MOUTH DAILY

## 2022-08-29 ENCOUNTER — OFFICE VISIT (OUTPATIENT)
Dept: FAMILY MEDICINE CLINIC | Age: 52
End: 2022-08-29
Payer: COMMERCIAL

## 2022-08-29 VITALS
WEIGHT: 212 LBS | HEART RATE: 111 BPM | OXYGEN SATURATION: 98 % | SYSTOLIC BLOOD PRESSURE: 126 MMHG | BODY MASS INDEX: 28.75 KG/M2 | DIASTOLIC BLOOD PRESSURE: 78 MMHG

## 2022-08-29 DIAGNOSIS — F19.90 ILLICIT DRUG USE: ICD-10-CM

## 2022-08-29 DIAGNOSIS — R19.7 DIARRHEA, UNSPECIFIED TYPE: ICD-10-CM

## 2022-08-29 DIAGNOSIS — F41.9 ANXIETY: ICD-10-CM

## 2022-08-29 DIAGNOSIS — J30.9 ALLERGIC RHINITIS, UNSPECIFIED SEASONALITY, UNSPECIFIED TRIGGER: ICD-10-CM

## 2022-08-29 DIAGNOSIS — I10 BENIGN ESSENTIAL HYPERTENSION: ICD-10-CM

## 2022-08-29 DIAGNOSIS — J30.9 ALLERGIC RHINITIS, UNSPECIFIED SEASONALITY, UNSPECIFIED TRIGGER: Primary | ICD-10-CM

## 2022-08-29 LAB
A/G RATIO: 1.7 (ref 1.1–2.2)
ALBUMIN SERPL-MCNC: 4.5 G/DL (ref 3.4–5)
ALP BLD-CCNC: 164 U/L (ref 40–129)
ALT SERPL-CCNC: 14 U/L (ref 10–40)
ANION GAP SERPL CALCULATED.3IONS-SCNC: 14 MMOL/L (ref 3–16)
AST SERPL-CCNC: 15 U/L (ref 15–37)
BASOPHILS ABSOLUTE: 0 K/UL (ref 0–0.2)
BASOPHILS RELATIVE PERCENT: 0.4 %
BILIRUB SERPL-MCNC: <0.2 MG/DL (ref 0–1)
BUN BLDV-MCNC: 26 MG/DL (ref 7–20)
CALCIUM SERPL-MCNC: 9.2 MG/DL (ref 8.3–10.6)
CHLORIDE BLD-SCNC: 106 MMOL/L (ref 99–110)
CHOLESTEROL, TOTAL: 129 MG/DL (ref 0–199)
CO2: 21 MMOL/L (ref 21–32)
CREAT SERPL-MCNC: 1.3 MG/DL (ref 0.9–1.3)
EOSINOPHILS ABSOLUTE: 0.3 K/UL (ref 0–0.6)
EOSINOPHILS RELATIVE PERCENT: 2.8 %
GFR AFRICAN AMERICAN: >60
GFR NON-AFRICAN AMERICAN: 58
GLUCOSE BLD-MCNC: 83 MG/DL (ref 70–99)
HCT VFR BLD CALC: 39.2 % (ref 40.5–52.5)
HDLC SERPL-MCNC: 31 MG/DL (ref 40–60)
HEMOGLOBIN: 13.6 G/DL (ref 13.5–17.5)
LDL CHOLESTEROL CALCULATED: 70 MG/DL
LYMPHOCYTES ABSOLUTE: 2.6 K/UL (ref 1–5.1)
LYMPHOCYTES RELATIVE PERCENT: 27 %
MCH RBC QN AUTO: 29.7 PG (ref 26–34)
MCHC RBC AUTO-ENTMCNC: 34.6 G/DL (ref 31–36)
MCV RBC AUTO: 85.7 FL (ref 80–100)
MONOCYTES ABSOLUTE: 0.9 K/UL (ref 0–1.3)
MONOCYTES RELATIVE PERCENT: 9.4 %
NEUTROPHILS ABSOLUTE: 5.9 K/UL (ref 1.7–7.7)
NEUTROPHILS RELATIVE PERCENT: 60.4 %
PDW BLD-RTO: 15.1 % (ref 12.4–15.4)
PLATELET # BLD: 270 K/UL (ref 135–450)
PMV BLD AUTO: 8.6 FL (ref 5–10.5)
POTASSIUM SERPL-SCNC: 4.8 MMOL/L (ref 3.5–5.1)
RBC # BLD: 4.57 M/UL (ref 4.2–5.9)
SODIUM BLD-SCNC: 141 MMOL/L (ref 136–145)
TOTAL PROTEIN: 7.2 G/DL (ref 6.4–8.2)
TRIGL SERPL-MCNC: 142 MG/DL (ref 0–150)
TSH REFLEX: 2.02 UIU/ML (ref 0.27–4.2)
VLDLC SERPL CALC-MCNC: 28 MG/DL
WBC # BLD: 9.7 K/UL (ref 4–11)

## 2022-08-29 PROCEDURE — 3017F COLORECTAL CA SCREEN DOC REV: CPT | Performed by: NURSE PRACTITIONER

## 2022-08-29 PROCEDURE — 99213 OFFICE O/P EST LOW 20 MIN: CPT | Performed by: NURSE PRACTITIONER

## 2022-08-29 PROCEDURE — 4004F PT TOBACCO SCREEN RCVD TLK: CPT | Performed by: NURSE PRACTITIONER

## 2022-08-29 PROCEDURE — G8417 CALC BMI ABV UP PARAM F/U: HCPCS | Performed by: NURSE PRACTITIONER

## 2022-08-29 PROCEDURE — G8428 CUR MEDS NOT DOCUMENT: HCPCS | Performed by: NURSE PRACTITIONER

## 2022-08-29 RX ORDER — DIPHENHYDRAMINE HCL 25 MG
25 TABLET ORAL EVERY 6 HOURS PRN
Qty: 30 TABLET | Refills: 1 | Status: CANCELLED | OUTPATIENT
Start: 2022-08-29

## 2022-08-29 RX ORDER — FLUTICASONE PROPIONATE 50 MCG
1 SPRAY, SUSPENSION (ML) NASAL DAILY
Qty: 16 G | Refills: 2 | Status: SHIPPED | OUTPATIENT
Start: 2022-08-29

## 2022-08-29 ASSESSMENT — PATIENT HEALTH QUESTIONNAIRE - PHQ9
SUM OF ALL RESPONSES TO PHQ9 QUESTIONS 1 & 2: 4
2. FEELING DOWN, DEPRESSED OR HOPELESS: 1
9. THOUGHTS THAT YOU WOULD BE BETTER OFF DEAD, OR OF HURTING YOURSELF: 0
3. TROUBLE FALLING OR STAYING ASLEEP: 3
SUM OF ALL RESPONSES TO PHQ QUESTIONS 1-9: 20
6. FEELING BAD ABOUT YOURSELF - OR THAT YOU ARE A FAILURE OR HAVE LET YOURSELF OR YOUR FAMILY DOWN: 3
1. LITTLE INTEREST OR PLEASURE IN DOING THINGS: 3
10. IF YOU CHECKED OFF ANY PROBLEMS, HOW DIFFICULT HAVE THESE PROBLEMS MADE IT FOR YOU TO DO YOUR WORK, TAKE CARE OF THINGS AT HOME, OR GET ALONG WITH OTHER PEOPLE: 2
5. POOR APPETITE OR OVEREATING: 3
SUM OF ALL RESPONSES TO PHQ QUESTIONS 1-9: 20
SUM OF ALL RESPONSES TO PHQ QUESTIONS 1-9: 20
8. MOVING OR SPEAKING SO SLOWLY THAT OTHER PEOPLE COULD HAVE NOTICED. OR THE OPPOSITE, BEING SO FIGETY OR RESTLESS THAT YOU HAVE BEEN MOVING AROUND A LOT MORE THAN USUAL: 1
4. FEELING TIRED OR HAVING LITTLE ENERGY: 3
SUM OF ALL RESPONSES TO PHQ QUESTIONS 1-9: 20
7. TROUBLE CONCENTRATING ON THINGS, SUCH AS READING THE NEWSPAPER OR WATCHING TELEVISION: 3

## 2022-08-29 ASSESSMENT — COLUMBIA-SUICIDE SEVERITY RATING SCALE - C-SSRS
2. HAVE YOU ACTUALLY HAD ANY THOUGHTS OF KILLING YOURSELF?: NO
6. HAVE YOU EVER DONE ANYTHING, STARTED TO DO ANYTHING, OR PREPARED TO DO ANYTHING TO END YOUR LIFE?: NO
1. WITHIN THE PAST MONTH, HAVE YOU WISHED YOU WERE DEAD OR WISHED YOU COULD GO TO SLEEP AND NOT WAKE UP?: NO

## 2022-08-29 ASSESSMENT — ANXIETY QUESTIONNAIRES
GAD7 TOTAL SCORE: 21
3. WORRYING TOO MUCH ABOUT DIFFERENT THINGS: 3
2. NOT BEING ABLE TO STOP OR CONTROL WORRYING: 3
7. FEELING AFRAID AS IF SOMETHING AWFUL MIGHT HAPPEN: 3
1. FEELING NERVOUS, ANXIOUS, OR ON EDGE: 3
5. BEING SO RESTLESS THAT IT IS HARD TO SIT STILL: 3
6. BECOMING EASILY ANNOYED OR IRRITABLE: 3
IF YOU CHECKED OFF ANY PROBLEMS ON THIS QUESTIONNAIRE, HOW DIFFICULT HAVE THESE PROBLEMS MADE IT FOR YOU TO DO YOUR WORK, TAKE CARE OF THINGS AT HOME, OR GET ALONG WITH OTHER PEOPLE: VERY DIFFICULT
4. TROUBLE RELAXING: 3

## 2022-08-29 NOTE — PATIENT INSTRUCTIONS
[facility]  [address]  [place], [address]  [phone] for general information and referrals for substance use disorder treatment    Dr. Hill Self.  [phone]

## 2022-08-29 NOTE — PROGRESS NOTES
2022  Genie Valentin II (: 1970)  47 y.o.    ASSESSMENT and PLAN:  Arthur Schneider was seen today for hypertension and anxiety. Diagnoses and all orders for this visit:    Allergic rhinitis, unspecified seasonality, unspecified trigger  -     fluticasone (FLONASE) 50 MCG/ACT nasal spray; 1 spray by Each Nostril route daily 1 spray by Each Nostril route daily  -     CBC with Auto Differential; Future  -The current medical regimen is effective;  continue present plan and medications.  -can try nasal rinses or gargling warm salt water intermittently. Benign essential hypertension  -     Comprehensive Metabolic Panel; Future  -     CBC with Auto Differential; Future  -     LIPID PANEL; Future  -     TSH with Reflex; Future  -update labs. -The current medical regimen is effective;  continue present plan and medications.  -check bp intermittently     Diarrhea, unspecified type  -     Comprehensive Metabolic Panel; Future  -     CBC with Auto Differential; Future  -     LIPID PANEL; Future  -     TSH with Reflex; Future  -update labs. R/o infectious process, anemia, electrolyte imbalance. If wbc elevated would pursue further testing like stool studies.   -alarm symptoms discussed at length. Illicit drug use  -     Comprehensive Metabolic Panel; Future  -     CBC with Auto Differential; Future  -recommend CRC and/or inpatient treatment. Anxiety  -     Comprehensive Metabolic Panel; Future  -     CBC with Auto Differential; Future  -     LIPID PANEL; Future  -     TSH with Reflex; Future  -recommend f/u with dr. Diego Rosales  -recommend cessation of illicit drug use. No follow-ups on file. HPI  Presenting for f/u on chronic conditions. Hx of substance abuse and tobacco use. Had tried chantix. Still using intermittently. Never went to inpatient treatment. Discussed CRC. Htn-on valsartan. No checking bp at home. Denies dizziness/lightheadedness, cp, sob. Gerd-on ppi.  Intermittent diarrhea, no MG extended release tablet TAKE ONE TABLET BY MOUTH DAILY 90 tablet 0    KLOR-CON M20 20 MEQ extended release tablet TAKE ONE TABLET BY MOUTH DAILY 90 tablet 1    atorvastatin (LIPITOR) 80 MG tablet TAKE ONE TABLET BY MOUTH DAILY 90 tablet 3    diphenhydrAMINE (BENADRYL) 25 MG tablet Take 1 tablet by mouth every 6 hours as needed for Itching Pt report takes for upset stomach in AM as needed 30 tablet 1    busPIRone (BUSPAR) 10 MG tablet TAKE TWO TABLETS BY MOUTH TWICE A  tablet 5    furosemide (LASIX) 20 MG tablet Take 1 tablet by mouth daily 90 tablet 1    ondansetron (ZOFRAN) 4 MG tablet Take 1 tablet by mouth every 8 hours as needed for Nausea or Vomiting 40 tablet 2    sildenafil (REVATIO) 20 MG tablet Take 3 tablets by mouth daily as needed (ED) 30 tablet 5    SPIRIVA RESPIMAT 2.5 MCG/ACT AERS inhaler INHALE TWO PUFFS BY MOUTH DAILY 1 Inhaler 5    aspirin 81 MG tablet Take 81 mg by mouth daily       lurasidone (LATUDA) 20 MG TABS tablet Take 1 tablet by mouth daily (Patient not taking: Reported on 8/29/2022) 30 tablet 0    CHANTIX 1 MG tablet TAKE ONE TABLET BY MOUTH TWICE A DAY (Patient not taking: Reported on 8/29/2022) 56 tablet 4     No current facility-administered medications for this visit. Vitals:    08/29/22 1448   BP: 126/78   Pulse: (!) 111   SpO2: 98%   Weight: 212 lb (96.2 kg)     Estimated body mass index is 28.75 kg/m² as calculated from the following:    Height as of 1/21/22: 6' (1.829 m). Weight as of this encounter: 212 lb (96.2 kg). Physical Exam  Vitals reviewed. Constitutional:       General: He is not in acute distress. Appearance: Normal appearance. He is not ill-appearing, toxic-appearing or diaphoretic. HENT:      Head: Normocephalic and atraumatic. Nose: Nose normal.   Eyes:      Conjunctiva/sclera: Conjunctivae normal.   Cardiovascular:      Rate and Rhythm: Normal rate and regular rhythm. Pulses: Normal pulses.       Heart sounds: Normal heart sounds. Pulmonary:      Effort: Pulmonary effort is normal. No respiratory distress. Breath sounds: Normal breath sounds. No wheezing or rhonchi. Chest:      Chest wall: No tenderness. Abdominal:      General: Abdomen is flat. Bowel sounds are normal. There is no distension. Palpations: Abdomen is soft. There is no mass. Tenderness: There is no abdominal tenderness. There is no right CVA tenderness, left CVA tenderness, guarding or rebound. Hernia: No hernia is present. Musculoskeletal:         General: Normal range of motion. Cervical back: Normal range of motion and neck supple. Skin:     General: Skin is warm and dry. Capillary Refill: Capillary refill takes less than 2 seconds. Neurological:      General: No focal deficit present. Mental Status: He is alert and oriented to person, place, and time. Mental status is at baseline. Psychiatric:         Mood and Affect: Mood normal.         Behavior: Behavior normal.         Thought Content:  Thought content normal.         Judgment: Judgment normal.

## 2022-09-09 NOTE — TELEPHONE ENCOUNTER
Refill Request     CONFIRM preferrred pharmacy with the patient. If Mail Order Rx - Pend for 90 day refill.       Last Seen: Last Seen Department: 8/29/2022  Last Seen by PCP: 2/8/2022    Last Written: 5/31/2022 22 with 0     Next Appointment:   Future Appointments   Date Time Provider Alejandra Grubbs   10/10/2022  1:30 PM Honey Resendiz APRN - CNP [facility]       Future appointment scheduled      Requested Prescriptions     Pending Prescriptions Disp Refills    mupirocin (BACTROBAN) 2 % ointment 22 g 0     Sig: APPLY 3 TIMES DAILY TO AFFECTED AREA(S)

## 2022-09-12 DIAGNOSIS — R74.8 ELEVATED ALKALINE PHOSPHATASE LEVEL: Primary | ICD-10-CM

## 2022-09-18 ASSESSMENT — ENCOUNTER SYMPTOMS
COUGH: 0
ABDOMINAL DISTENTION: 0
NAUSEA: 0
SHORTNESS OF BREATH: 0
DIARRHEA: 0
ABDOMINAL PAIN: 0
VOMITING: 0
CONSTIPATION: 0
WHEEZING: 0
CHEST TIGHTNESS: 0

## 2022-10-07 ENCOUNTER — TELEPHONE (OUTPATIENT)
Dept: FAMILY MEDICINE CLINIC | Age: 52
End: 2022-10-07

## 2022-10-07 DIAGNOSIS — F17.200 TOBACCO USE DISORDER: ICD-10-CM

## 2022-10-07 RX ORDER — CYCLOBENZAPRINE HCL 10 MG
TABLET ORAL
Qty: 90 TABLET | Refills: 1 | Status: SHIPPED | OUTPATIENT
Start: 2022-10-07

## 2022-10-07 RX ORDER — NICOTINE 21 MG/24HR
PATCH, TRANSDERMAL 24 HOURS TRANSDERMAL
Qty: 28 PATCH | Refills: 0 | Status: SHIPPED | OUTPATIENT
Start: 2022-10-07

## 2022-10-24 ENCOUNTER — TELEPHONE (OUTPATIENT)
Dept: FAMILY MEDICINE CLINIC | Age: 52
End: 2022-10-24

## 2022-10-24 NOTE — TELEPHONE ENCOUNTER
----- Message from Alis Guido sent at 10/24/2022  1:22 PM EDT -----  Subject: Message to Provider    QUESTIONS  Information for Provider? PT had to cancel appointment for today. He is   headed to the hospital. The pain became unbearable. He has been vomiting   all day. PT will need to schedule a hospital/ed follow up. Please contact   PT with next steps.  ---------------------------------------------------------------------------  --------------  Marjorie DONOHUE  [phone]; OK to leave message on voicemail  ---------------------------------------------------------------------------  --------------  SCRIPT ANSWERS  Relationship to Patient?  Self

## 2022-10-25 NOTE — TELEPHONE ENCOUNTER
I think that I am booked this week with hospital f/u's. Are ER f/u's required? It looks like he was seen yesterday for gastroenteritis (stomach virus). If symptoms have resolved, he may not need to f/u.

## 2022-10-25 NOTE — TELEPHONE ENCOUNTER
Patient was discharged from [facility] ED with phenergan and zofran to alternate. Please advise if available to schedule for ED follow up as  and Kiara will be out of the office for 2 weeks.

## 2022-10-31 NOTE — TELEPHONE ENCOUNTER
Refill Request     CONFIRM preferrred pharmacy with the patient. If Mail Order Rx - Pend for 90 day refill. Last Seen: Last Seen Department: 8/29/2022  Last Seen by PCP: 2/8/2022    Last Written:120 with 1 4/19/2022     If no future appointment scheduled, route STAFF MESSAGE with patient name to the [facility] for scheduling. Next Appointment:   No future appointments. Message sent to [address] [place] [address] to schedule appt with patient?   YES      Requested Prescriptions     Pending Prescriptions Disp Refills    DULoxetine (CYMBALTA) 60 MG extended release capsule 120 capsule 1     Sig: TAKE ONE CAPSULE BY MOUTH TWICE A DAY

## 2022-11-01 RX ORDER — DULOXETIN HYDROCHLORIDE 60 MG/1
CAPSULE, DELAYED RELEASE ORAL
Qty: 120 CAPSULE | Refills: 1 | Status: SHIPPED | OUTPATIENT
Start: 2022-11-01

## 2022-11-07 DIAGNOSIS — F17.200 TOBACCO USE DISORDER: ICD-10-CM

## 2022-11-07 RX ORDER — NICOTINE 21 MG/24HR
PATCH, TRANSDERMAL 24 HOURS TRANSDERMAL
Qty: 28 PATCH | Refills: 0 | Status: SHIPPED | OUTPATIENT
Start: 2022-11-07

## 2022-11-07 NOTE — TELEPHONE ENCOUNTER
Refill Request     CONFIRM preferrred pharmacy with the patient. If Mail Order Rx - Pend for 90 day refill. Last Seen: Last Seen Department: 8/29/2022  Last Seen by PCP: 2/8/2022    Last Written: 10/07/2022 28 patch 0 refills     If no future appointment scheduled, route STAFF MESSAGE with patient name to the [facility] for scheduling. Next Appointment:   Future Appointments   Date Time Provider Alejandra Grubbs   11/8/2022  1:30 PM Remy Amezcua MD [facility] 46       Message sent to [address] [place] [address] to schedule appt with patient? YES    Return in about 1 month (around 9/29/2022).     Requested Prescriptions     Pending Prescriptions Disp Refills    nicotine (NICODERM CQ) 14 MG/24HR [Pharmacy Med Name: NICOTINE 14 MG/24HR PATCH] 28 patch 0     Sig: APPLY ONE PATCH TO THE SKIN DAILY

## 2022-11-08 ENCOUNTER — OFFICE VISIT (OUTPATIENT)
Dept: FAMILY MEDICINE CLINIC | Age: 52
End: 2022-11-08
Payer: COMMERCIAL

## 2022-11-08 ENCOUNTER — TELEPHONE (OUTPATIENT)
Dept: FAMILY MEDICINE CLINIC | Age: 52
End: 2022-11-08

## 2022-11-08 VITALS
WEIGHT: 210 LBS | TEMPERATURE: 97.6 F | SYSTOLIC BLOOD PRESSURE: 155 MMHG | OXYGEN SATURATION: 97 % | DIASTOLIC BLOOD PRESSURE: 120 MMHG | HEIGHT: 73 IN | HEART RATE: 110 BPM | BODY MASS INDEX: 27.83 KG/M2

## 2022-11-08 DIAGNOSIS — Z76.89 ENCOUNTER TO ESTABLISH CARE WITH NEW DOCTOR: Primary | ICD-10-CM

## 2022-11-08 DIAGNOSIS — I10 BENIGN ESSENTIAL HYPERTENSION: ICD-10-CM

## 2022-11-08 DIAGNOSIS — Z23 FLU VACCINE NEED: ICD-10-CM

## 2022-11-08 DIAGNOSIS — R74.8 ELEVATED ALKALINE PHOSPHATASE LEVEL: ICD-10-CM

## 2022-11-08 DIAGNOSIS — F19.90 ILLICIT DRUG USE: ICD-10-CM

## 2022-11-08 DIAGNOSIS — I25.110 CORONARY ARTERY DISEASE INVOLVING NATIVE CORONARY ARTERY OF NATIVE HEART WITH UNSTABLE ANGINA PECTORIS (HCC): ICD-10-CM

## 2022-11-08 DIAGNOSIS — F90.9 ATTENTION DEFICIT HYPERACTIVITY DISORDER (ADHD), UNSPECIFIED ADHD TYPE: ICD-10-CM

## 2022-11-08 DIAGNOSIS — F17.200 SMOKER: ICD-10-CM

## 2022-11-08 DIAGNOSIS — F41.8 MIXED ANXIETY AND DEPRESSIVE DISORDER: ICD-10-CM

## 2022-11-08 DIAGNOSIS — Z23 NEED FOR SHINGLES VACCINE: ICD-10-CM

## 2022-11-08 DIAGNOSIS — K21.9 GASTROESOPHAGEAL REFLUX DISEASE WITHOUT ESOPHAGITIS: ICD-10-CM

## 2022-11-08 PROCEDURE — G8417 CALC BMI ABV UP PARAM F/U: HCPCS | Performed by: STUDENT IN AN ORGANIZED HEALTH CARE EDUCATION/TRAINING PROGRAM

## 2022-11-08 PROCEDURE — G8482 FLU IMMUNIZE ORDER/ADMIN: HCPCS | Performed by: STUDENT IN AN ORGANIZED HEALTH CARE EDUCATION/TRAINING PROGRAM

## 2022-11-08 PROCEDURE — 90674 CCIIV4 VAC NO PRSV 0.5 ML IM: CPT | Performed by: STUDENT IN AN ORGANIZED HEALTH CARE EDUCATION/TRAINING PROGRAM

## 2022-11-08 PROCEDURE — 90471 IMMUNIZATION ADMIN: CPT | Performed by: STUDENT IN AN ORGANIZED HEALTH CARE EDUCATION/TRAINING PROGRAM

## 2022-11-08 PROCEDURE — 3017F COLORECTAL CA SCREEN DOC REV: CPT | Performed by: STUDENT IN AN ORGANIZED HEALTH CARE EDUCATION/TRAINING PROGRAM

## 2022-11-08 PROCEDURE — 4004F PT TOBACCO SCREEN RCVD TLK: CPT | Performed by: STUDENT IN AN ORGANIZED HEALTH CARE EDUCATION/TRAINING PROGRAM

## 2022-11-08 PROCEDURE — 3074F SYST BP LT 130 MM HG: CPT | Performed by: STUDENT IN AN ORGANIZED HEALTH CARE EDUCATION/TRAINING PROGRAM

## 2022-11-08 PROCEDURE — G8427 DOCREV CUR MEDS BY ELIG CLIN: HCPCS | Performed by: STUDENT IN AN ORGANIZED HEALTH CARE EDUCATION/TRAINING PROGRAM

## 2022-11-08 PROCEDURE — 3078F DIAST BP <80 MM HG: CPT | Performed by: STUDENT IN AN ORGANIZED HEALTH CARE EDUCATION/TRAINING PROGRAM

## 2022-11-08 PROCEDURE — 99214 OFFICE O/P EST MOD 30 MIN: CPT | Performed by: STUDENT IN AN ORGANIZED HEALTH CARE EDUCATION/TRAINING PROGRAM

## 2022-11-08 RX ORDER — HYDROCHLOROTHIAZIDE 25 MG/1
25 TABLET ORAL DAILY
COMMUNITY
End: 2022-11-08

## 2022-11-08 RX ORDER — ZOSTER VACCINE RECOMBINANT, ADJUVANTED 50 MCG/0.5
0.5 KIT INTRAMUSCULAR SEE ADMIN INSTRUCTIONS
Qty: 0.5 ML | Refills: 0 | Status: SHIPPED | OUTPATIENT
Start: 2022-11-08 | End: 2023-05-07

## 2022-11-08 RX ORDER — AMLODIPINE BESYLATE 5 MG/1
5 TABLET ORAL DAILY
COMMUNITY

## 2022-11-08 RX ORDER — VARENICLINE TARTRATE 1 MG/1
0.5 TABLET, FILM COATED ORAL 2 TIMES DAILY
Qty: 120 TABLET | Refills: 1 | Status: SHIPPED
Start: 2022-11-08 | End: 2022-11-10

## 2022-11-08 SDOH — ECONOMIC STABILITY: FOOD INSECURITY: WITHIN THE PAST 12 MONTHS, YOU WORRIED THAT YOUR FOOD WOULD RUN OUT BEFORE YOU GOT MONEY TO BUY MORE.: NEVER TRUE

## 2022-11-08 SDOH — ECONOMIC STABILITY: FOOD INSECURITY: WITHIN THE PAST 12 MONTHS, THE FOOD YOU BOUGHT JUST DIDN'T LAST AND YOU DIDN'T HAVE MONEY TO GET MORE.: NEVER TRUE

## 2022-11-08 ASSESSMENT — ENCOUNTER SYMPTOMS
COUGH: 0
SHORTNESS OF BREATH: 0
DIARRHEA: 1
TROUBLE SWALLOWING: 0
NAUSEA: 1
ABDOMINAL PAIN: 0
CONSTIPATION: 0
VOMITING: 0
BACK PAIN: 0

## 2022-11-08 ASSESSMENT — SOCIAL DETERMINANTS OF HEALTH (SDOH): HOW HARD IS IT FOR YOU TO PAY FOR THE VERY BASICS LIKE FOOD, HOUSING, MEDICAL CARE, AND HEATING?: NOT HARD AT ALL

## 2022-11-08 NOTE — PROGRESS NOTES
María Leahy II  YOB: 1970    Date of Service:  11/8/2022    Chief Complaint:   Ming Jaime is a 47 y.o. male who presents for complete physical examination. HPI:   :   Chief Complaint   Patient presents with    New Patient     Patient is a 29-year-old male discussed staying has a past medical history of ADHD (attention deficit hyperactivity disorder), Alcoholic hepatitis, Arthritis, Ha esophagus, CAD (coronary artery disease), Chronic back pain, COPD (chronic obstructive pulmonary disease) ([facility] [address] Depression, ED (erectile dysfunction), Elevated LFTs, GERD (gastroesophageal reflux disease), History of intravenous drug abuse ([address] Hyperlipidemia, Hypertension, and Pleurisy. Previously seen at [facility] family medicine. ER follow-up  Patient was seen in the ER on 11/6/2022. Patient presented for high blood pressures 200s/100s. Patient was noncompliant with blood pressure medications per EMS. Patient also had altered mentation and was given 2 mg of Narcan with no change in response. Patient reports that he had not been able to get his Cymbalta prescription which was causing him to feel anxious. Hypertension/ CAD  Patient currently takes valsartan 80 mg daily at home. Patient is noncompliant with medications (metoprolol succinate, 50 mg, Lasix 20 mg, amlodipine), has multiple different blood pressure medications on file. Has seen cardiology in the past, started on amlodipine 5 mg. Was recently seen in the ER on 10/8/2022 and started on hydrochlorothiazide 25 mg daily, but patient reports he never started this medication. Was also prescribed Lasix 20 mg daily. Patient not taking this medication. Patient does not check his blood pressures at home. Denies any lightheadedness, dizziness, chest pain, palpitations, shortness of breath, orthopnea, PND, or swelling in lower extremities. Previously was having intermittent headaches.      Hyperlipidemia  Takes artorvastain 80mg daily. Depression and anxiety  Patient takes Cymbalta 60mg twice daily. Reports that he has been out of the medication for the past 2 to 3 weeks. Now currently it is at his pharmacy. BuSpar 10 mg daily as needed for anxiety. Saw Dr. Wilton Tellez previously. COPD  Uses Spiriva Respimat inhaler 5mcg daily, albuterol 2 puffs every 6 hours as needed. Pain well controlled. Denies any wheezing, shortness of breath. No cough. Smoker. GERD  For reflux symptoms patient takes Protonix 20 mg twice daily. Denies any burning abdomen. No blood in stool. No nausea or vomiting. Intermittent diarrhea, no change from baseline. Denies fever, body aches, chills. Chronic back pain/POSTLAMINECTOMY SYNDROME  Patient has history of previous back surgery. Taking cyclobenzaprine 10mg 3 times daily as needed. Has been taking the medication for over 30 years per patient. Current smoker  Currently smoking 1 pack/day. Patient reports he was previously on Chantix which has been working well for him. Reports that it had been recalled and was switched to something else as a effective. Does not like nicotine patches. Past Medical History:   Current Meds: see above   Allergies: wellbutrin, Lexapro  Social History  Occupation: unemployed   Lives with: lives alone   Smoker: since 16years old , 1 ppd   Alcohol use: none   Drug use: marijuana, fentanyl , many others  Diet: no restrictions    Exercise: limited       Health Maintenance    Hepatitis C screening :NR 9/18/21   HIV screening: NR 7/2017   COVID-19 vaccine - had 1 vaccine, reaction .   Last Tdap > 10 years ago  Colonsocopy 5/10/2018            Past Medical History:   Diagnosis Date    ADHD (attention deficit hyperactivity disorder)     Alcoholic hepatitis     Arthritis     Ha esophagus 6/11/2018    CAD (coronary artery disease)     Chronic back pain     COPD (chronic obstructive pulmonary disease) (HCC)     Depression     ED (erectile dysfunction) 12/29/2011    Elevated LFTs     GERD (gastroesophageal reflux disease)     History of intravenous drug abuse ([facility] [address]     Hyperlipidemia     Hypertension     Pleurisy      Family History   Problem Relation Age of Onset    Arthritis Mother     Other Mother         pneumothorax several times    Asthma Father        Health Maintenance   Topic Date Due    Pneumococcal 0-64 years Vaccine (2 - PCV) 09/08/2020    Shingles vaccine (1 of 2) Never done    COVID-19 Vaccine (2 - Booster for April series) 07/10/2021    Lipids  08/29/2023    Depression Monitoring  08/29/2023    DTaP/Tdap/Td vaccine (4 - Td or Tdap) 07/27/2030    Colorectal Cancer Screen  06/07/2031    Flu vaccine  Completed    Hepatitis C screen  Completed    HIV screen  Completed    Hepatitis A vaccine  Aged Out    Hib vaccine  Aged Out    Meningococcal (ACWY) vaccine  Aged Out     Immunization History   Administered Date(s) Administered    COVID-19, J&J, (age 18y+), IM, 0.5 mL 05/15/2021    Influenza Vaccine, unspecified formulation 10/03/2016, 01/23/2018, 12/21/2018    Influenza Virus Vaccine 09/19/2014, 10/11/2015, 01/11/2016, 09/08/2019    Influenza, FLUARIX, FLULAVAL, FLUZONE (age 10 mo+) AND AFLURIA, (age 1 y+), PF, 0.5mL 10/03/2016, 01/23/2018, 12/21/2018, 09/08/2019, 09/21/2020    Influenza, FLUCELVAX, (age 10 mo+), MDCK, PF, 0.5mL 11/08/2022    Pneumococcal Polysaccharide ([id number]) 01/23/2018, 09/08/2019    Tdap (Boostrix, Adacel) 06/16/2011, 07/26/2020, 07/27/2020       Allergies   Allergen Reactions    Lexapro [Escitalopram Oxalate] Other (See Comments)     Crazy dream hallucinations      Morphine      Makes him angry    Prozac [Fluoxetine Hcl]      Irritable        Wellbutrin [Bupropion] Other (See Comments)     Crazy dream halluciantions     Outpatient Medications Marked as Taking for the 11/8/22 encounter (Office Visit) with Arnulfo Riley MD   Medication Sig Dispense Refill    varenicline (CHANTIX STARTING MONTH PAK) 0.5 MG X 11 & 1 MG X 42 tablet By mouth.  1 each 0    amLODIPine (NORVASC) 5 MG tablet Take 5 mg by mouth daily      zoster recombinant adjuvanted vaccine (SHINGRIX) 50 MCG/0.5ML SUSR injection Inject 0.5 mLs into the muscle See Admin Instructions 1 dose now and repeat in 2-6 months 0.5 mL 0    nicotine (NICODERM CQ) 14 MG/24HR APPLY ONE PATCH TO THE SKIN DAILY 28 patch 0    DULoxetine (CYMBALTA) 60 MG extended release capsule TAKE ONE CAPSULE BY MOUTH TWICE A  capsule 1    cyclobenzaprine (FLEXERIL) 10 MG tablet TAKE ONE TABLET BY MOUTH THREE TIMES A DAY AS NEEDED FOR MUSCLE SPASMS 90 tablet 1    mupirocin (BACTROBAN) 2 % ointment APPLY 3 TIMES DAILY TO AFFECTED AREA(S) 22 g 0    fluticasone (FLONASE) 50 MCG/ACT nasal spray 1 spray by Each Nostril route daily 1 spray by Each Nostril route daily 16 g 2    valsartan (DIOVAN) 80 MG tablet TAKE ONE TABLET BY MOUTH DAILY 90 tablet 1    albuterol sulfate HFA (PROVENTIL;VENTOLIN;PROAIR) 108 (90 Base) MCG/ACT inhaler INHALE TWO PUFFS BY MOUTH EVERY 6 HOURS AS NEEDED FOR WHEEZING OR SHORTNESS OF BREATH 18 g 3    pantoprazole (PROTONIX) 40 MG tablet TAKE ONE TABLET BY MOUTH TWICE A DAY 60 tablet 5    atorvastatin (LIPITOR) 80 MG tablet TAKE ONE TABLET BY MOUTH DAILY 90 tablet 3    diphenhydrAMINE (BENADRYL) 25 MG tablet Take 1 tablet by mouth every 6 hours as needed for Itching Pt report takes for upset stomach in AM as needed 30 tablet 1    ondansetron (ZOFRAN) 4 MG tablet Take 1 tablet by mouth every 8 hours as needed for Nausea or Vomiting 40 tablet 2    sildenafil (REVATIO) 20 MG tablet Take 3 tablets by mouth daily as needed (ED) 30 tablet 5    SPIRIVA RESPIMAT 2.5 MCG/ACT AERS inhaler INHALE TWO PUFFS BY MOUTH DAILY 1 Inhaler 5         Past Surgical History:   Procedure Laterality Date    ARM SURGERY Left 12/10/2015    BACK SURGERY      COLONOSCOPY      LUNG SURGERY      partial removal (right) agent orange    SPINE SURGERY  1995    lumbar laminectomy       Social History Socioeconomic History    Marital status:      Spouse name: Not on file    Number of children: Not on file    Years of education: Not on file    Highest education level: Not on file   Occupational History    Not on file   Tobacco Use    Smoking status: Every Day     Packs/day: 0.50     Years: 33.00     Pack years: 16.50     Types: Cigarettes    Smokeless tobacco: Former     Quit date: 11/1/2015   Vaping Use    Vaping Use: Former    Substances: Always, THC   Substance and Sexual Activity    Alcohol use: Yes     Comment: 1/2 fifth tequila     Drug use: Yes     Types: Marijuana ([place]), Other-see comments, Opiates      Comment: pt states not recently     Sexual activity: Yes   Other Topics Concern    Not on file   Social History Narrative    Not on file     Social Determinants of Health     Financial Resource Strain: Low Risk     Difficulty of Paying Living Expenses: Not hard at all   Food Insecurity: No Food Insecurity    Worried About Running Out of Food in the Last Year: Never true    Ran Out of Food in the Last Year: Never true   Transportation Needs: Not on file   Physical Activity: Not on file   Stress: Not on file   Social Connections: Not on file   Intimate Partner Violence: Not on file   Housing Stability: Not on file       Review ofSystems:  Review of Systems   Constitutional:  Positive for diaphoresis. Negative for fatigue, fever and unexpected weight change. HENT:  Negative for congestion and trouble swallowing. Eyes:  Negative for visual disturbance. Respiratory:  Negative for cough and shortness of breath. Cardiovascular:  Negative for chest pain, palpitations and leg swelling. Gastrointestinal:  Positive for diarrhea and nausea. Negative for abdominal pain, constipation and vomiting. Genitourinary:  Positive for decreased urine volume. Negative for difficulty urinating and dysuria. Musculoskeletal:  Negative for arthralgias, back pain and joint swelling.    Neurological: Positive for syncope and headaches. Negative for weakness, light-headedness and numbness. Psychiatric/Behavioral:  Negative for hallucinations, self-injury, sleep disturbance (* hours) and suicidal ideas. PhysicalExam:   Vitals:    11/08/22 1326 11/08/22 1327   BP: (!) 159/102 (!) 155/120   Site: Left Upper Arm Left Upper Arm   Position: Sitting Sitting   Cuff Size: Large Adult Large Adult   Pulse: (!) 110    Temp: 97.6 °F (36.4 °C)    SpO2: 97%    Weight: 210 lb (95.3 kg)    Height: 6' 1\" (1.854 m)      Body mass index is 27.71 kg/m². Physical Exam  Constitutional:       General: He is not in acute distress. Appearance: Normal appearance. He is not ill-appearing. HENT:      Head: Normocephalic and atraumatic. Right Ear: External ear normal.      Left Ear: External ear normal.      Nose: Nose normal.      Mouth/Throat:      Mouth: Mucous membranes are moist.      Pharynx: Oropharynx is clear. No posterior oropharyngeal erythema. Eyes:      Extraocular Movements: Extraocular movements intact. Pupils: Pupils are equal, round, and reactive to light. Neck:      Thyroid: No thyroid mass, thyromegaly or thyroid tenderness. Cardiovascular:      Rate and Rhythm: Normal rate and regular rhythm. Pulses: Normal pulses. Heart sounds: Normal heart sounds. No murmur heard. No friction rub. No gallop. Pulmonary:      Effort: Pulmonary effort is normal. No respiratory distress. Breath sounds: Normal breath sounds. No wheezing. Abdominal:      General: Abdomen is flat. Bowel sounds are normal. There is no distension. Palpations: Abdomen is soft. There is no mass. Tenderness: There is no abdominal tenderness. Musculoskeletal:         General: Normal range of motion. Cervical back: Normal range of motion. Right lower leg: No edema. Left lower leg: No edema. Lymphadenopathy:      Cervical: No cervical adenopathy.    Skin:     Capillary Refill: Capillary refill takes less than 2 seconds. Findings: No rash. Neurological:      General: No focal deficit present. Mental Status: He is alert. Psychiatric:         Mood and Affect: Mood is anxious. Thought Content: Thought content normal.       Assessment/Plan:  1. Encounter to establish care with new doctor  Health Maintenance Due   Topic Date Due    Pneumococcal 0-64 years Vaccine (2 - PCV) 09/08/2020    Shingles vaccine (1 of 2) Never done    COVID-19 Vaccine (2 - Booster for April series) 07/10/2021    -     Comprehensive Metabolic Panel; (1/28/72)- GFR 58, Cr 1.3, > 135 (10/24/22) ; history of alcoholic hepatitis   -     CBC with Auto Differential; ((8/29/22) - WNL  -     LIPID PANEL; (8/29/22) , triglycerides 142, HDL 31, LDL 70,  -     TSH with Reflex; ((8/29/22) WNL     Benign essential hypertension  Not Controlled. Compliant with medications. Med: Valsartan 80mg daily. Adjusted medications. ?   Start amlodipine 5mg   Discussed DASH diet and dietary sodium restrictions. Continue/Increase dietary efforts and physical activity. Illicit drug use  History of drug use, opiates and THC     Coronary artery disease involving native coronary artery of native heart with unstable angina pectoris ([facility] [address]  Follow up with cardiology for CAD and HTN   -     Rayne Valle MD, Cardiology, [facility]   Gastroesophageal reflux disease without esophagitis  Continue pantoprazole (PROTONIX) 40 MG tablet    Mixed anxiety and depressive disorder  Stable. Continue Cymbalta 60 mg twice daily. Flu vaccine need  -     Influenza, FLUCELVAX, (age 10 mo+), IM, Preservative Free, 0.5 mL  Need for shingles vaccine  -     zoster recombinant adjuvanted vaccine (SHINGRIX) 50 MCG/0.5ML SUSR injection; Inject 0.5 mLs into the muscle See Admin Instructions 1 dose now and repeat in 2-6 months, Disp-0.5 mL, R-0Normal  Smoker  Greater than 3 minutes spent on smoking and tobacco use cessation. Readiness to quit: interested  Discussed treatment options with   -     varenicline (CHANTIX STARTING MONTH MARILUZ) 0.5 MG X 11 & 1 MG X 42 tablet; By mouth., Disp-1 each, R-0Normal  Elevated alkaline phosphatase level  Isolated elevation of alkaline phosphatase,   Discussed with patient possible liver origin will rule out with GGT  Hepatic causes intrahepatic cholestasis versus extrahepatic cholestasis  Nonhepatic etiologies h bone disease including healing fracture, Paget's, osteomalacia, vitamin D deficiency, acute, malignancy, renal failure, heart failure, hyper thyroid/parathyroid, malignancy(lymphoma, leukemia, RCC, aMEN  If alkaline phosphatase is greater than 2 times upper limit number follow-up with ultrasound vs recheck 3 months       Return in about 4 weeks (around 12/6/2022).

## 2022-11-08 NOTE — TELEPHONE ENCOUNTER
Patient called in wanting to send a message to you. He stated he's not sure what it is, but it's been taking a long time to receive medications. He just got his Cymbalta the other day and he had requested it a couple weeks ago. He had been without his Cymbalta for several days and has been \"on an emotional roller coaster\". Just wanted you to be aware.
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
## 2022-11-10 ENCOUNTER — TELEPHONE (OUTPATIENT)
Dept: FAMILY MEDICINE CLINIC | Age: 52
End: 2022-11-10

## 2022-11-10 NOTE — TELEPHONE ENCOUNTER
Monet Conklin pharmacist with [address] said Chantix Starting Box would be easier than patient dividing pills. Future Appointments   Date Time Provider Alejandra Humera   11/15/2022  1:00 PM MD TIM Rodriguez Cinnoel - DYJAZMIN   12/15/2022  1:00 PM Karie Stanton MD [facility] [address]     Past appointment was 11/8/22.     Pharmacy is [address] [place] [address]

## 2022-11-16 DIAGNOSIS — F17.200 SMOKER: Primary | ICD-10-CM

## 2022-11-16 NOTE — TELEPHONE ENCOUNTER
Patient called in this morning saying he spoke to the pharmacy this morning and the pharmacy said  has not sent a prescription for the Chantix starter box.

## 2022-12-13 ENCOUNTER — OFFICE VISIT (OUTPATIENT)
Dept: URGENT CARE | Age: 52
End: 2022-12-13

## 2022-12-13 ENCOUNTER — TELEPHONE (OUTPATIENT)
Dept: FAMILY MEDICINE CLINIC | Age: 52
End: 2022-12-13

## 2022-12-13 VITALS
OXYGEN SATURATION: 91 % | TEMPERATURE: 99.7 F | HEIGHT: 73 IN | RESPIRATION RATE: 22 BRPM | WEIGHT: 214 LBS | BODY MASS INDEX: 28.36 KG/M2 | SYSTOLIC BLOOD PRESSURE: 131 MMHG | HEART RATE: 114 BPM | DIASTOLIC BLOOD PRESSURE: 71 MMHG

## 2022-12-13 DIAGNOSIS — J10.1 INFLUENZA A: Primary | ICD-10-CM

## 2022-12-13 DIAGNOSIS — R50.9 FEVER, UNSPECIFIED FEVER CAUSE: ICD-10-CM

## 2022-12-13 DIAGNOSIS — F17.200 SMOKER: Primary | ICD-10-CM

## 2022-12-13 DIAGNOSIS — F17.200 TOBACCO USE DISORDER: ICD-10-CM

## 2022-12-13 LAB
INFLUENZA VIRUS A RNA: POSITIVE
INFLUENZA VIRUS B RNA: NEGATIVE

## 2022-12-13 RX ORDER — NICOTINE 21 MG/24HR
PATCH, TRANSDERMAL 24 HOURS TRANSDERMAL
Qty: 28 PATCH | Refills: 0 | Status: SHIPPED | OUTPATIENT
Start: 2022-12-13

## 2022-12-13 RX ORDER — ONDANSETRON 2 MG/ML
4 INJECTION INTRAMUSCULAR; INTRAVENOUS EVERY 6 HOURS PRN
Status: DISCONTINUED | OUTPATIENT
Start: 2022-12-13 | End: 2022-12-13

## 2022-12-13 RX ORDER — OSELTAMIVIR PHOSPHATE 75 MG/1
75 CAPSULE ORAL 2 TIMES DAILY
Qty: 10 CAPSULE | Refills: 0 | Status: SHIPPED | OUTPATIENT
Start: 2022-12-13 | End: 2022-12-18

## 2022-12-13 RX ORDER — ALBUTEROL SULFATE 2.5 MG/3ML
2.5 SOLUTION RESPIRATORY (INHALATION) ONCE
Status: COMPLETED | OUTPATIENT
Start: 2022-12-13 | End: 2022-12-13

## 2022-12-13 RX ORDER — ONDANSETRON 4 MG/1
4 TABLET, ORALLY DISINTEGRATING ORAL ONCE
Status: COMPLETED | OUTPATIENT
Start: 2022-12-13 | End: 2022-12-13

## 2022-12-13 RX ORDER — VARENICLINE TARTRATE 1 MG/1
1 TABLET, FILM COATED ORAL 2 TIMES DAILY
Qty: 180 TABLET | Refills: 1 | Status: SHIPPED | OUTPATIENT
Start: 2022-12-13

## 2022-12-13 RX ADMIN — ONDANSETRON 4 MG: 4 TABLET, ORALLY DISINTEGRATING ORAL at 11:47

## 2022-12-13 RX ADMIN — ALBUTEROL SULFATE 2.5 MG: 2.5 SOLUTION RESPIRATORY (INHALATION) at 11:46

## 2022-12-13 ASSESSMENT — ENCOUNTER SYMPTOMS
COUGH: 1
SORE THROAT: 1
SHORTNESS OF BREATH: 1
VOMITING: 1
NAUSEA: 1
ABDOMINAL PAIN: 1
CHEST TIGHTNESS: 1

## 2022-12-13 NOTE — PROGRESS NOTES
Yolanda Soliz (:  1970) is a 47 y.o. male,New patient, here for evaluation of the following chief complaint(s):  Shortness of Breath, Congestion, Emesis (X2days ), Headache, and Fatigue  1. Influenza A    - albuterol (PROVENTIL) nebulizer solution 2.5 mg  - oseltamivir (TAMIFLU) 75 MG capsule; Take 1 capsule by mouth 2 times daily for 5 days  Dispense: 10 capsule; Refill: 0  - ondansetron (ZOFRAN-ODT) disintegrating tablet 4 mg  Albuterol and Zofran in office for sxs. Vitals improved with medications. Tamiflu RX  ER return instructions reviewed with patient, verbalized understanding. 2. Fever, unspecified fever cause    - POCT Influenza A/B DNA (Alere i)   Results for POC orders placed in visit on 22   POCT Influenza A/B DNA (Alere i)   Result Value Ref Range    Influenza virus A RNA POSITIVE     Influenza virus B RNA NEGATIVE              Return in about 3 days (around 2022) for Follow up with PCP. SUBJECTIVE/OBJECTIVE:  Patient comes in today for fever, cough and Shortness of breath for two days. Home Covid test was negative. Has tried Tylenol, Ibuprofen and Benadryl for sxs not helping. Patient has been tolerating fluids only. History provided by:  Patient   used: No    Shortness of Breath  This is a new problem. The current episode started yesterday. The problem occurs constantly. The problem has been gradually worsening. Associated symptoms include abdominal pain, a fever, headaches, a sore throat and vomiting.    Headache  Fatigue  Associated symptoms: abdominal pain, congestion, cough, fatigue, fever, headaches, nausea, shortness of breath, sore throat and vomiting      Vitals:    22 1120 22 1209   BP: (!) 140/88 131/71   Site: Left Upper Arm    Position: Sitting    Cuff Size: Medium Adult    Pulse: (!) 120 (!) 114   Resp:  22   Temp: 99.7 °F (37.6 °C)    TempSrc: Oral    SpO2: 91% 91%   Weight: 214 lb (97.1 kg)    Height: 6' 1\" (1.854 m) Review of Systems   Constitutional:  Positive for chills, fatigue and fever. HENT:  Positive for congestion and sore throat. Respiratory:  Positive for cough, chest tightness and shortness of breath. Gastrointestinal:  Positive for abdominal pain, nausea and vomiting. Neurological:  Positive for headaches. All other systems reviewed and are negative. Physical Exam  Constitutional:       General: He is not in acute distress. Appearance: He is ill-appearing. He is not toxic-appearing. HENT:      Head: Normocephalic and atraumatic. Right Ear: Hearing, tympanic membrane, ear canal and external ear normal.      Left Ear: Hearing normal. Tympanic membrane is bulging. Nose: Congestion and rhinorrhea present. Rhinorrhea is clear. Mouth/Throat:      Mouth: Mucous membranes are moist.      Pharynx: Oropharynx is clear. No oropharyngeal exudate or posterior oropharyngeal erythema. Eyes:      Conjunctiva/sclera: Conjunctivae normal.      Pupils: Pupils are equal, round, and reactive to light. Cardiovascular:      Rate and Rhythm: Regular rhythm. Tachycardia present. Pulses: Normal pulses. Heart sounds: Normal heart sounds. Pulmonary:      Breath sounds: Decreased air movement present. Examination of the right-middle field reveals rhonchi. Examination of the left-lower field reveals rhonchi. Rhonchi present. Abdominal:      General: Abdomen is flat. Bowel sounds are normal.      Palpations: Abdomen is soft. Musculoskeletal:      Cervical back: Normal range of motion and neck supple. Lymphadenopathy:      Cervical: No cervical adenopathy. Skin:     General: Skin is warm and dry. Neurological:      Mental Status: He is alert and oriented to person, place, and time. An electronic signature was used to authenticate this note.     --RAH Ramon - CNP

## 2022-12-13 NOTE — TELEPHONE ENCOUNTER
Pharmacy called because they need a new rx for the Chantix continuing pack and they got another started pack. Please send new rx for the continuing pack of Chantix to [facility] on file.

## 2022-12-13 NOTE — TELEPHONE ENCOUNTER
Refill Request     CONFIRM preferrred pharmacy with the patient. If Mail Order Rx - Pend for 90 day refill. Last Seen: Last Seen Department: 8/29/2022  Last Seen by PCP: 2/8/2022    Last Written: 11/7/22 0 refill    If no future appointment scheduled, route STAFF MESSAGE with patient name to the [facility] for scheduling. Next Appointment:   Future Appointments   Date Time Provider Alejandra Grubbs   12/15/2022  1:00 PM Asiya Bowens MD [facility]       Message sent to [facility] to schedule appt with patient?   NO      Requested Prescriptions     Pending Prescriptions Disp Refills    nicotine (NICODERM CQ) 14 MG/24HR [Pharmacy Med Name: NICOTINE 14 MG/24HR PATCH] 28 patch 0     Sig: APPLY ONE PATCH TO THE SKIN DAILY

## 2022-12-13 NOTE — PATIENT INSTRUCTIONS
Take Tamiful as directed  Get plenty of rest, drink lots of fluid   Continue OTC medications as needed  Follow up if sxs do not improve or worsen  If you develop Chest pain, shortness of breath, or trouble breathing or uncontrolled vomiting go to the ER

## 2022-12-14 DIAGNOSIS — I25.110 CORONARY ARTERY DISEASE INVOLVING NATIVE CORONARY ARTERY OF NATIVE HEART WITH UNSTABLE ANGINA PECTORIS (HCC): ICD-10-CM

## 2022-12-14 DIAGNOSIS — I10 BENIGN ESSENTIAL HYPERTENSION: ICD-10-CM

## 2022-12-21 ENCOUNTER — TELEPHONE (OUTPATIENT)
Dept: FAMILY MEDICINE CLINIC | Age: 52
End: 2022-12-21

## 2022-12-21 ENCOUNTER — OFFICE VISIT (OUTPATIENT)
Dept: FAMILY MEDICINE CLINIC | Age: 52
End: 2022-12-21
Payer: COMMERCIAL

## 2022-12-21 VITALS
SYSTOLIC BLOOD PRESSURE: 137 MMHG | OXYGEN SATURATION: 94 % | DIASTOLIC BLOOD PRESSURE: 87 MMHG | BODY MASS INDEX: 29.03 KG/M2 | HEIGHT: 73 IN | HEART RATE: 110 BPM | WEIGHT: 219 LBS | TEMPERATURE: 97.4 F

## 2022-12-21 DIAGNOSIS — Z98.890 HISTORY OF LUMBAR LAMINECTOMY: ICD-10-CM

## 2022-12-21 DIAGNOSIS — I10 BENIGN ESSENTIAL HYPERTENSION: ICD-10-CM

## 2022-12-21 DIAGNOSIS — F17.200 SMOKER: ICD-10-CM

## 2022-12-21 DIAGNOSIS — R05.9 COUGH, UNSPECIFIED TYPE: ICD-10-CM

## 2022-12-21 DIAGNOSIS — J30.9 ALLERGIC RHINITIS, UNSPECIFIED SEASONALITY, UNSPECIFIED TRIGGER: ICD-10-CM

## 2022-12-21 DIAGNOSIS — F41.8 MIXED ANXIETY AND DEPRESSIVE DISORDER: Primary | ICD-10-CM

## 2022-12-21 PROCEDURE — 4004F PT TOBACCO SCREEN RCVD TLK: CPT | Performed by: STUDENT IN AN ORGANIZED HEALTH CARE EDUCATION/TRAINING PROGRAM

## 2022-12-21 PROCEDURE — G8427 DOCREV CUR MEDS BY ELIG CLIN: HCPCS | Performed by: STUDENT IN AN ORGANIZED HEALTH CARE EDUCATION/TRAINING PROGRAM

## 2022-12-21 PROCEDURE — 3017F COLORECTAL CA SCREEN DOC REV: CPT | Performed by: STUDENT IN AN ORGANIZED HEALTH CARE EDUCATION/TRAINING PROGRAM

## 2022-12-21 PROCEDURE — 99214 OFFICE O/P EST MOD 30 MIN: CPT | Performed by: STUDENT IN AN ORGANIZED HEALTH CARE EDUCATION/TRAINING PROGRAM

## 2022-12-21 PROCEDURE — G8417 CALC BMI ABV UP PARAM F/U: HCPCS | Performed by: STUDENT IN AN ORGANIZED HEALTH CARE EDUCATION/TRAINING PROGRAM

## 2022-12-21 PROCEDURE — G8482 FLU IMMUNIZE ORDER/ADMIN: HCPCS | Performed by: STUDENT IN AN ORGANIZED HEALTH CARE EDUCATION/TRAINING PROGRAM

## 2022-12-21 PROCEDURE — 3078F DIAST BP <80 MM HG: CPT | Performed by: STUDENT IN AN ORGANIZED HEALTH CARE EDUCATION/TRAINING PROGRAM

## 2022-12-21 PROCEDURE — 3074F SYST BP LT 130 MM HG: CPT | Performed by: STUDENT IN AN ORGANIZED HEALTH CARE EDUCATION/TRAINING PROGRAM

## 2022-12-21 RX ORDER — FLUTICASONE PROPIONATE 50 MCG
1 SPRAY, SUSPENSION (ML) NASAL DAILY
Qty: 16 G | Refills: 0 | Status: SHIPPED | OUTPATIENT
Start: 2022-12-21

## 2022-12-21 RX ORDER — ALBUTEROL SULFATE 2.5 MG/3ML
2.5 SOLUTION RESPIRATORY (INHALATION) EVERY 6 HOURS PRN
Qty: 180 ML | Refills: 2 | Status: SHIPPED | OUTPATIENT
Start: 2022-12-21 | End: 2023-01-05

## 2022-12-21 RX ORDER — ONDANSETRON 4 MG/1
4 TABLET, FILM COATED ORAL EVERY 8 HOURS PRN
Qty: 40 TABLET | Refills: 2 | Status: SHIPPED
Start: 2022-12-21 | End: 2022-12-21 | Stop reason: SINTOL

## 2022-12-21 RX ORDER — ALBUTEROL SULFATE 2.5 MG/3ML
2.5 SOLUTION RESPIRATORY (INHALATION) EVERY 6 HOURS PRN
Qty: 30 ML | Refills: 2 | Status: SHIPPED | OUTPATIENT
Start: 2022-12-21 | End: 2022-12-21

## 2022-12-21 RX ORDER — GUAIFENESIN/DEXTROMETHORPHAN 100-10MG/5
10 SYRUP ORAL 3 TIMES DAILY PRN
Qty: 120 ML | Refills: 0 | Status: SHIPPED | OUTPATIENT
Start: 2022-12-21 | End: 2022-12-31

## 2022-12-21 RX ORDER — BUSPIRONE HYDROCHLORIDE 10 MG/1
10 TABLET ORAL 2 TIMES DAILY PRN
Qty: 60 TABLET | Refills: 0 | Status: SHIPPED | OUTPATIENT
Start: 2022-12-21 | End: 2023-01-20

## 2022-12-21 RX ORDER — TIZANIDINE 4 MG/1
4 TABLET ORAL 3 TIMES DAILY PRN
Qty: 90 TABLET | Refills: 2 | Status: SHIPPED | OUTPATIENT
Start: 2022-12-21

## 2022-12-21 RX ORDER — CYCLOBENZAPRINE HCL 10 MG
TABLET ORAL
Qty: 90 TABLET | Refills: 1 | Status: CANCELLED | OUTPATIENT
Start: 2022-12-21

## 2022-12-21 RX ORDER — DIPHENHYDRAMINE HCL 25 MG
25 CAPSULE ORAL NIGHTLY PRN
Qty: 60 CAPSULE | Refills: 1 | Status: SHIPPED | OUTPATIENT
Start: 2022-12-21

## 2022-12-21 ASSESSMENT — ANXIETY QUESTIONNAIRES
2. NOT BEING ABLE TO STOP OR CONTROL WORRYING: 3
GAD7 TOTAL SCORE: 13
4. TROUBLE RELAXING: 2
7. FEELING AFRAID AS IF SOMETHING AWFUL MIGHT HAPPEN: 2
3. WORRYING TOO MUCH ABOUT DIFFERENT THINGS: 0
6. BECOMING EASILY ANNOYED OR IRRITABLE: 1
5. BEING SO RESTLESS THAT IT IS HARD TO SIT STILL: 3
1. FEELING NERVOUS, ANXIOUS, OR ON EDGE: 2

## 2022-12-21 NOTE — PROGRESS NOTES
4 [address], [address] [place] [address]  Tel: [phone]      2022     Taras Bhakta II (:  1970) is a 47 y.o. male, here for evaluation of the following medical concerns:  Chief Complaint   Patient presents with    Blood Pressure Check         HPI  Patient is a 21-year-old male history of HTN, ADHD, alcoholic hepatitis, Ha esophagus, CAD, COPD, depression, GERD, IV drug use history, who presents for follow-up on medical conditions  Patient was recently seen in urgent care on 2022 tested positive for influenza A, was prescribed Tamiflu 75 mg bid for 5 days and albuterol. Hypertension/ CAD  Patient currently takes valsartan 80 mg daily and amlodipine 5mg daily, and  lasix as needed for edema. Has not needed to use lasix. Patient does not check his blood pressures at home. Last week had headache, cough, and shortness of breath due to influenza infection. Denies any lightheadedness, dizziness, chest pain, palpitations, orthopnea, PND, or swelling in lower extremities. Hyperlipidemia  Takes artorvastain 80mg daily. Depression and anxiety  Patient takes Cymbalta 60mg twice daily. BuSpar 10 mg daily as needed for anxiety. Taking medication regularly, reports stressors including : mother is having memeory problems, father passed from COVID-23  Endorses difficulty falling asleep. Was taking 2 trazodne 50mg ,Flexeril and 3-5 benadryl at night. COPD  Uses Spiriva Respimat inhaler 5mcg daily, albuterol 2 puffs every 6 hours as needed. Pain well controlled. Denies any wheezing, shortness of breath. No cough. Smoker. Current smoker  Was smoking 1 pack/day, cut down to 5 cigarettes daily while using Chantix. Prior to Visit Medications    Medication Sig Taking?  Authorizing Provider   busPIRone (BUSPAR) 10 MG tablet Take 1 tablet by mouth 2 times daily as needed (anxiety) Yes Yanick Peralta MD   tiZANidine (ZANAFLEX) 4 MG tablet Take 1 tablet by mouth 3 times daily as needed (back pain) Yes Sophia Rosario MD   guaiFENesin-dextromethorphan (ROBITUSSIN DM) 100-10 MG/5ML syrup Take 10 mLs by mouth 3 times daily as needed for Cough Yes Sophia Rosario MD   diphenhydrAMINE (BENADRYL) 25 MG capsule Take 1 capsule by mouth nightly as needed for Itching or Sleep Yes Sophia Rosario MD   fluticasone (FLONASE) 50 MCG/ACT nasal spray 1 spray by Each Nostril route daily Yes Sophia Rosario MD   albuterol (PROVENTIL) (2.5 MG/3ML) 0.083% nebulizer solution Take 3 mLs by nebulization every 6 hours as needed for Wheezing Yes Sophia Rosario MD   nicotine (Blanca Dials) 14 MG/24HR APPLY ONE PATCH TO THE SKIN DAILY Yes Sophia Rosario MD   varenicline (CHANTIX) 1 MG tablet Take 1 tablet by mouth 2 times daily Yes Sophia Rosario MD   amLODIPine (NORVASC) 5 MG tablet Take 5 mg by mouth daily Yes Robert Raygoza MD   DULoxetine (CYMBALTA) 60 MG extended release capsule TAKE ONE CAPSULE BY MOUTH TWICE A DAY Yes Emile Ahumada, MD   mupirocin (BACTROBAN) 2 % ointment APPLY 3 TIMES DAILY TO AFFECTED AREA(S) Yes Emile Ahumada, MD   valsartan (DIOVAN) 80 MG tablet TAKE ONE TABLET BY MOUTH DAILY Yes RAH Horvath CNP   albuterol sulfate HFA (PROVENTIL;VENTOLIN;PROAIR) 108 (90 Base) MCG/ACT inhaler INHALE TWO PUFFS BY MOUTH EVERY 6 HOURS AS NEEDED FOR WHEEZING OR SHORTNESS OF BREATH Yes Emile Ahumada, MD   pantoprazole (PROTONIX) 40 MG tablet TAKE ONE TABLET BY MOUTH TWICE A DAY Yes RAH Sotelo CNP   atorvastatin (LIPITOR) 80 MG tablet TAKE ONE TABLET BY MOUTH DAILY Yes Aidan Whiting MD   furosemide (LASIX) 20 MG tablet Take 1 tablet by mouth daily Yes AVE Bocanegra   sildenafil (REVATIO) 20 MG tablet Take 3 tablets by mouth daily as needed (ED) Yes AVE Bocanegra   SPIRIVA RESPIMAT 2.5 MCG/ACT AERS inhaler INHALE TWO PUFFS BY MOUTH DAILY Yes Jorge Chou MD        Social History     Tobacco Use Smoking status: Every Day     Packs/day: 0.50     Years: 33.00     Pack years: 16.50     Types: Cigarettes    Smokeless tobacco: Former     Quit date: 11/1/2015   Substance Use Topics    Alcohol use: Yes     Comment: 1/2 fifth tequila       Physical exam  /87 (Site: Left Upper Arm, Position: Sitting, Cuff Size: Large Adult)   Pulse (!) 125   Temp 97.4 °F (36.3 °C)   Ht 6' 1\" (1.854 m)   Wt 219 lb (99.3 kg)   SpO2 94%   BMI 28.89 kg/m²     Physical Exam  Vitals reviewed. Constitutional:       General: He is not in acute distress. Appearance: Normal appearance. He is not toxic-appearing. HENT:      Right Ear: Tympanic membrane and external ear normal.      Left Ear: Tympanic membrane and external ear normal.      Nose: Mucosal edema and congestion present. No rhinorrhea. Right Sinus: No maxillary sinus tenderness or frontal sinus tenderness. Left Sinus: No maxillary sinus tenderness or frontal sinus tenderness. Mouth/Throat:      Mouth: Mucous membranes are moist.      Pharynx: No oropharyngeal exudate or posterior oropharyngeal erythema. Eyes:      Conjunctiva/sclera: Conjunctivae normal.   Cardiovascular:      Rate and Rhythm: Normal rate and regular rhythm. Pulses: Normal pulses. Heart sounds: Normal heart sounds. No murmur heard. Pulmonary:      Effort: Pulmonary effort is normal. No respiratory distress. Breath sounds: Rales (lower lobes) present. No wheezing. Musculoskeletal:      Cervical back: No tenderness. Right lower leg: No edema. Left lower leg: No edema. Lymphadenopathy:      Cervical: No cervical adenopathy. Skin:     General: Skin is warm. Findings: No rash. Neurological:      General: No focal deficit present. Mental Status: He is alert. Psychiatric:         Mood and Affect: Mood normal.       ASSESSMENT/PLAN:  1. Mixed anxiety and depressive disorder  Patient takes Cymbalta 60mg twice daily.  BuSpar 10 mg daily as needed for anxiety. Increase dose buspar to twice daily   Benadryl for sleep  - busPIRone (BUSPAR) 10 MG tablet; Take 1 tablet by mouth 2 times daily as needed (anxiety)  Dispense: 60 tablet; Refill: 0  - diphenhydrAMINE (BENADRYL) 25 MG capsule; Take 1 capsule by mouth nightly as needed for Itching or Sleep  Dispense: 60 capsule; Refill: 1    2. Benign essential hypertension  Controlled. compliant with medications. Med: Valsartan 80mg daily and amlodipine 5mg   No change in management, ??continue current ? medications  Discussed DASH diet and dietary sodium restrictions. Continue/Increase dietary efforts and physical activity. - Comprehensive Metabolic Panel; Future    3. Smoker  Encouraged continued efforts at cessation, went from smoking 1ppd to 5 cigarettes daily. Prescribed varenicline. Potential side effects and safe use reviewed. 4. History of lumbar laminectomy  Continued pain, has been on flexeril 10mg tid for 30+ year, will try to switch to zanaflex   Referral to spine center   - [address] [place] [address] [place], [facility] [facility]  - tiZANidine (ZANAFLEX) 4 MG tablet; Take 1 tablet by mouth 3 times daily as needed (back pain)  Dispense: 90 tablet; Refill: 2    5. Cough, unspecified type  Recently tested positive for influenza A .   - Start Robitussin DM and albuterol as needed   - guaiFENesin-dextromethorphan (ROBITUSSIN DM) 100-10 MG/5ML syrup; Take 10 mLs by mouth 3 times daily as needed for Cough  Dispense: 120 mL; Refill: 0  - albuterol (PROVENTIL) (2.5 MG/3ML) 0.083% nebulizer solution; Take 3 mLs by nebulization every 6 hours as needed for Wheezing  Dispense: 30 mL; Refill: 2    6. Allergic rhinitis, unspecified seasonality, unspecified trigger  - fluticasone (FLONASE) 50 MCG/ACT nasal spray; 1 spray by Each Nostril route daily  Dispense: 16 g; Refill: 0     Return in about 4 weeks (around 1/18/2023).     An electronic signature was used to authenticate this note.    --Gemini Grimes MD on 12/21/2022 at 1:59 PM

## 2022-12-21 NOTE — TELEPHONE ENCOUNTER
Due to a shortage the pharmacy is asking if the medication pack size could be increased. albuterol (PROVENTIL) (2.5 MG/3ML) 0.083% nebulizer solution     The pharmacy only has 180 ml in stock.   Would like to dispense 180 ml for 15 days  Dulce Maria Hdz [id number] Gaby Swann, [address] [facility] Alicia

## 2022-12-22 LAB
A/G RATIO: 1.4 (ref 1.1–2.2)
ALBUMIN SERPL-MCNC: 3.7 G/DL (ref 3.4–5)
ALP BLD-CCNC: 130 U/L (ref 40–129)
ALT SERPL-CCNC: 13 U/L (ref 10–40)
ANION GAP SERPL CALCULATED.3IONS-SCNC: 11 MMOL/L (ref 3–16)
AST SERPL-CCNC: 14 U/L (ref 15–37)
BILIRUB SERPL-MCNC: <0.2 MG/DL (ref 0–1)
BUN BLDV-MCNC: 13 MG/DL (ref 7–20)
CALCIUM SERPL-MCNC: 8.9 MG/DL (ref 8.3–10.6)
CHLORIDE BLD-SCNC: 101 MMOL/L (ref 99–110)
CO2: 24 MMOL/L (ref 21–32)
CREAT SERPL-MCNC: 0.6 MG/DL (ref 0.9–1.3)
GFR SERPL CREATININE-BSD FRML MDRD: >60 ML/MIN/{1.73_M2}
GLUCOSE BLD-MCNC: 97 MG/DL (ref 70–99)
POTASSIUM SERPL-SCNC: 4 MMOL/L (ref 3.5–5.1)
SODIUM BLD-SCNC: 136 MMOL/L (ref 136–145)
TOTAL PROTEIN: 6.4 G/DL (ref 6.4–8.2)

## 2023-01-03 DIAGNOSIS — F41.8 MIXED ANXIETY AND DEPRESSIVE DISORDER: ICD-10-CM

## 2023-01-03 RX ORDER — BUSPIRONE HYDROCHLORIDE 10 MG/1
TABLET ORAL
Qty: 60 TABLET | Refills: 1 | Status: SHIPPED | OUTPATIENT
Start: 2023-01-20 | End: 2023-01-27

## 2023-01-03 NOTE — TELEPHONE ENCOUNTER
12/21/2022        Future Appointments   Date Time Provider Alejandra Grubbs   1/23/2023  1:00 PM MD TIM Steve

## 2023-01-09 NOTE — TELEPHONE ENCOUNTER
Refill Request     CONFIRM preferrred pharmacy with the patient. If Mail Order Rx - Pend for 90 day refill. Last Seen: Last Seen Department: 8/29/2022  Last Seen by PCP: 2/8/2022    Last Written: 09/09/2022 22 g 0 refills     If no future appointment scheduled, route STAFF MESSAGE with patient name to the [facility] for scheduling. Next Appointment:   Future Appointments   Date Time Provider Alejandra Grubbs   1/23/2023  1:00 PM Tanisha Peters MD [place] [id number]       Message sent to [address] [place] [address] to schedule appt with patient?   NO      Requested Prescriptions     Pending Prescriptions Disp Refills    mupirocin (BACTROBAN) 2 % ointment [Pharmacy Med Name: MUPIROCIN 2% OINTMENT] 22 g 0     Sig: APPLY TO AFFECTED AREA(S) THREE TIMES A DAY

## 2023-01-10 DIAGNOSIS — F17.200 TOBACCO USE DISORDER: ICD-10-CM

## 2023-01-10 RX ORDER — NICOTINE 21 MG/24HR
1 PATCH, TRANSDERMAL 24 HOURS TRANSDERMAL EVERY 24 HOURS
Qty: 28 PATCH | Refills: 0 | Status: SHIPPED | OUTPATIENT
Start: 2023-01-10

## 2023-01-10 NOTE — TELEPHONE ENCOUNTER
Refill Request     CONFIRM preferrred pharmacy with the patient. If Mail Order Rx - Pend for 90 day refill. Last Seen: Last Seen Department: 8/29/2022  Last Seen by PCP: 12/21/2022    Last Written: 12/13/22 0 refill    If no future appointment scheduled, route STAFF MESSAGE with patient name to the [facility] for scheduling. Next Appointment:   Future Appointments   Date Time Provider Alejandra Grubbs   1/23/2023  1:00 PM Asiya Bowens MD [facility] 45       Message sent to [address] [place] [address] to schedule appt with patient?   NO      Requested Prescriptions     Pending Prescriptions Disp Refills    nicotine (NICODERM CQ) 14 MG/24HR 28 patch 0

## 2023-01-23 DIAGNOSIS — K22.70 BARRETT'S ESOPHAGUS DETERMINED BY ENDOSCOPY: ICD-10-CM

## 2023-01-23 DIAGNOSIS — K21.9 GASTROESOPHAGEAL REFLUX DISEASE WITHOUT ESOPHAGITIS: ICD-10-CM

## 2023-01-23 RX ORDER — PANTOPRAZOLE SODIUM 40 MG/1
TABLET, DELAYED RELEASE ORAL
Qty: 60 TABLET | Refills: 5 | Status: SHIPPED | OUTPATIENT
Start: 2023-01-23

## 2023-01-23 NOTE — TELEPHONE ENCOUNTER
Refill Request     CONFIRM preferrred pharmacy with the patient. If Mail Order Rx - Pend for 90 day refill. Last Seen: Last Seen Department: 8/29/2022  Last Seen by PCP: 12/21/2022    Last Written: 6/15/22 5 refill    If no future appointment scheduled, route STAFF MESSAGE with patient name to the [facility] for scheduling. Next Appointment:   Future Appointments   Date Time Provider Alejandra Grubbs   1/23/2023  1:00 PM Josr Loredo MD [facility] 45       Message sent to [address] [place] [address] to schedule appt with patient?   NO      Requested Prescriptions     Pending Prescriptions Disp Refills    pantoprazole (PROTONIX) 40 MG tablet 60 tablet 5     Sig: TAKE ONE TABLET BY MOUTH TWICE A DAY

## 2023-01-25 RX ORDER — AMLODIPINE BESYLATE 5 MG/1
TABLET ORAL
Qty: 90 TABLET | OUTPATIENT
Start: 2023-01-25

## 2023-01-25 RX ORDER — ATORVASTATIN CALCIUM 80 MG/1
TABLET, FILM COATED ORAL
Qty: 90 TABLET | Refills: 3 | OUTPATIENT
Start: 2023-01-25

## 2023-01-26 RX ORDER — ATORVASTATIN CALCIUM 80 MG/1
TABLET, FILM COATED ORAL
Qty: 90 TABLET | Refills: 3 | Status: SHIPPED | OUTPATIENT
Start: 2023-01-26

## 2023-01-27 ENCOUNTER — OFFICE VISIT (OUTPATIENT)
Dept: FAMILY MEDICINE CLINIC | Age: 53
End: 2023-01-27
Payer: COMMERCIAL

## 2023-01-27 VITALS
TEMPERATURE: 97.1 F | HEART RATE: 89 BPM | WEIGHT: 217 LBS | OXYGEN SATURATION: 96 % | HEIGHT: 73 IN | DIASTOLIC BLOOD PRESSURE: 91 MMHG | BODY MASS INDEX: 28.76 KG/M2 | SYSTOLIC BLOOD PRESSURE: 126 MMHG

## 2023-01-27 DIAGNOSIS — Z23 NEED FOR SHINGLES VACCINE: ICD-10-CM

## 2023-01-27 DIAGNOSIS — I10 ESSENTIAL HYPERTENSION: ICD-10-CM

## 2023-01-27 DIAGNOSIS — F41.8 MIXED ANXIETY AND DEPRESSIVE DISORDER: Primary | ICD-10-CM

## 2023-01-27 DIAGNOSIS — I25.10 CORONARY ARTERY DISEASE INVOLVING NATIVE HEART WITHOUT ANGINA PECTORIS, UNSPECIFIED VESSEL OR LESION TYPE: ICD-10-CM

## 2023-01-27 DIAGNOSIS — Q43.3 MALROTATION, CONGENITAL: ICD-10-CM

## 2023-01-27 PROCEDURE — G8427 DOCREV CUR MEDS BY ELIG CLIN: HCPCS | Performed by: STUDENT IN AN ORGANIZED HEALTH CARE EDUCATION/TRAINING PROGRAM

## 2023-01-27 PROCEDURE — 3017F COLORECTAL CA SCREEN DOC REV: CPT | Performed by: STUDENT IN AN ORGANIZED HEALTH CARE EDUCATION/TRAINING PROGRAM

## 2023-01-27 PROCEDURE — 4004F PT TOBACCO SCREEN RCVD TLK: CPT | Performed by: STUDENT IN AN ORGANIZED HEALTH CARE EDUCATION/TRAINING PROGRAM

## 2023-01-27 PROCEDURE — 3074F SYST BP LT 130 MM HG: CPT | Performed by: STUDENT IN AN ORGANIZED HEALTH CARE EDUCATION/TRAINING PROGRAM

## 2023-01-27 PROCEDURE — 3078F DIAST BP <80 MM HG: CPT | Performed by: STUDENT IN AN ORGANIZED HEALTH CARE EDUCATION/TRAINING PROGRAM

## 2023-01-27 PROCEDURE — 99214 OFFICE O/P EST MOD 30 MIN: CPT | Performed by: STUDENT IN AN ORGANIZED HEALTH CARE EDUCATION/TRAINING PROGRAM

## 2023-01-27 PROCEDURE — G8482 FLU IMMUNIZE ORDER/ADMIN: HCPCS | Performed by: STUDENT IN AN ORGANIZED HEALTH CARE EDUCATION/TRAINING PROGRAM

## 2023-01-27 PROCEDURE — G8417 CALC BMI ABV UP PARAM F/U: HCPCS | Performed by: STUDENT IN AN ORGANIZED HEALTH CARE EDUCATION/TRAINING PROGRAM

## 2023-01-27 RX ORDER — ZOSTER VACCINE RECOMBINANT, ADJUVANTED 50 MCG/0.5
0.5 KIT INTRAMUSCULAR
Qty: 1 EACH | Refills: 1 | Status: SHIPPED | OUTPATIENT
Start: 2023-01-27

## 2023-01-27 RX ORDER — BUSPIRONE HYDROCHLORIDE 10 MG/1
10 TABLET ORAL 2 TIMES DAILY
Qty: 60 TABLET | Refills: 1 | Status: SHIPPED | OUTPATIENT
Start: 2023-01-27 | End: 2023-03-28

## 2023-01-27 RX ORDER — AMLODIPINE BESYLATE 5 MG/1
5 TABLET ORAL DAILY
COMMUNITY

## 2023-01-27 RX ORDER — DICYCLOMINE HYDROCHLORIDE 10 MG/1
10 CAPSULE ORAL 4 TIMES DAILY
Qty: 120 CAPSULE | Refills: 0 | Status: SHIPPED | OUTPATIENT
Start: 2023-01-27

## 2023-01-27 ASSESSMENT — COLUMBIA-SUICIDE SEVERITY RATING SCALE - C-SSRS
1. WITHIN THE PAST MONTH, HAVE YOU WISHED YOU WERE DEAD OR WISHED YOU COULD GO TO SLEEP AND NOT WAKE UP?: NO
6. HAVE YOU EVER DONE ANYTHING, STARTED TO DO ANYTHING, OR PREPARED TO DO ANYTHING TO END YOUR LIFE?: NO
2. HAVE YOU ACTUALLY HAD ANY THOUGHTS OF KILLING YOURSELF?: NO

## 2023-01-27 ASSESSMENT — PATIENT HEALTH QUESTIONNAIRE - PHQ9
9. THOUGHTS THAT YOU WOULD BE BETTER OFF DEAD, OR OF HURTING YOURSELF: 0
6. FEELING BAD ABOUT YOURSELF - OR THAT YOU ARE A FAILURE OR HAVE LET YOURSELF OR YOUR FAMILY DOWN: 3
SUM OF ALL RESPONSES TO PHQ QUESTIONS 1-9: 22
7. TROUBLE CONCENTRATING ON THINGS, SUCH AS READING THE NEWSPAPER OR WATCHING TELEVISION: 3
4. FEELING TIRED OR HAVING LITTLE ENERGY: 3
SUM OF ALL RESPONSES TO PHQ QUESTIONS 1-9: 22
SUM OF ALL RESPONSES TO PHQ QUESTIONS 1-9: 22
2. FEELING DOWN, DEPRESSED OR HOPELESS: 2
10. IF YOU CHECKED OFF ANY PROBLEMS, HOW DIFFICULT HAVE THESE PROBLEMS MADE IT FOR YOU TO DO YOUR WORK, TAKE CARE OF THINGS AT HOME, OR GET ALONG WITH OTHER PEOPLE: 2
8. MOVING OR SPEAKING SO SLOWLY THAT OTHER PEOPLE COULD HAVE NOTICED. OR THE OPPOSITE, BEING SO FIGETY OR RESTLESS THAT YOU HAVE BEEN MOVING AROUND A LOT MORE THAN USUAL: 3
5. POOR APPETITE OR OVEREATING: 3
3. TROUBLE FALLING OR STAYING ASLEEP: 3
SUM OF ALL RESPONSES TO PHQ QUESTIONS 1-9: 22
SUM OF ALL RESPONSES TO PHQ9 QUESTIONS 1 & 2: 4
1. LITTLE INTEREST OR PLEASURE IN DOING THINGS: 2

## 2023-01-27 ASSESSMENT — ANXIETY QUESTIONNAIRES
GAD7 TOTAL SCORE: 17
IF YOU CHECKED OFF ANY PROBLEMS ON THIS QUESTIONNAIRE, HOW DIFFICULT HAVE THESE PROBLEMS MADE IT FOR YOU TO DO YOUR WORK, TAKE CARE OF THINGS AT HOME, OR GET ALONG WITH OTHER PEOPLE: VERY DIFFICULT
7. FEELING AFRAID AS IF SOMETHING AWFUL MIGHT HAPPEN: 3
4. TROUBLE RELAXING: 3
2. NOT BEING ABLE TO STOP OR CONTROL WORRYING: 2
5. BEING SO RESTLESS THAT IT IS HARD TO SIT STILL: 3
6. BECOMING EASILY ANNOYED OR IRRITABLE: 2
3. WORRYING TOO MUCH ABOUT DIFFERENT THINGS: 2
1. FEELING NERVOUS, ANXIOUS, OR ON EDGE: 2

## 2023-01-27 NOTE — PROGRESS NOTES
4 [address], [address] [place] [address]  Tel: [phone]      2023     Austin Ravi II (:  1970) is a 47 y.o. male, here for evaluation of the following medical concerns:  Chief Complaint   Patient presents with    Hypertension     Follow up            HPI  Patient is a 61-year-old male history of HTN, ADHD, alcoholic hepatitis, Ha esophagus, CAD, COPD, depression, GERD, IV drug use history, who presents for follow-up on medical conditions      Hypertension/ CAD  Patient currently takes valsartan 80 mg daily  amlodipine 5mg and  lasix as needed for edema. Has not needed to use lasix. Previously on metoprolol which then resulted in chest pain. His blood pressures at home: 120-130 /90 or less   Lightheadedness has resolved. Headaches, started 2 days ago, chronic 3-4 hours, right sided. No light or sound sentivity. Took a few tylenol. Did not help. Denies any , chest pain, palpitations, orthopnea, PND, or swelling in lower extremities. Depression and anxiety  Patient takes Cymbalta 60mg twice daily. Recently increased BuSpar 10 mg twice daily as needed for anxiety. Taking medication regularly, reports stressors including : father passed from COVID-23. Taking Benadryl for sleep and melatonin. Endorses difficulty falling asleep. NO SI or HI. Crying spell 1. Recently feeling worthless. Declined therapy   Reports he has history of ADHD. Previously on Adderall.   SANAZ-7 SCREENING 2022   Feeling nervous, anxious, or on edge More than half the days More than half the days Nearly every day   Not being able to stop or control worrying More than half the days Nearly every day Nearly every day   Worrying too much about different things More than half the days Not at all Nearly every day   Trouble relaxing Nearly every day More than half the days Nearly every day   Being so restless that it is hard to sit still Nearly every day Nearly every day Nearly every day   Becoming easily annoyed or irritable More than half the days Several days Nearly every day   Feeling afraid as if something awful might happen Nearly every day More than half the days Nearly every day   SANAZ-7 Total Score 17 13 21   How difficult have these problems made it for you to do your work, take care of things at home, or get along with other people? Very difficult - Very difficult   Feeling nervous, anxious, or on edge - - -   Not able to stop or control worrying - - -   Worrying too much about different things - - -   Trouble relaxing - - -   Being so restless that it's hard to sit still - - -   Becoming easily annoyed or irritable - - -   Feeling afraid as if something awful might happen - - -   SANAZ-7 Total Score - - -   No data recorded      Hyperlipidemia  Takes artorvastain 80mg daily. IBS  Endorses history of irritable bowel syndrome and history of malrotation. Has cramping abdominal pain. No associated blood in stool or diarrhea. Noted some nausea and vomiting. Having 2 meals a day. Having regular bowel movements. Prior to Visit Medications    Medication Sig Taking?  Authorizing Provider   hydrOXYzine pamoate (VISTARIL) 25 MG capsule Take 1 capsule by mouth nightly as needed for Anxiety (sleep) DO NOT Take WITH BENADRYL Yes Yanick Peralta MD   amLODIPine (NORVASC) 5 MG tablet Take 5 mg by mouth daily Yes Historical Provider, MD   dicyclomine (BENTYL) 10 MG capsule Take 1 capsule by mouth 4 times daily Yes Yanick Peralta MD   busPIRone (BUSPAR) 10 MG tablet Take 1 tablet by mouth 2 times daily Yes Yanick Peralta MD   zoster recombinant adjuvanted vaccine [facility]) 50 MCG/0.5ML SUSR injection Inject 0.5 mLs into the muscle every 6 months for 2 doses Second dose 6 months Yes Yanick Peralta MD   atorvastatin (LIPITOR) 80 MG tablet TAKE ONE TABLET BY MOUTH DAILY Yes Florence Koehler MD   pantoprazole (PROTONIX) 40 MG tablet TAKE ONE TABLET BY MOUTH TWICE A DAY Yes Alin Meyer MD   nicotine (NICODERM CQ) 14 MG/24HR Place 1 patch onto the skin every 24 hours Yes Alin Meyer MD   mupirocin (BACTROBAN) 2 % ointment APPLY TO AFFECTED AREA(S) THREE TIMES A DAY Yes Alin Meyer MD   tiZANidine (ZANAFLEX) 4 MG tablet Take 1 tablet by mouth 3 times daily as needed (back pain) Yes Alin Meyer MD   diphenhydrAMINE (BENADRYL) 25 MG capsule Take 1 capsule by mouth nightly as needed for Itching or Sleep Yes Alin Meyer MD   fluticasone (FLONASE) 50 MCG/ACT nasal spray 1 spray by Each Nostril route daily Yes Alin Meyer MD   albuterol (PROVENTIL) (2.5 MG/3ML) 0.083% nebulizer solution Take 3 mLs by nebulization every 6 hours as needed for Wheezing albuterol (PROVENTIL) (2.5 MG/3ML) 0.083% nebulizer solution Yes Alin Meyer MD   varenicline (CHANTIX) 1 MG tablet Take 1 tablet by mouth 2 times daily Yes Alin Meyer MD   DULoxetine (CYMBALTA) 60 MG extended release capsule TAKE ONE CAPSULE BY MOUTH TWICE A DAY Yes Fiorella Leon MD   valsartan (DIOVAN) 80 MG tablet TAKE ONE TABLET BY MOUTH DAILY Yes RAH Thomas CNP   albuterol sulfate HFA (PROVENTIL;VENTOLIN;PROAIR) 108 (90 Base) MCG/ACT inhaler INHALE TWO PUFFS BY MOUTH EVERY 6 HOURS AS NEEDED FOR WHEEZING OR SHORTNESS OF BREATH Yes Fiorella Leon MD   furosemide (LASIX) 20 MG tablet Take 1 tablet by mouth daily Yes AVE Shah   sildenafil (REVATIO) 20 MG tablet Take 3 tablets by mouth daily as needed (ED) Yes AVE Shah   SPIRIVA RESPIMAT 2.5 MCG/ACT AERS inhaler INHALE TWO PUFFS BY MOUTH DAILY Yes hNi Huynh MD        Social History     Tobacco Use    Smoking status: Every Day     Packs/day: 0.50     Years: 33.00     Pack years: 16.50     Types: Cigarettes    Smokeless tobacco: Former     Quit date: 11/1/2015   Substance Use Topics    Alcohol use: Yes     Comment: 1/2 fifth tequila       Physical exam  BP (!) 126/91 (Site: Left Upper Arm)   Pulse 89 Temp 97.1 °F (36.2 °C)   Ht 6' 1\" (1.854 m)   Wt 217 lb (98.4 kg)   SpO2 96%   BMI 28.63 kg/m²     Physical Exam  Vitals reviewed. Constitutional:       General: He is not in acute distress. Appearance: Normal appearance. He is not toxic-appearing. HENT:      Right Ear: Tympanic membrane and external ear normal.      Left Ear: Tympanic membrane and external ear normal.      Nose: No congestion or rhinorrhea. Right Sinus: No maxillary sinus tenderness or frontal sinus tenderness. Left Sinus: No maxillary sinus tenderness or frontal sinus tenderness. Mouth/Throat:      Mouth: Mucous membranes are moist.      Pharynx: No oropharyngeal exudate or posterior oropharyngeal erythema. Eyes:      Conjunctiva/sclera: Conjunctivae normal.   Cardiovascular:      Rate and Rhythm: Normal rate and regular rhythm. Pulses: Normal pulses. Heart sounds: Normal heart sounds. No murmur heard. Pulmonary:      Effort: Pulmonary effort is normal. No respiratory distress. Breath sounds: No stridor. No wheezing, rhonchi or rales. Musculoskeletal:      Cervical back: No tenderness. Right lower leg: No edema. Left lower leg: No edema. Lymphadenopathy:      Cervical: No cervical adenopathy. Skin:     General: Skin is warm. Findings: No rash. Neurological:      General: No focal deficit present. Mental Status: He is alert. Psychiatric:         Mood and Affect: Mood normal.       ASSESSMENT/PLAN:     Diagnosis Orders   1. Mixed anxiety and depressive disorder  busPIRone (BUSPAR) 10 MG tablet    hydrOXYzine pamoate (VISTARIL) 25 MG capsule      2. Essential hypertension        3. Malrotation, congenital  dicyclomine (BENTYL) 10 MG capsule      4. Coronary artery disease involving native heart without angina pectoris, unspecified vessel or lesion type  [facility]      5.  Need for shingles vaccine  zoster recombinant adjuvanted vaccine [facility]) 50 MCG/0.5ML SUSR injection      1. Mixed anxiety and depressive disorder  Patient takes Cymbalta 60mg twice daily. BuSpar 10 mg daily as needed Increase dose buspar to twice daily , not as needed  - Hydroxyzine 25mg or 50mg at Night, NOT WITH BENADRYL    -     busPIRone (BUSPAR) 10 MG tablet; Take 1 tablet by mouth 2 times daily, Disp-60 tablet, R-1Normal  2. Essential hypertension  Controlled. compliant with medications. Med: Valsartan 80mg daily and amlodipine 5mg (did not tolerate metoprolol per EMR)  No change in management, ??continue current ? medications  Discussed DASH diet and dietary sodium restrictions. Continue/Increase dietary efforts and physical activity. 3.  Malrotation, congenital  -     dicyclomine (BENTYL) 10 MG capsule; Take 1 capsule by mouth 4 times daily, Disp-120 capsule, R-0Normal  4. Coronary artery disease involving native heart without angina pectoris, unspecified vessel or lesion type  -     [facility]  5. Need for shingles vaccine  -     zoster recombinant adjuvanted vaccine [facility]) 50 MCG/0.5ML SUSR injection; Inject 0.5 mLs into the muscle every 6 months for 2 doses Second dose 6 months, Disp-1 each, R-1Normal               Return in about 3 months (around 4/27/2023). An electronic signature was used to authenticate this note.     --Vitaly Daley MD

## 2023-01-27 NOTE — PATIENT INSTRUCTIONS
- Nasonex instead of flonase   - Hydroxyzine 25mg or 50mg at Night, NOT WITH BENADRYL    - TAKE BUSPAR 2x daily with Cymbalta, NOT AS NEEDED   Spiriva Respimat DAILY

## 2023-02-02 RX ORDER — HYDROXYZINE PAMOATE 25 MG/1
25 CAPSULE ORAL
Qty: 60 CAPSULE | Refills: 0 | Status: SHIPPED | OUTPATIENT
Start: 2023-02-02 | End: 2023-04-03

## 2023-02-15 DIAGNOSIS — I10 BENIGN ESSENTIAL HYPERTENSION: Primary | ICD-10-CM

## 2023-02-15 DIAGNOSIS — I25.110 CORONARY ARTERY DISEASE INVOLVING NATIVE CORONARY ARTERY OF NATIVE HEART WITH UNSTABLE ANGINA PECTORIS (HCC): ICD-10-CM

## 2023-02-15 DIAGNOSIS — R09.02 HYPOXIA: ICD-10-CM

## 2023-02-16 RX ORDER — TIOTROPIUM BROMIDE INHALATION SPRAY 3.12 UG/1
SPRAY, METERED RESPIRATORY (INHALATION)
Qty: 4 G | OUTPATIENT
Start: 2023-02-16

## 2023-02-24 DIAGNOSIS — Q43.3 MALROTATION, CONGENITAL: ICD-10-CM

## 2023-02-24 RX ORDER — DICYCLOMINE HYDROCHLORIDE 10 MG/1
CAPSULE ORAL
Qty: 120 CAPSULE | Refills: 0 | Status: SHIPPED | OUTPATIENT
Start: 2023-02-24

## 2023-02-27 RX ORDER — DULOXETIN HYDROCHLORIDE 60 MG/1
CAPSULE, DELAYED RELEASE ORAL
Qty: 180 CAPSULE | Refills: 1 | OUTPATIENT
Start: 2023-02-27

## 2023-03-01 ENCOUNTER — TELEPHONE (OUTPATIENT)
Dept: FAMILY MEDICINE CLINIC | Age: 53
End: 2023-03-01

## 2023-03-01 NOTE — TELEPHONE ENCOUNTER
Patient stated he fell 2 days ago. Complaining of toe, knee and hip pain. He has decided to go to [facility] Urgent care . No other symptoms at this time.

## 2023-03-23 RX ORDER — DULOXETIN HYDROCHLORIDE 60 MG/1
CAPSULE, DELAYED RELEASE ORAL
Qty: 120 CAPSULE | Refills: 1 | Status: SHIPPED | OUTPATIENT
Start: 2023-03-23

## 2023-03-23 NOTE — TELEPHONE ENCOUNTER
Refill Request     CONFIRM preferred pharmacy with the patient. If Mail Order Rx - Pend for 90 day refill. Last Seen: Last Seen Department: 8/29/2022  Last Seen by PCP: 2/8/2022    Last Written: 11/01/2022 120 capsule 1 refills     If no future appointment scheduled, route STAFF MESSAGE with patient name to the [facility] for scheduling. Next Appointment:   No future appointments. Message sent to [address] [place] [address] to schedule appt with patient?   N/A      Requested Prescriptions     Pending Prescriptions Disp Refills    DULoxetine (CYMBALTA) 60 MG extended release capsule [Pharmacy Med Name: DULoxetine HCL DR 60 MG CAPSULE] 120 capsule 1     Sig: TAKE ONE CAPSULE BY MOUTH TWICE A DAY

## 2023-03-27 RX ORDER — DULOXETIN HYDROCHLORIDE 60 MG/1
CAPSULE, DELAYED RELEASE ORAL
Qty: 120 CAPSULE | Refills: 1 | OUTPATIENT
Start: 2023-03-27

## 2023-04-20 ENCOUNTER — TELEPHONE (OUTPATIENT)
Dept: FAMILY MEDICINE CLINIC | Age: 53
End: 2023-04-20

## 2023-04-20 DIAGNOSIS — L23.9 ALLERGIC CONTACT DERMATITIS, UNSPECIFIED TRIGGER: Primary | ICD-10-CM

## 2023-04-20 DIAGNOSIS — F17.200 SMOKER: ICD-10-CM

## 2023-04-20 DIAGNOSIS — F17.200 TOBACCO USE DISORDER: ICD-10-CM

## 2023-04-20 DIAGNOSIS — I10 BENIGN ESSENTIAL HYPERTENSION: ICD-10-CM

## 2023-04-24 RX ORDER — DIAPER,BRIEF,INFANT-TODD,DISP
EACH MISCELLANEOUS
Qty: 30 G | Refills: 0 | Status: SHIPPED | OUTPATIENT
Start: 2023-04-24 | End: 2023-05-01

## 2023-04-24 RX ORDER — NICOTINE 21 MG/24HR
1 PATCH, TRANSDERMAL 24 HOURS TRANSDERMAL EVERY 24 HOURS
Qty: 28 PATCH | Refills: 0 | Status: SHIPPED | OUTPATIENT
Start: 2023-04-24

## 2023-04-24 RX ORDER — VALSARTAN 80 MG/1
80 TABLET ORAL DAILY
Qty: 90 TABLET | Refills: 1 | Status: SHIPPED | OUTPATIENT
Start: 2023-04-24

## 2023-04-24 NOTE — TELEPHONE ENCOUNTER
I refilled everything but the Bactroban ointment. If patient is having a skin infection he should take make an appointment.   The hydrocortisone cream can be used for dermatitis

## 2023-04-25 ENCOUNTER — OFFICE VISIT (OUTPATIENT)
Dept: FAMILY MEDICINE CLINIC | Age: 53
End: 2023-04-25
Payer: COMMERCIAL

## 2023-04-25 VITALS
TEMPERATURE: 97.3 F | OXYGEN SATURATION: 95 % | DIASTOLIC BLOOD PRESSURE: 88 MMHG | HEART RATE: 106 BPM | BODY MASS INDEX: 28.63 KG/M2 | SYSTOLIC BLOOD PRESSURE: 125 MMHG | HEIGHT: 73 IN | WEIGHT: 216 LBS

## 2023-04-25 DIAGNOSIS — F17.200 SMOKER: ICD-10-CM

## 2023-04-25 DIAGNOSIS — I10 ESSENTIAL HYPERTENSION: Primary | ICD-10-CM

## 2023-04-25 DIAGNOSIS — M54.50 CHRONIC LOW BACK PAIN, UNSPECIFIED BACK PAIN LATERALITY, UNSPECIFIED WHETHER SCIATICA PRESENT: ICD-10-CM

## 2023-04-25 DIAGNOSIS — F41.8 MIXED ANXIETY AND DEPRESSIVE DISORDER: ICD-10-CM

## 2023-04-25 DIAGNOSIS — L30.9 DERMATITIS: ICD-10-CM

## 2023-04-25 DIAGNOSIS — J43.9 PULMONARY EMPHYSEMA, UNSPECIFIED EMPHYSEMA TYPE (HCC): ICD-10-CM

## 2023-04-25 DIAGNOSIS — G89.29 CHRONIC LOW BACK PAIN, UNSPECIFIED BACK PAIN LATERALITY, UNSPECIFIED WHETHER SCIATICA PRESENT: ICD-10-CM

## 2023-04-25 DIAGNOSIS — F41.0 PANIC ATTACKS: ICD-10-CM

## 2023-04-25 PROCEDURE — 3078F DIAST BP <80 MM HG: CPT | Performed by: STUDENT IN AN ORGANIZED HEALTH CARE EDUCATION/TRAINING PROGRAM

## 2023-04-25 PROCEDURE — 3074F SYST BP LT 130 MM HG: CPT | Performed by: STUDENT IN AN ORGANIZED HEALTH CARE EDUCATION/TRAINING PROGRAM

## 2023-04-25 PROCEDURE — 3017F COLORECTAL CA SCREEN DOC REV: CPT | Performed by: STUDENT IN AN ORGANIZED HEALTH CARE EDUCATION/TRAINING PROGRAM

## 2023-04-25 PROCEDURE — G8417 CALC BMI ABV UP PARAM F/U: HCPCS | Performed by: STUDENT IN AN ORGANIZED HEALTH CARE EDUCATION/TRAINING PROGRAM

## 2023-04-25 PROCEDURE — 3023F SPIROM DOC REV: CPT | Performed by: STUDENT IN AN ORGANIZED HEALTH CARE EDUCATION/TRAINING PROGRAM

## 2023-04-25 PROCEDURE — 4004F PT TOBACCO SCREEN RCVD TLK: CPT | Performed by: STUDENT IN AN ORGANIZED HEALTH CARE EDUCATION/TRAINING PROGRAM

## 2023-04-25 PROCEDURE — 99214 OFFICE O/P EST MOD 30 MIN: CPT | Performed by: STUDENT IN AN ORGANIZED HEALTH CARE EDUCATION/TRAINING PROGRAM

## 2023-04-25 PROCEDURE — G8427 DOCREV CUR MEDS BY ELIG CLIN: HCPCS | Performed by: STUDENT IN AN ORGANIZED HEALTH CARE EDUCATION/TRAINING PROGRAM

## 2023-04-25 RX ORDER — MELOXICAM 7.5 MG/1
7.5 TABLET ORAL DAILY PRN
Qty: 30 TABLET | Refills: 0 | Status: SHIPPED | OUTPATIENT
Start: 2023-04-25

## 2023-04-25 RX ORDER — DULOXETIN HYDROCHLORIDE 60 MG/1
60 CAPSULE, DELAYED RELEASE ORAL DAILY
Qty: 30 CAPSULE | Refills: 3 | Status: CANCELLED | OUTPATIENT
Start: 2023-04-25

## 2023-04-25 RX ORDER — GABAPENTIN 100 MG/1
100 CAPSULE ORAL NIGHTLY
Qty: 90 CAPSULE | Refills: 0 | Status: SHIPPED | OUTPATIENT
Start: 2023-04-25 | End: 2023-07-24

## 2023-04-25 SDOH — ECONOMIC STABILITY: HOUSING INSECURITY
IN THE LAST 12 MONTHS, WAS THERE A TIME WHEN YOU DID NOT HAVE A STEADY PLACE TO SLEEP OR SLEPT IN A SHELTER (INCLUDING NOW)?: YES

## 2023-04-25 SDOH — ECONOMIC STABILITY: FOOD INSECURITY: WITHIN THE PAST 12 MONTHS, THE FOOD YOU BOUGHT JUST DIDN'T LAST AND YOU DIDN'T HAVE MONEY TO GET MORE.: SOMETIMES TRUE

## 2023-04-25 SDOH — ECONOMIC STABILITY: INCOME INSECURITY: HOW HARD IS IT FOR YOU TO PAY FOR THE VERY BASICS LIKE FOOD, HOUSING, MEDICAL CARE, AND HEATING?: VERY HARD

## 2023-04-25 SDOH — ECONOMIC STABILITY: FOOD INSECURITY: WITHIN THE PAST 12 MONTHS, YOU WORRIED THAT YOUR FOOD WOULD RUN OUT BEFORE YOU GOT MONEY TO BUY MORE.: SOMETIMES TRUE

## 2023-04-25 NOTE — PATIENT INSTRUCTIONS
- Gabapentin 100mg at night anxiety    - Stop hydroxyzine   - Meloxicam 7.5 mg daily as needed for back pain   Do not take with Ibuprofen, Advil, Naproxen,

## 2023-04-25 NOTE — PROGRESS NOTES
4 [address], [address] [place] [address]  Tel: [phone]      2023     María Leahy II (:  1970) is a 47 y.o. male, here for evaluation of the following medical concerns:  Chief Complaint   Patient presents with    3 Month Follow-Up     HPI  Patient is a 81-year-old male history of HTN, ADHD, alcoholic hepatitis, Ha esophagus, CAD, COPD, depression, GERD, IV drug use history, who presents for follow-up on medical conditions    Multiple exocriations using mupiroic cream as needed. Dermatitis lesion on neck and right arm using steroid cream, hydrocortisone     Smoker /COPD  Uses Spiriva Respimat inhaler 5mcg daily, albuterol 2 puffs every 6 hours as needed. Endorses any wheezing, shortness of breath. Was smoking 1 pack/day, cut down to 1/2 pack daily while using Chantix. Hypertension/ CAD  Patient currently takes valsartan 80 mg daily  amlodipine 5mg and  lasix as needed for edema. Has not needed to use lasix. Lightheadedness has resolved. No light or sound sentivity. Took a few tylenol. Did not help. Denies any , chest pain, palpitations, orthopnea, PND, or swelling in lower extremities. Depression and anxiety  Patient takes Cymbalta 60mg twice daily, notes worsening symptoms if not taking it . Recently increased BuSpar 10 mg twice daily as needed for anxiety, started 2023. Denies any change ins symptoms. Taking medication regularly. Taking Benadryl for sleep , not taking melatonin. Takes hydroxyzine at 8-9 oclock, dry mouth . Does not feel likes it helping. NO SI or HI. Few times a weeks has nightmares. Sees people in the corner of his peripheral vision. Currently financially limited, does not have any money or access to a phone'. Hyperlipidemia  Takes artorvastain 80mg daily. No new myalgias or GI upset on atorvastatin (Lipitor). Patient is not following a low fat, low cholesterol diet.

## 2023-05-22 DIAGNOSIS — J30.9 ALLERGIC RHINITIS, UNSPECIFIED SEASONALITY, UNSPECIFIED TRIGGER: ICD-10-CM

## 2023-05-23 ENCOUNTER — OFFICE VISIT (OUTPATIENT)
Dept: CARDIOLOGY CLINIC | Age: 53
End: 2023-05-23
Payer: COMMERCIAL

## 2023-05-23 VITALS
DIASTOLIC BLOOD PRESSURE: 84 MMHG | OXYGEN SATURATION: 97 % | SYSTOLIC BLOOD PRESSURE: 124 MMHG | WEIGHT: 220 LBS | HEIGHT: 73 IN | HEART RATE: 106 BPM | BODY MASS INDEX: 29.16 KG/M2

## 2023-05-23 DIAGNOSIS — I25.10 CORONARY ARTERY DISEASE INVOLVING NATIVE CORONARY ARTERY OF NATIVE HEART WITHOUT ANGINA PECTORIS: ICD-10-CM

## 2023-05-23 DIAGNOSIS — R06.02 SOB (SHORTNESS OF BREATH) ON EXERTION: Primary | ICD-10-CM

## 2023-05-23 DIAGNOSIS — F17.200 TOBACCO USE DISORDER: ICD-10-CM

## 2023-05-23 DIAGNOSIS — Z01.810 ENCOUNTER FOR PRE-OPERATIVE CARDIOVASCULAR CLEARANCE: ICD-10-CM

## 2023-05-23 PROCEDURE — G8417 CALC BMI ABV UP PARAM F/U: HCPCS | Performed by: INTERNAL MEDICINE

## 2023-05-23 PROCEDURE — 99215 OFFICE O/P EST HI 40 MIN: CPT | Performed by: INTERNAL MEDICINE

## 2023-05-23 PROCEDURE — 3079F DIAST BP 80-89 MM HG: CPT | Performed by: INTERNAL MEDICINE

## 2023-05-23 PROCEDURE — G8427 DOCREV CUR MEDS BY ELIG CLIN: HCPCS | Performed by: INTERNAL MEDICINE

## 2023-05-23 PROCEDURE — 3017F COLORECTAL CA SCREEN DOC REV: CPT | Performed by: INTERNAL MEDICINE

## 2023-05-23 PROCEDURE — 3074F SYST BP LT 130 MM HG: CPT | Performed by: INTERNAL MEDICINE

## 2023-05-23 PROCEDURE — 4004F PT TOBACCO SCREEN RCVD TLK: CPT | Performed by: INTERNAL MEDICINE

## 2023-05-23 RX ORDER — FLUTICASONE PROPIONATE 50 MCG
SPRAY, SUSPENSION (ML) NASAL
OUTPATIENT
Start: 2023-05-23

## 2023-05-23 NOTE — PATIENT INSTRUCTIONS
Plan:  1. Order echocardiogram~ shortness of breath  2. Order Lexiscan stress test ~ shortness of breath  3. Cardiac clearance will be provided once results of cardiac testing   4. Tobacco Cessation ~ high risk factor heart attack or stroke  ~1-800-QUIT-NOW  5. Refer to pulmonary ~ tobacco abuse, shortness of breath  6. Follow up based on testing results we will call you to discuss. If  within normal limits one year.

## 2023-05-23 NOTE — PROGRESS NOTES
6' 1\" (1.854 m)   Wt 220 lb (99.8 kg)   SpO2 97%   BMI 29.03 kg/m²   /84   Pulse (!) 106   Ht 6' 1\" (1.854 m)   Wt 220 lb (99.8 kg)   SpO2 97%   BMI 29.03 kg/m²    Weight - Scale: 220 lb (99.8 kg)     Wt Readings from Last 3 Encounters:   05/23/23 220 lb (99.8 kg)   04/25/23 216 lb (98 kg)   01/27/23 217 lb (98.4 kg)     No intake or output data in the 24 hours ending 05/23/23 1403    General Appearance:  Alert, cooperative, no distress, appears stated age   Head:  Normocephalic, without obvious abnormality, atraumatic   Eyes:  PERRL, conjunctiva/corneas clear       Nose: Nares normal, no drainage or sinus tenderness   Throat: Lips, mucosa, and tongue normal   Neck: Supple, symmetrical, trachea midline, no adenopathy, thyroid: not enlarged, symmetric, no tenderness/mass/nodules, no carotid bruit or JVD       Lungs:   Clear to auscultation bilaterally, respirations unlabored   Chest Wall:  No tenderness or deformity   Heart:  Regular rhythm and normal rate; S1, S2 are normal; no murmur noted; no rub or gallop   Abdomen:   Soft, non-tender, bowel sounds active all four quadrants,  no masses, no organomegaly           Extremities: Extremities normal, atraumatic, no cyanosis or edema   Pulses: 2+ and symmetric   Skin: Skin color, texture, turgor normal, no rashes or lesions   Pysch: Normal mood and affect   Neurologic: Normal gross motor and sensory exam.         Labs  No results for input(s): WBC, HGB, HCT, MCV, PLT in the last 72 hours. No results for input(s): CREATININE, BUN, NA, K, CL, CO2 in the last 72 hours. No results for input(s): INR, PROTIME in the last 72 hours. No results for input(s): PROBNP in the last 72 hours. No results for input(s): CHOL, LDL, HDL in the last 72 hours. Invalid input(s): TG  No results found for: TROPHS       Imaging:  I have reviewed the below testing personally and my interpretation is below.   EKG:        CXR:      Assessment:  47 y.o. patient with:  Active

## 2023-05-30 ENCOUNTER — OFFICE VISIT (OUTPATIENT)
Dept: FAMILY MEDICINE CLINIC | Age: 53
End: 2023-05-30
Payer: COMMERCIAL

## 2023-05-30 VITALS
SYSTOLIC BLOOD PRESSURE: 126 MMHG | RESPIRATION RATE: 16 BRPM | DIASTOLIC BLOOD PRESSURE: 86 MMHG | HEART RATE: 90 BPM | WEIGHT: 219.4 LBS | OXYGEN SATURATION: 96 % | TEMPERATURE: 97.3 F | BODY MASS INDEX: 28.95 KG/M2

## 2023-05-30 DIAGNOSIS — F17.200 SMOKER: ICD-10-CM

## 2023-05-30 DIAGNOSIS — J43.9 PULMONARY EMPHYSEMA, UNSPECIFIED EMPHYSEMA TYPE (HCC): ICD-10-CM

## 2023-05-30 DIAGNOSIS — Z59.87 MATERIAL HARDSHIP DUE TO LIMITED FINANCIAL RESOURCES: ICD-10-CM

## 2023-05-30 DIAGNOSIS — I10 BENIGN ESSENTIAL HYPERTENSION: Primary | ICD-10-CM

## 2023-05-30 DIAGNOSIS — F41.8 MIXED ANXIETY AND DEPRESSIVE DISORDER: ICD-10-CM

## 2023-05-30 PROCEDURE — 3017F COLORECTAL CA SCREEN DOC REV: CPT | Performed by: STUDENT IN AN ORGANIZED HEALTH CARE EDUCATION/TRAINING PROGRAM

## 2023-05-30 PROCEDURE — 3078F DIAST BP <80 MM HG: CPT | Performed by: STUDENT IN AN ORGANIZED HEALTH CARE EDUCATION/TRAINING PROGRAM

## 2023-05-30 PROCEDURE — 4004F PT TOBACCO SCREEN RCVD TLK: CPT | Performed by: STUDENT IN AN ORGANIZED HEALTH CARE EDUCATION/TRAINING PROGRAM

## 2023-05-30 PROCEDURE — G8427 DOCREV CUR MEDS BY ELIG CLIN: HCPCS | Performed by: STUDENT IN AN ORGANIZED HEALTH CARE EDUCATION/TRAINING PROGRAM

## 2023-05-30 PROCEDURE — 99214 OFFICE O/P EST MOD 30 MIN: CPT | Performed by: STUDENT IN AN ORGANIZED HEALTH CARE EDUCATION/TRAINING PROGRAM

## 2023-05-30 PROCEDURE — 3023F SPIROM DOC REV: CPT | Performed by: STUDENT IN AN ORGANIZED HEALTH CARE EDUCATION/TRAINING PROGRAM

## 2023-05-30 PROCEDURE — G8417 CALC BMI ABV UP PARAM F/U: HCPCS | Performed by: STUDENT IN AN ORGANIZED HEALTH CARE EDUCATION/TRAINING PROGRAM

## 2023-05-30 PROCEDURE — 3074F SYST BP LT 130 MM HG: CPT | Performed by: STUDENT IN AN ORGANIZED HEALTH CARE EDUCATION/TRAINING PROGRAM

## 2023-05-30 RX ORDER — DULOXETIN HYDROCHLORIDE 60 MG/1
60 CAPSULE, DELAYED RELEASE ORAL 2 TIMES DAILY
Qty: 120 CAPSULE | Refills: 1 | Status: SHIPPED | OUTPATIENT
Start: 2023-05-30

## 2023-05-30 RX ORDER — DIPHENHYDRAMINE HCL 25 MG
25 CAPSULE ORAL NIGHTLY PRN
Qty: 60 CAPSULE | Refills: 3 | Status: SHIPPED | OUTPATIENT
Start: 2023-05-30

## 2023-05-30 RX ORDER — BUSPIRONE HYDROCHLORIDE 10 MG/1
TABLET ORAL
COMMUNITY
Start: 2023-05-07

## 2023-05-30 SDOH — ECONOMIC STABILITY: INCOME INSECURITY: HOW HARD IS IT FOR YOU TO PAY FOR THE VERY BASICS LIKE FOOD, HOUSING, MEDICAL CARE, AND HEATING?: NOT HARD AT ALL

## 2023-05-30 SDOH — ECONOMIC STABILITY: FOOD INSECURITY: WITHIN THE PAST 12 MONTHS, YOU WORRIED THAT YOUR FOOD WOULD RUN OUT BEFORE YOU GOT MONEY TO BUY MORE.: NEVER TRUE

## 2023-05-30 SDOH — ECONOMIC STABILITY: FOOD INSECURITY: WITHIN THE PAST 12 MONTHS, THE FOOD YOU BOUGHT JUST DIDN'T LAST AND YOU DIDN'T HAVE MONEY TO GET MORE.: NEVER TRUE

## 2023-05-30 SDOH — ECONOMIC STABILITY - INCOME SECURITY: MATERIAL HARDSHIP DUE TO LIMITED FINANCIAL RESOURCES, NOT ELSEWHERE CLASSIFIED: Z59.87

## 2023-05-30 ASSESSMENT — PATIENT HEALTH QUESTIONNAIRE - PHQ9
2. FEELING DOWN, DEPRESSED OR HOPELESS: 1
8. MOVING OR SPEAKING SO SLOWLY THAT OTHER PEOPLE COULD HAVE NOTICED. OR THE OPPOSITE, BEING SO FIGETY OR RESTLESS THAT YOU HAVE BEEN MOVING AROUND A LOT MORE THAN USUAL: 0
7. TROUBLE CONCENTRATING ON THINGS, SUCH AS READING THE NEWSPAPER OR WATCHING TELEVISION: 3
9. THOUGHTS THAT YOU WOULD BE BETTER OFF DEAD, OR OF HURTING YOURSELF: 0
SUM OF ALL RESPONSES TO PHQ QUESTIONS 1-9: 14
SUM OF ALL RESPONSES TO PHQ QUESTIONS 1-9: 14
6. FEELING BAD ABOUT YOURSELF - OR THAT YOU ARE A FAILURE OR HAVE LET YOURSELF OR YOUR FAMILY DOWN: 3
5. POOR APPETITE OR OVEREATING: 1
SUM OF ALL RESPONSES TO PHQ9 QUESTIONS 1 & 2: 1
1. LITTLE INTEREST OR PLEASURE IN DOING THINGS: 0
SUM OF ALL RESPONSES TO PHQ QUESTIONS 1-9: 14
10. IF YOU CHECKED OFF ANY PROBLEMS, HOW DIFFICULT HAVE THESE PROBLEMS MADE IT FOR YOU TO DO YOUR WORK, TAKE CARE OF THINGS AT HOME, OR GET ALONG WITH OTHER PEOPLE: 1
3. TROUBLE FALLING OR STAYING ASLEEP: 3
4. FEELING TIRED OR HAVING LITTLE ENERGY: 3
SUM OF ALL RESPONSES TO PHQ QUESTIONS 1-9: 14

## 2023-05-30 ASSESSMENT — ANXIETY QUESTIONNAIRES
7. FEELING AFRAID AS IF SOMETHING AWFUL MIGHT HAPPEN: 1
1. FEELING NERVOUS, ANXIOUS, OR ON EDGE: 3
GAD7 TOTAL SCORE: 15
6. BECOMING EASILY ANNOYED OR IRRITABLE: 1
2. NOT BEING ABLE TO STOP OR CONTROL WORRYING: 3
5. BEING SO RESTLESS THAT IT IS HARD TO SIT STILL: 1
4. TROUBLE RELAXING: 3
3. WORRYING TOO MUCH ABOUT DIFFERENT THINGS: 3
IF YOU CHECKED OFF ANY PROBLEMS ON THIS QUESTIONNAIRE, HOW DIFFICULT HAVE THESE PROBLEMS MADE IT FOR YOU TO DO YOUR WORK, TAKE CARE OF THINGS AT HOME, OR GET ALONG WITH OTHER PEOPLE: SOMEWHAT DIFFICULT

## 2023-05-30 NOTE — PROGRESS NOTES
Cervical: No cervical adenopathy. Skin:     General: Skin is warm. Findings: No rash. Neurological:      General: No focal deficit present. Mental Status: He is alert. Psychiatric:         Attention and Perception: Attention and perception normal.         Mood and Affect: Mood is depressed. Speech: Speech normal.         Behavior: Behavior normal.         Thought Content: Thought content normal.         Cognition and Memory: Cognition and memory normal.         Judgment: Judgment normal.        ASSESSMENT/PLAN:  Benign essential hypertension  Controlled. compliant with medications. Med: Valsartan 80mg daily and amlodipine 5mg (did not tolerate metoprolol per EMR)  No change in management, ??continue current ? medications  Pulmonary emphysema, unspecified emphysema type (HCC)  Controlled, spiriva Respimat inhaler daily, albuterol 2 puffs every 6 hours as needed. - Continue current medications   - Advised to stop smoking     -     tiotropium (SPIRIVA RESPIMAT) 2.5 MCG/ACT AERS inhaler; INHALE TWO PUFFS BY MOUTH DAILY, Disp-1 each, R-5Normal  Mixed anxiety and depressive disorder  Discussed, no change in anxiety, meds: Cymbalta 60mg twice daily, and BuSpar 10 mg twice daily as needed for anxiety,  Hydroxyzine and trazodone did not help with sleep , using benadryl   No access to phone, out of work 4 years. Lives with mother  - Started gabapentin at night to help with sleep   - referral to psychiatry and [facility]   -     External Referral To Social Work  -     diphenhydrAMINE (BENADRYL) 25 MG capsule; Take 1 capsule by mouth nightly as needed for Sleep, Disp-60 capsule, R-3Normal  -     DULoxetine (CYMBALTA) 60 MG extended release capsule; Take 1 capsule by mouth 2 times daily, Disp-120 capsule, R-1Normal  Smoker  Smoking 1/2 ppd. Greater than 3 minutes spent on smoking and tobacco use cessation.    Taking chantix   Material hardship due to limited financial resources  No access to phone, out of

## 2023-05-31 ENCOUNTER — CARE COORDINATION (OUTPATIENT)
Dept: CARE COORDINATION | Age: 53
End: 2023-05-31

## 2023-05-31 NOTE — CARE COORDINATION
Received referral from PCP  Attempted to contact patient. Patient's Mom Kiki Done) answered the phone. Introduced role of ACM  Ms. Oscar Chand reports patient can use her phone however, patient is currently not available to take this call. She agrees to have patient return this ACM's call  Informed Ms. Oscar Chand this ACM is often on the phone through out  the day to f/u with other patients  Therefore if patient is not able to reach this ACM immediately it would be very helpful if he could leave a voice mail with his name, date of birth and  a date and time for this ACM to return call. Ms Oscar Chand verbalized understanding and will give the patient the message.

## 2023-06-01 ENCOUNTER — TELEPHONE (OUTPATIENT)
Dept: FAMILY MEDICINE CLINIC | Age: 53
End: 2023-06-01

## 2023-06-01 NOTE — TELEPHONE ENCOUNTER
Ricky Colon is asking if Dr. Klever Gabriel would fill out [employer] form to keep his electric from been shut off. Patient needs to use a nebulizer. No future appointments. Last ov with Dr. Klever Gabriel was 5/30/23.

## 2023-06-01 NOTE — TELEPHONE ENCOUNTER
Patient was under the impression that we supply the form. Advised that we do not. He will try to get the form.

## 2023-06-02 ENCOUNTER — CARE COORDINATION (OUTPATIENT)
Dept: CARE COORDINATION | Age: 53
End: 2023-06-02

## 2023-06-02 NOTE — CARE COORDINATION
Received referral from PCP  Contacted patient and introduced role of ACM/CC and purpose of call  Patient is requesting to call ACM back next week  Will assess CC needs at that time.

## 2023-06-05 ENCOUNTER — CARE COORDINATION (OUTPATIENT)
Dept: CARE COORDINATION | Age: 53
End: 2023-06-05

## 2023-06-05 NOTE — CARE COORDINATION
Patient did not contact this ACM as previously scheduled for today. This ACM attempted to contact patient for f/u  Spoke briefly with patient's Mom and left HIPPA compliant message for patient to return this ACM's call  And provided this ACM's contact information.

## 2023-06-06 ENCOUNTER — CARE COORDINATION (OUTPATIENT)
Dept: CARE COORDINATION | Age: 53
End: 2023-06-06

## 2023-06-06 NOTE — CARE COORDINATION
Multiple attempts to contact patient for CC referral unsuccessful  Spoke with patient's Mom Gene Colon) and she took down this AC's contact number  She reports she will notify her son now and have him return call    Gene Colon returned call and reports patient wasn't feeling well and will return the call tomorrow. Will wait to hear back from patient tomorrow.

## 2023-06-09 ENCOUNTER — CARE COORDINATION (OUTPATIENT)
Dept: CARE COORDINATION | Age: 53
End: 2023-06-09

## 2023-06-09 NOTE — CARE COORDINATION
Attempted to contact patient on 5/31, 6/2, 6/5, 6/6 and 6/9/23. Left several messages and ACM contact information with patient's Mom (Jan).   Will wait to hear back from patient  No further outreach scheduled with this ACM; episode of care resolved

## 2023-06-22 PROBLEM — Z01.810 ENCOUNTER FOR PRE-OPERATIVE CARDIOVASCULAR CLEARANCE: Status: RESOLVED | Noted: 2023-05-23 | Resolved: 2023-06-22

## 2023-07-01 DIAGNOSIS — K21.9 GASTROESOPHAGEAL REFLUX DISEASE WITHOUT ESOPHAGITIS: ICD-10-CM

## 2023-07-01 DIAGNOSIS — K22.70 BARRETT'S ESOPHAGUS DETERMINED BY ENDOSCOPY: ICD-10-CM

## 2023-07-03 RX ORDER — PANTOPRAZOLE SODIUM 40 MG/1
TABLET, DELAYED RELEASE ORAL
Qty: 180 TABLET | Refills: 0 | Status: SHIPPED | OUTPATIENT
Start: 2023-07-03

## 2023-07-11 DIAGNOSIS — M54.50 CHRONIC LOW BACK PAIN, UNSPECIFIED BACK PAIN LATERALITY, UNSPECIFIED WHETHER SCIATICA PRESENT: ICD-10-CM

## 2023-07-11 DIAGNOSIS — Z98.890 HISTORY OF LUMBAR LAMINECTOMY: ICD-10-CM

## 2023-07-11 DIAGNOSIS — R11.0 NAUSEA: Primary | ICD-10-CM

## 2023-07-11 DIAGNOSIS — G89.29 CHRONIC LOW BACK PAIN, UNSPECIFIED BACK PAIN LATERALITY, UNSPECIFIED WHETHER SCIATICA PRESENT: ICD-10-CM

## 2023-07-11 RX ORDER — TIZANIDINE 4 MG/1
TABLET ORAL
Qty: 90 TABLET | Refills: 2 | Status: SHIPPED | OUTPATIENT
Start: 2023-07-11

## 2023-07-11 RX ORDER — MELOXICAM 7.5 MG/1
7.5 TABLET ORAL DAILY PRN
Qty: 30 TABLET | Refills: 0 | Status: CANCELLED | OUTPATIENT
Start: 2023-07-11

## 2023-07-11 RX ORDER — PROMETHAZINE HYDROCHLORIDE 12.5 MG/1
12.5 TABLET ORAL 3 TIMES DAILY PRN
Qty: 9 TABLET | Refills: 0 | Status: SHIPPED | OUTPATIENT
Start: 2023-07-11 | End: 2023-07-14

## 2023-07-11 NOTE — TELEPHONE ENCOUNTER
Patient needs a refill on promethazine ,.     meloxicam (MOBIC) 7.5 MG tablet      tiZANidine (ZANAFLEX) 4 MG tablet  They need a 30 day supply.        Pharmacy: [facility] [id number] Phyllis Deutsch, [address] [place] [address]

## 2023-07-14 DIAGNOSIS — F41.8 MIXED ANXIETY AND DEPRESSIVE DISORDER: ICD-10-CM

## 2023-07-14 DIAGNOSIS — R05.9 COUGH, UNSPECIFIED TYPE: ICD-10-CM

## 2023-07-14 DIAGNOSIS — F41.0 PANIC ATTACKS: ICD-10-CM

## 2023-07-14 RX ORDER — ALBUTEROL SULFATE 2.5 MG/3ML
SOLUTION RESPIRATORY (INHALATION)
Qty: 360 ML | Refills: 0 | Status: SHIPPED | OUTPATIENT
Start: 2023-07-14 | End: 2023-08-04

## 2023-07-14 RX ORDER — GABAPENTIN 100 MG/1
CAPSULE ORAL
Qty: 30 CAPSULE | Refills: 0 | Status: SHIPPED | OUTPATIENT
Start: 2023-07-14 | End: 2023-08-07

## 2023-07-19 ENCOUNTER — CARE COORDINATION (OUTPATIENT)
Dept: CARE COORDINATION | Age: 53
End: 2023-07-19

## 2023-08-04 DIAGNOSIS — R05.9 COUGH, UNSPECIFIED TYPE: ICD-10-CM

## 2023-08-04 RX ORDER — ALBUTEROL SULFATE 2.5 MG/3ML
SOLUTION RESPIRATORY (INHALATION)
Qty: 360 ML | Refills: 10 | Status: SHIPPED | OUTPATIENT
Start: 2023-08-04

## 2023-08-06 DIAGNOSIS — F41.0 PANIC ATTACKS: ICD-10-CM

## 2023-08-06 DIAGNOSIS — F41.8 MIXED ANXIETY AND DEPRESSIVE DISORDER: ICD-10-CM

## 2023-08-07 ENCOUNTER — CARE COORDINATION (OUTPATIENT)
Dept: CARE COORDINATION | Age: 53
End: 2023-08-07

## 2023-08-07 RX ORDER — GABAPENTIN 100 MG/1
100 CAPSULE ORAL NIGHTLY
Qty: 30 CAPSULE | Refills: 2 | Status: SHIPPED | OUTPATIENT
Start: 2023-08-07 | End: 2023-09-05

## 2023-08-07 NOTE — CARE COORDINATION
PLAN:  Patient advised to go to ED    FYI:  Patient contacted this ACM after attempting to contact [address]  Patient reports they do not accept his insurance  Patient reports he feels light-headed and dizzy and states, \"I am not thinking clearly. \"  Patient also reports his urine output is significantly decreased and he has not been able to drink fluids  He has had N/V and diarrhea for several days; he is unsure day of onset. Patient informs this AC he last used heroin this morning and he snorted it.   Patient reports he is alone and at his trailer  Advised patient to call squad and go to ED  Patient agrees to three way call with his Mom, Kvng Calderon reports she lives two miles away and patient agrees for her to help him  Advised patient to go to ED; both patient and patient's Mom verbalized understanding and he agrees to go to ED

## 2023-08-07 NOTE — TELEPHONE ENCOUNTER
Future Appointments   Date Time Provider [address]   8/7/2023 11:40 AM MD TIM Escobar - [facility]     LOV 5/30/2023

## 2023-08-07 NOTE — CARE COORDINATION
Ambulatory Care Coordination Note  2023      ACM contacted the patient by telephone. Verified name and  with patient as identifiers. Provided introduction to self, and explanation of the ACM role. Challenges to be reviewed by the provider   Additional needs identified to be addressed with provider: No  none    Contacted patient for CC f/u  Patient reports he has not f/u with ortho or [address]  He further reports he is has been trying to wean off heroin   Discussed treatment centers and NA; patient declines  He states, \"I think I have it almost beat\"  Explained how BH/support groups can help      Noted patient canceled PCP appointment today; he rescheduled appointment 23  He c/o N/V, diarrhea  Discussed [facility] and provided contact number             Method of communication with provider: chart routing. ACM: Mel Cohne RN        Offered patient enrollment in the Remote Patient Monitoring (RPM) program for in-home monitoring: NA.    Care Coordination Interventions    Referral from Primary Care Provider: Yes  Suggested Interventions and [address]: In Process  Pharmacist:  (Comment: patient is going to refill his medications; referral not currently needed)  Transportation Support: Completed (Comment: Patient has transportation but difficulty affording gas.  Patient has transportation assistance with his insurance)  [employer] Tools: Not Started  Other Services or Interventions: Patient to complete form on [employer] site for free phone          Goals Addressed    None         Future Appointments   Date Time Provider 4600  [facility]   2023  3:40 PM Stevan Cary MD [facility]

## 2023-08-08 ENCOUNTER — OFFICE VISIT (OUTPATIENT)
Dept: FAMILY MEDICINE CLINIC | Age: 53
End: 2023-08-08
Payer: COMMERCIAL

## 2023-08-08 VITALS
DIASTOLIC BLOOD PRESSURE: 122 MMHG | TEMPERATURE: 97.1 F | HEIGHT: 73 IN | SYSTOLIC BLOOD PRESSURE: 166 MMHG | BODY MASS INDEX: 29.16 KG/M2 | RESPIRATION RATE: 16 BRPM | WEIGHT: 220 LBS | HEART RATE: 105 BPM | OXYGEN SATURATION: 98 %

## 2023-08-08 DIAGNOSIS — F17.200 SMOKER: ICD-10-CM

## 2023-08-08 DIAGNOSIS — M25.551 PAIN OF RIGHT HIP: Primary | ICD-10-CM

## 2023-08-08 DIAGNOSIS — F33.2 SEVERE EPISODE OF RECURRENT MAJOR DEPRESSIVE DISORDER, WITHOUT PSYCHOTIC FEATURES (HCC): ICD-10-CM

## 2023-08-08 DIAGNOSIS — I48.92 ATRIAL FLUTTER, UNSPECIFIED TYPE (HCC): ICD-10-CM

## 2023-08-08 DIAGNOSIS — F11.90 HEROIN USE: ICD-10-CM

## 2023-08-08 DIAGNOSIS — I10 HYPERTENSION, UNSPECIFIED TYPE: ICD-10-CM

## 2023-08-08 PROCEDURE — 3078F DIAST BP <80 MM HG: CPT | Performed by: STUDENT IN AN ORGANIZED HEALTH CARE EDUCATION/TRAINING PROGRAM

## 2023-08-08 PROCEDURE — 4004F PT TOBACCO SCREEN RCVD TLK: CPT | Performed by: STUDENT IN AN ORGANIZED HEALTH CARE EDUCATION/TRAINING PROGRAM

## 2023-08-08 PROCEDURE — G8417 CALC BMI ABV UP PARAM F/U: HCPCS | Performed by: STUDENT IN AN ORGANIZED HEALTH CARE EDUCATION/TRAINING PROGRAM

## 2023-08-08 PROCEDURE — 3017F COLORECTAL CA SCREEN DOC REV: CPT | Performed by: STUDENT IN AN ORGANIZED HEALTH CARE EDUCATION/TRAINING PROGRAM

## 2023-08-08 PROCEDURE — 93000 ELECTROCARDIOGRAM COMPLETE: CPT | Performed by: STUDENT IN AN ORGANIZED HEALTH CARE EDUCATION/TRAINING PROGRAM

## 2023-08-08 PROCEDURE — G8427 DOCREV CUR MEDS BY ELIG CLIN: HCPCS | Performed by: STUDENT IN AN ORGANIZED HEALTH CARE EDUCATION/TRAINING PROGRAM

## 2023-08-08 PROCEDURE — 3074F SYST BP LT 130 MM HG: CPT | Performed by: STUDENT IN AN ORGANIZED HEALTH CARE EDUCATION/TRAINING PROGRAM

## 2023-08-08 PROCEDURE — 99214 OFFICE O/P EST MOD 30 MIN: CPT | Performed by: STUDENT IN AN ORGANIZED HEALTH CARE EDUCATION/TRAINING PROGRAM

## 2023-08-08 SDOH — ECONOMIC STABILITY: INCOME INSECURITY: HOW HARD IS IT FOR YOU TO PAY FOR THE VERY BASICS LIKE FOOD, HOUSING, MEDICAL CARE, AND HEATING?: VERY HARD

## 2023-08-08 SDOH — ECONOMIC STABILITY: FOOD INSECURITY: WITHIN THE PAST 12 MONTHS, YOU WORRIED THAT YOUR FOOD WOULD RUN OUT BEFORE YOU GOT MONEY TO BUY MORE.: OFTEN TRUE

## 2023-08-08 SDOH — ECONOMIC STABILITY: FOOD INSECURITY: WITHIN THE PAST 12 MONTHS, THE FOOD YOU BOUGHT JUST DIDN'T LAST AND YOU DIDN'T HAVE MONEY TO GET MORE.: OFTEN TRUE

## 2023-08-08 ASSESSMENT — PATIENT HEALTH QUESTIONNAIRE - PHQ9
5. POOR APPETITE OR OVEREATING: 3
6. FEELING BAD ABOUT YOURSELF - OR THAT YOU ARE A FAILURE OR HAVE LET YOURSELF OR YOUR FAMILY DOWN: 3
3. TROUBLE FALLING OR STAYING ASLEEP: 3
SUM OF ALL RESPONSES TO PHQ QUESTIONS 1-9: 18
SUM OF ALL RESPONSES TO PHQ QUESTIONS 1-9: 18
1. LITTLE INTEREST OR PLEASURE IN DOING THINGS: 2
SUM OF ALL RESPONSES TO PHQ QUESTIONS 1-9: 18
7. TROUBLE CONCENTRATING ON THINGS, SUCH AS READING THE NEWSPAPER OR WATCHING TELEVISION: 2
SUM OF ALL RESPONSES TO PHQ9 QUESTIONS 1 & 2: 4
10. IF YOU CHECKED OFF ANY PROBLEMS, HOW DIFFICULT HAVE THESE PROBLEMS MADE IT FOR YOU TO DO YOUR WORK, TAKE CARE OF THINGS AT HOME, OR GET ALONG WITH OTHER PEOPLE: 3
9. THOUGHTS THAT YOU WOULD BE BETTER OFF DEAD, OR OF HURTING YOURSELF: 0
SUM OF ALL RESPONSES TO PHQ QUESTIONS 1-9: 18
8. MOVING OR SPEAKING SO SLOWLY THAT OTHER PEOPLE COULD HAVE NOTICED. OR THE OPPOSITE, BEING SO FIGETY OR RESTLESS THAT YOU HAVE BEEN MOVING AROUND A LOT MORE THAN USUAL: 0
4. FEELING TIRED OR HAVING LITTLE ENERGY: 3
2. FEELING DOWN, DEPRESSED OR HOPELESS: 2

## 2023-08-08 NOTE — PROGRESS NOTES
medications  -     EKG 12 Lead - Clinic Performed; Future  3. Heroin use  Using heroin and fentanyl. Patient used 1/5 gram yesterday, 2.5 today. Snorting.   - Discussed cessation     4. Severe episode of recurrent major depressive disorder, without psychotic features ([address])  Discussed, no change in anxiety, meds: Cymbalta 60mg twice daily, and BuSpar 10 mg twice daily as needed for anxiety,  Hydroxyzine and trazodone did not help with sleep , using benadryl   No access to phone, out of work 4 years. Lives with mother     5. Atrial flutter, unspecified type [facility])  -cardiology referral    6. Smoker  Smoking 1/2 ppd. Greater than 3 minutes spent on smoking and tobacco use cessation. Taking chantix        An electronic signature was used to authenticate this note.     --Cory Gibbs MD    8/8/2023

## 2023-08-08 NOTE — PATIENT INSTRUCTIONS
Atrial flutter - Echocardiogram and stress test   Follow uup with Dr Lili Pugh Hip   Try Tylenol 500mg   Diclofenac gel 4 times daily       [facility]  Address: [address] [place] [address]  Closed ? Opens 5:30? AM Wed  Phone: [phone] [facility]  [address] [phone]  Closed ? Opens 5:45? AM Wed  Medicare/Medicaid accepted

## 2023-08-15 ENCOUNTER — OFFICE VISIT (OUTPATIENT)
Dept: ORTHOPEDIC SURGERY | Age: 53
End: 2023-08-15
Payer: COMMERCIAL

## 2023-08-15 VITALS — WEIGHT: 220 LBS | HEIGHT: 73 IN | BODY MASS INDEX: 29.16 KG/M2

## 2023-08-15 DIAGNOSIS — M25.551 HIP PAIN, RIGHT: Primary | ICD-10-CM

## 2023-08-15 DIAGNOSIS — M25.851 IMPINGEMENT SYNDROME, HIP, RIGHT: ICD-10-CM

## 2023-08-15 PROCEDURE — 3017F COLORECTAL CA SCREEN DOC REV: CPT | Performed by: PHYSICIAN ASSISTANT

## 2023-08-15 PROCEDURE — G8417 CALC BMI ABV UP PARAM F/U: HCPCS | Performed by: PHYSICIAN ASSISTANT

## 2023-08-15 PROCEDURE — G8428 CUR MEDS NOT DOCUMENT: HCPCS | Performed by: PHYSICIAN ASSISTANT

## 2023-08-15 PROCEDURE — 99203 OFFICE O/P NEW LOW 30 MIN: CPT | Performed by: PHYSICIAN ASSISTANT

## 2023-08-15 PROCEDURE — 4004F PT TOBACCO SCREEN RCVD TLK: CPT | Performed by: PHYSICIAN ASSISTANT

## 2023-08-15 NOTE — PROGRESS NOTES
Dr Bassam Shah      Date /Time 8/15/2023       2:53 PM EDT  Name Mat Orozco II             1970   Location  [facility]  MRN [id number]                Chief Complaint   Patient presents with    Follow-up     Right Hip         History of Present Illness    Mat Orozco II is a 47 y.o. male who presents with  right hip pain. Sent in consultation by Rosi Willett MD, . Injury Mechanism:  none. Worker's Comp. & legal issues:   none. Previous Treatments: Ice, Heat, and NSAIDs    Patient presents to the office today for a new problem. Patient here with a chief complaint of right hip pain. He complains of pain concentrated in his groin and lumbar spine. He has radicular symptoms into his right lower extremity with numbness and tingling into his foot. He does not recall any specific injury or trauma. He already takes Neurontin and Zanaflex. He has also tried Voltaren gel without significant improvement.     Past History  Past Medical History:   Diagnosis Date    ADHD (attention deficit hyperactivity disorder)     Alcoholic hepatitis     Arthritis     Ha esophagus 6/11/2018    CAD (coronary artery disease)     Chronic back pain     COPD (chronic obstructive pulmonary disease) (HCC)     Depression     ED (erectile dysfunction) 12/29/2011    Elevated LFTs     GERD (gastroesophageal reflux disease)     History of intravenous drug abuse ([address])     Hyperlipidemia     Hypertension     Pleurisy      Past Surgical History:   Procedure Laterality Date    ARM SURGERY Left 12/10/2015    BACK SURGERY      COLONOSCOPY      LUNG SURGERY      partial removal (right) agent orange    SPINE SURGERY  1995    lumbar laminectomy     Family History   Problem Relation Age of Onset    Arthritis Mother     Other Mother         pneumothorax several times    Asthma Father      Social History     Tobacco Use    Smoking status: Every Day     Packs/day: 0.50     Years: 33.00     Pack years: 16.50     Types:

## 2023-08-17 ENCOUNTER — TELEPHONE (OUTPATIENT)
Dept: FAMILY MEDICINE CLINIC | Age: 53
End: 2023-08-17

## 2023-08-17 ENCOUNTER — CARE COORDINATION (OUTPATIENT)
Dept: CARE COORDINATION | Age: 53
End: 2023-08-17

## 2023-08-17 DIAGNOSIS — F17.200 SMOKER: Primary | ICD-10-CM

## 2023-08-17 NOTE — TELEPHONE ENCOUNTER
Sent in prescription for nicotine gum. Please let patient know that I cannot increase dose of gabapentin unless he has negative urine drug screen. The risk is too high to take high-dose of gabapentin with any kind of IV drug.

## 2023-08-17 NOTE — TELEPHONE ENCOUNTER
Spoke with patient notified of message. He did start going to the suboxone clinic and wanted to make Dr. Poppy Hogan aware of that. Dr. Poppy Hogan saw message.

## 2023-08-17 NOTE — CARE COORDINATION
my barriers: work with ACM for accountability  Confidence: 10  Anticipated Goal Completion Date: 7/9/23       Conditions and Symptoms   Improving     I will schedule office visits, as directed by my provider. I will keep my appointment or reschedule if I have to cancel. I will notify my provider of any barriers to my plan of care. Barriers: lack of motivation and overwhelmed by complexity of regimen  Plan for overcoming my barriers: N/A  Confidence: 8/10  Anticipated Goal Completion Date: 8/10/21    Patient missed and had a no show appt for stress test. 1/7/22 CJT    Patient rescheduled stress test for          Wellness Goal   Improving     Patient Self-Management Goal for Health Maintenance  Goal: I will chose a goal related to tobacco cessation:  I will think about my triggers for smoking, I will think about reasons why I should quit smoking, I will learn more about the harmful effects of tobacco, and I will start an exercise program to relieve stress and avoid weight gain associated with smoking cessation.   Barriers: stress  Plan for overcoming my barriers: N/A  Confidence: 8/10  Anticipated Goal Completion Date: 8/10/21    1 cigarette a day              Future Appointments   Date Time Provider [address]   8/21/2023  8:15 AM Patricio Wilkerson PA-C [facility]   8/21/2023 10:15 AM Vince Colon PA-C [facility]   9/26/2023  2:45 PM Naresh Chery MD [facility]

## 2023-08-17 NOTE — TELEPHONE ENCOUNTER
Patient called stated he forgot to ask when he was in at appointment on 08/08/23 if he could get prescription for nicotine gum . Patient also stated he needs increase in gabapentin because he feels like he has gotten used to it .

## 2023-08-24 ENCOUNTER — CARE COORDINATION (OUTPATIENT)
Dept: CARE COORDINATION | Age: 53
End: 2023-08-24

## 2023-08-24 NOTE — CARE COORDINATION
Patient left message for this ACM to return call  Attempted to contact patient; unable to leave message d/t VM not set up yet

## 2023-09-04 DIAGNOSIS — F41.0 PANIC ATTACKS: ICD-10-CM

## 2023-09-04 DIAGNOSIS — I10 BENIGN ESSENTIAL HYPERTENSION: ICD-10-CM

## 2023-09-04 DIAGNOSIS — F41.8 MIXED ANXIETY AND DEPRESSIVE DISORDER: ICD-10-CM

## 2023-09-04 DIAGNOSIS — R05.9 COUGH, UNSPECIFIED TYPE: ICD-10-CM

## 2023-09-04 RX ORDER — ALBUTEROL SULFATE 2.5 MG/3ML
SOLUTION RESPIRATORY (INHALATION)
Qty: 360 ML | Refills: 10 | Status: SHIPPED | OUTPATIENT
Start: 2023-09-04

## 2023-09-05 RX ORDER — GABAPENTIN 100 MG/1
CAPSULE ORAL
Qty: 30 CAPSULE | Refills: 5 | Status: SHIPPED | OUTPATIENT
Start: 2023-09-05 | End: 2023-10-05

## 2023-09-05 RX ORDER — VALSARTAN 80 MG/1
80 TABLET ORAL DAILY
Qty: 90 TABLET | Refills: 1 | Status: SHIPPED | OUTPATIENT
Start: 2023-09-05

## 2023-09-05 RX ORDER — BUSPIRONE HYDROCHLORIDE 10 MG/1
TABLET ORAL
Qty: 60 TABLET | Refills: 0 | Status: SHIPPED | OUTPATIENT
Start: 2023-09-05

## 2023-09-07 DIAGNOSIS — F17.200 SMOKER: ICD-10-CM

## 2023-09-07 RX ORDER — VARENICLINE TARTRATE 1 MG/1
TABLET, FILM COATED ORAL
Qty: 56 TABLET | Refills: 1 | Status: SHIPPED | OUTPATIENT
Start: 2023-09-07

## 2023-09-07 NOTE — TELEPHONE ENCOUNTER
Future Appointments   Date Time Provider 4600  [facility]   9/26/2023  2:45 PM Selena Huddleston MD [facility]     LOV 8/8/2023

## 2023-09-19 ENCOUNTER — CARE COORDINATION (OUTPATIENT)
Dept: CARE COORDINATION | Age: 53
End: 2023-09-19

## 2023-09-26 DIAGNOSIS — F41.8 MIXED ANXIETY AND DEPRESSIVE DISORDER: ICD-10-CM

## 2023-09-26 RX ORDER — DULOXETIN HYDROCHLORIDE 60 MG/1
CAPSULE, DELAYED RELEASE ORAL
Qty: 60 CAPSULE | Refills: 5 | Status: SHIPPED | OUTPATIENT
Start: 2023-09-26

## 2023-09-26 RX ORDER — ALBUTEROL SULFATE 90 UG/1
AEROSOL, METERED RESPIRATORY (INHALATION)
Qty: 18 G | Refills: 10 | Status: SHIPPED | OUTPATIENT
Start: 2023-09-26

## 2023-09-26 RX ORDER — BUSPIRONE HYDROCHLORIDE 10 MG/1
TABLET ORAL
Qty: 60 TABLET | Refills: 10 | Status: SHIPPED | OUTPATIENT
Start: 2023-09-26

## 2023-09-26 NOTE — TELEPHONE ENCOUNTER
Refill Request     CONFIRM preferred pharmacy with the patient. If Mail Order Rx - Pend for 90 day refill. Last Seen: Last Seen Department: 8/29/2022  Last Seen by PCP: Visit date not found    Last Written: 05/30/2023 120 cap 1 refills     If no future appointment scheduled:  Review the last OV with PCP and review information for follow-up visit,  Route STAFF MESSAGE with patient name to the [facility] for scheduling with the following information:            -  Timing of next visit           -  Visit type ie Physical, OV, etc           -  Diagnoses/Reason ie. COPD, HTN - Do not use MEDICATION, Follow-up or CHECK UP - Give reason for visit      Next Appointment:   No future appointments. Message sent to Major Aide [facility] to schedule appt with patient?   N/A      Requested Prescriptions     Pending Prescriptions Disp Refills    DULoxetine (CYMBALTA) 60 MG extended release capsule [Pharmacy Med Name: DULOXETINE 60MG CAPSULE 60 Capsule] 60 capsule 10     Sig: TAKE ONE (1) CAPSULE BY MOUTH TWICE DAILY

## 2023-10-02 ENCOUNTER — CARE COORDINATION (OUTPATIENT)
Dept: CARE COORDINATION | Age: 53
End: 2023-10-02

## 2023-10-02 NOTE — CARE COORDINATION
Attempted to contact patient; unable to leave message d/t voice mail not set up yet  Contacted patient's Mom and left HIPPA compliant message requesting patient return call; left ACM contact information

## 2023-10-10 ENCOUNTER — CARE COORDINATION (OUTPATIENT)
Dept: CARE COORDINATION | Age: 53
End: 2023-10-10

## 2023-10-10 NOTE — CARE COORDINATION
Multiple attempts to contact patient have been unsuccessful  CC Letter sent via My Chart and via mail  No further outreach scheduled with this ACM; episode of care resolved

## 2023-10-12 DIAGNOSIS — J43.9 PULMONARY EMPHYSEMA, UNSPECIFIED EMPHYSEMA TYPE (HCC): ICD-10-CM

## 2023-10-16 ENCOUNTER — CARE COORDINATION (OUTPATIENT)
Dept: CARE COORDINATION | Age: 53
End: 2023-10-16

## 2023-10-16 NOTE — CARE COORDINATION
Patient left message requesting this ACM return call  This AC returned call    Patient reports he has been clean an sober for the past month and has been going to the [facility] daily  He is requesting information on transportation. Congratulated and commended patient for his sobriety. This AC provided contact information for [employer]  Patient informs this ACM he has been feeling depressed.    He further reports he has had thoughts of suicide in the past; He denies any thoughts of suicide or self harm today  He is requesting an appointment to f/u with his PCP  This [facility] provided the contact number for the suicide hotline [phone]  Assisted patient with scheduling appointment with his PCP  10/18/23 10:00 am

## 2023-10-18 ENCOUNTER — OFFICE VISIT (OUTPATIENT)
Dept: FAMILY MEDICINE CLINIC | Age: 53
End: 2023-10-18
Payer: COMMERCIAL

## 2023-10-18 VITALS
DIASTOLIC BLOOD PRESSURE: 87 MMHG | TEMPERATURE: 97.1 F | RESPIRATION RATE: 16 BRPM | WEIGHT: 227 LBS | HEART RATE: 96 BPM | SYSTOLIC BLOOD PRESSURE: 116 MMHG | HEIGHT: 73 IN | BODY MASS INDEX: 30.09 KG/M2 | OXYGEN SATURATION: 96 %

## 2023-10-18 DIAGNOSIS — F41.8 MIXED ANXIETY AND DEPRESSIVE DISORDER: ICD-10-CM

## 2023-10-18 DIAGNOSIS — J30.9 ALLERGIC RHINITIS, UNSPECIFIED SEASONALITY, UNSPECIFIED TRIGGER: ICD-10-CM

## 2023-10-18 DIAGNOSIS — L98.9 SKIN LESION: ICD-10-CM

## 2023-10-18 DIAGNOSIS — F17.200 SMOKER: ICD-10-CM

## 2023-10-18 DIAGNOSIS — M25.561 RIGHT KNEE PAIN, UNSPECIFIED CHRONICITY: Primary | ICD-10-CM

## 2023-10-18 DIAGNOSIS — I25.110 CORONARY ARTERY DISEASE INVOLVING NATIVE CORONARY ARTERY OF NATIVE HEART WITH UNSTABLE ANGINA PECTORIS (HCC): ICD-10-CM

## 2023-10-18 DIAGNOSIS — G47.00 INSOMNIA, UNSPECIFIED TYPE: ICD-10-CM

## 2023-10-18 PROCEDURE — 4004F PT TOBACCO SCREEN RCVD TLK: CPT | Performed by: STUDENT IN AN ORGANIZED HEALTH CARE EDUCATION/TRAINING PROGRAM

## 2023-10-18 PROCEDURE — 99214 OFFICE O/P EST MOD 30 MIN: CPT | Performed by: STUDENT IN AN ORGANIZED HEALTH CARE EDUCATION/TRAINING PROGRAM

## 2023-10-18 PROCEDURE — G8484 FLU IMMUNIZE NO ADMIN: HCPCS | Performed by: STUDENT IN AN ORGANIZED HEALTH CARE EDUCATION/TRAINING PROGRAM

## 2023-10-18 PROCEDURE — 3017F COLORECTAL CA SCREEN DOC REV: CPT | Performed by: STUDENT IN AN ORGANIZED HEALTH CARE EDUCATION/TRAINING PROGRAM

## 2023-10-18 PROCEDURE — G8427 DOCREV CUR MEDS BY ELIG CLIN: HCPCS | Performed by: STUDENT IN AN ORGANIZED HEALTH CARE EDUCATION/TRAINING PROGRAM

## 2023-10-18 PROCEDURE — 3078F DIAST BP <80 MM HG: CPT | Performed by: STUDENT IN AN ORGANIZED HEALTH CARE EDUCATION/TRAINING PROGRAM

## 2023-10-18 PROCEDURE — 3074F SYST BP LT 130 MM HG: CPT | Performed by: STUDENT IN AN ORGANIZED HEALTH CARE EDUCATION/TRAINING PROGRAM

## 2023-10-18 PROCEDURE — G8417 CALC BMI ABV UP PARAM F/U: HCPCS | Performed by: STUDENT IN AN ORGANIZED HEALTH CARE EDUCATION/TRAINING PROGRAM

## 2023-10-18 RX ORDER — BUSPIRONE HYDROCHLORIDE 10 MG/1
10 TABLET ORAL 3 TIMES DAILY
Qty: 90 TABLET | Refills: 0 | Status: SHIPPED | OUTPATIENT
Start: 2023-10-18 | End: 2023-11-17

## 2023-10-18 RX ORDER — DIPHENHYDRAMINE HCL 25 MG
25 CAPSULE ORAL NIGHTLY PRN
Qty: 60 CAPSULE | Refills: 3 | Status: SHIPPED | OUTPATIENT
Start: 2023-10-18

## 2023-10-18 RX ORDER — METHADONE HYDROCHLORIDE 10 MG/5ML
5 SOLUTION ORAL EVERY 4 HOURS PRN
COMMUNITY

## 2023-10-18 RX ORDER — FLUTICASONE PROPIONATE 50 MCG
SPRAY, SUSPENSION (ML) NASAL
Qty: 16 G | Refills: 0 | Status: SHIPPED | OUTPATIENT
Start: 2023-10-18

## 2023-10-18 RX ORDER — MIRTAZAPINE 7.5 MG/1
7.5 TABLET, FILM COATED ORAL NIGHTLY
Qty: 90 TABLET | Refills: 0 | Status: SHIPPED | OUTPATIENT
Start: 2023-10-18

## 2023-10-18 SDOH — ECONOMIC STABILITY: INCOME INSECURITY: HOW HARD IS IT FOR YOU TO PAY FOR THE VERY BASICS LIKE FOOD, HOUSING, MEDICAL CARE, AND HEATING?: HARD

## 2023-10-18 SDOH — ECONOMIC STABILITY: FOOD INSECURITY: WITHIN THE PAST 12 MONTHS, THE FOOD YOU BOUGHT JUST DIDN'T LAST AND YOU DIDN'T HAVE MONEY TO GET MORE.: NEVER TRUE

## 2023-10-18 SDOH — ECONOMIC STABILITY: FOOD INSECURITY: WITHIN THE PAST 12 MONTHS, YOU WORRIED THAT YOUR FOOD WOULD RUN OUT BEFORE YOU GOT MONEY TO BUY MORE.: NEVER TRUE

## 2023-10-18 ASSESSMENT — PATIENT HEALTH QUESTIONNAIRE - PHQ9
1. LITTLE INTEREST OR PLEASURE IN DOING THINGS: 3
7. TROUBLE CONCENTRATING ON THINGS, SUCH AS READING THE NEWSPAPER OR WATCHING TELEVISION: 3
9. THOUGHTS THAT YOU WOULD BE BETTER OFF DEAD, OR OF HURTING YOURSELF: 1
3. TROUBLE FALLING OR STAYING ASLEEP: 3
SUM OF ALL RESPONSES TO PHQ QUESTIONS 1-9: 22
SUM OF ALL RESPONSES TO PHQ9 QUESTIONS 1 & 2: 6
2. FEELING DOWN, DEPRESSED OR HOPELESS: 3
6. FEELING BAD ABOUT YOURSELF - OR THAT YOU ARE A FAILURE OR HAVE LET YOURSELF OR YOUR FAMILY DOWN: 3
SUM OF ALL RESPONSES TO PHQ QUESTIONS 1-9: 21
4. FEELING TIRED OR HAVING LITTLE ENERGY: 3
SUM OF ALL RESPONSES TO PHQ QUESTIONS 1-9: 22
5. POOR APPETITE OR OVEREATING: 3
10. IF YOU CHECKED OFF ANY PROBLEMS, HOW DIFFICULT HAVE THESE PROBLEMS MADE IT FOR YOU TO DO YOUR WORK, TAKE CARE OF THINGS AT HOME, OR GET ALONG WITH OTHER PEOPLE: 2
SUM OF ALL RESPONSES TO PHQ QUESTIONS 1-9: 22
8. MOVING OR SPEAKING SO SLOWLY THAT OTHER PEOPLE COULD HAVE NOTICED. OR THE OPPOSITE, BEING SO FIGETY OR RESTLESS THAT YOU HAVE BEEN MOVING AROUND A LOT MORE THAN USUAL: 0

## 2023-10-18 ASSESSMENT — COLUMBIA-SUICIDE SEVERITY RATING SCALE - C-SSRS
2. HAVE YOU ACTUALLY HAD ANY THOUGHTS OF KILLING YOURSELF?: YES
7. DID THIS OCCUR IN THE LAST THREE MONTHS: YES
5. HAVE YOU STARTED TO WORK OUT OR WORKED OUT THE DETAILS OF HOW TO KILL YOURSELF? DO YOU INTEND TO CARRY OUT THIS PLAN?: NO
6. HAVE YOU EVER DONE ANYTHING, STARTED TO DO ANYTHING, OR PREPARED TO DO ANYTHING TO END YOUR LIFE?: NO
3. HAVE YOU BEEN THINKING ABOUT HOW YOU MIGHT KILL YOURSELF?: NO
BASED ON RESPONSES TO C-SSRS QS 1-6, WHAT IS THE PATIENT'S OVERALL RISK RATING FOR SUICIDE: HIGH RISK
1. WITHIN THE PAST MONTH, HAVE YOU WISHED YOU WERE DEAD OR WISHED YOU COULD GO TO SLEEP AND NOT WAKE UP?: YES
4. HAVE YOU HAD THESE THOUGHTS AND HAD SOME INTENTION OF ACTING ON THEM?: NO

## 2023-10-18 NOTE — PATIENT INSTRUCTIONS
- Ask about soma at methadone [facility]   - See dermatology - USe betamethasone cream on buttock    - Schedule heart exams   Echo and NM scan   - Mirtazapine 7.5 mg , Take Buspar 10 mg 3 times daily as needed   Cymbalta 60mg twice daily, NO MORE than prescribed   Call for side effects hould not be stopped abruptly. Recognition of any signs of soraya or mood disturbance reviewed. Discussed risk and symptoms of serotonin syndrome (confusion, fever, dilated pupils, agitation, high blood pressure , increased/irregular hearrate).

## 2023-10-20 DIAGNOSIS — K21.9 GASTROESOPHAGEAL REFLUX DISEASE WITHOUT ESOPHAGITIS: ICD-10-CM

## 2023-10-20 DIAGNOSIS — K22.70 BARRETT'S ESOPHAGUS DETERMINED BY ENDOSCOPY: ICD-10-CM

## 2023-10-20 RX ORDER — PANTOPRAZOLE SODIUM 40 MG/1
TABLET, DELAYED RELEASE ORAL
Qty: 180 TABLET | Refills: 0 | Status: SHIPPED | OUTPATIENT
Start: 2023-10-20

## 2023-10-20 NOTE — PROGRESS NOTES
Behavioral Health Consultation  MIKAL Burnham  10/23/2023   8:32 AM EDT      Hailey Crowder Homero MARY ANN, was evaluated through a synchronous (real-time) audio-video encounter. The patient (or guardian if applicable) is aware that this is a billable service, which includes applicable co-pays. This Virtual Visit was conducted with patient's (and/or legal guardian's) consent. Verbal consent to proceed has been obtained within the past 12 months. Patient identification was verified, and a caregiver was present when appropriate. The patient was located in a [facility] where the provider was licensed to provide care. Time spent with Patient: 45 minutes  This is patient's first [facility] appointment. Reason for Consult:    Chief Complaint   Patient presents with    Depression    Anxiety    Addiction Problem     Referring Provider: Sandi Nieto MD  [address] [place] [address]    Patient provided informed consent for the behavioral health program. Discussed with patient model of service to include the limits of confidentiality (i.e. abuse reporting, suicide intervention, etc.) and short-term intervention focused approach. Pt indicated understanding. Feedback given to PCP. Verified the following information:  Patient's identification: Yes  Patient location: [address] [place] [id number]   Patient's call back number: [phone]   Patient's emergency contact's name and number, as well as permission to contact them if needed: Extended Emergency Contact Information  Primary Emergency Contact: [address] of [address] [address] Phone: [phone]  Mobile Phone: [phone]  Relation: Parent  Secondary Emergency Contact: [facility] Phone: [phone]  Relation: None     Provider location: [facility]     S:  Patient presents with concerns about anxiety, depression and substance use. Pt reports having ADHD, stating that he started Ritalin at 11years old.  Pt reports that he has been

## 2023-10-23 ENCOUNTER — TELEMEDICINE (OUTPATIENT)
Dept: PSYCHOLOGY | Age: 53
End: 2023-10-23
Payer: COMMERCIAL

## 2023-10-23 DIAGNOSIS — F33.1 MODERATE EPISODE OF RECURRENT MAJOR DEPRESSIVE DISORDER (HCC): ICD-10-CM

## 2023-10-23 DIAGNOSIS — F19.20 DRUG ADDICTION (HCC): ICD-10-CM

## 2023-10-23 DIAGNOSIS — F41.9 ANXIETY: Primary | ICD-10-CM

## 2023-10-23 PROCEDURE — 90791 PSYCH DIAGNOSTIC EVALUATION: CPT | Performed by: SOCIAL WORKER

## 2023-10-23 PROCEDURE — 4004F PT TOBACCO SCREEN RCVD TLK: CPT | Performed by: SOCIAL WORKER

## 2023-10-30 NOTE — TELEPHONE ENCOUNTER
Pt requests :    Last office visit 8/29/2022     Last written 7/11/2022    Next office visit scheduled 10/10/2022    Requested Prescriptions     Pending Prescriptions Disp Refills    cyclobenzaprine (FLEXERIL) 10 MG tablet 90 tablet 1     Sig: TAKE ONE TABLET BY MOUTH THREE TIMES A DAY AS NEEDED FOR MUSCLE SPASMS    nicotine (Geoff Peterson) 14 MG/24HR 28 patch 0       [facility] in [place] is correct pharmacy.
65.8
Pt d/c from PT, does not need acute skilled PT, can amb on unit with unit staff using a RW./anticipated discharge recommendation

## 2023-11-02 ENCOUNTER — HOSPITAL ENCOUNTER (OUTPATIENT)
Dept: GENERAL RADIOLOGY | Age: 53
Discharge: HOME | End: 2023-11-02
Payer: COMMERCIAL

## 2023-11-02 DIAGNOSIS — J43.9 PULMONARY EMPHYSEMA, UNSPECIFIED EMPHYSEMA TYPE (HCC): ICD-10-CM

## 2023-11-02 DIAGNOSIS — G89.29 CHRONIC LOW BACK PAIN, UNSPECIFIED BACK PAIN LATERALITY, UNSPECIFIED WHETHER SCIATICA PRESENT: ICD-10-CM

## 2023-11-02 DIAGNOSIS — I10 BENIGN ESSENTIAL HYPERTENSION: ICD-10-CM

## 2023-11-02 DIAGNOSIS — Z98.890 HISTORY OF LUMBAR LAMINECTOMY: ICD-10-CM

## 2023-11-02 DIAGNOSIS — J30.9 ALLERGIC RHINITIS, UNSPECIFIED SEASONALITY, UNSPECIFIED TRIGGER: ICD-10-CM

## 2023-11-02 DIAGNOSIS — F17.200 TOBACCO USE DISORDER: ICD-10-CM

## 2023-11-02 DIAGNOSIS — K22.70 BARRETT'S ESOPHAGUS DETERMINED BY ENDOSCOPY: ICD-10-CM

## 2023-11-02 DIAGNOSIS — M54.50 CHRONIC LOW BACK PAIN, UNSPECIFIED BACK PAIN LATERALITY, UNSPECIFIED WHETHER SCIATICA PRESENT: ICD-10-CM

## 2023-11-02 DIAGNOSIS — F41.8 MIXED ANXIETY AND DEPRESSIVE DISORDER: ICD-10-CM

## 2023-11-02 DIAGNOSIS — F41.0 PANIC ATTACKS: ICD-10-CM

## 2023-11-02 DIAGNOSIS — K21.9 GASTROESOPHAGEAL REFLUX DISEASE WITHOUT ESOPHAGITIS: ICD-10-CM

## 2023-11-02 DIAGNOSIS — R05.9 COUGH, UNSPECIFIED TYPE: ICD-10-CM

## 2023-11-02 DIAGNOSIS — L30.9 DERMATITIS: ICD-10-CM

## 2023-11-02 DIAGNOSIS — R52 PAIN: ICD-10-CM

## 2023-11-02 DIAGNOSIS — M25.561 RIGHT KNEE PAIN, UNSPECIFIED CHRONICITY: ICD-10-CM

## 2023-11-02 DIAGNOSIS — F17.200 SMOKER: ICD-10-CM

## 2023-11-02 PROCEDURE — 73562 X-RAY EXAM OF KNEE 3: CPT

## 2023-11-02 PROCEDURE — 73564 X-RAY EXAM KNEE 4 OR MORE: CPT

## 2023-11-02 RX ORDER — FLUTICASONE PROPIONATE 50 MCG
SPRAY, SUSPENSION (ML) NASAL
Qty: 16 G | Refills: 0 | Status: CANCELLED | OUTPATIENT
Start: 2023-11-02

## 2023-11-02 RX ORDER — VARENICLINE TARTRATE 1 MG/1
1 TABLET, FILM COATED ORAL 2 TIMES DAILY
Qty: 56 TABLET | Refills: 1 | Status: CANCELLED | OUTPATIENT
Start: 2023-11-02

## 2023-11-02 RX ORDER — GABAPENTIN 100 MG/1
CAPSULE ORAL
Qty: 30 CAPSULE | Refills: 5 | Status: CANCELLED | OUTPATIENT
Start: 2023-11-02

## 2023-11-02 RX ORDER — ALBUTEROL SULFATE 2.5 MG/3ML
SOLUTION RESPIRATORY (INHALATION)
Qty: 360 ML | Refills: 10 | Status: CANCELLED | OUTPATIENT
Start: 2023-11-02

## 2023-11-02 RX ORDER — PANTOPRAZOLE SODIUM 40 MG/1
TABLET, DELAYED RELEASE ORAL
Qty: 180 TABLET | Refills: 0 | Status: CANCELLED | OUTPATIENT
Start: 2023-11-02

## 2023-11-02 RX ORDER — BUSPIRONE HYDROCHLORIDE 10 MG/1
10 TABLET ORAL 3 TIMES DAILY
Qty: 90 TABLET | Refills: 0 | Status: CANCELLED | OUTPATIENT
Start: 2023-11-02 | End: 2023-12-02

## 2023-11-02 RX ORDER — ATORVASTATIN CALCIUM 80 MG/1
80 TABLET, FILM COATED ORAL DAILY
Qty: 90 TABLET | Refills: 3 | Status: CANCELLED | OUTPATIENT
Start: 2023-11-02

## 2023-11-02 RX ORDER — FUROSEMIDE 20 MG/1
20 TABLET ORAL DAILY
Qty: 90 TABLET | Refills: 3 | Status: CANCELLED | OUTPATIENT
Start: 2023-11-02

## 2023-11-02 RX ORDER — DULOXETIN HYDROCHLORIDE 60 MG/1
CAPSULE, DELAYED RELEASE ORAL
Qty: 60 CAPSULE | Refills: 5 | Status: CANCELLED | OUTPATIENT
Start: 2023-11-02

## 2023-11-02 RX ORDER — ALBUTEROL SULFATE 90 UG/1
AEROSOL, METERED RESPIRATORY (INHALATION)
Qty: 18 G | Refills: 10 | Status: CANCELLED | OUTPATIENT
Start: 2023-11-02

## 2023-11-02 RX ORDER — VALSARTAN 80 MG/1
80 TABLET ORAL DAILY
Qty: 90 TABLET | Refills: 1 | Status: CANCELLED | OUTPATIENT
Start: 2023-11-02

## 2023-11-02 RX ORDER — TIZANIDINE 4 MG/1
TABLET ORAL
Qty: 90 TABLET | Refills: 2 | Status: CANCELLED | OUTPATIENT
Start: 2023-11-02

## 2023-11-02 RX ORDER — MELOXICAM 7.5 MG/1
7.5 TABLET ORAL DAILY PRN
Qty: 30 TABLET | Refills: 0 | Status: CANCELLED | OUTPATIENT
Start: 2023-11-02

## 2023-11-02 RX ORDER — NICOTINE 21 MG/24HR
1 PATCH, TRANSDERMAL 24 HOURS TRANSDERMAL EVERY 24 HOURS
Qty: 28 PATCH | Refills: 0 | Status: CANCELLED | OUTPATIENT
Start: 2023-11-02

## 2023-11-02 NOTE — TELEPHONE ENCOUNTER
Future Appointments   Date Time Provider 4600  [facility]   11/2/2023 10:45 [address] [facility]   11/7/2023 11:00 AM Abby Kaur [place] [facility]     LOV Visit date not found

## 2023-11-03 NOTE — TELEPHONE ENCOUNTER
I spoke to patient and he said he was not happy with this [facility] pharmacy and wants to stay with [facility] Rivera in [facility] so wants me to cancel this refill request for [facility].  Does not need any refills right now

## 2023-11-08 ENCOUNTER — OFFICE VISIT (OUTPATIENT)
Dept: FAMILY MEDICINE CLINIC | Age: 53
End: 2023-11-08
Payer: COMMERCIAL

## 2023-11-08 VITALS
BODY MASS INDEX: 30.88 KG/M2 | WEIGHT: 233 LBS | OXYGEN SATURATION: 96 % | DIASTOLIC BLOOD PRESSURE: 81 MMHG | RESPIRATION RATE: 16 BRPM | HEIGHT: 73 IN | HEART RATE: 93 BPM | TEMPERATURE: 97.1 F | SYSTOLIC BLOOD PRESSURE: 114 MMHG

## 2023-11-08 DIAGNOSIS — F17.200 SMOKER: ICD-10-CM

## 2023-11-08 DIAGNOSIS — F41.8 MIXED ANXIETY AND DEPRESSIVE DISORDER: ICD-10-CM

## 2023-11-08 DIAGNOSIS — I10 BENIGN ESSENTIAL HYPERTENSION: ICD-10-CM

## 2023-11-08 DIAGNOSIS — M54.41 RIGHT-SIDED LOW BACK PAIN WITH RIGHT-SIDED SCIATICA, UNSPECIFIED CHRONICITY: Primary | ICD-10-CM

## 2023-11-08 DIAGNOSIS — N52.9 ERECTILE DYSFUNCTION, UNSPECIFIED ERECTILE DYSFUNCTION TYPE: ICD-10-CM

## 2023-11-08 DIAGNOSIS — I25.110 CORONARY ARTERY DISEASE INVOLVING NATIVE CORONARY ARTERY OF NATIVE HEART WITH UNSTABLE ANGINA PECTORIS (HCC): ICD-10-CM

## 2023-11-08 PROCEDURE — 4004F PT TOBACCO SCREEN RCVD TLK: CPT | Performed by: STUDENT IN AN ORGANIZED HEALTH CARE EDUCATION/TRAINING PROGRAM

## 2023-11-08 PROCEDURE — 3078F DIAST BP <80 MM HG: CPT | Performed by: STUDENT IN AN ORGANIZED HEALTH CARE EDUCATION/TRAINING PROGRAM

## 2023-11-08 PROCEDURE — G8417 CALC BMI ABV UP PARAM F/U: HCPCS | Performed by: STUDENT IN AN ORGANIZED HEALTH CARE EDUCATION/TRAINING PROGRAM

## 2023-11-08 PROCEDURE — G8484 FLU IMMUNIZE NO ADMIN: HCPCS | Performed by: STUDENT IN AN ORGANIZED HEALTH CARE EDUCATION/TRAINING PROGRAM

## 2023-11-08 PROCEDURE — 3074F SYST BP LT 130 MM HG: CPT | Performed by: STUDENT IN AN ORGANIZED HEALTH CARE EDUCATION/TRAINING PROGRAM

## 2023-11-08 PROCEDURE — G8427 DOCREV CUR MEDS BY ELIG CLIN: HCPCS | Performed by: STUDENT IN AN ORGANIZED HEALTH CARE EDUCATION/TRAINING PROGRAM

## 2023-11-08 PROCEDURE — 3017F COLORECTAL CA SCREEN DOC REV: CPT | Performed by: STUDENT IN AN ORGANIZED HEALTH CARE EDUCATION/TRAINING PROGRAM

## 2023-11-08 PROCEDURE — 99214 OFFICE O/P EST MOD 30 MIN: CPT | Performed by: STUDENT IN AN ORGANIZED HEALTH CARE EDUCATION/TRAINING PROGRAM

## 2023-11-08 RX ORDER — VARENICLINE TARTRATE 1 MG/1
1 TABLET, FILM COATED ORAL 2 TIMES DAILY
Qty: 60 TABLET | Refills: 3 | Status: SHIPPED | OUTPATIENT
Start: 2023-11-08

## 2023-11-08 SDOH — ECONOMIC STABILITY: INCOME INSECURITY: HOW HARD IS IT FOR YOU TO PAY FOR THE VERY BASICS LIKE FOOD, HOUSING, MEDICAL CARE, AND HEATING?: HARD

## 2023-11-08 SDOH — ECONOMIC STABILITY: FOOD INSECURITY: WITHIN THE PAST 12 MONTHS, THE FOOD YOU BOUGHT JUST DIDN'T LAST AND YOU DIDN'T HAVE MONEY TO GET MORE.: SOMETIMES TRUE

## 2023-11-08 SDOH — ECONOMIC STABILITY: FOOD INSECURITY: WITHIN THE PAST 12 MONTHS, YOU WORRIED THAT YOUR FOOD WOULD RUN OUT BEFORE YOU GOT MONEY TO BUY MORE.: SOMETIMES TRUE

## 2023-11-08 ASSESSMENT — ENCOUNTER SYMPTOMS
ABDOMINAL PAIN: 0
BACK PAIN: 1

## 2023-11-08 NOTE — PROGRESS NOTES
[place] [address], [address] [place] [address]  Tel: [phone]      2023     Rocío Kemp II (:  1970) is a 47 y.o. male, here for evaluation of the following medical concerns:  Chief Complaint   Patient presents with    Back Pain       HPI  Patient is a 52-year-old male history of HTN,, alcoholic hepatitis, Ha esophagus, CAD, COPD, depression, GERD, IV drug use history, who presents for follow-up on back pain. Back Pain  This is a chronic problem. Episode onset: years. The problem has been gradually worsening since onset. The pain is present in the lumbar spine, thoracic spine, sacro-iliac and gluteal. The quality of the pain is described as stabbing and cramping. The pain radiates to the right knee and right foot. The pain is at a severity of 3/10. The symptoms are aggravated by bending, standing, twisting and lying down. Associated symptoms include leg pain, numbness, paresthesias, perianal numbness, tingling and weakness. Numbness and tingling back leg right , fingers and toes . Pertinent negatives include no abdominal pain, bladder incontinence, bowel incontinence, dysuria, fever, headaches, paresis or pelvic pain. Had back surgery , through  [address] at night and methadone daily. Depression and anxiety  Recently started on mirtazapine 7.5 mg daily for sleep. Endorses some improvement in sleep. Also taking  Cymbalta 60mg twice daily, and BuSpar 10 mg twice daily  for anxiety. Intermittent depressed mood. No SI or HI. Currently sleeping 2 to 3 hours a night. Has appointment 23 Dr. Darien Bai. Endorses history of erectile dysfunction. Denies any morning erections. Patient was advised to check testosterone level. Heroin use / Fentayl use   History of heroin and fentanyl use. Currently sober, started Methadone daily, doing better. Goes to [facility].    Hypertension/ CAD  Patient

## 2023-11-13 ENCOUNTER — OFFICE VISIT (OUTPATIENT)
Dept: ORTHOPEDIC SURGERY | Age: 53
End: 2023-11-13
Payer: COMMERCIAL

## 2023-11-13 VITALS — HEIGHT: 73 IN | BODY MASS INDEX: 30.88 KG/M2 | WEIGHT: 233 LBS

## 2023-11-13 DIAGNOSIS — M54.50 LUMBAR PAIN: Primary | ICD-10-CM

## 2023-11-13 PROCEDURE — 3017F COLORECTAL CA SCREEN DOC REV: CPT | Performed by: PHYSICIAN ASSISTANT

## 2023-11-13 PROCEDURE — G8427 DOCREV CUR MEDS BY ELIG CLIN: HCPCS | Performed by: PHYSICIAN ASSISTANT

## 2023-11-13 PROCEDURE — 99214 OFFICE O/P EST MOD 30 MIN: CPT | Performed by: PHYSICIAN ASSISTANT

## 2023-11-13 PROCEDURE — G8417 CALC BMI ABV UP PARAM F/U: HCPCS | Performed by: PHYSICIAN ASSISTANT

## 2023-11-13 PROCEDURE — G8484 FLU IMMUNIZE NO ADMIN: HCPCS | Performed by: PHYSICIAN ASSISTANT

## 2023-11-13 PROCEDURE — 4004F PT TOBACCO SCREEN RCVD TLK: CPT | Performed by: PHYSICIAN ASSISTANT
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
## 2023-11-14 ENCOUNTER — TELEPHONE (OUTPATIENT)
Dept: FAMILY MEDICINE CLINIC | Age: 53
End: 2023-11-14

## 2023-11-20 ENCOUNTER — TELEPHONE (OUTPATIENT)
Dept: FAMILY MEDICINE CLINIC | Age: 53
End: 2023-11-20

## 2023-11-20 ENCOUNTER — TELEMEDICINE (OUTPATIENT)
Dept: PSYCHOLOGY | Age: 53
End: 2023-11-20
Payer: COMMERCIAL

## 2023-11-20 DIAGNOSIS — F33.1 MODERATE EPISODE OF RECURRENT MAJOR DEPRESSIVE DISORDER (HCC): ICD-10-CM

## 2023-11-20 DIAGNOSIS — F19.20 DRUG ADDICTION (HCC): ICD-10-CM

## 2023-11-20 DIAGNOSIS — F41.9 ANXIETY: Primary | ICD-10-CM

## 2023-11-20 PROCEDURE — 90832 PSYTX W PT 30 MINUTES: CPT | Performed by: SOCIAL WORKER

## 2023-11-20 PROCEDURE — 4004F PT TOBACCO SCREEN RCVD TLK: CPT | Performed by: SOCIAL WORKER

## 2023-11-20 NOTE — PATIENT INSTRUCTIONS
stress may be contributing to their emotional state. By relaxing the body, a person may be able to let go of anxious thoughts and feelings. PMR Step-by-Step  For a quick taste of how PMR works, squeeze one of your fists as hard as you can. Notice how tight your fingers and forearm feel. Count to ten and then release the clinch. Allow your hand to relax completely and let go of any tension. Let your hand go limp and notice how relaxed it feels now compared to before your clinched your fist.       This methodical approach to increasing and releasing tension throughout your body is the linchpin of PMR: By systematically constricting and releasing various muscle groups it is possible to relieve physical stress and quiet and calm the mind. Here are the steps for one version of PMR that anyone can do. Try it next time you're feeling nervous, anxious, or find yourself tossing, turning, and unable to sleep. Step 1    Get comfortable. You don't have to lie down to do PMR; it will work if you're sitting up in a chair. Do make sure you're in a place that's free of distraction. Close your eyes if that feels best for you. Step 2    Breathe. Inhale deeply through your nose, feeling your abdomen rise as you fill your body with air. Then slowly exhale from your mouth, drawing your navel toward your spine. Repeat three to five times. Step 3    Starting with your feet, tighten and release your muscles. Clench your toes and pressing your heels toward the ground. Squeeze tightly for a few breaths and then release. Now flex your feet in, pointing your toes up towards your head. Hold for a few seconds and then release. Step 4    Continue to work your way up your body, tightening and releasing each muscle group. Work your way up in this order: legs, glutes, abdomen, back, hands, arms, shoulders, neck, and face. Try to tighten each muscle group for a few breaths and then slowly release.  Repeat any areas that feel

## 2023-11-20 NOTE — TELEPHONE ENCOUNTER
Calling for [facility],     [facility] called stated that they have reached out three time and have not had any luck with scheduling him do you still want them to try and call to schedule .

## 2023-11-20 NOTE — PROGRESS NOTES
Behavioral Health Consultation- Follow UP  MIKAL Whitt  11/20/2023   2:31 PM      Sis Valentin II, was evaluated through a synchronous (real-time) audio-video encounter. The patient (or guardian if applicable) is aware that this is a billable service, which includes applicable co-pays. This Virtual Visit was conducted with patient's (and/or legal guardian's) consent. Verbal consent to proceed has been obtained within the past 12 months. Patient identification was verified, and a caregiver was present when appropriate. The patient was located in a state where the provider was licensed to provide care. Time spent with Patient: 30 minutes  This is patient's second  [facility] appointment. Reason for Consult:    Chief Complaint   Patient presents with    Anxiety    Depression    Addiction Problem     Referring Provider: Martin Franklin MD  [address] [place] [address]    Patient provided informed consent for the behavioral health program. Discussed with patient model of service to include the limits of confidentiality (i.e. abuse reporting, suicide intervention, etc.) and short-term intervention focused approach. Pt indicated understanding. Feedback given to PCP. Verified the following information:  Patient's identification: Yes  Patient location: [address] [place] [id number]   Patient's call back number: [phone]   Patient's emergency contact's name and number, as well as permission to contact them if needed: Extended Emergency Contact Information  Primary Emergency Contact: [address] of [address] Kevin Damon Phone: [phone]  Mobile Phone: [phone]  Relation: Parent  Secondary Emergency Contact: [facility] Phone: [phone]  Relation: None     Provider location: [address]:  Last appointment 10/23/23    Pt reports that he has been very forgetful lately. Pt was 15 minutes late to today's appointment.  Pt states that he has missed previous [facility]

## 2023-11-28 ENCOUNTER — HOSPITAL ENCOUNTER (OUTPATIENT)
Dept: GENERAL RADIOLOGY | Age: 53
Discharge: HOME | End: 2023-11-28
Payer: COMMERCIAL

## 2023-11-28 DIAGNOSIS — M54.41 RIGHT-SIDED LOW BACK PAIN WITH RIGHT-SIDED SCIATICA, UNSPECIFIED CHRONICITY: ICD-10-CM

## 2023-11-28 DIAGNOSIS — G89.29 CHRONIC LOW BACK PAIN, UNSPECIFIED BACK PAIN LATERALITY, UNSPECIFIED WHETHER SCIATICA PRESENT: Primary | ICD-10-CM

## 2023-11-28 DIAGNOSIS — N52.9 ERECTILE DYSFUNCTION, UNSPECIFIED ERECTILE DYSFUNCTION TYPE: ICD-10-CM

## 2023-11-28 DIAGNOSIS — I10 BENIGN ESSENTIAL HYPERTENSION: ICD-10-CM

## 2023-11-28 DIAGNOSIS — I25.110 CORONARY ARTERY DISEASE INVOLVING NATIVE CORONARY ARTERY OF NATIVE HEART WITH UNSTABLE ANGINA PECTORIS (HCC): ICD-10-CM

## 2023-11-28 DIAGNOSIS — M54.50 CHRONIC LOW BACK PAIN, UNSPECIFIED BACK PAIN LATERALITY, UNSPECIFIED WHETHER SCIATICA PRESENT: Primary | ICD-10-CM

## 2023-11-28 LAB
ALBUMIN SERPL-MCNC: 4.2 G/DL (ref 3.4–5)
ALBUMIN/GLOB SERPL: 1.5 {RATIO} (ref 1.1–2.2)
ALP SERPL-CCNC: 133 U/L (ref 40–129)
ALT SERPL-CCNC: 30 U/L (ref 10–40)
ANION GAP SERPL CALCULATED.3IONS-SCNC: 10 MMOL/L (ref 3–16)
AST SERPL-CCNC: 28 U/L (ref 15–37)
BASOPHILS # BLD: 0 K/UL (ref 0–0.2)
BASOPHILS NFR BLD: 0.5 %
BILIRUB SERPL-MCNC: 0.4 MG/DL (ref 0–1)
BUN SERPL-MCNC: 9 MG/DL (ref 7–20)
CALCIUM SERPL-MCNC: 8.9 MG/DL (ref 8.3–10.6)
CHLORIDE SERPL-SCNC: 107 MMOL/L (ref 99–110)
CHOLEST SERPL-MCNC: 126 MG/DL (ref 0–199)
CO2 SERPL-SCNC: 23 MMOL/L (ref 21–32)
CREAT SERPL-MCNC: 0.7 MG/DL (ref 0.9–1.3)
DEPRECATED RDW RBC AUTO: 13.9 % (ref 12.4–15.4)
EOSINOPHIL # BLD: 0.2 K/UL (ref 0–0.6)
EOSINOPHIL NFR BLD: 2.2 %
GFR SERPLBLD CREATININE-BSD FMLA CKD-EPI: >60 ML/MIN/{1.73_M2}
GLUCOSE SERPL-MCNC: 124 MG/DL (ref 70–99)
HCT VFR BLD AUTO: 41.5 % (ref 40.5–52.5)
HDLC SERPL-MCNC: 38 MG/DL (ref 40–60)
HGB BLD-MCNC: 14.2 G/DL (ref 13.5–17.5)
LDL CHOLESTEROL CALCULATED: 68 MG/DL
LYMPHOCYTES # BLD: 1.6 K/UL (ref 1–5.1)
LYMPHOCYTES NFR BLD: 20.6 %
MCH RBC QN AUTO: 31.3 PG (ref 26–34)
MCHC RBC AUTO-ENTMCNC: 34.1 G/DL (ref 31–36)
MCV RBC AUTO: 91.7 FL (ref 80–100)
MONOCYTES # BLD: 0.4 K/UL (ref 0–1.3)
MONOCYTES NFR BLD: 5.9 %
NEUTROPHILS # BLD: 5.4 K/UL (ref 1.7–7.7)
NEUTROPHILS NFR BLD: 70.8 %
PLATELET # BLD AUTO: 247 K/UL (ref 135–450)
PMV BLD AUTO: 8.7 FL (ref 5–10.5)
POTASSIUM SERPL-SCNC: 3.8 MMOL/L (ref 3.5–5.1)
PROT SERPL-MCNC: 7 G/DL (ref 6.4–8.2)
RBC # BLD AUTO: 4.52 M/UL (ref 4.2–5.9)
SODIUM SERPL-SCNC: 140 MMOL/L (ref 136–145)
TRIGL SERPL-MCNC: 99 MG/DL (ref 0–150)
VLDLC SERPL CALC-MCNC: 20 MG/DL
WBC # BLD AUTO: 7.6 K/UL (ref 4–11)

## 2023-11-28 PROCEDURE — 72100 X-RAY EXAM L-S SPINE 2/3 VWS: CPT

## 2023-12-01 DIAGNOSIS — R79.89 LOW TESTOSTERONE: Primary | ICD-10-CM

## 2023-12-01 LAB
SHBG SERPL-SCNC: 77 NMOL/L (ref 11–80)
TESTOST FREE SERPL-MCNC: 4.8 PG/ML (ref 47–244)
TESTOST SERPL-MCNC: 48 NG/DL (ref 220–1000)

## 2023-12-01 NOTE — PROGRESS NOTES
[facility]  [address], [address], [address]  Phone: [phone]  Fax: [phone]

## 2023-12-06 ENCOUNTER — TELEPHONE (OUTPATIENT)
Dept: FAMILY MEDICINE CLINIC | Age: 53
End: 2023-12-06

## 2023-12-06 NOTE — TELEPHONE ENCOUNTER
Pt just had appt with Irena Mena today at the methadone clinic. Per pt Irena Mena okay with increasing the gabapentin dose and it is okay to take Soma. Please call back with any questions.      [id number] [facility] clinic ([facility])

## 2023-12-08 ENCOUNTER — OFFICE VISIT (OUTPATIENT)
Dept: FAMILY MEDICINE CLINIC | Age: 53
End: 2023-12-08
Payer: COMMERCIAL

## 2023-12-08 VITALS
DIASTOLIC BLOOD PRESSURE: 90 MMHG | SYSTOLIC BLOOD PRESSURE: 124 MMHG | HEIGHT: 73 IN | TEMPERATURE: 97.1 F | OXYGEN SATURATION: 95 % | BODY MASS INDEX: 32.2 KG/M2 | WEIGHT: 243 LBS | RESPIRATION RATE: 16 BRPM | HEART RATE: 101 BPM

## 2023-12-08 DIAGNOSIS — R79.89 LOW TESTOSTERONE IN MALE: ICD-10-CM

## 2023-12-08 DIAGNOSIS — G89.29 CHRONIC LOW BACK PAIN WITH SCIATICA, SCIATICA LATERALITY UNSPECIFIED, UNSPECIFIED BACK PAIN LATERALITY: Primary | ICD-10-CM

## 2023-12-08 DIAGNOSIS — F11.20 PATIENT ON METHADONE MAINTENANCE THERAPY (HCC): ICD-10-CM

## 2023-12-08 DIAGNOSIS — M54.40 CHRONIC LOW BACK PAIN WITH SCIATICA, SCIATICA LATERALITY UNSPECIFIED, UNSPECIFIED BACK PAIN LATERALITY: Primary | ICD-10-CM

## 2023-12-08 DIAGNOSIS — I10 BENIGN ESSENTIAL HYPERTENSION: ICD-10-CM

## 2023-12-08 DIAGNOSIS — K40.90 RIGHT INGUINAL HERNIA: ICD-10-CM

## 2023-12-08 DIAGNOSIS — R60.9 EDEMA, UNSPECIFIED TYPE: ICD-10-CM

## 2023-12-08 DIAGNOSIS — M25.561 RIGHT KNEE PAIN, UNSPECIFIED CHRONICITY: ICD-10-CM

## 2023-12-08 PROCEDURE — G8427 DOCREV CUR MEDS BY ELIG CLIN: HCPCS | Performed by: STUDENT IN AN ORGANIZED HEALTH CARE EDUCATION/TRAINING PROGRAM

## 2023-12-08 PROCEDURE — 3080F DIAST BP >= 90 MM HG: CPT | Performed by: STUDENT IN AN ORGANIZED HEALTH CARE EDUCATION/TRAINING PROGRAM

## 2023-12-08 PROCEDURE — 4004F PT TOBACCO SCREEN RCVD TLK: CPT | Performed by: STUDENT IN AN ORGANIZED HEALTH CARE EDUCATION/TRAINING PROGRAM

## 2023-12-08 PROCEDURE — 3074F SYST BP LT 130 MM HG: CPT | Performed by: STUDENT IN AN ORGANIZED HEALTH CARE EDUCATION/TRAINING PROGRAM

## 2023-12-08 PROCEDURE — G8484 FLU IMMUNIZE NO ADMIN: HCPCS | Performed by: STUDENT IN AN ORGANIZED HEALTH CARE EDUCATION/TRAINING PROGRAM

## 2023-12-08 PROCEDURE — G8417 CALC BMI ABV UP PARAM F/U: HCPCS | Performed by: STUDENT IN AN ORGANIZED HEALTH CARE EDUCATION/TRAINING PROGRAM

## 2023-12-08 PROCEDURE — 3017F COLORECTAL CA SCREEN DOC REV: CPT | Performed by: STUDENT IN AN ORGANIZED HEALTH CARE EDUCATION/TRAINING PROGRAM

## 2023-12-08 PROCEDURE — 99214 OFFICE O/P EST MOD 30 MIN: CPT | Performed by: STUDENT IN AN ORGANIZED HEALTH CARE EDUCATION/TRAINING PROGRAM

## 2023-12-08 RX ORDER — TIZANIDINE 4 MG/1
TABLET ORAL
Qty: 90 TABLET | Refills: 3 | Status: SHIPPED | OUTPATIENT
Start: 2023-12-08

## 2023-12-08 RX ORDER — GABAPENTIN 300 MG/1
300 CAPSULE ORAL 2 TIMES DAILY
Qty: 60 CAPSULE | Refills: 1 | Status: SHIPPED | OUTPATIENT
Start: 2023-12-08 | End: 2024-02-06

## 2023-12-08 RX ORDER — FUROSEMIDE 20 MG/1
20 TABLET ORAL DAILY PRN
Qty: 30 TABLET | Refills: 1 | Status: SHIPPED | OUTPATIENT
Start: 2023-12-08

## 2023-12-08 RX ORDER — CARISOPRODOL 350 MG/1
350 TABLET ORAL 3 TIMES DAILY PRN
Qty: 1 TABLET | Refills: 0 | Status: CANCELLED | OUTPATIENT
Start: 2023-12-08 | End: 2023-12-18

## 2023-12-08 RX ORDER — METAXALONE 400 MG/1
TABLET ORAL 3 TIMES DAILY
Status: CANCELLED | OUTPATIENT
Start: 2023-12-08

## 2023-12-08 RX ORDER — BUSPIRONE HYDROCHLORIDE 15 MG/1
15 TABLET ORAL
COMMUNITY
Start: 2023-11-28

## 2023-12-08 RX ORDER — RISPERIDONE 0.5 MG/1
0.5 TABLET ORAL
COMMUNITY
Start: 2023-11-28

## 2023-12-08 SDOH — ECONOMIC STABILITY: FOOD INSECURITY: WITHIN THE PAST 12 MONTHS, YOU WORRIED THAT YOUR FOOD WOULD RUN OUT BEFORE YOU GOT MONEY TO BUY MORE.: NEVER TRUE

## 2023-12-08 SDOH — ECONOMIC STABILITY: INCOME INSECURITY: HOW HARD IS IT FOR YOU TO PAY FOR THE VERY BASICS LIKE FOOD, HOUSING, MEDICAL CARE, AND HEATING?: NOT HARD AT ALL

## 2023-12-08 SDOH — ECONOMIC STABILITY: FOOD INSECURITY: WITHIN THE PAST 12 MONTHS, THE FOOD YOU BOUGHT JUST DIDN'T LAST AND YOU DIDN'T HAVE MONEY TO GET MORE.: NEVER TRUE

## 2023-12-08 ASSESSMENT — COLUMBIA-SUICIDE SEVERITY RATING SCALE - C-SSRS
5. HAVE YOU STARTED TO WORK OUT OR WORKED OUT THE DETAILS OF HOW TO KILL YOURSELF? DO YOU INTEND TO CARRY OUT THIS PLAN?: NO
4. HAVE YOU HAD THESE THOUGHTS AND HAD SOME INTENTION OF ACTING ON THEM?: NO
7. DID THIS OCCUR IN THE LAST THREE MONTHS: NO
3. HAVE YOU BEEN THINKING ABOUT HOW YOU MIGHT KILL YOURSELF?: NO

## 2023-12-08 ASSESSMENT — PATIENT HEALTH QUESTIONNAIRE - PHQ9
6. FEELING BAD ABOUT YOURSELF - OR THAT YOU ARE A FAILURE OR HAVE LET YOURSELF OR YOUR FAMILY DOWN: 0
SUM OF ALL RESPONSES TO PHQ QUESTIONS 1-9: 0
7. TROUBLE CONCENTRATING ON THINGS, SUCH AS READING THE NEWSPAPER OR WATCHING TELEVISION: 0
8. MOVING OR SPEAKING SO SLOWLY THAT OTHER PEOPLE COULD HAVE NOTICED. OR THE OPPOSITE, BEING SO FIGETY OR RESTLESS THAT YOU HAVE BEEN MOVING AROUND A LOT MORE THAN USUAL: 0
2. FEELING DOWN, DEPRESSED OR HOPELESS: 0
SUM OF ALL RESPONSES TO PHQ QUESTIONS 1-9: 0
9. THOUGHTS THAT YOU WOULD BE BETTER OFF DEAD, OR OF HURTING YOURSELF: 0
SUM OF ALL RESPONSES TO PHQ QUESTIONS 1-9: 0
3. TROUBLE FALLING OR STAYING ASLEEP: 0
10. IF YOU CHECKED OFF ANY PROBLEMS, HOW DIFFICULT HAVE THESE PROBLEMS MADE IT FOR YOU TO DO YOUR WORK, TAKE CARE OF THINGS AT HOME, OR GET ALONG WITH OTHER PEOPLE: 0
5. POOR APPETITE OR OVEREATING: 0
1. LITTLE INTEREST OR PLEASURE IN DOING THINGS: 0
4. FEELING TIRED OR HAVING LITTLE ENERGY: 0
SUM OF ALL RESPONSES TO PHQ QUESTIONS 1-9: 0
SUM OF ALL RESPONSES TO PHQ9 QUESTIONS 1 & 2: 0

## 2023-12-08 NOTE — PROGRESS NOTES
[place] [address], [address] [place] [address]  Tel: [phone]      2023     Rocío Kemp II (:  1970) is a 47 y.o. male, here for evaluation of the following medical concerns:  Chief Complaint   Patient presents with    Discuss Medications       HPI  Patient is a 64-year-old male history of HTN,, alcoholic hepatitis, Ha esophagus, CAD, COPD, depression, GERD, IV drug use history, who presents for follow-up on back pain and labs. Discussed Low testosterone - Given Referral to Urology . Chronic Fatigue . Endorses fatigue, depression and low sex drive. No morning erections. Right inguinal pain  Evaluated for hip pian, normal xray. Pain with sitting up and walking. No pain with lying dwn. Localized pressure relieves the pain. Concern for internal inguinal hernia, per patient history. Back Pain  Saw back specialist , in [facility]. 23 was referred to pain management,   The problem has been gradually worsening since onset. Localized to lumbar spine, neck   . Associated symptoms include leg pain, numbness, paresthesias tingling and weakness. Pertinent negatives include no abdominal pain, bladder incontinence, bowel incontinence, dysuria, fever, , paresis or pelvic pain. Had back surgery , through [address] at night and methadone daily. Taking 3-4 gabapentin 100 mg at a time. Has not scheduled with pain management. Knee pain   - Chronic knee pain, was reffered to PT; Xray  23 No acute abnormality of the knee. Mild degenerative changes in the patellofemoral articulation. Endorses swelling in the right knee, worsens with walking . Pain under patella. On methadone. Uses cane. No redness. Endorses instablity. No popping or craking. Depression and anxiety  Recently started on mirtazapine 7.5 mg daily for sleep. On Cymbalta 60mg twice daily, and BuSpar 10 mg twice daily for anxiety. Mood stable.

## 2023-12-12 RX ORDER — DIPHENHYDRAMINE HCL 25 MG/1
TABLET ORAL
Qty: 30 TABLET | Refills: 10 | OUTPATIENT
Start: 2023-12-12

## 2024-01-09 ENCOUNTER — OFFICE VISIT (OUTPATIENT)
Dept: FAMILY MEDICINE CLINIC | Age: 54
End: 2024-01-09
Payer: COMMERCIAL

## 2024-01-09 VITALS
OXYGEN SATURATION: 96 % | RESPIRATION RATE: 16 BRPM | DIASTOLIC BLOOD PRESSURE: 87 MMHG | HEIGHT: 73 IN | SYSTOLIC BLOOD PRESSURE: 119 MMHG | TEMPERATURE: 97.1 F | HEART RATE: 104 BPM | BODY MASS INDEX: 31.94 KG/M2 | WEIGHT: 241 LBS

## 2024-01-09 DIAGNOSIS — J43.9 PULMONARY EMPHYSEMA, UNSPECIFIED EMPHYSEMA TYPE (HCC): ICD-10-CM

## 2024-01-09 DIAGNOSIS — I10 BENIGN ESSENTIAL HYPERTENSION: ICD-10-CM

## 2024-01-09 DIAGNOSIS — R10.31 RIGHT LOWER QUADRANT ABDOMINAL PAIN: Primary | ICD-10-CM

## 2024-01-09 DIAGNOSIS — F41.8 MIXED ANXIETY AND DEPRESSIVE DISORDER: ICD-10-CM

## 2024-01-09 DIAGNOSIS — F17.200 SMOKER: ICD-10-CM

## 2024-01-09 PROBLEM — I48.92 ATRIAL FLUTTER, UNSPECIFIED TYPE (HCC): Status: ACTIVE | Noted: 2024-01-09

## 2024-01-09 PROBLEM — F11.20 PATIENT ON METHADONE MAINTENANCE THERAPY (HCC): Status: ACTIVE | Noted: 2024-01-09

## 2024-01-09 PROCEDURE — 99214 OFFICE O/P EST MOD 30 MIN: CPT | Performed by: STUDENT IN AN ORGANIZED HEALTH CARE EDUCATION/TRAINING PROGRAM

## 2024-01-09 PROCEDURE — 3023F SPIROM DOC REV: CPT | Performed by: STUDENT IN AN ORGANIZED HEALTH CARE EDUCATION/TRAINING PROGRAM

## 2024-01-09 PROCEDURE — 3079F DIAST BP 80-89 MM HG: CPT | Performed by: STUDENT IN AN ORGANIZED HEALTH CARE EDUCATION/TRAINING PROGRAM

## 2024-01-09 PROCEDURE — G8417 CALC BMI ABV UP PARAM F/U: HCPCS | Performed by: STUDENT IN AN ORGANIZED HEALTH CARE EDUCATION/TRAINING PROGRAM

## 2024-01-09 PROCEDURE — 3017F COLORECTAL CA SCREEN DOC REV: CPT | Performed by: STUDENT IN AN ORGANIZED HEALTH CARE EDUCATION/TRAINING PROGRAM

## 2024-01-09 PROCEDURE — 3074F SYST BP LT 130 MM HG: CPT | Performed by: STUDENT IN AN ORGANIZED HEALTH CARE EDUCATION/TRAINING PROGRAM

## 2024-01-09 PROCEDURE — G8484 FLU IMMUNIZE NO ADMIN: HCPCS | Performed by: STUDENT IN AN ORGANIZED HEALTH CARE EDUCATION/TRAINING PROGRAM

## 2024-01-09 PROCEDURE — 4004F PT TOBACCO SCREEN RCVD TLK: CPT | Performed by: STUDENT IN AN ORGANIZED HEALTH CARE EDUCATION/TRAINING PROGRAM

## 2024-01-09 PROCEDURE — G8427 DOCREV CUR MEDS BY ELIG CLIN: HCPCS | Performed by: STUDENT IN AN ORGANIZED HEALTH CARE EDUCATION/TRAINING PROGRAM

## 2024-01-09 RX ORDER — AMLODIPINE BESYLATE 5 MG/1
5 TABLET ORAL DAILY
Qty: 90 TABLET | Refills: 1 | Status: SHIPPED | OUTPATIENT
Start: 2024-01-09

## 2024-01-09 RX ORDER — MIRTAZAPINE 7.5 MG/1
7.5 TABLET, FILM COATED ORAL NIGHTLY
Qty: 90 TABLET | Refills: 0 | Status: SHIPPED | OUTPATIENT
Start: 2024-01-09

## 2024-01-09 RX ORDER — NICOTINE 21 MG/24HR
1 PATCH, TRANSDERMAL 24 HOURS TRANSDERMAL EVERY 24 HOURS
Qty: 28 PATCH | Refills: 0 | Status: CANCELLED | OUTPATIENT
Start: 2024-01-09

## 2024-01-09 SDOH — ECONOMIC STABILITY: INCOME INSECURITY: HOW HARD IS IT FOR YOU TO PAY FOR THE VERY BASICS LIKE FOOD, HOUSING, MEDICAL CARE, AND HEATING?: NOT HARD AT ALL

## 2024-01-09 SDOH — ECONOMIC STABILITY: FOOD INSECURITY: WITHIN THE PAST 12 MONTHS, YOU WORRIED THAT YOUR FOOD WOULD RUN OUT BEFORE YOU GOT MONEY TO BUY MORE.: NEVER TRUE

## 2024-01-09 SDOH — ECONOMIC STABILITY: FOOD INSECURITY: WITHIN THE PAST 12 MONTHS, THE FOOD YOU BOUGHT JUST DIDN'T LAST AND YOU DIDN'T HAVE MONEY TO GET MORE.: NEVER TRUE

## 2024-01-09 ASSESSMENT — PATIENT HEALTH QUESTIONNAIRE - PHQ9
6. FEELING BAD ABOUT YOURSELF - OR THAT YOU ARE A FAILURE OR HAVE LET YOURSELF OR YOUR FAMILY DOWN: 0
8. MOVING OR SPEAKING SO SLOWLY THAT OTHER PEOPLE COULD HAVE NOTICED. OR THE OPPOSITE, BEING SO FIGETY OR RESTLESS THAT YOU HAVE BEEN MOVING AROUND A LOT MORE THAN USUAL: 0
4. FEELING TIRED OR HAVING LITTLE ENERGY: 0
7. TROUBLE CONCENTRATING ON THINGS, SUCH AS READING THE NEWSPAPER OR WATCHING TELEVISION: 0
2. FEELING DOWN, DEPRESSED OR HOPELESS: 0
SUM OF ALL RESPONSES TO PHQ9 QUESTIONS 1 & 2: 0
9. THOUGHTS THAT YOU WOULD BE BETTER OFF DEAD, OR OF HURTING YOURSELF: 0
5. POOR APPETITE OR OVEREATING: 0
SUM OF ALL RESPONSES TO PHQ QUESTIONS 1-9: 0
SUM OF ALL RESPONSES TO PHQ QUESTIONS 1-9: 0
10. IF YOU CHECKED OFF ANY PROBLEMS, HOW DIFFICULT HAVE THESE PROBLEMS MADE IT FOR YOU TO DO YOUR WORK, TAKE CARE OF THINGS AT HOME, OR GET ALONG WITH OTHER PEOPLE: 0
1. LITTLE INTEREST OR PLEASURE IN DOING THINGS: 0
SUM OF ALL RESPONSES TO PHQ QUESTIONS 1-9: 0
SUM OF ALL RESPONSES TO PHQ QUESTIONS 1-9: 0
3. TROUBLE FALLING OR STAYING ASLEEP: 0

## 2024-01-09 NOTE — PATIENT INSTRUCTIONS
Schedule CT abdomen   See Gastroenterology and Surgery   Advised patient to take MiraLAX daily to maintain soft stools.   Once pt has regular bowel movements start diet w/ high fiber content. (MEtamucil)  Advised to increase fiber to 20 to 35 g daily monitor signs for bloating and distention  Increase fluid intake to 64oz a day  Walk is much as possible  Plenty of fluids.      Hypertension/ CAD   valsartan 80 mg daily and amlodipine 5mg daily       Mood   mirtazapine 7.5 mg daily for sleep,  Cymbalta 60mg twice daily, BuSpar 15 mg twice daily for anxiety, Risperdone 0.5 mg daily.        Return if symptoms fail to improve or worsen.

## 2024-01-09 NOTE — TELEPHONE ENCOUNTER
Tristen with [facility] called to ask the frequency on the nicorette and if there is a max daily.

## 2024-01-10 RX ORDER — ATORVASTATIN CALCIUM 80 MG/1
TABLET, FILM COATED ORAL
Qty: 30 TABLET | Refills: 3 | Status: SHIPPED | OUTPATIENT
Start: 2024-01-10

## 2024-01-19 DIAGNOSIS — K22.70 BARRETT'S ESOPHAGUS DETERMINED BY ENDOSCOPY: ICD-10-CM

## 2024-01-19 DIAGNOSIS — F41.8 MIXED ANXIETY AND DEPRESSIVE DISORDER: ICD-10-CM

## 2024-01-19 DIAGNOSIS — K21.9 GASTROESOPHAGEAL REFLUX DISEASE WITHOUT ESOPHAGITIS: ICD-10-CM

## 2024-01-19 RX ORDER — PANTOPRAZOLE SODIUM 40 MG/1
TABLET, DELAYED RELEASE ORAL
Qty: 180 TABLET | Refills: 0 | Status: SHIPPED | OUTPATIENT
Start: 2024-01-19

## 2024-01-19 RX ORDER — MIRTAZAPINE 7.5 MG/1
7.5 TABLET, FILM COATED ORAL NIGHTLY
Qty: 90 TABLET | Refills: 0 | OUTPATIENT
Start: 2024-01-19

## 2024-01-30 DIAGNOSIS — R60.9 EDEMA, UNSPECIFIED TYPE: ICD-10-CM

## 2024-01-30 RX ORDER — FUROSEMIDE 20 MG/1
TABLET ORAL
Qty: 30 TABLET | Refills: 1 | Status: SHIPPED | OUTPATIENT
Start: 2024-01-30

## 2024-02-05 DIAGNOSIS — G89.29 CHRONIC LOW BACK PAIN WITH SCIATICA, SCIATICA LATERALITY UNSPECIFIED, UNSPECIFIED BACK PAIN LATERALITY: ICD-10-CM

## 2024-02-05 DIAGNOSIS — M54.40 CHRONIC LOW BACK PAIN WITH SCIATICA, SCIATICA LATERALITY UNSPECIFIED, UNSPECIFIED BACK PAIN LATERALITY: ICD-10-CM

## 2024-02-05 RX ORDER — GABAPENTIN 300 MG/1
CAPSULE ORAL
Qty: 60 CAPSULE | Refills: 0 | Status: SHIPPED | OUTPATIENT
Start: 2024-02-05 | End: 2024-03-05

## 2024-02-07 DIAGNOSIS — I10 BENIGN ESSENTIAL HYPERTENSION: ICD-10-CM

## 2024-02-09 RX ORDER — VALSARTAN 80 MG/1
80 TABLET ORAL DAILY
Qty: 90 TABLET | Refills: 3 | Status: SHIPPED | OUTPATIENT
Start: 2024-02-09
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
## 2024-03-27 DIAGNOSIS — M54.40 CHRONIC LOW BACK PAIN WITH SCIATICA, SCIATICA LATERALITY UNSPECIFIED, UNSPECIFIED BACK PAIN LATERALITY: ICD-10-CM

## 2024-03-27 DIAGNOSIS — G89.29 CHRONIC LOW BACK PAIN WITH SCIATICA, SCIATICA LATERALITY UNSPECIFIED, UNSPECIFIED BACK PAIN LATERALITY: ICD-10-CM

## 2024-03-28 DIAGNOSIS — R60.9 EDEMA, UNSPECIFIED TYPE: ICD-10-CM

## 2024-03-28 RX ORDER — FUROSEMIDE 20 MG/1
TABLET ORAL
Qty: 30 TABLET | Refills: 1 | Status: SHIPPED | OUTPATIENT
Start: 2024-03-28

## 2024-03-28 RX ORDER — GABAPENTIN 300 MG/1
CAPSULE ORAL
Qty: 60 CAPSULE | Refills: 0 | Status: SHIPPED | OUTPATIENT
Start: 2024-03-28 | End: 2024-04-05

## 2024-03-28 NOTE — TELEPHONE ENCOUNTER
No future appointments.      LOV 1/9/2024      Pharmacy requesting refill for patient.   Furosemide 20 MG.

## 2024-04-05 DIAGNOSIS — G89.29 CHRONIC LOW BACK PAIN WITH SCIATICA, SCIATICA LATERALITY UNSPECIFIED, UNSPECIFIED BACK PAIN LATERALITY: ICD-10-CM

## 2024-04-05 DIAGNOSIS — F41.0 PANIC ATTACKS: ICD-10-CM

## 2024-04-05 DIAGNOSIS — M54.40 CHRONIC LOW BACK PAIN WITH SCIATICA, SCIATICA LATERALITY UNSPECIFIED, UNSPECIFIED BACK PAIN LATERALITY: ICD-10-CM

## 2024-04-05 DIAGNOSIS — F41.8 MIXED ANXIETY AND DEPRESSIVE DISORDER: ICD-10-CM

## 2024-04-05 RX ORDER — GABAPENTIN 300 MG/1
CAPSULE ORAL
Qty: 60 CAPSULE | Refills: 0 | Status: SHIPPED | OUTPATIENT
Start: 2024-04-05 | End: 2024-05-05

## 2024-04-05 RX ORDER — MIRTAZAPINE 7.5 MG/1
7.5 TABLET, FILM COATED ORAL NIGHTLY
Qty: 90 TABLET | Refills: 0 | Status: SHIPPED | OUTPATIENT
Start: 2024-04-05

## 2024-04-09 RX ORDER — GABAPENTIN 100 MG/1
CAPSULE ORAL
Qty: 30 CAPSULE | Refills: 10 | OUTPATIENT
Start: 2024-04-09 | End: 2024-05-09

## 2024-04-16 DIAGNOSIS — F17.200 SMOKER: ICD-10-CM

## 2024-04-16 RX ORDER — VARENICLINE TARTRATE 1 MG/1
1 TABLET, FILM COATED ORAL 2 TIMES DAILY
Qty: 60 TABLET | Refills: 3 | Status: SHIPPED | OUTPATIENT
Start: 2024-04-16

## 2024-04-16 NOTE — TELEPHONE ENCOUNTER
Patient calling for refill. Trying to stop smoking   LOV 1/9/2024  Future Appointments   Date Time Provider Department Center   4/19/2024 11:40 AM Curtis Morrison MD [facility]

## 2024-04-19 ENCOUNTER — HOSPITAL ENCOUNTER (OUTPATIENT)
Dept: GENERAL RADIOLOGY | Age: 54
Discharge: HOME | End: 2024-04-19
Payer: COMMERCIAL

## 2024-04-19 ENCOUNTER — OFFICE VISIT (OUTPATIENT)
Dept: FAMILY MEDICINE CLINIC | Age: 54
End: 2024-04-19
Payer: COMMERCIAL

## 2024-04-19 VITALS
HEART RATE: 120 BPM | WEIGHT: 274 LBS | OXYGEN SATURATION: 96 % | SYSTOLIC BLOOD PRESSURE: 132 MMHG | DIASTOLIC BLOOD PRESSURE: 80 MMHG | BODY MASS INDEX: 36.15 KG/M2 | RESPIRATION RATE: 16 BRPM | TEMPERATURE: 97.1 F

## 2024-04-19 DIAGNOSIS — R06.00 DYSPNEA, UNSPECIFIED TYPE: ICD-10-CM

## 2024-04-19 DIAGNOSIS — F17.200 SMOKER: ICD-10-CM

## 2024-04-19 DIAGNOSIS — R06.00 DYSPNEA, UNSPECIFIED TYPE: Primary | ICD-10-CM

## 2024-04-19 DIAGNOSIS — F11.20 PATIENT ON METHADONE MAINTENANCE THERAPY (HCC): ICD-10-CM

## 2024-04-19 DIAGNOSIS — R73.09 ELEVATED GLUCOSE: ICD-10-CM

## 2024-04-19 DIAGNOSIS — R00.0 TACHYCARDIA: ICD-10-CM

## 2024-04-19 DIAGNOSIS — I10 BENIGN ESSENTIAL HYPERTENSION: ICD-10-CM

## 2024-04-19 DIAGNOSIS — J44.9 CHRONIC OBSTRUCTIVE PULMONARY DISEASE, UNSPECIFIED COPD TYPE (HCC): ICD-10-CM

## 2024-04-19 PROCEDURE — G8427 DOCREV CUR MEDS BY ELIG CLIN: HCPCS | Performed by: STUDENT IN AN ORGANIZED HEALTH CARE EDUCATION/TRAINING PROGRAM

## 2024-04-19 PROCEDURE — 3075F SYST BP GE 130 - 139MM HG: CPT | Performed by: STUDENT IN AN ORGANIZED HEALTH CARE EDUCATION/TRAINING PROGRAM

## 2024-04-19 PROCEDURE — 4004F PT TOBACCO SCREEN RCVD TLK: CPT | Performed by: STUDENT IN AN ORGANIZED HEALTH CARE EDUCATION/TRAINING PROGRAM

## 2024-04-19 PROCEDURE — 93000 ELECTROCARDIOGRAM COMPLETE: CPT | Performed by: STUDENT IN AN ORGANIZED HEALTH CARE EDUCATION/TRAINING PROGRAM

## 2024-04-19 PROCEDURE — 3017F COLORECTAL CA SCREEN DOC REV: CPT | Performed by: STUDENT IN AN ORGANIZED HEALTH CARE EDUCATION/TRAINING PROGRAM

## 2024-04-19 PROCEDURE — 3023F SPIROM DOC REV: CPT | Performed by: STUDENT IN AN ORGANIZED HEALTH CARE EDUCATION/TRAINING PROGRAM

## 2024-04-19 PROCEDURE — 71046 X-RAY EXAM CHEST 2 VIEWS: CPT

## 2024-04-19 PROCEDURE — G8417 CALC BMI ABV UP PARAM F/U: HCPCS | Performed by: STUDENT IN AN ORGANIZED HEALTH CARE EDUCATION/TRAINING PROGRAM

## 2024-04-19 PROCEDURE — 3079F DIAST BP 80-89 MM HG: CPT | Performed by: STUDENT IN AN ORGANIZED HEALTH CARE EDUCATION/TRAINING PROGRAM

## 2024-04-19 PROCEDURE — 99214 OFFICE O/P EST MOD 30 MIN: CPT | Performed by: STUDENT IN AN ORGANIZED HEALTH CARE EDUCATION/TRAINING PROGRAM

## 2024-04-19 PROCEDURE — 83036 HEMOGLOBIN GLYCOSYLATED A1C: CPT | Performed by: STUDENT IN AN ORGANIZED HEALTH CARE EDUCATION/TRAINING PROGRAM

## 2024-04-19 RX ORDER — CARVEDILOL 6.25 MG/1
6.25 TABLET ORAL 2 TIMES DAILY
Qty: 60 TABLET | Refills: 0 | Status: SHIPPED | OUTPATIENT
Start: 2024-04-19

## 2024-04-19 RX ORDER — NICOTINE 21 MG/24HR
1 PATCH, TRANSDERMAL 24 HOURS TRANSDERMAL EVERY 24 HOURS
Qty: 28 PATCH | Refills: 0 | Status: SHIPPED | OUTPATIENT
Start: 2024-04-19

## 2024-04-19 RX ORDER — FLUTICASONE PROPIONATE AND SALMETEROL XINAFOATE 115; 21 UG/1; UG/1
2 AEROSOL, METERED RESPIRATORY (INHALATION) 2 TIMES DAILY
Qty: 12 G | Refills: 0 | Status: SHIPPED | OUTPATIENT
Start: 2024-04-19

## 2024-04-19 SDOH — ECONOMIC STABILITY: FOOD INSECURITY: WITHIN THE PAST 12 MONTHS, THE FOOD YOU BOUGHT JUST DIDN'T LAST AND YOU DIDN'T HAVE MONEY TO GET MORE.: NEVER TRUE

## 2024-04-19 SDOH — ECONOMIC STABILITY: FOOD INSECURITY: WITHIN THE PAST 12 MONTHS, YOU WORRIED THAT YOUR FOOD WOULD RUN OUT BEFORE YOU GOT MONEY TO BUY MORE.: NEVER TRUE

## 2024-04-19 SDOH — ECONOMIC STABILITY: INCOME INSECURITY: HOW HARD IS IT FOR YOU TO PAY FOR THE VERY BASICS LIKE FOOD, HOUSING, MEDICAL CARE, AND HEATING?: NOT HARD AT ALL

## 2024-04-19 ASSESSMENT — ENCOUNTER SYMPTOMS
RHINORRHEA: 0
SHORTNESS OF BREATH: 1
BACK PAIN: 1
SWOLLEN GLANDS: 0
SORE THROAT: 0
VOMITING: 0
HEMOPTYSIS: 0
SPUTUM PRODUCTION: 1
ABDOMINAL PAIN: 0
WHEEZING: 1

## 2024-04-19 ASSESSMENT — PATIENT HEALTH QUESTIONNAIRE - PHQ9
9. THOUGHTS THAT YOU WOULD BE BETTER OFF DEAD, OR OF HURTING YOURSELF: NOT AT ALL
6. FEELING BAD ABOUT YOURSELF - OR THAT YOU ARE A FAILURE OR HAVE LET YOURSELF OR YOUR FAMILY DOWN: NOT AT ALL
3. TROUBLE FALLING OR STAYING ASLEEP: NOT AT ALL
1. LITTLE INTEREST OR PLEASURE IN DOING THINGS: NOT AT ALL
SUM OF ALL RESPONSES TO PHQ QUESTIONS 1-9: 0
8. MOVING OR SPEAKING SO SLOWLY THAT OTHER PEOPLE COULD HAVE NOTICED. OR THE OPPOSITE, BEING SO FIGETY OR RESTLESS THAT YOU HAVE BEEN MOVING AROUND A LOT MORE THAN USUAL: NOT AT ALL
4. FEELING TIRED OR HAVING LITTLE ENERGY: NOT AT ALL
SUM OF ALL RESPONSES TO PHQ QUESTIONS 1-9: 0
5. POOR APPETITE OR OVEREATING: NOT AT ALL
7. TROUBLE CONCENTRATING ON THINGS, SUCH AS READING THE NEWSPAPER OR WATCHING TELEVISION: NOT AT ALL
10. IF YOU CHECKED OFF ANY PROBLEMS, HOW DIFFICULT HAVE THESE PROBLEMS MADE IT FOR YOU TO DO YOUR WORK, TAKE CARE OF THINGS AT HOME, OR GET ALONG WITH OTHER PEOPLE: NOT DIFFICULT AT ALL
SUM OF ALL RESPONSES TO PHQ QUESTIONS 1-9: 0
SUM OF ALL RESPONSES TO PHQ QUESTIONS 1-9: 0
2. FEELING DOWN, DEPRESSED OR HOPELESS: NOT AT ALL
SUM OF ALL RESPONSES TO PHQ9 QUESTIONS 1 & 2: 0

## 2024-04-19 NOTE — PROGRESS NOTES
mirtazapine 7.5 mg daily for sleep (10/18/2023), Cymbalta 60mg twice daily, and BuSpar 10 mg twice daily  for anxiety.  Intermittent depressed mood. No SI or HI.      Heroin use / Fentayl use :History of heroin and fentanyl use. Currently sober, started Methadone daily, doing better. Goes to [facility].       Review of Systems   Constitutional:  Negative for fever.   HENT:  Negative for ear pain, rhinorrhea and sore throat.    Respiratory:  Positive for sputum production, shortness of breath and wheezing. Negative for hemoptysis.    Cardiovascular:  Positive for syncope. Negative for chest pain (right sided chronic , old) and leg swelling.   Gastrointestinal:  Negative for abdominal pain and vomiting.   Genitourinary:  Negative for dysuria.   Musculoskeletal:  Positive for back pain.   Neurological:  Positive for weakness and numbness. Negative for headaches.       Physical exam  /80 (Site: Left Upper Arm, Position: Sitting)   Pulse (!) 120   Temp 97.1 °F (36.2 °C) (Temporal)   Resp 16   Wt 124.3 kg (274 lb)   SpO2 96%   BMI 36.15 kg/m²     Physical Exam  Vitals reviewed.   Constitutional:       General: He is not in acute distress.     Appearance: Normal appearance. He is not toxic-appearing.   HENT:      Right Ear: Tympanic membrane, ear canal and external ear normal.      Left Ear: Tympanic membrane, ear canal and external ear normal.      Nose: Nose normal. No congestion or rhinorrhea.      Right Sinus: No maxillary sinus tenderness or frontal sinus tenderness.      Left Sinus: No maxillary sinus tenderness or frontal sinus tenderness.      Mouth/Throat:      Mouth: Mucous membranes are moist.      Pharynx: Oropharynx is clear. No oropharyngeal exudate or posterior oropharyngeal erythema.   Eyes:      Extraocular Movements: Extraocular movements intact.      Conjunctiva/sclera: Conjunctivae normal.      Pupils: Pupils are equal, round, and reactive to light.   Cardiovascular:      Rate

## 2024-04-19 NOTE — PATIENT INSTRUCTIONS
- Increase lasix twice daily  - Ddimer, BNP   - Increase lasix daily, > twice daily 1 week   - Advair 2 puffs twice daily (rinse mouth)  - Coreg 3.125 mg twice daily , Blood pressure too low < 90/60 , [heart medication for heart rate]  Hear rate > 120 too high     ER

## 2024-04-20 DIAGNOSIS — R06.00 DYSPNEA, UNSPECIFIED TYPE: ICD-10-CM

## 2024-04-20 DIAGNOSIS — R00.0 TACHYCARDIA: ICD-10-CM

## 2024-04-20 LAB
ANION GAP SERPL CALCULATED.3IONS-SCNC: 10 MMOL/L (ref 3–16)
BASOPHILS # BLD: 0.1 K/UL (ref 0–0.2)
BASOPHILS NFR BLD: 1 %
BUN SERPL-MCNC: 14 MG/DL (ref 7–20)
CALCIUM SERPL-MCNC: 9.2 MG/DL (ref 8.3–10.6)
CHLORIDE SERPL-SCNC: 103 MMOL/L (ref 99–110)
CO2 SERPL-SCNC: 27 MMOL/L (ref 21–32)
CREAT SERPL-MCNC: 0.7 MG/DL (ref 0.9–1.3)
D DIMER: 0.39 UG/ML FEU (ref 0–0.6)
DEPRECATED RDW RBC AUTO: 14.5 % (ref 12.4–15.4)
EOSINOPHIL # BLD: 0.2 K/UL (ref 0–0.6)
EOSINOPHIL NFR BLD: 2 %
GFR SERPLBLD CREATININE-BSD FMLA CKD-EPI: >90 ML/MIN/{1.73_M2}
GLUCOSE SERPL-MCNC: 110 MG/DL (ref 70–99)
HCT VFR BLD AUTO: 43.7 % (ref 40.5–52.5)
HGB BLD-MCNC: 14.6 G/DL (ref 13.5–17.5)
LYMPHOCYTES # BLD: 3.3 K/UL (ref 1–5.1)
LYMPHOCYTES NFR BLD: 36 %
MCH RBC QN AUTO: 29.7 PG (ref 26–34)
MCHC RBC AUTO-ENTMCNC: 33.3 G/DL (ref 31–36)
MCV RBC AUTO: 89.3 FL (ref 80–100)
MONOCYTES # BLD: 0.6 K/UL (ref 0–1.3)
MONOCYTES NFR BLD: 7 %
NEUTROPHILS # BLD: 5 K/UL (ref 1.7–7.7)
NEUTROPHILS NFR BLD: 54 %
NT-PROBNP SERPL-MCNC: <36 PG/ML (ref 0–124)
PLATELET # BLD AUTO: 301 K/UL (ref 135–450)
PMV BLD AUTO: 8.1 FL (ref 5–10.5)
POTASSIUM SERPL-SCNC: 3.9 MMOL/L (ref 3.5–5.1)
RBC # BLD AUTO: 4.89 M/UL (ref 4.2–5.9)
RBC MORPH BLD: NORMAL
SLIDE REVIEW: NORMAL
SODIUM SERPL-SCNC: 140 MMOL/L (ref 136–145)
TSH SERPL DL<=0.005 MIU/L-ACNC: 2.54 UIU/ML (ref 0.27–4.2)
WBC # BLD AUTO: 9.2 K/UL (ref 4–11)

## 2024-05-07 RX ORDER — ATORVASTATIN CALCIUM 80 MG/1
TABLET, FILM COATED ORAL
Qty: 30 TABLET | Refills: 1 | Status: SHIPPED | OUTPATIENT
Start: 2024-05-07

## 2024-05-09 ENCOUNTER — OFFICE VISIT (OUTPATIENT)
Dept: FAMILY MEDICINE CLINIC | Age: 54
End: 2024-05-09
Payer: COMMERCIAL

## 2024-05-09 VITALS
OXYGEN SATURATION: 94 % | DIASTOLIC BLOOD PRESSURE: 87 MMHG | WEIGHT: 260 LBS | SYSTOLIC BLOOD PRESSURE: 125 MMHG | HEIGHT: 73 IN | BODY MASS INDEX: 34.46 KG/M2 | HEART RATE: 124 BPM

## 2024-05-09 DIAGNOSIS — M54.40 CHRONIC LOW BACK PAIN WITH SCIATICA, SCIATICA LATERALITY UNSPECIFIED, UNSPECIFIED BACK PAIN LATERALITY: ICD-10-CM

## 2024-05-09 DIAGNOSIS — M79.605 LEG PAIN, INFERIOR, LEFT: Primary | ICD-10-CM

## 2024-05-09 DIAGNOSIS — G89.29 CHRONIC LOW BACK PAIN WITH SCIATICA, SCIATICA LATERALITY UNSPECIFIED, UNSPECIFIED BACK PAIN LATERALITY: ICD-10-CM

## 2024-05-09 DIAGNOSIS — I25.110 CORONARY ARTERY DISEASE INVOLVING NATIVE CORONARY ARTERY OF NATIVE HEART WITH UNSTABLE ANGINA PECTORIS (HCC): ICD-10-CM

## 2024-05-09 DIAGNOSIS — J44.9 CHRONIC OBSTRUCTIVE PULMONARY DISEASE, UNSPECIFIED COPD TYPE (HCC): ICD-10-CM

## 2024-05-09 PROCEDURE — 3017F COLORECTAL CA SCREEN DOC REV: CPT | Performed by: SURGERY

## 2024-05-09 PROCEDURE — 3023F SPIROM DOC REV: CPT | Performed by: SURGERY

## 2024-05-09 PROCEDURE — 4004F PT TOBACCO SCREEN RCVD TLK: CPT | Performed by: SURGERY

## 2024-05-09 PROCEDURE — 3074F SYST BP LT 130 MM HG: CPT | Performed by: SURGERY

## 2024-05-09 PROCEDURE — 99213 OFFICE O/P EST LOW 20 MIN: CPT | Performed by: SURGERY

## 2024-05-09 PROCEDURE — G8417 CALC BMI ABV UP PARAM F/U: HCPCS | Performed by: SURGERY

## 2024-05-09 PROCEDURE — 3079F DIAST BP 80-89 MM HG: CPT | Performed by: SURGERY

## 2024-05-09 PROCEDURE — G8427 DOCREV CUR MEDS BY ELIG CLIN: HCPCS | Performed by: SURGERY

## 2024-05-09 RX ORDER — CETIRIZINE HYDROCHLORIDE 10 MG/1
10 TABLET ORAL DAILY PRN
COMMUNITY
Start: 2024-04-20

## 2024-05-09 RX ORDER — DOXYCYCLINE HYCLATE 100 MG
100 TABLET ORAL
COMMUNITY
Start: 2024-04-20

## 2024-05-09 RX ORDER — PREDNISONE 20 MG/1
20 TABLET ORAL
COMMUNITY
Start: 2024-04-20

## 2024-05-09 NOTE — PROGRESS NOTES
route Exp: 5/09/25  Dispense: 1 each; Refill: 0    3. Chronic obstructive pulmonary disease, unspecified COPD type (HCC)  - Handicap Placard MISC; by Does not apply route Exp: 5/09/25  Dispense: 1 each; Refill: 0    4. Chronic low back pain with sciatica, sciatica laterality unspecified, unspecified back pain laterality  - Handicap Placard MISC; by Does not apply route Exp: 5/09/25  Dispense: 1 each; Refill: 0      Orders Placed This Encounter   Medications    Handicap Placard MISC     Sig: by Does not apply route Exp: 5/09/25     Dispense:  1 each     Refill:  0       No follow-ups on file.    Koki Nicolas, DO

## 2024-05-10 ASSESSMENT — ENCOUNTER SYMPTOMS
DIARRHEA: 0
COUGH: 0
CONSTIPATION: 0
NAUSEA: 0

## 2024-05-14 ENCOUNTER — TELEPHONE (OUTPATIENT)
Dept: CARDIOLOGY CLINIC | Age: 54
End: 2024-05-14

## 2024-05-14 NOTE — TELEPHONE ENCOUNTER
The patient's cardiac condition is not prohibitory to undergo surgery. The patient's cardiac risk is moderate based upon my evaluation and testing. Stable to proceed.     They can hold her aspirin as necessary.    Can see us after surgery.

## 2024-05-14 NOTE — TELEPHONE ENCOUNTER
CARDIAC CLEARANCE REQUEST    What type of procedure are you having: open reduction internal fixation left foot fracture  General Anesthesia  Are you taking any blood thinners: low dose ASA    When is your procedure scheduled for: 5/17/24    What physician is performing your procedure:Dr [place]    Phone Number: [phone]    Fax number to send the letter: [phone]

## 2024-05-14 NOTE — TELEPHONE ENCOUNTER
05/14 attempted to call pt @ [phone] (Home Phone)  And phone stated \" the number you are trying to reach is not accepting calls at the moment\" will try again at later time

## 2024-05-14 NOTE — TELEPHONE ENCOUNTER
Letter created and faxed. Attempted to reach pt, left vm to call office back to schedule appt.    Front- please get pt scheduled for ov with JAMIEP

## 2024-05-16 DIAGNOSIS — R00.0 TACHYCARDIA: ICD-10-CM

## 2024-05-16 DIAGNOSIS — F17.200 SMOKER: ICD-10-CM

## 2024-05-16 DIAGNOSIS — R06.00 DYSPNEA, UNSPECIFIED TYPE: ICD-10-CM

## 2024-05-16 RX ORDER — CARVEDILOL 6.25 MG/1
6.25 TABLET ORAL 2 TIMES DAILY
Qty: 60 TABLET | Refills: 0 | Status: SHIPPED | OUTPATIENT
Start: 2024-05-16

## 2024-05-16 RX ORDER — FLUTICASONE PROPIONATE AND SALMETEROL XINAFOATE 115; 21 UG/1; UG/1
2 AEROSOL, METERED RESPIRATORY (INHALATION) 2 TIMES DAILY
Qty: 12 G | Refills: 0 | Status: SHIPPED | OUTPATIENT
Start: 2024-05-16

## 2024-05-17 ENCOUNTER — OFFICE VISIT (OUTPATIENT)
Dept: URGENT CARE | Age: 54
End: 2024-05-17

## 2024-05-17 VITALS
TEMPERATURE: 97.8 F | BODY MASS INDEX: 34.3 KG/M2 | RESPIRATION RATE: 16 BRPM | HEART RATE: 96 BPM | WEIGHT: 260 LBS | OXYGEN SATURATION: 95 % | SYSTOLIC BLOOD PRESSURE: 135 MMHG | DIASTOLIC BLOOD PRESSURE: 83 MMHG

## 2024-05-17 DIAGNOSIS — Z01.818 PREOP GENERAL PHYSICAL EXAM: Primary | ICD-10-CM

## 2024-05-17 ASSESSMENT — VISUAL ACUITY: OU: 1

## 2024-05-17 NOTE — PROGRESS NOTES
97.8 °F (36.6 °C)   TempSrc: Oral   SpO2: 95%   Weight: 117.9 kg (260 lb)     CURRENT MEDICATIONS    Current Outpatient Rx   Medication Sig Dispense Refill    ADVAIR -21 MCG/ACT inhaler INHALE 2 PUFFS BY MOUTH TWICE A DAY 12 g 0    carvedilol (COREG) 6.25 MG tablet TAKE 1 TABLET BY MOUTH 2 TIMES A DAY 60 tablet 0    Aspirin (BUFFERIN LOW DOSE PO) Take 81 mg by mouth daily      predniSONE (DELTASONE) 20 MG tablet 1 tablet      Handicap Placard MISC by Does not apply route Exp: 5/09/25 1 each 0    atorvastatin (LIPITOR) 80 MG tablet TAKE 1 TABLET BY MOUTH DAILY 30 tablet 1    nicotine polacrilex (NICORETTE) 4 MG gum Take 1 each by mouth every 2 hours as needed for Smoking cessation (maximum: 24 pieces/day); 100 each 5    nicotine (NICODERM CQ) 14 MG/24HR Place 1 patch onto the skin every 24 hours 28 patch 0    varenicline (CHANTIX CONTINUING MONTH MARILUZ) 1 MG tablet Take 1 tablet by mouth 2 times daily 60 tablet 3    mirtazapine (REMERON) 7.5 MG tablet TAKE ONE TABLET BY MOUTH ONCE NIGHTLY 90 tablet 0    gabapentin (NEURONTIN) 300 MG capsule TAKE ONE CAPSULE BY MOUTH EVERY MORNING AND TAKE ONE CAPSULE BY MOUTH EVERY EVENING FOR 60 DAYS 60 capsule 0    furosemide (LASIX) 20 MG tablet TAKE 1 TABLET BY MOUTH DAILY AS NEEDED FOR LEG SWELLING 30 tablet 1    valsartan (DIOVAN) 80 MG tablet TAKE 1 TABLET BY MOUTH DAILY 90 tablet 3    pantoprazole (PROTONIX) 40 MG tablet TAKE 1 TABLET BY MOUTH TWICE A  tablet 0    amLODIPine (NORVASC) 5 MG tablet Take 1 tablet by mouth daily 90 tablet 1    busPIRone (BUSPAR) 15 MG tablet 15 mg      risperiDONE (RISPERDAL) 0.5 MG tablet 1 tablet      tiZANidine (ZANAFLEX) 4 MG tablet TAKE ONE TABLET BY MOUTH THREE TIMES A DAY AS NEEDED FOR BACK PAIN 90 tablet 3    fluticasone (FLONASE) 50 MCG/ACT nasal spray SPRAY ONE SPRAY IN EACH NOSTRIL DAILY 16 g 0    diphenhydrAMINE (BENADRYL) 25 MG capsule Take 1 capsule by mouth nightly as needed for Sleep 60 capsule 3    methadone 10 MG/5ML

## 2024-05-17 NOTE — PATIENT INSTRUCTIONS
No acute physical findings in this limited setting that contraindicate surgery in this patient previously unknown to me.  EKG today within normal limits.  We will fax the physical form, EKG, and my notes today to the fax number provided.

## 2024-05-17 NOTE — TELEPHONE ENCOUNTER
5/17- second attempt. Called pt @[phone]. Unable to lvm. Received an automated msg stating, \"the number you are trying to reach is not accepting calls at this time, please try your call again later\". Pt needs annual appt with [facility] scheduled.     5/23/2023 [facility]

## 2024-05-20 NOTE — TELEPHONE ENCOUNTER
Pt has a new number listed in chart [phone]. Tried calling that number. Number goes straight to vm, vm is not setup yet. Created letter to have pt return call back to office to schedule with [facility] for next available.

## 2024-05-29 DIAGNOSIS — F17.200 SMOKER: ICD-10-CM

## 2024-05-29 DIAGNOSIS — R06.00 DYSPNEA, UNSPECIFIED TYPE: ICD-10-CM

## 2024-05-29 RX ORDER — FLUTICASONE PROPIONATE AND SALMETEROL XINAFOATE 115; 21 UG/1; UG/1
2 AEROSOL, METERED RESPIRATORY (INHALATION) 2 TIMES DAILY
Qty: 12 G | Refills: 0 | Status: SHIPPED | OUTPATIENT
Start: 2024-05-29

## 2024-06-03 DIAGNOSIS — R00.0 TACHYCARDIA: ICD-10-CM

## 2024-06-03 DIAGNOSIS — G89.29 CHRONIC LOW BACK PAIN WITH SCIATICA, SCIATICA LATERALITY UNSPECIFIED, UNSPECIFIED BACK PAIN LATERALITY: ICD-10-CM

## 2024-06-03 DIAGNOSIS — M54.40 CHRONIC LOW BACK PAIN WITH SCIATICA, SCIATICA LATERALITY UNSPECIFIED, UNSPECIFIED BACK PAIN LATERALITY: ICD-10-CM

## 2024-06-03 RX ORDER — CARVEDILOL 6.25 MG/1
6.25 TABLET ORAL 2 TIMES DAILY
Qty: 60 TABLET | Refills: 0 | Status: SHIPPED | OUTPATIENT
Start: 2024-06-03

## 2024-06-03 RX ORDER — GABAPENTIN 300 MG/1
CAPSULE ORAL
Qty: 60 CAPSULE | Refills: 0 | Status: SHIPPED | OUTPATIENT
Start: 2024-06-03 | End: 2024-07-03

## 2024-06-05 DIAGNOSIS — R05.9 COUGH, UNSPECIFIED TYPE: ICD-10-CM

## 2024-06-06 RX ORDER — ALBUTEROL SULFATE 2.5 MG/3ML
SOLUTION RESPIRATORY (INHALATION)
Qty: 360 ML | Refills: 10 | Status: SHIPPED | OUTPATIENT
Start: 2024-06-06

## 2024-07-06 DIAGNOSIS — F41.8 MIXED ANXIETY AND DEPRESSIVE DISORDER: ICD-10-CM

## 2024-07-08 RX ORDER — DULOXETIN HYDROCHLORIDE 60 MG/1
CAPSULE, DELAYED RELEASE ORAL
Qty: 60 CAPSULE | Refills: 5 | Status: SHIPPED | OUTPATIENT
Start: 2024-07-08

## 2024-07-09 NOTE — TELEPHONE ENCOUNTER
7/9- called pt @[phone] [facility] informing pt to call back to schedule annual appt with [facility].

## 2024-07-09 NOTE — TELEPHONE ENCOUNTER
Last ov: 5/23/23 [facility]  Upcoming ov: no upcoming appt    Lipid- 11/28/23    Front please call pt for yearly ov with [facility]

## 2024-07-12 RX ORDER — ATORVASTATIN CALCIUM 80 MG/1
TABLET, FILM COATED ORAL
Qty: 30 TABLET | Refills: 3 | Status: SHIPPED | OUTPATIENT
Start: 2024-07-12

## 2024-07-13 DIAGNOSIS — R00.0 TACHYCARDIA: ICD-10-CM

## 2024-07-13 DIAGNOSIS — F41.8 MIXED ANXIETY AND DEPRESSIVE DISORDER: ICD-10-CM

## 2024-07-13 DIAGNOSIS — K22.70 BARRETT'S ESOPHAGUS DETERMINED BY ENDOSCOPY: ICD-10-CM

## 2024-07-13 DIAGNOSIS — I10 BENIGN ESSENTIAL HYPERTENSION: ICD-10-CM

## 2024-07-13 DIAGNOSIS — K21.9 GASTROESOPHAGEAL REFLUX DISEASE WITHOUT ESOPHAGITIS: ICD-10-CM

## 2024-07-15 RX ORDER — CARVEDILOL 6.25 MG/1
6.25 TABLET ORAL 2 TIMES DAILY
Qty: 60 TABLET | Refills: 0 | Status: SHIPPED | OUTPATIENT
Start: 2024-07-15

## 2024-07-15 RX ORDER — PANTOPRAZOLE SODIUM 40 MG/1
TABLET, DELAYED RELEASE ORAL
Qty: 180 TABLET | Refills: 0 | Status: SHIPPED | OUTPATIENT
Start: 2024-07-15

## 2024-07-15 RX ORDER — ATORVASTATIN CALCIUM 80 MG/1
TABLET, FILM COATED ORAL
Qty: 90 TABLET | Refills: 0 | Status: SHIPPED | OUTPATIENT
Start: 2024-07-15

## 2024-07-15 RX ORDER — MIRTAZAPINE 7.5 MG/1
7.5 TABLET, FILM COATED ORAL NIGHTLY
Qty: 90 TABLET | Refills: 0 | Status: SHIPPED | OUTPATIENT
Start: 2024-07-15

## 2024-07-15 RX ORDER — AMLODIPINE BESYLATE 5 MG/1
5 TABLET ORAL DAILY
Qty: 90 TABLET | Refills: 1 | Status: SHIPPED | OUTPATIENT
Start: 2024-07-15

## 2024-07-15 NOTE — TELEPHONE ENCOUNTER
Future Appointments   Date Time Provider Department Center   10/8/2024 10:30 AM Winnie Durham MD [facility] 4/19/2024

## 2024-07-21 DIAGNOSIS — M54.40 CHRONIC LOW BACK PAIN WITH SCIATICA, SCIATICA LATERALITY UNSPECIFIED, UNSPECIFIED BACK PAIN LATERALITY: ICD-10-CM

## 2024-07-21 DIAGNOSIS — G89.29 CHRONIC LOW BACK PAIN WITH SCIATICA, SCIATICA LATERALITY UNSPECIFIED, UNSPECIFIED BACK PAIN LATERALITY: ICD-10-CM

## 2024-07-22 RX ORDER — TIZANIDINE 4 MG/1
TABLET ORAL
Qty: 90 TABLET | Refills: 3 | Status: SHIPPED | OUTPATIENT
Start: 2024-07-22

## 2024-07-22 NOTE — TELEPHONE ENCOUNTER
4/19/2024    Future Appointments   Date Time Provider Department Center   10/8/2024 10:30 AM Winnie Durham MD [facility]

## 2024-08-06 NOTE — TELEPHONE ENCOUNTER
Future Appointments   Date Time Provider Department Center   10/8/2024 10:30 AM Winnie Durham MD [facility] 4/19/2024

## 2024-08-07 RX ORDER — BUSPIRONE HYDROCHLORIDE 10 MG/1
TABLET ORAL
Qty: 60 TABLET | Refills: 10 | OUTPATIENT
Start: 2024-08-07

## 2024-08-07 RX ORDER — ALBUTEROL SULFATE 90 UG/1
AEROSOL, METERED RESPIRATORY (INHALATION)
Qty: 18 G | Refills: 10 | Status: SHIPPED | OUTPATIENT
Start: 2024-08-07

## 2024-08-14 DIAGNOSIS — M54.40 CHRONIC LOW BACK PAIN WITH SCIATICA, SCIATICA LATERALITY UNSPECIFIED, UNSPECIFIED BACK PAIN LATERALITY: ICD-10-CM

## 2024-08-14 DIAGNOSIS — G89.29 CHRONIC LOW BACK PAIN WITH SCIATICA, SCIATICA LATERALITY UNSPECIFIED, UNSPECIFIED BACK PAIN LATERALITY: ICD-10-CM

## 2024-08-14 NOTE — TELEPHONE ENCOUNTER
4/19/2024      Future Appointments   Date Time Provider Department Center   10/8/2024 10:30 AM Winnie Durham MD [facility]

## 2024-08-15 RX ORDER — GABAPENTIN 300 MG/1
CAPSULE ORAL
Qty: 60 CAPSULE | Refills: 0 | Status: SHIPPED | OUTPATIENT
Start: 2024-08-15 | End: 2024-09-14

## 2024-08-23 DIAGNOSIS — F17.200 SMOKER: ICD-10-CM

## 2024-08-23 RX ORDER — NICOTINE 14 MG/24H
PATCH, EXTENDED RELEASE TRANSDERMAL
Qty: 14 PATCH | Refills: 0 | Status: SHIPPED | OUTPATIENT
Start: 2024-08-23

## 2024-08-23 NOTE — TELEPHONE ENCOUNTER
Future Appointments   Date Time Provider Department Center   10/8/2024 10:30 AM Winnie Durham MD [facility] 4/19/2024

## 2024-09-04 DIAGNOSIS — J43.9 PULMONARY EMPHYSEMA, UNSPECIFIED EMPHYSEMA TYPE (HCC): ICD-10-CM

## 2024-09-07 RX ORDER — BUSPIRONE HYDROCHLORIDE 10 MG/1
TABLET ORAL
Qty: 60 TABLET | Refills: 10 | OUTPATIENT
Start: 2024-09-07

## 2024-09-10 NOTE — TELEPHONE ENCOUNTER
Please check with patient if he needs this medication , was filled August with atleast 60 day supply, please check dose and pharmacy,

## 2024-09-11 DIAGNOSIS — R00.0 TACHYCARDIA: ICD-10-CM

## 2024-09-11 RX ORDER — CARVEDILOL 6.25 MG/1
6.25 TABLET ORAL 2 TIMES DAILY
Qty: 60 TABLET | Refills: 0 | Status: SHIPPED | OUTPATIENT
Start: 2024-09-11

## 2024-09-11 NOTE — TELEPHONE ENCOUNTER
Called and unable to L/M   Form faxed to [phone] and placed in scan bin for abstracting.      Marley THOMAS (R))

## 2024-09-17 NOTE — TELEPHONE ENCOUNTER
Patient sees psychiatry, who was prescribing. I wanted to check and see if he needs the  refill, dose and pharmacy

## 2024-09-18 RX ORDER — BUSPIRONE HYDROCHLORIDE 10 MG/1
TABLET ORAL
Qty: 60 TABLET | Refills: 10 | OUTPATIENT
Start: 2024-09-18

## 2024-09-24 ENCOUNTER — TELEPHONE (OUTPATIENT)
Dept: FAMILY MEDICINE CLINIC | Age: 54
End: 2024-09-24

## 2024-09-24 RX ORDER — BUSPIRONE HYDROCHLORIDE 10 MG/1
TABLET ORAL
Qty: 60 TABLET | Refills: 10 | OUTPATIENT
Start: 2024-09-24

## 2024-10-05 DIAGNOSIS — G89.29 CHRONIC LOW BACK PAIN WITH SCIATICA, SCIATICA LATERALITY UNSPECIFIED, UNSPECIFIED BACK PAIN LATERALITY: ICD-10-CM

## 2024-10-05 DIAGNOSIS — M54.40 CHRONIC LOW BACK PAIN WITH SCIATICA, SCIATICA LATERALITY UNSPECIFIED, UNSPECIFIED BACK PAIN LATERALITY: ICD-10-CM

## 2024-10-07 RX ORDER — GABAPENTIN 300 MG/1
CAPSULE ORAL
Qty: 60 CAPSULE | Refills: 0 | Status: SHIPPED | OUTPATIENT
Start: 2024-10-07 | End: 2024-11-06

## 2024-10-09 ENCOUNTER — OFFICE VISIT (OUTPATIENT)
Dept: FAMILY MEDICINE CLINIC | Age: 54
End: 2024-10-09
Payer: COMMERCIAL

## 2024-10-09 VITALS
BODY MASS INDEX: 36.28 KG/M2 | HEART RATE: 108 BPM | SYSTOLIC BLOOD PRESSURE: 130 MMHG | DIASTOLIC BLOOD PRESSURE: 100 MMHG | TEMPERATURE: 98.3 F | RESPIRATION RATE: 16 BRPM | WEIGHT: 275 LBS | OXYGEN SATURATION: 93 %

## 2024-10-09 DIAGNOSIS — J01.90 ACUTE BACTERIAL SINUSITIS: ICD-10-CM

## 2024-10-09 DIAGNOSIS — F17.200 SMOKER: ICD-10-CM

## 2024-10-09 DIAGNOSIS — R42 DIZZINESS: ICD-10-CM

## 2024-10-09 DIAGNOSIS — F41.8 MIXED ANXIETY AND DEPRESSIVE DISORDER: ICD-10-CM

## 2024-10-09 DIAGNOSIS — B96.89 ACUTE BACTERIAL SINUSITIS: ICD-10-CM

## 2024-10-09 DIAGNOSIS — J30.9 ALLERGIC RHINITIS, UNSPECIFIED SEASONALITY, UNSPECIFIED TRIGGER: ICD-10-CM

## 2024-10-09 DIAGNOSIS — I25.10 CORONARY ARTERY DISEASE WITHOUT ANGINA PECTORIS, UNSPECIFIED VESSEL OR LESION TYPE, UNSPECIFIED WHETHER NATIVE OR TRANSPLANTED HEART: ICD-10-CM

## 2024-10-09 DIAGNOSIS — F33.2 SEVERE EPISODE OF RECURRENT MAJOR DEPRESSIVE DISORDER, WITHOUT PSYCHOTIC FEATURES (HCC): ICD-10-CM

## 2024-10-09 DIAGNOSIS — R05.1 ACUTE COUGH: Primary | ICD-10-CM

## 2024-10-09 LAB
INFLUENZA A ANTIGEN, POC: NORMAL
INFLUENZA B ANTIGEN, POC: NORMAL
Lab: 0
PERFORMING INSTRUMENT: NORMAL
QC PASS/FAIL: NORMAL
SARS-COV-2, POC: NORMAL

## 2024-10-09 PROCEDURE — 99214 OFFICE O/P EST MOD 30 MIN: CPT | Performed by: STUDENT IN AN ORGANIZED HEALTH CARE EDUCATION/TRAINING PROGRAM

## 2024-10-09 PROCEDURE — G8427 DOCREV CUR MEDS BY ELIG CLIN: HCPCS | Performed by: STUDENT IN AN ORGANIZED HEALTH CARE EDUCATION/TRAINING PROGRAM

## 2024-10-09 PROCEDURE — 3017F COLORECTAL CA SCREEN DOC REV: CPT | Performed by: STUDENT IN AN ORGANIZED HEALTH CARE EDUCATION/TRAINING PROGRAM

## 2024-10-09 PROCEDURE — 87426 SARSCOV CORONAVIRUS AG IA: CPT | Performed by: STUDENT IN AN ORGANIZED HEALTH CARE EDUCATION/TRAINING PROGRAM

## 2024-10-09 PROCEDURE — 3080F DIAST BP >= 90 MM HG: CPT | Performed by: STUDENT IN AN ORGANIZED HEALTH CARE EDUCATION/TRAINING PROGRAM

## 2024-10-09 PROCEDURE — 4004F PT TOBACCO SCREEN RCVD TLK: CPT | Performed by: STUDENT IN AN ORGANIZED HEALTH CARE EDUCATION/TRAINING PROGRAM

## 2024-10-09 PROCEDURE — G8484 FLU IMMUNIZE NO ADMIN: HCPCS | Performed by: STUDENT IN AN ORGANIZED HEALTH CARE EDUCATION/TRAINING PROGRAM

## 2024-10-09 PROCEDURE — 3075F SYST BP GE 130 - 139MM HG: CPT | Performed by: STUDENT IN AN ORGANIZED HEALTH CARE EDUCATION/TRAINING PROGRAM

## 2024-10-09 PROCEDURE — 87804 INFLUENZA ASSAY W/OPTIC: CPT | Performed by: STUDENT IN AN ORGANIZED HEALTH CARE EDUCATION/TRAINING PROGRAM

## 2024-10-09 PROCEDURE — G8417 CALC BMI ABV UP PARAM F/U: HCPCS | Performed by: STUDENT IN AN ORGANIZED HEALTH CARE EDUCATION/TRAINING PROGRAM

## 2024-10-09 RX ORDER — NICOTINE 21 MG/24HR
1 PATCH, TRANSDERMAL 24 HOURS TRANSDERMAL EVERY 24 HOURS
Qty: 28 PATCH | Refills: 0 | Status: SHIPPED | OUTPATIENT
Start: 2024-10-09

## 2024-10-09 RX ORDER — MIRTAZAPINE 7.5 MG/1
7.5 TABLET, FILM COATED ORAL NIGHTLY
COMMUNITY

## 2024-10-09 RX ORDER — VARENICLINE TARTRATE 1 MG/1
1 TABLET, FILM COATED ORAL 2 TIMES DAILY
Qty: 60 TABLET | Refills: 3 | Status: SHIPPED | OUTPATIENT
Start: 2024-10-09

## 2024-10-09 RX ORDER — FLUTICASONE PROPIONATE 50 MCG
SPRAY, SUSPENSION (ML) NASAL
Qty: 16 G | Refills: 0 | Status: SHIPPED | OUTPATIENT
Start: 2024-10-09

## 2024-10-09 RX ORDER — TRAZODONE HYDROCHLORIDE 50 MG/1
50 TABLET, FILM COATED ORAL
COMMUNITY
Start: 2024-08-19

## 2024-10-09 SDOH — ECONOMIC STABILITY: FOOD INSECURITY: WITHIN THE PAST 12 MONTHS, THE FOOD YOU BOUGHT JUST DIDN'T LAST AND YOU DIDN'T HAVE MONEY TO GET MORE.: NEVER TRUE

## 2024-10-09 SDOH — ECONOMIC STABILITY: FOOD INSECURITY: WITHIN THE PAST 12 MONTHS, YOU WORRIED THAT YOUR FOOD WOULD RUN OUT BEFORE YOU GOT MONEY TO BUY MORE.: NEVER TRUE

## 2024-10-09 SDOH — ECONOMIC STABILITY: INCOME INSECURITY: HOW HARD IS IT FOR YOU TO PAY FOR THE VERY BASICS LIKE FOOD, HOUSING, MEDICAL CARE, AND HEATING?: NOT VERY HARD

## 2024-10-09 ASSESSMENT — PATIENT HEALTH QUESTIONNAIRE - PHQ9
10. IF YOU CHECKED OFF ANY PROBLEMS, HOW DIFFICULT HAVE THESE PROBLEMS MADE IT FOR YOU TO DO YOUR WORK, TAKE CARE OF THINGS AT HOME, OR GET ALONG WITH OTHER PEOPLE: NOT DIFFICULT AT ALL
7. TROUBLE CONCENTRATING ON THINGS, SUCH AS READING THE NEWSPAPER OR WATCHING TELEVISION: NOT AT ALL
SUM OF ALL RESPONSES TO PHQ QUESTIONS 1-9: 0
SUM OF ALL RESPONSES TO PHQ QUESTIONS 1-9: 0
6. FEELING BAD ABOUT YOURSELF - OR THAT YOU ARE A FAILURE OR HAVE LET YOURSELF OR YOUR FAMILY DOWN: NOT AT ALL
2. FEELING DOWN, DEPRESSED OR HOPELESS: NOT AT ALL
4. FEELING TIRED OR HAVING LITTLE ENERGY: NOT AT ALL
9. THOUGHTS THAT YOU WOULD BE BETTER OFF DEAD, OR OF HURTING YOURSELF: NOT AT ALL
5. POOR APPETITE OR OVEREATING: NOT AT ALL
SUM OF ALL RESPONSES TO PHQ QUESTIONS 1-9: 0
SUM OF ALL RESPONSES TO PHQ9 QUESTIONS 1 & 2: 0
8. MOVING OR SPEAKING SO SLOWLY THAT OTHER PEOPLE COULD HAVE NOTICED. OR THE OPPOSITE, BEING SO FIGETY OR RESTLESS THAT YOU HAVE BEEN MOVING AROUND A LOT MORE THAN USUAL: NOT AT ALL
SUM OF ALL RESPONSES TO PHQ QUESTIONS 1-9: 0
3. TROUBLE FALLING OR STAYING ASLEEP: NOT AT ALL
1. LITTLE INTEREST OR PLEASURE IN DOING THINGS: NOT AT ALL

## 2024-10-09 NOTE — PATIENT INSTRUCTIONS
- Coreg ./carvedilol are you taking?      - Negative for COVID-19 and flu   - Start antibiotic Augmentin twice daily 7 days - Take with probiotic or yogurt   - Use Flonase twice daily 1 week along   - Use expectorant Mucinex DM   Increase daily water intake while using expectorant. A humidifier or steam inhalation (as during a hot shower)     Call or return if no improvement in 1 week, new or worsening symptoms, fever, chest pain, or worsening cough.      Call and see cardiology for the heart and lightheadedness

## 2024-10-09 NOTE — PROGRESS NOTES
[facility]  [address]  [address]  [place], [place] [address]  Tel: [phone]      10/9/2024     Refugio Valentin II (:  1970) is a 54 y.o. male, here for evaluation of the following medical concerns:  Chief Complaint   Patient presents with    Cough    Shortness of Breath       HPI  Patient is a 54-year-old male history of HTN,, alcoholic hepatitis, Ha esophagus, CAD, COPD, depression, GERD, IV drug use history, who presents for follow-up on back pain.     Dizziness : Sitting in the car, feeling drunk. Can't keep in the lanes. Uncertain if with positional changes. More lightheadedness. Hot and sweating. Thinks it may be related to anxiety   Lab Results   Component Value Date    TSH 2.54 2024       Cough  This is a new problem. The current episode started in the past 7 days. The problem has been unchanged. The cough is Non-productive (clear). Associated symptoms include ear congestion, nasal congestion, a sore throat, shortness of breath and wheezing. Pertinent negatives include no chest pain (right sided chronic , old), chills, ear pain, fever, headaches, heartburn, hemoptysis, postnasal drip or rhinorrhea. Risk factors for lung disease include smoking/tobacco exposure. He has tried OTC cough suppressant (zytrec , benadryl and Mucinex) for the symptoms. The treatment provided no relief. His past medical history is significant for emphysema. There is no history of COPD.   Shortness of Breath  Associated symptoms include a sore throat and wheezing. Pertinent negatives include no abdominal pain, chest pain (right sided chronic , old), ear pain, fever, headaches, hemoptysis, leg swelling, rhinorrhea or vomiting. There is no history of COPD.           Patient has gained 30 pounds in the last 3 months.  Patient reports that he is not overeating, but is also not eating healthy.  Shortness of breath was the worst yesterday, minimal cough and sneezing.  No other URI symptoms.

## 2024-10-12 DIAGNOSIS — K22.70 BARRETT'S ESOPHAGUS DETERMINED BY ENDOSCOPY: ICD-10-CM

## 2024-10-12 DIAGNOSIS — K21.9 GASTROESOPHAGEAL REFLUX DISEASE WITHOUT ESOPHAGITIS: ICD-10-CM

## 2024-10-14 RX ORDER — PANTOPRAZOLE SODIUM 40 MG/1
TABLET, DELAYED RELEASE ORAL
Qty: 180 TABLET | Refills: 0 | Status: SHIPPED | OUTPATIENT
Start: 2024-10-14

## 2024-10-26 NOTE — TELEPHONE ENCOUNTER
Last seen: 9/24/20    Next apt: None (when med was filled on 12/7, there were no refills provided b/c notes indicate that pt is due for appointment). He cancelled his PSG and 31-90 day f/u and declined to reschedule. Yes...

## 2025-02-03 DIAGNOSIS — F41.8 MIXED ANXIETY AND DEPRESSIVE DISORDER: ICD-10-CM

## 2025-02-03 DIAGNOSIS — I10 BENIGN ESSENTIAL HYPERTENSION: ICD-10-CM

## 2025-02-04 NOTE — TELEPHONE ENCOUNTER
Last OV w/ [facility] 05/23/2023. Please call to schedule f/u with [facility]SP prior to refill.     Plan:  1. Order echocardiogram~ shortness of breath  2. Order Lexiscan stress test ~ shortness of breath  3. Cardiac clearance will be provided once results of cardiac testing   4. Tobacco Cessation ~ high risk factor heart attack or stroke  ~1-800-QUIT-NOW  5. Refer to pulmonary ~ tobacco abuse, shortness of breath  6. Follow up based on testing results we will call you to discuss. If  within normal limits one year.

## 2025-02-05 RX ORDER — VALSARTAN 80 MG/1
80 TABLET ORAL DAILY
Qty: 90 TABLET | Refills: 1 | Status: SHIPPED | OUTPATIENT
Start: 2025-02-05

## 2025-02-05 RX ORDER — DULOXETIN HYDROCHLORIDE 60 MG/1
CAPSULE, DELAYED RELEASE ORAL
Qty: 60 CAPSULE | Refills: 5 | Status: SHIPPED | OUTPATIENT
Start: 2025-02-05

## 2025-02-06 NOTE — TELEPHONE ENCOUNTER
2/6 Second attempt: Called [phone]. Could not lvm, mail box not set up.  Next step, send letter

## 2025-02-10 NOTE — TELEPHONE ENCOUNTER
2.10.2025- third attempt  Called pt at [phone], couldn`t lvm due to vm not being set up  Schedule w/ [facility]  Mailing letter

## 2025-02-11 RX ORDER — ATORVASTATIN CALCIUM 80 MG/1
TABLET, FILM COATED ORAL
Qty: 30 TABLET | Refills: 0 | Status: SHIPPED | OUTPATIENT
Start: 2025-02-11 | End: 2025-02-13

## 2025-02-13 RX ORDER — ATORVASTATIN CALCIUM 80 MG/1
TABLET, FILM COATED ORAL
Qty: 15 TABLET | Refills: 0 | Status: SHIPPED | OUTPATIENT
Start: 2025-02-13

## 2025-03-20 DIAGNOSIS — K21.9 GASTROESOPHAGEAL REFLUX DISEASE WITHOUT ESOPHAGITIS: ICD-10-CM

## 2025-03-20 DIAGNOSIS — K22.70 BARRETT'S ESOPHAGUS DETERMINED BY ENDOSCOPY: ICD-10-CM

## 2025-03-21 RX ORDER — PANTOPRAZOLE SODIUM 40 MG/1
40 TABLET, DELAYED RELEASE ORAL 2 TIMES DAILY
Qty: 180 TABLET | Refills: 0 | Status: SHIPPED | OUTPATIENT
Start: 2025-03-21

## 2025-04-02 ENCOUNTER — TELEPHONE (OUTPATIENT)
Dept: CARDIOLOGY CLINIC | Age: 55
End: 2025-04-02

## 2025-04-02 DIAGNOSIS — I25.10 CORONARY ARTERY DISEASE INVOLVING NATIVE CORONARY ARTERY OF NATIVE HEART WITHOUT ANGINA PECTORIS: ICD-10-CM

## 2025-04-02 DIAGNOSIS — Z79.899 MEDICATION MANAGEMENT: Primary | ICD-10-CM

## 2025-04-07 NOTE — TELEPHONE ENCOUNTER
Last Office Visit: 05/23/23 Provider: ERIC  **Is provider OOT? No    Next Office Visit: Visit date not found Provider: ERIC  **If no OV, when does pt need to be seen? Patient needs follow up prior to refill being sent. Please inform patient to have fasting blood work completed at [facility] lab.   **Has patient already had 30 day supply? Yes    LAST LABS:   Liver:   Lab Results   Component Value Date    ALT 30 11/28/2023    AST 28 11/28/2023    ALKPHOS 133 (H) 11/28/2023    BILITOT 0.4 11/28/2023      Lipid:   Lab Results   Component Value Date    CHOL 129 08/29/2022    TRIG 142 08/29/2022    HDL 38 (L) 11/28/2023    LDL 68 11/28/2023    VLDL 20 11/28/2023     Lab orders needed? yes - Lipids and LFTs  fasting

## 2025-04-07 NOTE — TELEPHONE ENCOUNTER
4.7.25- first attempt  Called pt at [phone], couldn`t lvm due to vm not being set up  Schedule w/ [facility] and complete labs

## 2025-04-14 RX ORDER — ATORVASTATIN CALCIUM 80 MG/1
80 TABLET, FILM COATED ORAL DAILY
Qty: 15 TABLET | Refills: 10 | OUTPATIENT
Start: 2025-04-14

## 2025-04-15 DIAGNOSIS — G89.29 CHRONIC LOW BACK PAIN WITH SCIATICA, SCIATICA LATERALITY UNSPECIFIED, UNSPECIFIED BACK PAIN LATERALITY: ICD-10-CM

## 2025-04-15 DIAGNOSIS — M54.40 CHRONIC LOW BACK PAIN WITH SCIATICA, SCIATICA LATERALITY UNSPECIFIED, UNSPECIFIED BACK PAIN LATERALITY: ICD-10-CM

## 2025-04-22 ENCOUNTER — TELEPHONE (OUTPATIENT)
Dept: FAMILY MEDICINE CLINIC | Age: 55
End: 2025-04-22

## 2025-04-22 DIAGNOSIS — K22.70 BARRETT'S ESOPHAGUS DETERMINED BY ENDOSCOPY: ICD-10-CM

## 2025-04-22 DIAGNOSIS — K21.9 GASTROESOPHAGEAL REFLUX DISEASE WITHOUT ESOPHAGITIS: ICD-10-CM

## 2025-04-22 RX ORDER — PANTOPRAZOLE SODIUM 40 MG/1
40 TABLET, DELAYED RELEASE ORAL 2 TIMES DAILY
Qty: 180 TABLET | Refills: 0 | Status: SHIPPED | OUTPATIENT
Start: 2025-04-22

## 2025-05-01 DIAGNOSIS — R05.9 COUGH, UNSPECIFIED TYPE: ICD-10-CM

## 2025-05-02 RX ORDER — ALBUTEROL SULFATE 0.83 MG/ML
SOLUTION RESPIRATORY (INHALATION)
Qty: 360 ML | Refills: 11 | Status: SHIPPED | OUTPATIENT
Start: 2025-05-02

## 2025-05-02 NOTE — TELEPHONE ENCOUNTER
Future Appointments   Date Time Provider Department Center   9/8/2025  1:30 PM Winnie Durham MD [facility] 10/9/2024

## 2025-06-23 ENCOUNTER — OFFICE VISIT (OUTPATIENT)
Dept: FAMILY MEDICINE CLINIC | Age: 55
End: 2025-06-23

## 2025-06-23 VITALS
DIASTOLIC BLOOD PRESSURE: 119 MMHG | WEIGHT: 269 LBS | RESPIRATION RATE: 16 BRPM | OXYGEN SATURATION: 97 % | SYSTOLIC BLOOD PRESSURE: 165 MMHG | TEMPERATURE: 97.8 F | HEART RATE: 111 BPM | BODY MASS INDEX: 35.49 KG/M2

## 2025-06-23 DIAGNOSIS — F41.1 GENERALIZED ANXIETY DISORDER: ICD-10-CM

## 2025-06-23 DIAGNOSIS — Z72.0 TOBACCO ABUSE: ICD-10-CM

## 2025-06-23 DIAGNOSIS — R00.0 TACHYCARDIA: ICD-10-CM

## 2025-06-23 DIAGNOSIS — J44.9 CHRONIC OBSTRUCTIVE PULMONARY DISEASE, UNSPECIFIED COPD TYPE (HCC): ICD-10-CM

## 2025-06-23 DIAGNOSIS — K22.70 BARRETT'S ESOPHAGUS DETERMINED BY ENDOSCOPY: ICD-10-CM

## 2025-06-23 DIAGNOSIS — J01.90 ACUTE SINUSITIS, RECURRENCE NOT SPECIFIED, UNSPECIFIED LOCATION: ICD-10-CM

## 2025-06-23 DIAGNOSIS — I10 BENIGN ESSENTIAL HYPERTENSION: ICD-10-CM

## 2025-06-23 DIAGNOSIS — Z91.199 NONCOMPLIANCE: Primary | ICD-10-CM

## 2025-06-23 DIAGNOSIS — I48.92 ATRIAL FLUTTER, UNSPECIFIED TYPE (HCC): ICD-10-CM

## 2025-06-23 DIAGNOSIS — F33.2 SEVERE EPISODE OF RECURRENT MAJOR DEPRESSIVE DISORDER, WITHOUT PSYCHOTIC FEATURES (HCC): ICD-10-CM

## 2025-06-23 PROBLEM — F11.20 PATIENT ON METHADONE MAINTENANCE THERAPY (HCC): Status: RESOLVED | Noted: 2024-01-09 | Resolved: 2025-06-23

## 2025-06-23 RX ORDER — AMLODIPINE BESYLATE 5 MG/1
5 TABLET ORAL DAILY
Qty: 90 TABLET | Refills: 1 | Status: SHIPPED | OUTPATIENT
Start: 2025-06-23

## 2025-06-23 RX ORDER — DICYCLOMINE HCL 20 MG
TABLET ORAL
COMMUNITY
Start: 2025-05-13

## 2025-06-23 RX ORDER — BUSPIRONE HYDROCHLORIDE 15 MG/1
15 TABLET ORAL 2 TIMES DAILY
Qty: 60 TABLET | Refills: 2 | Status: SHIPPED | OUTPATIENT
Start: 2025-06-23

## 2025-06-23 RX ORDER — ONDANSETRON 4 MG/1
4 TABLET, ORALLY DISINTEGRATING ORAL EVERY 8 HOURS PRN
COMMUNITY
Start: 2024-05-22

## 2025-06-23 RX ORDER — FAMOTIDINE 20 MG/1
20 TABLET, FILM COATED ORAL 2 TIMES DAILY
Qty: 60 TABLET | Refills: 3 | Status: SHIPPED | OUTPATIENT
Start: 2025-06-23

## 2025-06-23 RX ORDER — CARVEDILOL 6.25 MG/1
6.25 TABLET ORAL 2 TIMES DAILY
Qty: 60 TABLET | Refills: 0 | Status: SHIPPED | OUTPATIENT
Start: 2025-06-23

## 2025-06-23 RX ORDER — BUSPIRONE HYDROCHLORIDE 15 MG/1
15 TABLET ORAL DAILY
Qty: 90 TABLET | Refills: 0 | Status: SHIPPED | OUTPATIENT
Start: 2025-06-23 | End: 2025-06-23

## 2025-06-23 RX ORDER — VALSARTAN 80 MG/1
80 TABLET ORAL DAILY
Qty: 90 TABLET | Refills: 1 | Status: SHIPPED | OUTPATIENT
Start: 2025-06-23

## 2025-06-23 RX ORDER — FLUTICASONE PROPIONATE 50 MCG
1 SPRAY, SUSPENSION (ML) NASAL DAILY
Qty: 16 G | Refills: 0 | Status: SHIPPED | OUTPATIENT
Start: 2025-06-23

## 2025-06-23 SDOH — ECONOMIC STABILITY: FOOD INSECURITY: WITHIN THE PAST 12 MONTHS, THE FOOD YOU BOUGHT JUST DIDN'T LAST AND YOU DIDN'T HAVE MONEY TO GET MORE.: NEVER TRUE

## 2025-06-23 SDOH — ECONOMIC STABILITY: FOOD INSECURITY: WITHIN THE PAST 12 MONTHS, YOU WORRIED THAT YOUR FOOD WOULD RUN OUT BEFORE YOU GOT MONEY TO BUY MORE.: NEVER TRUE

## 2025-06-23 ASSESSMENT — ENCOUNTER SYMPTOMS
SORE THROAT: 1
BELCHING: 0
HOARSE VOICE: 0
RHINORRHEA: 0
NAUSEA: 1
ABDOMINAL PAIN: 1
COUGH: 1
SHORTNESS OF BREATH: 1
HEARTBURN: 1
WHEEZING: 1
GLOBUS SENSATION: 0
CHOKING: 0

## 2025-06-23 ASSESSMENT — PATIENT HEALTH QUESTIONNAIRE - PHQ9
8. MOVING OR SPEAKING SO SLOWLY THAT OTHER PEOPLE COULD HAVE NOTICED. OR THE OPPOSITE, BEING SO FIGETY OR RESTLESS THAT YOU HAVE BEEN MOVING AROUND A LOT MORE THAN USUAL: NOT AT ALL
5. POOR APPETITE OR OVEREATING: NOT AT ALL
SUM OF ALL RESPONSES TO PHQ QUESTIONS 1-9: 0
3. TROUBLE FALLING OR STAYING ASLEEP: NOT AT ALL
9. THOUGHTS THAT YOU WOULD BE BETTER OFF DEAD, OR OF HURTING YOURSELF: NOT AT ALL
4. FEELING TIRED OR HAVING LITTLE ENERGY: NOT AT ALL
6. FEELING BAD ABOUT YOURSELF - OR THAT YOU ARE A FAILURE OR HAVE LET YOURSELF OR YOUR FAMILY DOWN: NOT AT ALL
SUM OF ALL RESPONSES TO PHQ QUESTIONS 1-9: 0
1. LITTLE INTEREST OR PLEASURE IN DOING THINGS: NOT AT ALL
7. TROUBLE CONCENTRATING ON THINGS, SUCH AS READING THE NEWSPAPER OR WATCHING TELEVISION: NOT AT ALL
2. FEELING DOWN, DEPRESSED OR HOPELESS: NOT AT ALL
DEPRESSION UNABLE TO ASSESS: PT REFUSES
SUM OF ALL RESPONSES TO PHQ QUESTIONS 1-9: 0
SUM OF ALL RESPONSES TO PHQ QUESTIONS 1-9: 0

## 2025-06-23 NOTE — PROGRESS NOTES
[address]  [address]  [place], [place] [address]  Tel: [phone]      2025     Refugio Valentin II (:  1970) is a 55 y.o. male, here for evaluation of the following medical concerns:  Chief Complaint   Patient presents with    Diarrhea       HPI  Patient is a 54-year-old male history of HTN, alcoholic hepatitis, Ha esophagus, CAD, COPD, depression, GERD, IV drug use history, who presents for  diagestive problems, diarreaha, gERD X 2 weeks    Noncompliance: Patient has been off of multiple medications due to recent stressors of taking care of his mother.  Mother has dementia    Abdominal pain  Patient was seen in the emergency room at [facility] 2025 with vomiting and diarrhea for the last 2 weeks.  Endorses history of peptic ulcer and hypertension.    CBC no acute findings.  Lipase and hepatic function within normal limits.  Alk phosphatase 142  CT abdomen pelvis no acute abnormality.  Patient was given IV fluids, morphine and Zofran  Recently prescribed pantoprazole 40 mg twice daily, patient is also taking Prevacid, Pepto-Bismol and Bentyl.       Gastroesophageal Reflux  He complains of abdominal pain, coughing, heartburn, nausea, a sore throat and wheezing. He reports no belching, no chest pain, no choking, no dysphagia, no early satiety, no globus sensation or no hoarse voice. This is a recurrent problem. The current episode started more than 1 month ago. The problem occurs constantly. The problem has been gradually worsening. The heartburn duration is more than one hour. The heartburn is of moderate intensity. The heartburn wakes him from sleep. The heartburn limits his activity. The heartburn changes with position. The symptoms are aggravated by caffeine (1 coke). Associated symptoms include fatigue. Pertinent negatives include no anemia, melena, muscle weakness or weight loss. Risk factors include Ha's esophagus. He has tried an antacid and a diet change for the symptoms.

## 2025-06-23 NOTE — PATIENT INSTRUCTIONS
REFLUX   FAMOTIDINE 20 mg once daily given at bedtime in addition to pANTOPRAZOLE 40 MG TWICE DAILY  - STOP PREVACID     RESTART BLOOD PRESSURE MEDICATIONS   VALSARTAN 80 MG DAILY, COREG 6.25 MG BID , AMLODIPINE 5 MG DAILY       fLONASE NASAL SPRAY DAILY

## 2025-07-01 DIAGNOSIS — J44.9 CHRONIC OBSTRUCTIVE PULMONARY DISEASE, UNSPECIFIED COPD TYPE (HCC): Primary | ICD-10-CM

## 2025-07-02 RX ORDER — ALBUTEROL SULFATE 90 UG/1
2 INHALANT RESPIRATORY (INHALATION) EVERY 6 HOURS PRN
Qty: 18 G | Refills: 11 | Status: SHIPPED | OUTPATIENT
Start: 2025-07-02

## 2025-07-02 RX ORDER — DICYCLOMINE HCL 20 MG
20 TABLET ORAL EVERY 8 HOURS PRN
Qty: 90 TABLET | Refills: 0 | Status: SHIPPED | OUTPATIENT
Start: 2025-07-02

## 2025-07-02 RX ORDER — ALBUTEROL SULFATE 90 UG/1
INHALANT RESPIRATORY (INHALATION)
Qty: 18 G | Refills: 11 | Status: SHIPPED | OUTPATIENT
Start: 2025-07-02 | End: 2025-07-02

## 2025-07-02 NOTE — TELEPHONE ENCOUNTER
Called and verified what pharmacy [facility]. Patient while on the phone also asked for Jennifer please review and send if appropriate

## 2025-07-02 NOTE — TELEPHONE ENCOUNTER
Future Appointments   Date Time Provider Department Center   7/23/2025  1:40 PM Curtis Morrison MD [place] [facility] ECC DEP   9/8/2025  1:30 PM Winnie Durham MD [facility] 6/23/2025

## 2025-07-18 DIAGNOSIS — K22.70 BARRETT'S ESOPHAGUS DETERMINED BY ENDOSCOPY: ICD-10-CM

## 2025-07-18 DIAGNOSIS — K21.9 GASTROESOPHAGEAL REFLUX DISEASE WITHOUT ESOPHAGITIS: ICD-10-CM

## 2025-07-18 RX ORDER — PANTOPRAZOLE SODIUM 40 MG/1
40 TABLET, DELAYED RELEASE ORAL 2 TIMES DAILY
Qty: 180 TABLET | Refills: 0 | Status: SHIPPED | OUTPATIENT
Start: 2025-07-18

## 2025-07-18 NOTE — TELEPHONE ENCOUNTER
Future Appointments   Date Time Provider Department Center   7/23/2025  1:40 PM Curtis Morrison MD [place] [facility] DEP   9/8/2025  1:30 PM Winnie Durham MD [facility] 6/23/2025

## 2025-07-31 DIAGNOSIS — J43.9 PULMONARY EMPHYSEMA, UNSPECIFIED EMPHYSEMA TYPE (HCC): ICD-10-CM

## 2025-07-31 DIAGNOSIS — F41.8 MIXED ANXIETY AND DEPRESSIVE DISORDER: ICD-10-CM

## 2025-08-01 RX ORDER — DULOXETIN HYDROCHLORIDE 60 MG/1
60 CAPSULE, DELAYED RELEASE ORAL 2 TIMES DAILY
Qty: 60 CAPSULE | Refills: 3 | Status: SHIPPED | OUTPATIENT
Start: 2025-08-01

## 2025-08-01 NOTE — TELEPHONE ENCOUNTER
Lov 6/23/2025  Future Appointments   Date Time Provider Department Center   9/8/2025  1:30 PM Winnie Durham MD [facility]